# Patient Record
Sex: FEMALE | Race: WHITE | NOT HISPANIC OR LATINO | Employment: PART TIME | URBAN - NONMETROPOLITAN AREA
[De-identification: names, ages, dates, MRNs, and addresses within clinical notes are randomized per-mention and may not be internally consistent; named-entity substitution may affect disease eponyms.]

---

## 2017-01-03 ENCOUNTER — OFFICE VISIT (OUTPATIENT)
Dept: URGENT CARE | Facility: PHYSICIAN GROUP | Age: 67
End: 2017-01-03
Payer: MEDICARE

## 2017-01-03 VITALS
HEART RATE: 76 BPM | BODY MASS INDEX: 20.83 KG/M2 | WEIGHT: 122 LBS | RESPIRATION RATE: 16 BRPM | OXYGEN SATURATION: 100 % | DIASTOLIC BLOOD PRESSURE: 78 MMHG | SYSTOLIC BLOOD PRESSURE: 124 MMHG | TEMPERATURE: 98.2 F | HEIGHT: 64 IN

## 2017-01-03 DIAGNOSIS — J01.00 ACUTE NON-RECURRENT MAXILLARY SINUSITIS: ICD-10-CM

## 2017-01-03 PROCEDURE — 99214 OFFICE O/P EST MOD 30 MIN: CPT | Performed by: PHYSICIAN ASSISTANT

## 2017-01-03 RX ORDER — DOXYCYCLINE HYCLATE 100 MG
100 TABLET ORAL 2 TIMES DAILY
Qty: 20 TAB | Refills: 0 | Status: SHIPPED | OUTPATIENT
Start: 2017-01-03 | End: 2017-02-16

## 2017-01-03 NOTE — MR AVS SNAPSHOT
"        Tanisha Haile   1/3/2017 1:10 PM   Office Visit   MRN: [id number]    Department:  [facility]   Dept Phone:  [phone]    Description:  Female : 1950   Provider:  Trini Oakley PA-C           Reason for Visit     Nasal Congestion X 3 days, treated for laryngitis a week ago with antibiotics      Allergies as of 1/3/2017     Allergen Noted Reactions    Tylenol 2014   Anaphylaxis    Asa [Aspirin] 2012       Nsaids 2013   Rash, Itching    Pcn [Penicillins] 2012   Itching    Sulfa Drugs 2012         You were diagnosed with     Acute non-recurrent maxillary sinusitis   [[id number]]         Vital Signs     Blood Pressure Pulse Temperature Respirations Height Weight    124/78 mmHg 76 36.8 °C (98.2 °F) 16 1.626 m (5' 4.02\") 55.339 kg (122 lb)    Body Mass Index Oxygen Saturation Breastfeeding? Smoking Status          20.93 kg/m2 100% No Former Smoker        Basic Information     Date Of Birth Sex Race Ethnicity Preferred Language    1950 Female White Non- English      Your appointments     2017 11:40 AM   Established Patient with ELLEN Madsen   [facility] [address] [address] [phone]           You will be receiving a confirmation call a few days before your appointment from our automated call confirmation system.              Problem List              ICD-10-CM Priority Class Noted - Resolved    Dyslipidemia E78.5   10/24/2012 - Present    History of carpal tunnel release of both wrists Z98.890   2013 - Present    Vitamin D deficiency E55.9   2013 - Present    Neck pain M54.2   4/15/2013 - Present    Seasonal allergies J30.2   2013 - Present    Cerumen impaction H61.20   2013 - Present    Shingles B02.9   7/10/2013 - Present    Insomnia G47.00   2014 - Present    Headache R51   10/3/2014 - Present    Need for vaccination for measles Z23   2/3/2015 - " Present    Elevated TSH R94.6   7/9/2015 - Present    Stress at home F43.9   7/9/2015 - Present    Mid back pain on left side M54.9   8/14/2015 - Present    Osteoporosis M81.0   8/14/2015 - Present    Interstitial cystitis N30.10   7/7/2016 - Present    Need for Tdap vaccination Z23   10/4/2016 - Present      Health Maintenance        Date Due Completion Dates    IMM DTaP/Tdap/Td Vaccine (1 - Tdap) 5/13/1969 ---    IMM PNEUMOCOCCAL 65+ (ADULT) LOW/MEDIUM RISK SERIES (2 of 2 - PCV13) 12/22/2016 12/22/2015    IMM INFLUENZA (1) 10/2/2017 (Originally 9/1/2016) ---    MAMMOGRAM 1/5/2017 1/5/2016, 1/2/2015, 12/31/2013, 12/24/2012    PAP SMEAR 8/4/2017 8/4/2014, 8/4/2014 (Done)    Override on 8/4/2014: Done    BONE DENSITY 7/15/2020 7/15/2015, 12/24/2012    COLONOSCOPY 1/9/2024 1/9/2014            Current Immunizations     Pneumococcal polysaccharide vaccine (PPSV-23) 12/22/2015  2:20 PM      Below and/or attached are the medications your provider expects you to take. Review all of your home medications and newly ordered medications with your provider and/or pharmacist. Follow medication instructions as directed by your provider and/or pharmacist. Please keep your medication list with you and share with your provider. Update the information when medications are discontinued, doses are changed, or new medications (including over-the-counter products) are added; and carry medication information at all times in the event of emergency situations     Allergies:  TYLENOL - Anaphylaxis     ASA - (reactions not documented)     NSAIDS - Rash,Itching     PCN - Itching     SULFA DRUGS - (reactions not documented)               Medications  Valid as of: January 03, 2017 -  1:36 PM    Generic Name Brand Name Tablet Size Instructions for use    Calcium Carbonate-Vitamin D (Tab) Calcium Carbonate-Vitamin D 600-400 MG-UNIT Take  by mouth 2 Times a Day.          Cholecalciferol (Tab) vitamin D 2000 UNIT Take 1 Tab by mouth every day.           Doxycycline Hyclate (Tab) VIBRAMYCIN 100 MG Take 1 Tab by mouth 2 times a day.        Simvastatin (Tab) ZOCOR 20 MG TAKE 1 TABLET BY MOUTH EVERY EVENING        Tetanus-Diphth-Acell Pertussis (Suspension) ADACEL 5-2-15.5 LF-MCG/0.5 0.5 mL by Intramuscular route Once PRN for up to 1 dose.        ValACYclovir HCl (Tab) VALTREX 500 MG Take 1 Tab by mouth every day.        Vitamin E (Cap) VITAMIN E 400 UNIT Take 800 Units by mouth every day.        .                 Medicines prescribed today were sent to:     [facility] [address] [place] [address] - [address] [facility] [address]    [address] [facility] [address]    Phone: [phone] Fax: [phone]    Open 24 Hours?: Yes      Medication refill instructions:       If your prescription bottle indicates you have medication refills left, it is not necessary to call your provider’s office. Please contact your pharmacy and they will refill your medication.    If your prescription bottle indicates you do not have any refills left, you may request refills at any time through one of the following ways: The online LIN TV system (except Urgent Care), by calling your provider’s office, or by asking your pharmacy to contact your provider’s office with a refill request. Medication refills are processed only during regular business hours and may not be available until the next business day. Your provider may request additional information or to have a follow-up visit with you prior to refilling your medication.   *Please Note: Medication refills are assigned a new Rx number when refilled electronically. Your pharmacy may indicate that no refills were authorized even though a new prescription for the same medication is available at the pharmacy. Please request the medicine by name with the pharmacy before contacting your provider for a refill.           LIN TV Access Code: [email or web address]  Expires: 1/18/2017  1:28 PM    LIN TV  A secure, online tool to  manage your health information     [employer]’s Edmodo® is a secure, online tool that connects you to your personalized health information from the privacy of your home -- day or night - making it very easy for you to manage your healthcare. Once the activation process is completed, you can even access your medical information using the Edmodo margie, which is available for free in the Apple Margie store or Google Play store.     Edmodo provides the following levels of access (as shown below):   My Chart Features   [facility] Primary [facility] Doctor [facility]  Specialists [facility]  Urgent  Care Non-[facility]  Primary Care  Doctor   Email your healthcare team securely and privately 24/7 X X X    Manage appointments: schedule your next appointment; view details of past/upcoming appointments X      Request prescription refills. X      View recent personal medical records, including lab and immunizations X X X X   View health record, including health history, allergies, medications X X X X   Read reports about your outpatient visits, procedures, consult and ER notes X X X X   See your discharge summary, which is a recap of your hospital and/or ER visit that includes your diagnosis, lab results, and care plan. X X       How to register for Edmodo:  1. Go to  [email or web address].  2. Click on the Sign Up Now box, which takes you to the New Member Sign Up page. You will need to provide the following information:  a. Enter your Edmodo Access Code exactly as it appears at the top of this page. (You will not need to use this code after you’ve completed the sign-up process. If you do not sign up before the expiration date, you must request a new code.)   b. Enter your date of birth.   c. Enter your home email address.   d. Click Submit, and follow the next screen’s instructions.  3. Create a Edmodo ID. This will be your Edmodo login ID and cannot be changed, so think of one that is secure and easy to remember.  4. Create a Miiixt  password. You can change your password at any time.  5. Enter your Password Reset Question and Answer. This can be used at a later time if you forget your password.   6. Enter your e-mail address. This allows you to receive e-mail notifications when new information is available in [employer].  7. Click Sign Up. You can now view your health information.    For assistance activating your [employer] account, call [phone]

## 2017-01-03 NOTE — PROGRESS NOTES
Chief Complaint   Patient presents with   • Nasal Congestion     X 3 days, treated for laryngitis a week ago with antibiotics       HISTORY OF PRESENT ILLNESS: Patient is a 66 y.o. female who presents today for evaluation of persistent nasal congestion. Patient has had nasal congestion for a total of about 3 weeks now. It became acutely worse about 4 days ago. She was seen 12/22/16 and put on erythromycin. She states it did not help at all. She denies fever but continues to have pressure and pain in both cheeks. She has difficulty sleeping because of pressure. She recently lost her  to cancer. Over-the-counter medication is not helping.    Patient Active Problem List    Diagnosis Date Noted   • Need for Tdap vaccination 10/04/2016   • Interstitial cystitis 07/07/2016   • Mid back pain on left side 08/14/2015   • Osteoporosis 08/14/2015   • Elevated TSH 07/09/2015   • Stress at home 07/09/2015   • Need for vaccination for measles 02/03/2015   • Headache 10/03/2014   • Insomnia 02/27/2014   • Shingles 07/10/2013   • Cerumen impaction 06/05/2013   • Seasonal allergies 05/22/2013   • Neck pain 04/15/2013   • Vitamin D deficiency 02/28/2013   • History of carpal tunnel release of both wrists 01/16/2013   • Dyslipidemia 10/24/2012       Allergies:Tylenol; Asa; Nsaids; Pcn; and Sulfa drugs    Current Outpatient Prescriptions Ordered in [facility]   Medication Sig Dispense Refill   • doxycycline (VIBRAMYCIN) 100 MG Tab Take 1 Tab by mouth 2 times a day. 20 Tab 0   • simvastatin (ZOCOR) 20 MG Tab TAKE 1 TABLET BY MOUTH EVERY EVENING 90 Tab 3   • Calcium Carbonate-Vitamin D (CALTRATE 600+D) 600-400 MG-UNIT TABS Take  by mouth 2 Times a Day.       • Cholecalciferol (VITAMIN D) 2000 UNIT TABS Take 1 Tab by mouth every day. 30 Each 3   • vitamin e (VITAMIN E) 400 UNIT Cap Take 800 Units by mouth every day.     • tetanus-dipth-acell pertussis (ADACEL) 5-2-15.5 LF-MCG/0.5 Suspension 0.5 mL by Intramuscular route Once PRN for up  to 1 dose. 0.5 mL 0   • valacyclovir (VALTREX) 500 MG Tab Take 1 Tab by mouth every day. 90 Tab 3     No current Epic-ordered facility-administered medications on file.       Past Medical History   Diagnosis Date   • Hyperlipidemia    • Dyslipidemia 10/24/2012   • Cervical cancer screening 2013     Due to repeat around .   • Seasonal allergies 2013   • Insomnia 2014     Not sleeping, can't shut brain off Making her really tired during the day X 1 month plus but worse for a month   • Dysuria 10/2/2014     Seen  for uti, given macrobid Feels the same as  Feels more like vaginal pain     • Shingles        Social History   Substance Use Topics   • Smoking status: Former Smoker -- 1.00 packs/day for 50 years     Types: Cigarettes     Quit date: 2008   • Smokeless tobacco: Never Used   • Alcohol Use: 0.0 oz/week     0 Standard drinks or equivalent per week      Comment: 2 glasses of wine a day sometimes       Family Status   Relation Status Death Age   • Mother  83     heart failure   • Father  69     quad bpass, coma   • Brother Alive      handicap at a facility   • Brother Alive    • Brother Alive      Family History   Problem Relation Age of Onset   • Hypertension Mother    • Hyperlipidemia Mother    • Thyroid Mother    • Lung Disease Mother    • Heart Disease Father    • Hypertension Father    • Hyperlipidemia Father        ROS:   Review of Systems   Constitutional: Negative for fever, chills, weight loss and malaise/fatigue.   HENT: Negative for ear pain, nosebleeds, sore throat and neck pain.    Eyes: Negative for blurred vision.   Respiratory: Negative for cough, sputum production, shortness of breath and wheezing.    Cardiovascular: Negative for chest pain, palpitations, orthopnea and leg swelling.   Gastrointestinal: Negative for heartburn, nausea, vomiting and abdominal pain.   Genitourinary: Negative for dysuria, urgency and frequency.       Exam:  Blood pressure  "124/78, pulse 76, temperature 36.8 °C (98.2 °F), resp. rate 16, height 1.626 m (5' 4.02\"), weight 55.339 kg (122 lb), SpO2 100 %, not currently breastfeeding.  General: Normal appearing. No distress.  HEENT: Conjunctiva clear, lids without ptosis, ears normal shape and contour, canals are clear bilaterally, tympanic membranes are benign, nasal mucosa boggy bilaterally, oropharynx is without erythema, edema or exudates.  Pulmonary: Clear to ausculation and percussion.  Normal effort. No rales, ronchi, or wheezing.   Cardiovascular: Regular rate and rhythm without murmur.   Neurologic: Grossly nonfocal.  Lymph: No cervical lymphadenopathy noted.  Skin: No obvious lesions.  Psych: Normal mood. Alert and oriented x3. Judgment and insight is normal.    Assessment/Plan:  Take all medication as directed. Follow up for worsening or persistent symptoms.    1. Acute non-recurrent maxillary sinusitis  doxycycline (VIBRAMYCIN) 100 MG Tab         "

## 2017-01-05 ENCOUNTER — OFFICE VISIT (OUTPATIENT)
Dept: URGENT CARE | Facility: PHYSICIAN GROUP | Age: 67
End: 2017-01-05
Payer: MEDICARE

## 2017-01-05 VITALS
HEART RATE: 94 BPM | BODY MASS INDEX: 20.83 KG/M2 | HEIGHT: 64 IN | DIASTOLIC BLOOD PRESSURE: 84 MMHG | RESPIRATION RATE: 14 BRPM | SYSTOLIC BLOOD PRESSURE: 122 MMHG | WEIGHT: 122 LBS | TEMPERATURE: 98.6 F | OXYGEN SATURATION: 98 %

## 2017-01-05 DIAGNOSIS — F41.9 ANXIETY: ICD-10-CM

## 2017-01-05 LAB
APPEARANCE UR: NORMAL
BILIRUB UR STRIP-MCNC: NORMAL MG/DL
COLOR UR AUTO: YELLOW
GLUCOSE UR STRIP.AUTO-MCNC: NORMAL MG/DL
KETONES UR STRIP.AUTO-MCNC: NORMAL MG/DL
LEUKOCYTE ESTERASE UR QL STRIP.AUTO: NORMAL
NITRITE UR QL STRIP.AUTO: NORMAL
PH UR STRIP.AUTO: 6 [PH] (ref 5–8)
PROT UR QL STRIP: NORMAL MG/DL
RBC UR QL AUTO: NORMAL
SP GR UR STRIP.AUTO: 1.01
UROBILINOGEN UR STRIP-MCNC: NORMAL MG/DL

## 2017-01-05 PROCEDURE — 99214 OFFICE O/P EST MOD 30 MIN: CPT | Performed by: PHYSICIAN ASSISTANT

## 2017-01-05 PROCEDURE — 81002 URINALYSIS NONAUTO W/O SCOPE: CPT | Performed by: PHYSICIAN ASSISTANT

## 2017-01-05 RX ORDER — HYDROXYZINE HYDROCHLORIDE 25 MG/1
25 TABLET, FILM COATED ORAL 3 TIMES DAILY PRN
Qty: 30 TAB | Refills: 0 | Status: SHIPPED | OUTPATIENT
Start: 2017-01-05 | End: 2017-09-25

## 2017-01-05 RX ORDER — LORAZEPAM 0.5 MG/1
0.5 TABLET ORAL NIGHTLY PRN
Qty: 30 TAB | Refills: 0 | Status: SHIPPED | OUTPATIENT
Start: 2017-01-05 | End: 2017-02-21 | Stop reason: SDUPTHER

## 2017-01-05 NOTE — MR AVS SNAPSHOT
"        Tanisha Haile   2017 3:55 PM   Office Visit   MRN: [id number]    Department:  [facility]   Dept Phone:  [phone]    Description:  Female : 1950   Provider:  Nikita Stiles PA-C           Reason for Visit     Anxiety           Allergies as of 2017     Allergen Noted Reactions    Tylenol 2014   Anaphylaxis    Asa [Aspirin] 2012       Nsaids 2013   Rash, Itching    Pcn [Penicillins] 2012   Itching    Sulfa Drugs 2012         You were diagnosed with     Anxiety   [988857]         Vital Signs     Blood Pressure Pulse Temperature Respirations Height Weight    122/84 mmHg 94 37 °C (98.6 °F) 14 1.626 m (5' 4.02\") 55.339 kg (122 lb)    Body Mass Index Oxygen Saturation Smoking Status             20.93 kg/m2 98% Former Smoker         Basic Information     Date Of Birth Sex Race Ethnicity Preferred Language    1950 Female White Non- English      Your appointments     2017  3:00 PM   Established Patient with ELLEN Madsen   [facility] [address] [address] [phone]           You will be receiving a confirmation call a few days before your appointment from our automated call confirmation system.              Problem List              ICD-10-CM Priority Class Noted - Resolved    Dyslipidemia E78.5   10/24/2012 - Present    History of carpal tunnel release of both wrists Z98.890   2013 - Present    Vitamin D deficiency E55.9   2013 - Present    Neck pain M54.2   4/15/2013 - Present    Seasonal allergies J30.2   2013 - Present    Cerumen impaction H61.20   2013 - Present    Shingles B02.9   7/10/2013 - Present    Insomnia G47.00   2014 - Present    Headache R51   10/3/2014 - Present    Need for vaccination for measles Z23   2/3/2015 - Present    Elevated TSH R94.6   2015 - Present    Stress at home F43.9   2015 - Present    Mid back pain on left " side M54.9   8/14/2015 - Present    Osteoporosis M81.0   8/14/2015 - Present    Interstitial cystitis N30.10   7/7/2016 - Present    Need for Tdap vaccination Z23   10/4/2016 - Present      Health Maintenance        Date Due Completion Dates    IMM DTaP/Tdap/Td Vaccine (1 - Tdap) 5/13/1969 ---    IMM PNEUMOCOCCAL 65+ (ADULT) LOW/MEDIUM RISK SERIES (2 of 2 - PCV13) 12/22/2016 12/22/2015    MAMMOGRAM 1/5/2017 1/5/2016, 1/2/2015, 12/31/2013, 12/24/2012    IMM INFLUENZA (1) 10/2/2017 (Originally 9/1/2016) ---    PAP SMEAR 8/4/2017 8/4/2014, 8/4/2014 (Done)    Override on 8/4/2014: Done    BONE DENSITY 7/15/2020 7/15/2015, 12/24/2012    COLONOSCOPY 1/9/2024 1/9/2014            Current Immunizations     Pneumococcal polysaccharide vaccine (PPSV-23) 12/22/2015  2:20 PM      Below and/or attached are the medications your provider expects you to take. Review all of your home medications and newly ordered medications with your provider and/or pharmacist. Follow medication instructions as directed by your provider and/or pharmacist. Please keep your medication list with you and share with your provider. Update the information when medications are discontinued, doses are changed, or new medications (including over-the-counter products) are added; and carry medication information at all times in the event of emergency situations     Allergies:  TYLENOL - Anaphylaxis     ASA - (reactions not documented)     NSAIDS - Rash,Itching     PCN - Itching     SULFA DRUGS - (reactions not documented)               Medications  Valid as of: January 05, 2017 -  4:46 PM    Generic Name Brand Name Tablet Size Instructions for use    Calcium Carbonate-Vitamin D (Tab) Calcium Carbonate-Vitamin D 600-400 MG-UNIT Take  by mouth 2 Times a Day.          Cholecalciferol (Tab) vitamin D 2000 UNIT Take 1 Tab by mouth every day.        Doxycycline Hyclate (Tab) VIBRAMYCIN 100 MG Take 1 Tab by mouth 2 times a day.        HydrOXYzine HCl (Tab) ATARAX 25  MG Take 1 Tab by mouth 3 times a day as needed for Anxiety.        LORazepam (Tab) ATIVAN 0.5 MG Take 1 Tab by mouth at bedtime as needed for Anxiety.        Simvastatin (Tab) ZOCOR 20 MG TAKE 1 TABLET BY MOUTH EVERY EVENING        Tetanus-Diphth-Acell Pertussis (Suspension) ADACEL 5-2-15.5 LF-MCG/0.5 0.5 mL by Intramuscular route Once PRN for up to 1 dose.        ValACYclovir HCl (Tab) VALTREX 500 MG Take 1 Tab by mouth every day.        Vitamin E (Cap) VITAMIN E 400 UNIT Take 800 Units by mouth every day.        .                 Medicines prescribed today were sent to:     [facility] [address] [place] [address] - [address] [facility] [address]    Phone: [phone] Fax: [phone]    Open 24 Hours?: Yes      Medication refill instructions:       If your prescription bottle indicates you have medication refills left, it is not necessary to call your provider’s office. Please contact your pharmacy and they will refill your medication.    If your prescription bottle indicates you do not have any refills left, you may request refills at any time through one of the following ways: The online Con[employer] system (except Urgent Care), by calling your provider’s office, or by asking your pharmacy to contact your provider’s office with a refill request. Medication refills are processed only during regular business hours and may not be available until the next business day. Your provider may request additional information or to have a follow-up visit with you prior to refilling your medication.   *Please Note: Medication refills are assigned a new Rx number when refilled electronically. Your pharmacy may indicate that no refills were authorized even though a new prescription for the same medication is available at the pharmacy. Please request the medicine by name with the pharmacy before contacting your provider for a refill.           Conversion Sound Access Code:  [email or web address]  Expires: 1/18/2017  1:28 PM    [employer]  A secure, online tool to manage your health information     [employer] [employer]’s [employer]® is a secure, online tool that connects you to your personalized health information from the privacy of your home -- day or night - making it very easy for you to manage your healthcare. Once the activation process is completed, you can even access your medical information using the [employer] margie, which is available for free in the Apple Margie store or Google Play store.     [employer] provides the following levels of access (as shown below):   My Chart Features   [facility] Primary Care Doctor [facility]  Specialists [facility]  Urgent  Care Non-[facility]  Primary Care  Doctor   Email your healthcare team securely and privately 24/7 X X X    Manage appointments: schedule your next appointment; view details of past/upcoming appointments X      Request prescription refills. X      View recent personal medical records, including lab and immunizations X X X X   View health record, including health history, allergies, medications X X X X   Read reports about your outpatient visits, procedures, consult and ER notes X X X X   See your discharge summary, which is a recap of your hospital and/or ER visit that includes your diagnosis, lab results, and care plan. X X       How to register for [employer]:  1. Go to  [email or web address].  2. Click on the Sign Up Now box, which takes you to the New Member Sign Up page. You will need to provide the following information:  a. Enter your [employer] Access Code exactly as it appears at the top of this page. (You will not need to use this code after you’ve completed the sign-up process. If you do not sign up before the expiration date, you must request a new code.)   b. Enter your date of birth.   c. Enter your home email address.   d. Click Submit, and follow the next screen’s instructions.  3. Create a [employer] ID. This will be your [employer] login ID and cannot be  changed, so think of one that is secure and easy to remember.  4. Create a [employer] password. You can change your password at any time.  5. Enter your Password Reset Question and Answer. This can be used at a later time if you forget your password.   6. Enter your e-mail address. This allows you to receive e-mail notifications when new information is available in [employer].  7. Click Sign Up. You can now view your health information.    For assistance activating your [employer] account, call [phone]

## 2017-01-06 NOTE — PROGRESS NOTES
Chief Complaint   Patient presents with   • Anxiety       HISTORY OF PRESENT ILLNESS: Patient is a 66 y.o. female who presents today because she is having anxiety in the evenings. Her   6 days ago suddenly from an undiagnosed brain cancer . This patient has been having difficulty sleeping, fever in the evenings because she has alone, internal monologue and a lot of worries about finances and decision making around her 's death. She denies any suicidal or homicidal ideation    Patient Active Problem List    Diagnosis Date Noted   • Need for Tdap vaccination 10/04/2016   • Interstitial cystitis 2016   • Mid back pain on left side 2015   • Osteoporosis 2015   • Elevated TSH 2015   • Stress at home 2015   • Need for vaccination for measles 2015   • Headache 10/03/2014   • Insomnia 2014   • Shingles 07/10/2013   • Cerumen impaction 2013   • Seasonal allergies 2013   • Neck pain 04/15/2013   • Vitamin D deficiency 2013   • History of carpal tunnel release of both wrists 2013   • Dyslipidemia 10/24/2012       Allergies:Tylenol; Asa; Nsaids; Pcn; and Sulfa drugs    Current Outpatient Prescriptions Ordered in [facility]   Medication Sig Dispense Refill   • hydrOXYzine (ATARAX) 25 MG Tab Take 1 Tab by mouth 3 times a day as needed for Anxiety. 30 Tab 0   • lorazepam (ATIVAN) 0.5 MG Tab Take 1 Tab by mouth at bedtime as needed for Anxiety. 30 Tab 0   • doxycycline (VIBRAMYCIN) 100 MG Tab Take 1 Tab by mouth 2 times a day. 20 Tab 0   • vitamin e (VITAMIN E) 400 UNIT Cap Take 800 Units by mouth every day.     • tetanus-dipth-acell pertussis (ADACEL) 5-2-15.5 LF-MCG/0.5 Suspension 0.5 mL by Intramuscular route Once PRN for up to 1 dose. 0.5 mL 0   • simvastatin (ZOCOR) 20 MG Tab TAKE 1 TABLET BY MOUTH EVERY EVENING 90 Tab 3   • valacyclovir (VALTREX) 500 MG Tab Take 1 Tab by mouth every day. 90 Tab 3   • Calcium Carbonate-Vitamin D (CALTRATE 600+D)  600-400 MG-UNIT TABS Take  by mouth 2 Times a Day.       • Cholecalciferol (VITAMIN D) 2000 UNIT TABS Take 1 Tab by mouth every day. 30 Each 3     No current Epic-ordered facility-administered medications on file.       Past Medical History   Diagnosis Date   • Hyperlipidemia    • Dyslipidemia 10/24/2012   • Cervical cancer screening 2013     Due to repeat around .   • Seasonal allergies 2013   • Insomnia 2014     Not sleeping, can't shut brain off Making her really tired during the day X 1 month plus but worse for a month   • Dysuria 10/2/2014     Seen  for uti, given macrobid Feels the same as  Feels more like vaginal pain     • Shingles        Social History   Substance Use Topics   • Smoking status: Former Smoker -- 1.00 packs/day for 50 years     Types: Cigarettes     Quit date: 2008   • Smokeless tobacco: Never Used   • Alcohol Use: 0.0 oz/week     0 Standard drinks or equivalent per week      Comment: 2 glasses of wine a day sometimes       Family Status   Relation Status Death Age   • Mother  83     heart failure   • Father  69     quad bpass, coma   • Brother Alive      handicap at a facility   • Brother Alive    • Brother Alive      Family History   Problem Relation Age of Onset   • Hypertension Mother    • Hyperlipidemia Mother    • Thyroid Mother    • Lung Disease Mother    • Heart Disease Father    • Hypertension Father    • Hyperlipidemia Father        ROS:  Review of Systems   Constitutional: Negative for fever, chills, weight loss and malaise/fatigue.   HENT: Negative for ear pain, nosebleeds, congestion, sore throat and neck pain.    Eyes: Negative for blurred vision.   Respiratory: Negative for cough, sputum production, shortness of breath and wheezing.    Cardiovascular: Negative for chest pain, palpitations, orthopnea and leg swelling.   Gastrointestinal: Negative for heartburn, nausea, vomiting and abdominal pain.       Exam:  Blood pressure  "122/84, pulse 94, temperature 37 °C (98.6 °F), resp. rate 14, height 1.626 m (5' 4.02\"), weight 55.339 kg (122 lb), SpO2 98 %.  General:  Well nourished, well developed female in NAD, tearful when talking about her   Head:Normocephalic, atraumatic  Eyes: PERRLA, EOM within normal limits, no conjunctival injection, no scleral icterus, visual fields and acuity grossly intact.  Extremities: no clubbing, cyanosis, or edema.    Please note that this dictation was created using voice recognition software. I have made every reasonable attempt to correct obvious errors, but I expect that there are errors of grammar and possibly content that I did not discover before finalizing the note.    Assessment/Plan:  1. Anxiety  POCT Urinalysis    hydrOXYzine (ATARAX) 25 MG Tab    lorazepam (ATIVAN) 0.5 MG Tab       Followup with primary care in the next 7-10 days if not significantly improving, return to the urgent care or go to the emergency room sooner for any worsening of symptoms.         "

## 2017-01-09 ENCOUNTER — OFFICE VISIT (OUTPATIENT)
Dept: URGENT CARE | Facility: PHYSICIAN GROUP | Age: 67
End: 2017-01-09
Payer: MEDICARE

## 2017-01-09 VITALS
HEART RATE: 73 BPM | RESPIRATION RATE: 16 BRPM | WEIGHT: 122 LBS | BODY MASS INDEX: 20.83 KG/M2 | OXYGEN SATURATION: 98 % | HEIGHT: 64 IN | DIASTOLIC BLOOD PRESSURE: 68 MMHG | TEMPERATURE: 98.8 F | SYSTOLIC BLOOD PRESSURE: 104 MMHG

## 2017-01-09 DIAGNOSIS — J98.8 WHEEZING-ASSOCIATED RESPIRATORY INFECTION (WARI): ICD-10-CM

## 2017-01-09 PROCEDURE — 99214 OFFICE O/P EST MOD 30 MIN: CPT | Performed by: PHYSICIAN ASSISTANT

## 2017-01-09 RX ORDER — ALBUTEROL SULFATE 90 UG/1
2 AEROSOL, METERED RESPIRATORY (INHALATION) EVERY 4 HOURS PRN
Qty: 1 INHALER | Refills: 0 | Status: SHIPPED | OUTPATIENT
Start: 2017-01-09 | End: 2017-09-19

## 2017-01-09 NOTE — MR AVS SNAPSHOT
"        Tanisha Haile   2017 3:50 PM   Office Visit   MRN: [id number]    Department:  [facility]   Dept Phone:  [phone]    Description:  Female : 1950   Provider:  Nikita Stiles PA-C           Reason for Visit     Cough Pt states she was seen 16 with little improvement, Pt states when she was last seen R foot is now numb      Allergies as of 2017     Allergen Noted Reactions    Tylenol 2014   Anaphylaxis    Asa [Aspirin] 2012       Nsaids 2013   Rash, Itching    Pcn [Penicillins] 2012   Itching    Sulfa Drugs 2012         You were diagnosed with     Wheezing-associated respiratory infection (WARI)   [[id number]]         Vital Signs     Blood Pressure Pulse Temperature Respirations Height Weight    104/68 mmHg 73 37.1 °C (98.8 °F) 16 1.626 m (5' 4.02\") 55.339 kg (122 lb)    Body Mass Index Oxygen Saturation Breastfeeding? Smoking Status          20.93 kg/m2 98% No Former Smoker        Basic Information     Date Of Birth Sex Race Ethnicity Preferred Language    1950 Female White Non- English      Your appointments     2017  3:00 PM   Established Patient with ELLEN Madsen   [facility] [address] [address] [phone]           You will be receiving a confirmation call a few days before your appointment from our automated call confirmation system.              Problem List              ICD-10-CM Priority Class Noted - Resolved    Dyslipidemia E78.5   10/24/2012 - Present    History of carpal tunnel release of both wrists Z98.890   2013 - Present    Vitamin D deficiency E55.9   2013 - Present    Neck pain M54.2   4/15/2013 - Present    Seasonal allergies J30.2   2013 - Present    Cerumen impaction H61.20   2013 - Present    Shingles B02.9   7/10/2013 - Present    Insomnia G47.00   2014 - Present    Headache R51   10/3/2014 - Present    Need for " vaccination for measles Z23   2/3/2015 - Present    Elevated TSH R94.6   7/9/2015 - Present    Stress at home F43.9   7/9/2015 - Present    Mid back pain on left side M54.9   8/14/2015 - Present    Osteoporosis M81.0   8/14/2015 - Present    Interstitial cystitis N30.10   7/7/2016 - Present    Need for Tdap vaccination Z23   10/4/2016 - Present      Health Maintenance        Date Due Completion Dates    IMM DTaP/Tdap/Td Vaccine (1 - Tdap) 5/13/1969 ---    IMM PNEUMOCOCCAL 65+ (ADULT) LOW/MEDIUM RISK SERIES (2 of 2 - PCV13) 12/22/2016 12/22/2015    MAMMOGRAM 1/5/2017 1/5/2016, 1/2/2015, 12/31/2013, 12/24/2012    IMM INFLUENZA (1) 10/2/2017 (Originally 9/1/2016) ---    PAP SMEAR 8/4/2017 8/4/2014, 8/4/2014 (Done)    Override on 8/4/2014: Done    BONE DENSITY 7/15/2020 7/15/2015, 12/24/2012    COLONOSCOPY 1/9/2024 1/9/2014            Current Immunizations     Pneumococcal polysaccharide vaccine (PPSV-23) 12/22/2015  2:20 PM      Below and/or attached are the medications your provider expects you to take. Review all of your home medications and newly ordered medications with your provider and/or pharmacist. Follow medication instructions as directed by your provider and/or pharmacist. Please keep your medication list with you and share with your provider. Update the information when medications are discontinued, doses are changed, or new medications (including over-the-counter products) are added; and carry medication information at all times in the event of emergency situations     Allergies:  TYLENOL - Anaphylaxis     ASA - (reactions not documented)     NSAIDS - Rash,Itching     PCN - Itching     SULFA DRUGS - (reactions not documented)               Medications  Valid as of: January 09, 2017 -  5:05 PM    Generic Name Brand Name Tablet Size Instructions for use    Albuterol Sulfate (Aero Soln) albuterol 108 (90 BASE) MCG/ACT Inhale 2 Puffs by mouth every four hours as needed.        Calcium Carbonate-Vitamin D (Tab)  Calcium Carbonate-Vitamin D 600-400 MG-UNIT Take  by mouth 2 Times a Day.          Cholecalciferol (Tab) vitamin D 2000 UNIT Take 1 Tab by mouth every day.        Doxycycline Hyclate (Tab) VIBRAMYCIN 100 MG Take 1 Tab by mouth 2 times a day.        HydrOXYzine HCl (Tab) ATARAX 25 MG Take 1 Tab by mouth 3 times a day as needed for Anxiety.        LORazepam (Tab) ATIVAN 0.5 MG Take 1 Tab by mouth at bedtime as needed for Anxiety.        Simvastatin (Tab) ZOCOR 20 MG TAKE 1 TABLET BY MOUTH EVERY EVENING        Tetanus-Diphth-Acell Pertussis (Suspension) ADACEL 5-2-15.5 LF-MCG/0.5 0.5 mL by Intramuscular route Once PRN for up to 1 dose.        ValACYclovir HCl (Tab) VALTREX 500 MG Take 1 Tab by mouth every day.        Vitamin E (Cap) VITAMIN E 400 UNIT Take 800 Units by mouth every day.        .                 Medicines prescribed today were sent to:     [facility] [id number] [facility] [place] - [address] AT [place] [address]    [address] [facility] [address]    Phone: [phone] Fax: [phone]    Open 24 Hours?: No      Medication refill instructions:       If your prescription bottle indicates you have medication refills left, it is not necessary to call your provider’s office. Please contact your pharmacy and they will refill your medication.    If your prescription bottle indicates you do not have any refills left, you may request refills at any time through one of the following ways: The online Cup[employer] system (except Urgent Care), by calling your provider’s office, or by asking your pharmacy to contact your provider’s office with a refill request. Medication refills are processed only during regular business hours and may not be available until the next business day. Your provider may request additional information or to have a follow-up visit with you prior to refilling your medication.   *Please Note: Medication refills are assigned a new Rx number when refilled electronically. Your pharmacy may  indicate that no refills were authorized even though a new prescription for the same medication is available at the pharmacy. Please request the medicine by name with the pharmacy before contacting your provider for a refill.           [employer] Access Code: [email or web address]  Expires: 1/18/2017  1:28 PM    [employer]  A secure, online tool to manage your health information     [employer]’s [employer]® is a secure, online tool that connects you to your personalized health information from the privacy of your home -- day or night - making it very easy for you to manage your healthcare. Once the activation process is completed, you can even access your medical information using the [employer] margie, which is available for free in the Apple Margie store or Google Play store.     [employer] provides the following levels of access (as shown below):   My Chart Features   [facility] Doctor [facility]  Specialists [facility]  Urgent  Care [facility]  Primary Care  Doctor   Email your healthcare team securely and privately 24/7 X X X    Manage appointments: schedule your next appointment; view details of past/upcoming appointments X      Request prescription refills. X      View recent personal medical records, including lab and immunizations X X X X   View health record, including health history, allergies, medications X X X X   Read reports about your outpatient visits, procedures, consult and ER notes X X X X   See your discharge summary, which is a recap of your hospital and/or ER visit that includes your diagnosis, lab results, and care plan. X X       How to register for [employer]:  1. Go to  [email or web address].  2. Click on the Sign Up Now box, which takes you to the New Member Sign Up page. You will need to provide the following information:  a. Enter your [employer] Access Code exactly as it appears at the top of this page. (You will not need to use this code after you’ve completed the sign-up process. If you do not sign up  before the expiration date, you must request a new code.)   b. Enter your date of birth.   c. Enter your home email address.   d. Click Submit, and follow the next screen’s instructions.  3. Create a [employer] ID. This will be your [employer] login ID and cannot be changed, so think of one that is secure and easy to remember.  4. Create a [employer] password. You can change your password at any time.  5. Enter your Password Reset Question and Answer. This can be used at a later time if you forget your password.   6. Enter your e-mail address. This allows you to receive e-mail notifications when new information is available in [employer].  7. Click Sign Up. You can now view your health information.    For assistance activating your [employer] account, call [phone]

## 2017-01-10 NOTE — PROGRESS NOTES
Chief Complaint   Patient presents with   • Cough     Pt states she was seen 12/19/16 with little improvement, Pt states when she was last seen R foot is now numb       HISTORY OF PRESENT ILLNESS: Patient is a 66 y.o. female who presents today because she has no ongoing cough. His been going on for almost a month. She initially had a laryngitis, was treated for that and most of her symptoms went away, but she continues to have a cough, some intermittent phlegm production. She denies any fevers, chills, nausea, vomiting or diarrhea. She is under a lot of stress, as he recently lost her .    Patient Active Problem List    Diagnosis Date Noted   • Need for Tdap vaccination 10/04/2016   • Interstitial cystitis 07/07/2016   • Mid back pain on left side 08/14/2015   • Osteoporosis 08/14/2015   • Elevated TSH 07/09/2015   • Stress at home 07/09/2015   • Need for vaccination for measles 02/03/2015   • Headache 10/03/2014   • Insomnia 02/27/2014   • Shingles 07/10/2013   • Cerumen impaction 06/05/2013   • Seasonal allergies 05/22/2013   • Neck pain 04/15/2013   • Vitamin D deficiency 02/28/2013   • History of carpal tunnel release of both wrists 01/16/2013   • Dyslipidemia 10/24/2012       Allergies:Tylenol; Asa; Nsaids; Pcn; and Sulfa drugs    Current Outpatient Prescriptions Ordered in [facility]   Medication Sig Dispense Refill   • albuterol 108 (90 BASE) MCG/ACT Aero Soln inhalation aerosol Inhale 2 Puffs by mouth every four hours as needed. 1 Inhaler 0   • hydrOXYzine (ATARAX) 25 MG Tab Take 1 Tab by mouth 3 times a day as needed for Anxiety. 30 Tab 0   • lorazepam (ATIVAN) 0.5 MG Tab Take 1 Tab by mouth at bedtime as needed for Anxiety. 30 Tab 0   • simvastatin (ZOCOR) 20 MG Tab TAKE 1 TABLET BY MOUTH EVERY EVENING 90 Tab 3   • valacyclovir (VALTREX) 500 MG Tab Take 1 Tab by mouth every day. 90 Tab 3   • Calcium Carbonate-Vitamin D (CALTRATE 600+D) 600-400 MG-UNIT TABS Take  by mouth 2 Times a Day.       •  Cholecalciferol (VITAMIN D) 2000 UNIT TABS Take 1 Tab by mouth every day. 30 Each 3   • doxycycline (VIBRAMYCIN) 100 MG Tab Take 1 Tab by mouth 2 times a day. 20 Tab 0   • vitamin e (VITAMIN E) 400 UNIT Cap Take 800 Units by mouth every day.     • tetanus-dipth-acell pertussis (ADACEL) 5-2-15.5 LF-MCG/0.5 Suspension 0.5 mL by Intramuscular route Once PRN for up to 1 dose. 0.5 mL 0     No current Epic-ordered facility-administered medications on file.       Past Medical History   Diagnosis Date   • Hyperlipidemia    • Dyslipidemia 10/24/2012   • Cervical cancer screening 2013     Due to repeat around .   • Seasonal allergies 2013   • Insomnia 2014     Not sleeping, can't shut brain off Making her really tired during the day X 1 month plus but worse for a month   • Dysuria 10/2/2014     Seen  for uti, given macrobid Feels the same as  Feels more like vaginal pain     • Shingles        Social History   Substance Use Topics   • Smoking status: Former Smoker -- 1.00 packs/day for 50 years     Types: Cigarettes     Quit date: 2008   • Smokeless tobacco: Never Used   • Alcohol Use: 0.0 oz/week     0 Standard drinks or equivalent per week      Comment: 2 glasses of wine a day sometimes       Family Status   Relation Status Death Age   • Mother  83     heart failure   • Father  69     quad bpass, coma   • Brother Alive      handicap at a facility   • Brother Alive    • Brother Alive      Family History   Problem Relation Age of Onset   • Hypertension Mother    • Hyperlipidemia Mother    • Thyroid Mother    • Lung Disease Mother    • Heart Disease Father    • Hypertension Father    • Hyperlipidemia Father        ROS:  Review of Systems   Constitutional: Negative for fever, chills, weight loss and malaise/fatigue.   HENT: Negative for ear pain, nosebleeds, congestion, sore throat and neck pain.    Eyes: Negative for blurred vision.   Respiratory: Positive for cough, occasional  "sputum production, shortness of breath and no wheezing.    Cardiovascular: Negative for chest pain, palpitations, orthopnea and leg swelling.   Gastrointestinal: Negative for heartburn, nausea, vomiting and abdominal pain.   Genitourinary: Negative for dysuria, urgency and frequency.     Exam:  Blood pressure 104/68, pulse 73, temperature 37.1 °C (98.8 °F), resp. rate 16, height 1.626 m (5' 4.02\"), weight 55.339 kg (122 lb), SpO2 98 %, not currently breastfeeding.  General:  Well nourished, well developed female in NAD  Head:Normocephalic, atraumatic  Eyes: PERRLA, EOM within normal limits, no conjunctival injection, no scleral icterus, visual fields and acuity grossly intact.  Ears: Normal shape and symmetry, no tenderness, no discharge. External canals are without any significant edema or erythema. Tympanic membranes are without any inflammation, no effusion. Gross auditory acuity is intact  Nose: Symmetrical without tenderness, no discharge.  Mouth: reasonable hygiene, no erythema exudates or tonsillar enlargement.  Neck: no masses, range of motion within normal limits, no tracheal deviation. No obvious thyroid enlargement.  Pulmonary: chest is symmetrical with respiration, diminished with a few scattered wheezes bilaterally, no rales or rhonchi  Cardiovascular: regular rate and rhythm without murmurs, rubs, or gallops.  Extremities: no clubbing, cyanosis, or edema.    Please note that this dictation was created using voice recognition software. I have made every reasonable attempt to correct obvious errors, but I expect that there are errors of grammar and possibly content that I did not discover before finalizing the note.    Assessment/Plan:  1. Wheezing-associated respiratory infection (WARI)  albuterol 108 (90 BASE) MCG/ACT Aero Soln inhalation aerosol    reassured the patient. No evidence of acute bacterial infection.    Followup with primary care in the next 7-10 days if not significantly improving, return " to the urgent care or go to the emergency room sooner for any worsening of symptoms.

## 2017-02-16 ENCOUNTER — OFFICE VISIT (OUTPATIENT)
Dept: URGENT CARE | Facility: PHYSICIAN GROUP | Age: 67
End: 2017-02-16
Payer: MEDICARE

## 2017-02-16 VITALS
DIASTOLIC BLOOD PRESSURE: 78 MMHG | TEMPERATURE: 97.5 F | HEART RATE: 76 BPM | OXYGEN SATURATION: 100 % | RESPIRATION RATE: 16 BRPM | SYSTOLIC BLOOD PRESSURE: 122 MMHG | BODY MASS INDEX: 21.1 KG/M2 | WEIGHT: 123 LBS

## 2017-02-16 DIAGNOSIS — J04.0 LARYNGITIS: ICD-10-CM

## 2017-02-16 DIAGNOSIS — J32.9 RHINOSINUSITIS: Primary | ICD-10-CM

## 2017-02-16 DIAGNOSIS — J98.8 RTI (RESPIRATORY TRACT INFECTION): ICD-10-CM

## 2017-02-16 PROCEDURE — G8484 FLU IMMUNIZE NO ADMIN: HCPCS | Performed by: FAMILY MEDICINE

## 2017-02-16 PROCEDURE — G8432 DEP SCR NOT DOC, RNG: HCPCS | Performed by: FAMILY MEDICINE

## 2017-02-16 PROCEDURE — 1101F PT FALLS ASSESS-DOCD LE1/YR: CPT | Performed by: FAMILY MEDICINE

## 2017-02-16 PROCEDURE — G8419 CALC BMI OUT NRM PARAM NOF/U: HCPCS | Performed by: FAMILY MEDICINE

## 2017-02-16 PROCEDURE — 1036F TOBACCO NON-USER: CPT | Performed by: FAMILY MEDICINE

## 2017-02-16 PROCEDURE — 4040F PNEUMOC VAC/ADMIN/RCVD: CPT | Performed by: FAMILY MEDICINE

## 2017-02-16 PROCEDURE — 99214 OFFICE O/P EST MOD 30 MIN: CPT | Performed by: FAMILY MEDICINE

## 2017-02-16 PROCEDURE — 3014F SCREEN MAMMO DOC REV: CPT | Performed by: FAMILY MEDICINE

## 2017-02-16 PROCEDURE — 3017F COLORECTAL CA SCREEN DOC REV: CPT | Performed by: FAMILY MEDICINE

## 2017-02-16 RX ORDER — DOXYCYCLINE HYCLATE 100 MG
100 TABLET ORAL EVERY 12 HOURS
Qty: 14 TAB | Refills: 0 | Status: SHIPPED | OUTPATIENT
Start: 2017-02-16 | End: 2017-02-23

## 2017-02-16 RX ORDER — CODEINE PHOSPHATE AND GUAIFENESIN 10; 100 MG/5ML; MG/5ML
10 SOLUTION ORAL EVERY 6 HOURS PRN
Qty: 240 ML | Refills: 0 | Status: SHIPPED | OUTPATIENT
Start: 2017-02-16 | End: 2017-09-19

## 2017-02-16 ASSESSMENT — ENCOUNTER SYMPTOMS
FEVER: 0
FOCAL WEAKNESS: 0
HEMOPTYSIS: 0
DIZZINESS: 0
ORTHOPNEA: 0
CHILLS: 0
SORE THROAT: 1
COUGH: 1
SHORTNESS OF BREATH: 0

## 2017-02-16 NOTE — PROGRESS NOTES
Subjective:      Tanisha Haile is a 66 y.o. female who presents with Nasal Congestion    Chief Complaint   Patient presents with   • Nasal Congestion        - sinus pain pressure and DC x 2-3 days. No NVFC. Says she is prone to sinus infections. Slight cough for past few days as well    - hoarse x 2 months. Discussed this could be due to PND, advised to srat using her Flonase. If not improving in next few weeks when she has her f/u w/ PCP she may need ENT eval.               HPI    Review of Systems   Constitutional: Negative for fever and chills.   HENT: Positive for congestion and sore throat.    Respiratory: Positive for cough. Negative for hemoptysis and shortness of breath.    Cardiovascular: Negative for chest pain and orthopnea.   Neurological: Negative for dizziness and focal weakness.          Objective:     /78 mmHg  Pulse 76  Temp(Src) 36.4 °C (97.5 °F)  Resp 16  Wt 55.792 kg (123 lb)  SpO2 100%     Physical Exam   Constitutional: She appears well-developed. No distress.   HENT:   Head: Normocephalic and atraumatic.   Mouth/Throat: Oropharynx is clear and moist.   Cardiovascular: Regular rhythm.    No murmur heard.  Pulmonary/Chest: Effort normal. She has no wheezes.   Neurological: She is alert.   Skin: Skin is warm and dry.   Psychiatric: She has a normal mood and affect. Judgment normal.   Nursing note and vitals reviewed.  boggy nasal mucosa w/ yellow DC              Assessment/Plan:         1. Rhinosinusitis  doxycycline (VIBRAMYCIN) 100 MG Tab   2. RTI (respiratory tract infection)  guaifenesin-codeine (ROBITUSSIN AC) Solution oral solution   3. Laryngitis               Dx & d/c instructions discussed w/ patient and/or family members. Follow up w/ Prvt Dr or here in 3-4 days if not getting better, sooner if needed,  ER if worse and UC/PCP unavailable.        Possible side effects (i.e. Rash, GI upset/constipation, sedation, elevation of BP or sugars) of any medications given discussed.

## 2017-02-16 NOTE — MR AVS SNAPSHOT
Tanisha Pérezjune   2017 9:40 AM   Office Visit   MRN: [id number]    Department:  [facility]   Dept Phone:  [phone]    Description:  Female : 1950   Provider:  Dimas Lam M.D.           Reason for Visit     Nasal Congestion           Allergies as of 2017     Allergen Noted Reactions    Tylenol 2014   Anaphylaxis    Asa [Aspirin] 2012       Nsaids 2013   Rash, Itching    Pcn [Penicillins] 2012   Itching    Sulfa Drugs 2012         You were diagnosed with     Rhinosinusitis   [833760]  -  Primary     RTI (respiratory tract infection)   [135191]       Laryngitis   [715585]         Vital Signs     Blood Pressure Pulse Temperature Respirations Weight Oxygen Saturation    122/78 mmHg 76 36.4 °C (97.5 °F) 16 55.792 kg (123 lb) 100%    Smoking Status                   Former Smoker           Basic Information     Date Of Birth Sex Race Ethnicity Preferred Language    1950 Female White Non- English      Your appointments     2017  3:00 PM   Established Patient with ELLEN Madsen   [facility])    [address] [place] [id number] [phone]           You will be receiving a confirmation call a few days before your appointment from our automated call confirmation system.              Problem List              ICD-10-CM Priority Class Noted - Resolved    Dyslipidemia E78.5   10/24/2012 - Present    History of carpal tunnel release of both wrists Z98.890   2013 - Present    Vitamin D deficiency E55.9   2013 - Present    Neck pain M54.2   4/15/2013 - Present    Seasonal allergies J30.2   2013 - Present    Cerumen impaction H61.20   2013 - Present    Shingles B02.9   7/10/2013 - Present    Insomnia G47.00   2014 - Present    Headache R51   10/3/2014 - Present    Need for vaccination for measles Z23   2/3/2015 - Present    Elevated TSH R94.6   2015 - Present    Stress at home F43.9   7/9/2015 - Present    Mid back pain on left side M54.9   8/14/2015 - Present    Osteoporosis M81.0   8/14/2015 - Present    Interstitial cystitis N30.10   7/7/2016 - Present    Need for Tdap vaccination Z23   10/4/2016 - Present      Health Maintenance        Date Due Completion Dates    IMM DTaP/Tdap/Td Vaccine (1 - Tdap) 5/13/1969 ---    IMM PNEUMOCOCCAL 65+ (ADULT) LOW/MEDIUM RISK SERIES (2 of 2 - PCV13) 12/22/2016 12/22/2015    MAMMOGRAM 1/5/2017 1/5/2016, 1/2/2015, 12/31/2013, 12/24/2012    IMM INFLUENZA (1) 10/2/2017 (Originally 9/1/2016) ---    PAP SMEAR 8/4/2017 8/4/2014, 8/4/2014 (Done)    Override on 8/4/2014: Done    BONE DENSITY 7/15/2020 7/15/2015, 12/24/2012    COLONOSCOPY 1/9/2024 1/9/2014            Current Immunizations     Pneumococcal polysaccharide vaccine (PPSV-23) 12/22/2015  2:20 PM      Below and/or attached are the medications your provider expects you to take. Review all of your home medications and newly ordered medications with your provider and/or pharmacist. Follow medication instructions as directed by your provider and/or pharmacist. Please keep your medication list with you and share with your provider. Update the information when medications are discontinued, doses are changed, or new medications (including over-the-counter products) are added; and carry medication information at all times in the event of emergency situations     Allergies:  TYLENOL - Anaphylaxis     ASA - (reactions not documented)     NSAIDS - Rash,Itching     PCN - Itching     SULFA DRUGS - (reactions not documented)               Medications  Valid as of: February 16, 2017 - 10:11 AM    Generic Name Brand Name Tablet Size Instructions for use    Albuterol Sulfate (Aero Soln) albuterol 108 (90 BASE) MCG/ACT Inhale 2 Puffs by mouth every four hours as needed.        Calcium Carbonate-Vitamin D (Tab) Calcium Carbonate-Vitamin D 600-400 MG-UNIT Take  by mouth 2 Times a Day.           Cholecalciferol (Tab) vitamin D 2000 UNIT Take 1 Tab by mouth every day.        Doxycycline Hyclate (Tab) VIBRAMYCIN 100 MG Take 1 Tab by mouth every 12 hours for 7 days.        Guaifenesin-Codeine (Solution) ROBITUSSIN -10 mg/5mL Take 10 mL by mouth every 6 hours as needed for Cough.        HydrOXYzine HCl (Tab) ATARAX 25 MG Take 1 Tab by mouth 3 times a day as needed for Anxiety.        LORazepam (Tab) ATIVAN 0.5 MG Take 1 Tab by mouth at bedtime as needed for Anxiety.        Simvastatin (Tab) ZOCOR 20 MG TAKE 1 TABLET BY MOUTH EVERY EVENING        Tetanus-Diphth-Acell Pertussis (Suspension) ADACEL 5-2-15.5 LF-MCG/0.5 0.5 mL by Intramuscular route Once PRN for up to 1 dose.        ValACYclovir HCl (Tab) VALTREX 500 MG Take 1 Tab by mouth every day.        Vitamin E (Cap) VITAMIN E 400 UNIT Take 800 Units by mouth every day.        .                 Medicines prescribed today were sent to:     [facility] [address] [place] [address] - [address] [facility] [address] [place] [address] & [place]    [address] [facility] [address] [facility] [address]    Phone: [phone] Fax: [phone]    Open 24 Hours?: No      Medication refill instructions:       If your prescription bottle indicates you have medication refills left, it is not necessary to call your provider’s office. Please contact your pharmacy and they will refill your medication.    If your prescription bottle indicates you do not have any refills left, you may request refills at any time through one of the following ways: The online [employer] system (except Urgent Care), by calling your provider’s office, or by asking your pharmacy to contact your provider’s office with a refill request. Medication refills are processed only during regular business hours and may not be available until the next business day. Your provider may request additional information or to have a follow-up visit with you prior to refilling your medication.   *Please Note: Medication refills are  assigned a new Rx number when refilled electronically. Your pharmacy may indicate that no refills were authorized even though a new prescription for the same medication is available at the pharmacy. Please request the medicine by name with the pharmacy before contacting your provider for a refill.           new test company Access Code: [email or web address]  Expires: 3/11/2017  1:57 PM    new test company  A secure, online tool to manage your health information     [employer]’s new test company® is a secure, online tool that connects you to your personalized health information from the privacy of your home -- day or night - making it very easy for you to manage your healthcare. Once the activation process is completed, you can even access your medical information using the new test company margie, which is available for free in the Apple Margie store or Google Play store.     new test company provides the following levels of access (as shown below):   My Chart Features   [facility] Primary Care Doctor [facility]  Specialists [facility]  Urgent  Care Non-[facility]  Primary Care  Doctor   Email your healthcare team securely and privately 24/7 X X X    Manage appointments: schedule your next appointment; view details of past/upcoming appointments X      Request prescription refills. X      View recent personal medical records, including lab and immunizations X X X X   View health record, including health history, allergies, medications X X X X   Read reports about your outpatient visits, procedures, consult and ER notes X X X X   See your discharge summary, which is a recap of your hospital and/or ER visit that includes your diagnosis, lab results, and care plan. X X       How to register for new test company:  1. Go to  [email or web address].  2. Click on the Sign Up Now box, which takes you to the New Member Sign Up page. You will need to provide the following information:  a. Enter your new test company Access Code exactly as it appears at the top of this page. (You will not need to use this  code after you’ve completed the sign-up process. If you do not sign up before the expiration date, you must request a new code.)   b. Enter your date of birth.   c. Enter your home email address.   d. Click Submit, and follow the next screen’s instructions.  3. Create a OT Enterprisest ID. This will be your OT Enter[employer] login ID and cannot be changed, so think of one that is secure and easy to remember.  4. Create a [employer] password. You can change your password at any time.  5. Enter your Password Reset Question and Answer. This can be used at a later time if you forget your password.   6. Enter your e-mail address. This allows you to receive e-mail notifications when new information is available in [employer].  7. Click Sign Up. You can now view your health information.    For assistance activating your [employer] account, call [phone]

## 2017-02-16 NOTE — Clinical Note
February 16, 2017         Patient: Tanisha Haile   YOB: 1950   Date of Visit: 2/16/2017           To Whom it May Concern:    Tanisha Haile was seen in my clinic on 2/16/2017. She may return to school on 2/17/17..    If you have any questions or concerns, please don't hesitate to call.        Sincerely,     Dimas Lam M.D.  Electronically Signed

## 2017-02-21 ENCOUNTER — OFFICE VISIT (OUTPATIENT)
Dept: MEDICAL GROUP | Facility: PHYSICIAN GROUP | Age: 67
End: 2017-02-21
Payer: MEDICARE

## 2017-02-21 VITALS
HEIGHT: 64 IN | WEIGHT: 123 LBS | TEMPERATURE: 97.7 F | DIASTOLIC BLOOD PRESSURE: 72 MMHG | RESPIRATION RATE: 12 BRPM | HEART RATE: 76 BPM | SYSTOLIC BLOOD PRESSURE: 110 MMHG | BODY MASS INDEX: 21 KG/M2 | OXYGEN SATURATION: 100 %

## 2017-02-21 DIAGNOSIS — F43.21 GRIEF REACTION: ICD-10-CM

## 2017-02-21 DIAGNOSIS — J01.10 ACUTE NON-RECURRENT FRONTAL SINUSITIS: ICD-10-CM

## 2017-02-21 DIAGNOSIS — F51.04 PSYCHOPHYSIOLOGICAL INSOMNIA: ICD-10-CM

## 2017-02-21 DIAGNOSIS — F41.9 ANXIETY: ICD-10-CM

## 2017-02-21 DIAGNOSIS — R20.0 NUMBNESS OF FOOT: ICD-10-CM

## 2017-02-21 DIAGNOSIS — M81.0 OSTEOPOROSIS: ICD-10-CM

## 2017-02-21 DIAGNOSIS — E78.5 DYSLIPIDEMIA: ICD-10-CM

## 2017-02-21 DIAGNOSIS — R20.0 NUMBNESS OF LOWER EXTREMITY: ICD-10-CM

## 2017-02-21 DIAGNOSIS — B02.9 HERPES ZOSTER WITHOUT COMPLICATION: ICD-10-CM

## 2017-02-21 DIAGNOSIS — E55.9 VITAMIN D DEFICIENCY: ICD-10-CM

## 2017-02-21 PROBLEM — F43.20 GRIEF REACTION: Status: ACTIVE | Noted: 2017-02-21

## 2017-02-21 PROCEDURE — 3017F COLORECTAL CA SCREEN DOC REV: CPT | Performed by: NURSE PRACTITIONER

## 2017-02-21 PROCEDURE — 4040F PNEUMOC VAC/ADMIN/RCVD: CPT | Performed by: NURSE PRACTITIONER

## 2017-02-21 PROCEDURE — 3014F SCREEN MAMMO DOC REV: CPT | Performed by: NURSE PRACTITIONER

## 2017-02-21 PROCEDURE — 1101F PT FALLS ASSESS-DOCD LE1/YR: CPT | Performed by: NURSE PRACTITIONER

## 2017-02-21 PROCEDURE — G8419 CALC BMI OUT NRM PARAM NOF/U: HCPCS | Performed by: NURSE PRACTITIONER

## 2017-02-21 PROCEDURE — G8484 FLU IMMUNIZE NO ADMIN: HCPCS | Performed by: NURSE PRACTITIONER

## 2017-02-21 PROCEDURE — G8432 DEP SCR NOT DOC, RNG: HCPCS | Performed by: NURSE PRACTITIONER

## 2017-02-21 PROCEDURE — 99214 OFFICE O/P EST MOD 30 MIN: CPT | Performed by: NURSE PRACTITIONER

## 2017-02-21 PROCEDURE — 1036F TOBACCO NON-USER: CPT | Performed by: NURSE PRACTITIONER

## 2017-02-21 RX ORDER — LORAZEPAM 0.5 MG/1
0.5 TABLET ORAL NIGHTLY PRN
Qty: 30 TAB | Refills: 0 | Status: SHIPPED
Start: 2017-02-21 | End: 2017-09-19

## 2017-02-21 ASSESSMENT — PATIENT HEALTH QUESTIONNAIRE - PHQ9
SUM OF ALL RESPONSES TO PHQ QUESTIONS 1-9: 13
5. POOR APPETITE OR OVEREATING: 1 - SEVERAL DAYS
CLINICAL INTERPRETATION OF PHQ2 SCORE: 6

## 2017-02-21 NOTE — ASSESSMENT & PLAN NOTE
Patient's   unexpectedly in Dec 2016. Patient cannot sleep and is very anxious. She was initially given Ativan 0.5 mg and it helped considerably, but she ran out and is asking for refill.

## 2017-02-21 NOTE — ASSESSMENT & PLAN NOTE
Patient was treated for sinusitis last week. She still has medications left over from the visit. Patient does have nasal spray that she has not used. Patient has not used anti-histamines or decongestants. She is having voice changes and a nagging cough.

## 2017-02-22 PROBLEM — R20.0 NUMBNESS OF LOWER EXTREMITY: Status: ACTIVE | Noted: 2017-02-22

## 2017-02-22 NOTE — ASSESSMENT & PLAN NOTE
Patient has a history of hyperlipidemia.  Cholesterol,Tot 170 100 - 199 mg/dL Final      Triglycerides 42 0 - 149 mg/dL Final     HDL 92 >=40 mg/dL Final     LDL 70 <100 mg/dL Final

## 2017-02-22 NOTE — ASSESSMENT & PLAN NOTE
Patient has a history of osteoporosis in the past she's not due for repeat bone density next year. Patient cannot take bisphosphonates. She however continued calcium and vitamin D.

## 2017-02-22 NOTE — ASSESSMENT & PLAN NOTE
Patient describes a chronic numbness to her right foot and lower leg.  This is only on the top of her foot. No weakness noted. No pain. No redness or swelling. No injury. No diabetes.  Patient does have a history of back pain.

## 2017-02-22 NOTE — ASSESSMENT & PLAN NOTE
Patient has been on various forms of treatment for insomnia. Currently her situation is quite different. She is now  for less than 3 months and still is having trouble sleeping. She is having normal grief reaction and depression that is difficult for her to settle into a good night sleep and wakes up very nervous. Initially someone gave her some lorazepam 0.5 mg which seemed to help she ran out and then thought to try to go without it. It is really still having some sleep issues and is here to discuss. It certainly would not be long term therapy.

## 2017-02-22 NOTE — PROGRESS NOTES
Chief Complaint   Patient presents with   • Sinus Problem     issues with sinus / fv UTI       HISTORY OF PRESENT ILLNESS: Patient is a @ age 66 here  today to discuss    Interval history:     Hospitalizations NO    Injuries NO    Illness YES acute sinusitis treated at URGENT CARE    Grief reaction  Patient's   unexpectedly in Dec 2016. Patient cannot sleep and is very anxious. She was initially given Ativan 0.5 mg and it helped considerably, but she ran out and is asking for refill.     Acute non-recurrent frontal sinusitis  Patient was treated for sinusitis last week. She still has medications left over from the visit. Patient does have nasal spray that she has not used. Patient has not used anti-histamines or decongestants. She is having voice changes and a nagging cough.     Insomnia  Patient has been on various forms of treatment for insomnia. Currently her situation is quite different. She is now  for less than 3 months and still is having trouble sleeping. She is having normal grief reaction and depression that is difficult for her to settle into a good night sleep and wakes up very nervous. Initially someone gave her some lorazepam 0.5 mg which seemed to help she ran out and then thought to try to go without it. It is really still having some sleep issues and is here to discuss. It certainly would not be long term therapy.    Osteoporosis  Patient has a history of osteoporosis in the past she's not due for repeat bone density next year. Patient cannot take bisphosphonates. She however continued calcium and vitamin D.    Vitamin D deficiency  Patient to continue taking vitamin D 2000 units daily.    Dyslipidemia  Patient has a history of hyperlipidemia.  Cholesterol,Tot 170 100 - 199 mg/dL Final      Triglycerides 42 0 - 149 mg/dL Final     HDL 92 >=40 mg/dL Final     LDL 70 <100 mg/dL Final             Shingles  Currently there is no outbreak or blistering.    Numbness of lower  extremity  Patient describes a chronic numbness to her right foot and lower leg.  This is only on the top of her foot. No weakness noted. No pain. No redness or swelling. No injury. No diabetes.  Patient does have a history of back pain.                 Allergies:Tylenol; Asa; Nsaids; Pcn; and Sulfa drugs    Current Outpatient Prescriptions Ordered in [facility]   Medication Sig Dispense Refill   • lorazepam (ATIVAN) 0.5 MG Tab Take 1 Tab by mouth at bedtime as needed for Anxiety. 30 Tab 0   • doxycycline (VIBRAMYCIN) 100 MG Tab Take 1 Tab by mouth every 12 hours for 7 days. 14 Tab 0   • guaifenesin-codeine (ROBITUSSIN AC) Solution oral solution Take 10 mL by mouth every 6 hours as needed for Cough. 240 mL 0   • albuterol 108 (90 BASE) MCG/ACT Aero Soln inhalation aerosol Inhale 2 Puffs by mouth every four hours as needed. 1 Inhaler 0   • hydrOXYzine (ATARAX) 25 MG Tab Take 1 Tab by mouth 3 times a day as needed for Anxiety. 30 Tab 0   • vitamin e (VITAMIN E) 400 UNIT Cap Take 800 Units by mouth every day.     • tetanus-dipth-acell pertussis (ADACEL) 5-2-15.5 LF-MCG/0.5 Suspension 0.5 mL by Intramuscular route Once PRN for up to 1 dose. 0.5 mL 0   • simvastatin (ZOCOR) 20 MG Tab TAKE 1 TABLET BY MOUTH EVERY EVENING 90 Tab 3   • valacyclovir (VALTREX) 500 MG Tab Take 1 Tab by mouth every day. 90 Tab 3   • Calcium Carbonate-Vitamin D (CALTRATE 600+D) 600-400 MG-UNIT TABS Take  by mouth 2 Times a Day.       • Cholecalciferol (VITAMIN D) 2000 UNIT TABS Take 1 Tab by mouth every day. 30 Each 3     No current Epic-ordered facility-administered medications on file.       Past Medical History   Diagnosis Date   • Hyperlipidemia    • Dyslipidemia 10/24/2012   • Cervical cancer screening 5/22/2013     Due to repeat around 2015.   • Seasonal allergies 5/22/2013   • Insomnia 2/27/2014     Not sleeping, can't shut brain off Making her really tired during the day X 1 month plus but worse for a month   • Dysuria 10/2/2014     Seen 9/27  "for uti, given macrobid Feels the same as  Feels more like vaginal pain     • Shingles        Social History   Substance Use Topics   • Smoking status: Former Smoker -- 1.00 packs/day for 50 years     Types: Cigarettes     Quit date: 2008   • Smokeless tobacco: Never Used   • Alcohol Use: 0.0 oz/week     0 Standard drinks or equivalent per week      Comment: 2 glasses of wine a day sometimes       Family Status   Relation Status Death Age   • Mother  83     heart failure   • Father  69     quad bpass, coma   • Brother Alive      handicap at a facility   • Brother Alive    • Brother Alive      Family History   Problem Relation Age of Onset   • Hypertension Mother    • Hyperlipidemia Mother    • Thyroid Mother    • Lung Disease Mother    • Heart Disease Father    • Hypertension Father    • Hyperlipidemia Father        ROS: As documented in my HPI      Exam:  Blood pressure 110/72, pulse 76, temperature 36.5 °C (97.7 °F), resp. rate 12, height 1.626 m (5' 4\"), weight 55.792 kg (123 lb), SpO2 100 %.  General:  Well nourished, well developed female in NAD  Head: Nontender scalp. No lesions  Neck: Supple. Symmetric   Nasal: Boggy and red Oropharynx: Postnasal drip is present some cobblestoning in the back of her throat   Ears: Slight wax is present but no impaction TMs are seen no redness noted.  Pulmonary:  Normal effort. No rales, ronchi, or wheezing.  Cardiovascular: Regular rate and rhythm without murmur.   Abdomen: Soft nontender, normal bowel sounds  Extremities: no clubbing, cyanosis, or edema.  Psych: Alert and oriented x3.  Emotional: Stable mood but very sad affect appropriate for grief  Neurological: No focal deficits    Please note that this dictation was created using voice recognition software. I have made every reasonable attempt to correct obvious errors, but I expect that there are errors of grammar and possibly content that I did not discover before finalizing the " note.    Assessment/Plan:  1. Grief reaction  Stable but need to follow up.   2. Anxiety  lorazepam (ATIVAN) 0.5 MG Tab   3. Acute non-recurrent frontal sinusitis  resolving   4. Numbness of foot  REFERRAL TO PODIATRY    REFERRAL TO NEURODIAGNOSTICS (EEG,EP,EMG/NCS/DBS) Modality Requested: EMG-Comment Extremities   5. Psychophysiological insomnia  Ativan 0.5 mg temporary    6. Osteoporosis  Vitamin D and calcium   7. Vitamin D deficiency   vitamin D    8. Dyslipidemia   continue statin    9. Herpes zoster without complication   stable no outbreaks      Patient was called the following day to have her come in next week to reexamine her right lower leg and foot secondary to some numbness that she is having. She is agreeable and we will see her next week before going on with EMG and other types of diagnostic testing.

## 2017-02-28 ENCOUNTER — OFFICE VISIT (OUTPATIENT)
Dept: MEDICAL GROUP | Facility: PHYSICIAN GROUP | Age: 67
End: 2017-02-28
Payer: MEDICARE

## 2017-02-28 VITALS
OXYGEN SATURATION: 95 % | RESPIRATION RATE: 12 BRPM | HEART RATE: 76 BPM | WEIGHT: 124 LBS | TEMPERATURE: 98.2 F | DIASTOLIC BLOOD PRESSURE: 72 MMHG | HEIGHT: 64 IN | BODY MASS INDEX: 21.17 KG/M2 | SYSTOLIC BLOOD PRESSURE: 114 MMHG

## 2017-02-28 DIAGNOSIS — F43.21 GRIEF REACTION: ICD-10-CM

## 2017-02-28 DIAGNOSIS — R20.0 NUMBNESS OF LOWER EXTREMITY: ICD-10-CM

## 2017-02-28 DIAGNOSIS — F51.04 PSYCHOPHYSIOLOGICAL INSOMNIA: ICD-10-CM

## 2017-02-28 PROCEDURE — 1101F PT FALLS ASSESS-DOCD LE1/YR: CPT | Performed by: NURSE PRACTITIONER

## 2017-02-28 PROCEDURE — G8419 CALC BMI OUT NRM PARAM NOF/U: HCPCS | Performed by: NURSE PRACTITIONER

## 2017-02-28 PROCEDURE — 99213 OFFICE O/P EST LOW 20 MIN: CPT | Performed by: NURSE PRACTITIONER

## 2017-02-28 PROCEDURE — 1036F TOBACCO NON-USER: CPT | Performed by: NURSE PRACTITIONER

## 2017-02-28 PROCEDURE — 3014F SCREEN MAMMO DOC REV: CPT | Performed by: NURSE PRACTITIONER

## 2017-02-28 PROCEDURE — 3017F COLORECTAL CA SCREEN DOC REV: CPT | Performed by: NURSE PRACTITIONER

## 2017-02-28 PROCEDURE — G8432 DEP SCR NOT DOC, RNG: HCPCS | Performed by: NURSE PRACTITIONER

## 2017-02-28 PROCEDURE — G8484 FLU IMMUNIZE NO ADMIN: HCPCS | Performed by: NURSE PRACTITIONER

## 2017-02-28 PROCEDURE — 4040F PNEUMOC VAC/ADMIN/RCVD: CPT | Performed by: NURSE PRACTITIONER

## 2017-02-28 NOTE — ASSESSMENT & PLAN NOTE
Patient tried the ativan to help shut down her mind. It made her feel too groggy. Patient will try to use Melantonin and possibly benadryl.

## 2017-02-28 NOTE — ASSESSMENT & PLAN NOTE
Patient attempted the lorazepam for just helping her shutter breakdown and deal with the grief and get some sleep. It was not helpful at all. Patient is very interested in seeing a therapist either in the behavioral health mental health realm to help her develop some tools to deal with this new grief. She still quite tearful. Will refer her to behavioral health.

## 2017-02-28 NOTE — ASSESSMENT & PLAN NOTE
Patient is here for follow up on her numbness on the top of the foot. Patient wears tennis shoes, that tie up with shoe strings.   Patient has appointment with Podiatry. They have called patient and I have asked her to call back for appointment.

## 2017-02-28 NOTE — MR AVS SNAPSHOT
"        Tanisha Haile   2017 3:20 PM   Office Visit   MRN: [id number]    Department:  [facility]   Dept Phone:  [phone]    Description:  Female : 1950   Provider:  ELLEN Madsen           Reason for Visit     Results foot / rt foot      Allergies as of 2017     Allergen Noted Reactions    Tylenol 2014   Anaphylaxis    Asa [Aspirin] 2012       Nsaids 2013   Rash, Itching    Pcn [Penicillins] 2012   Itching    Sulfa Drugs 2012         You were diagnosed with     Numbness of lower extremity   [6277842]       Psychophysiological insomnia   [576974]       Grief reaction   [458559]         Vital Signs     Blood Pressure Pulse Temperature Respirations Height Weight    114/72 mmHg 76 36.8 °C (98.2 °F) 12 1.626 m (5' 4\") 56.246 kg (124 lb)    Body Mass Index Oxygen Saturation Smoking Status             21.27 kg/m2 95% Former Smoker         Basic Information     Date Of Birth Sex Race Ethnicity Preferred Language    1950 Female White Non- English      Your appointments     Mar 21, 2017  4:20 PM   Urgent/Same Day with ELLEN Madsen   [facility] [place] [address] [facility] [address] [phone]           You will be receiving a confirmation call a few days before your appointment from our automated call confirmation system.              Problem List              ICD-10-CM Priority Class Noted - Resolved    Dyslipidemia E78.5 Low  10/24/2012 - Present    Vitamin D deficiency E55.9 Medium  2013 - Present    Neck pain M54.2 Medium  4/15/2013 - Present    Seasonal allergies J30.2 Medium  2013 - Present    Shingles B02.9 Low  7/10/2013 - Present    Insomnia G47.00 High  2014 - Present    Headache R51 Low  10/3/2014 - Present    Elevated TSH R94.6 Low  2015 - Present    Mid back pain on left side M54.9 High  2015 - Present    Osteoporosis M81.0 High  2015 - Present   " Interstitial cystitis N30.10 Low  7/7/2016 - Present    Grief reaction F43.20 High  2/21/2017 - Present    Acute non-recurrent frontal sinusitis J01.10 Low  2/21/2017 - Present    Numbness of lower extremity R20.0   2/22/2017 - Present      Health Maintenance        Date Due Completion Dates    IMM DTaP/Tdap/Td Vaccine (1 - Tdap) 5/13/1969 ---    IMM PNEUMOCOCCAL 65+ (ADULT) LOW/MEDIUM RISK SERIES (2 of 2 - PCV13) 12/22/2016 12/22/2015    MAMMOGRAM 1/5/2017 1/5/2016, 1/2/2015, 12/31/2013, 12/24/2012    IMM INFLUENZA (1) 10/2/2017 (Originally 9/1/2016) ---    PAP SMEAR 8/4/2017 8/4/2014, 8/4/2014 (Done)    Override on 8/4/2014: Done    BONE DENSITY 7/15/2020 7/15/2015, 12/24/2012    COLONOSCOPY 1/9/2024 1/9/2014            Current Immunizations     Pneumococcal polysaccharide vaccine (PPSV-23) 12/22/2015  2:20 PM      Below and/or attached are the medications your provider expects you to take. Review all of your home medications and newly ordered medications with your provider and/or pharmacist. Follow medication instructions as directed by your provider and/or pharmacist. Please keep your medication list with you and share with your provider. Update the information when medications are discontinued, doses are changed, or new medications (including over-the-counter products) are added; and carry medication information at all times in the event of emergency situations     Allergies:  TYLENOL - Anaphylaxis     ASA - (reactions not documented)     NSAIDS - Rash,Itching     PCN - Itching     SULFA DRUGS - (reactions not documented)               Medications  Valid as of: February 28, 2017 -  4:00 PM    Generic Name Brand Name Tablet Size Instructions for use    Albuterol Sulfate (Aero Soln) albuterol 108 (90 BASE) MCG/ACT Inhale 2 Puffs by mouth every four hours as needed.        Calcium Carbonate-Vitamin D (Tab) Calcium Carbonate-Vitamin D 600-400 MG-UNIT Take  by mouth 2 Times a Day.          Cholecalciferol (Tab)  vitamin D 2000 UNIT Take 1 Tab by mouth every day.        Guaifenesin-Codeine (Solution) ROBITUSSIN -10 mg/5mL Take 10 mL by mouth every 6 hours as needed for Cough.        HydrOXYzine HCl (Tab) ATARAX 25 MG Take 1 Tab by mouth 3 times a day as needed for Anxiety.        LORazepam (Tab) ATIVAN 0.5 MG Take 1 Tab by mouth at bedtime as needed for Anxiety.        Simvastatin (Tab) ZOCOR 20 MG TAKE 1 TABLET BY MOUTH EVERY EVENING        Tetanus-Diphth-Acell Pertussis (Suspension) ADACEL 5-2-15.5 LF-MCG/0.5 0.5 mL by Intramuscular route Once PRN for up to 1 dose.        ValACYclovir HCl (Tab) VALTREX 500 MG Take 1 Tab by mouth every day.        Vitamin E (Cap) VITAMIN E 400 UNIT Take 800 Units by mouth every day.        .                 Medicines prescribed today were sent to:     [facility] [address] [place] [address] [address]  AT [facility] [address] [facility] [address]    Phone: [phone] Fax: [phone]    Open 24 Hours?: No      Medication refill instructions:       If your prescription bottle indicates you have medication refills left, it is not necessary to call your provider’s office. Please contact your pharmacy and they will refill your medication.    If your prescription bottle indicates you do not have any refills left, you may request refills at any time through one of the following ways: The online [employer] system (except Urgent Care), by calling your provider’s office, or by asking your pharmacy to contact your provider’s office with a refill request. Medication refills are processed only during regular business hours and may not be available until the next business day. Your provider may request additional information or to have a follow-up visit with you prior to refilling your medication.   *Please Note: Medication refills are assigned a new Rx number when refilled electronically. Your pharmacy may indicate that no refills were authorized even  though a new prescription for the same medication is available at the pharmacy. Please request the medicine by name with the pharmacy before contacting your provider for a refill.        Referral     A referral request has been sent to our patient care coordination department. Please allow 3-5 business days for us to process this request and contact you either by phone or mail. If you do not hear from us by the 5th business day, please call us at [phone].           HUNT Mobile Ads Access Code: [email or web address]  Expires: 3/11/2017  1:57 PM    HUNT Mobile Ads  A secure, online tool to manage your health information     [employer]’s HUNT Mobile Ads® is a secure, online tool that connects you to your personalized health information from the privacy of your home -- day or night - making it very easy for you to manage your healthcare. Once the activation process is completed, you can even access your medical information using the HUNT Mobile Ads margie, which is available for free in the Apple Margie store or Google Play store.     HUNT Mobile Ads provides the following levels of access (as shown below):   My Chart Features   [facility]  Specialists [facility]  Urgent  Care Non-[facility]  Primary Care  Doctor   Email your healthcare team securely and privately 24/7 X X X    Manage appointments: schedule your next appointment; view details of past/upcoming appointments X      Request prescription refills. X      View recent personal medical records, including lab and immunizations X X X X   View health record, including health history, allergies, medications X X X X   Read reports about your outpatient visits, procedures, consult and ER notes X X X X   See your discharge summary, which is a recap of your hospital and/or ER visit that includes your diagnosis, lab results, and care plan. X X       How to register for HUNT Mobile Ads:  1. Go to  [email or web address].  2. Click on the Sign Up Now box, which takes you to the New Member Sign Up  page. You will need to provide the following information:  a. Enter your ScreenHits Access Code exactly as it appears at the top of this page. (You will not need to use this code after you’ve completed the sign-up process. If you do not sign up before the expiration date, you must request a new code.)   b. Enter your date of birth.   c. Enter your home email address.   d. Click Submit, and follow the next screen’s instructions.  3. Create a ScreenHits ID. This will be your ScreenHits login ID and cannot be changed, so think of one that is secure and easy to remember.  4. Create a ScreenHits password. You can change your password at any time.  5. Enter your Password Reset Question and Answer. This can be used at a later time if you forget your password.   6. Enter your e-mail address. This allows you to receive e-mail notifications when new information is available in ScreenHits.  7. Click Sign Up. You can now view your health information.    For assistance activating your ScreenHits account, call [phone]

## 2017-02-28 NOTE — PROGRESS NOTES
Chief Complaint   Patient presents with   • Results     foot / rt foot       HISTORY OF PRESENT ILLNESS: Patient is a @ age 66 here  today to follow up by my request.         Numbness of lower extremity  Patient is here for follow up on her numbness on the top of the foot. Patient wears tennis shoes, that tie up with shoe strings.   Patient has appointment with Podiatry. They have called patient and I have asked her to call back for appointment.     Insomnia  Patient tried the ativan to help shut down her mind. It made her feel too groggy. Patient will try to use Melantonin and possibly benadryl.     Grief reaction  Patient attempted the lorazepam for just helping her shutter breakdown and deal with the grief and get some sleep. It was not helpful at all. Patient is very interested in seeing a therapist either in the behavioral health mental health realm to help her develop some tools to deal with this new grief. She still quite tearful. Will refer her to behavioral health.        Allergies:Tylenol; Asa; Nsaids; Pcn; and Sulfa drugs    Current Outpatient Prescriptions Ordered in [facility]   Medication Sig Dispense Refill   • lorazepam (ATIVAN) 0.5 MG Tab Take 1 Tab by mouth at bedtime as needed for Anxiety. 30 Tab 0   • guaifenesin-codeine (ROBITUSSIN AC) Solution oral solution Take 10 mL by mouth every 6 hours as needed for Cough. 240 mL 0   • albuterol 108 (90 BASE) MCG/ACT Aero Soln inhalation aerosol Inhale 2 Puffs by mouth every four hours as needed. 1 Inhaler 0   • simvastatin (ZOCOR) 20 MG Tab TAKE 1 TABLET BY MOUTH EVERY EVENING 90 Tab 3   • Calcium Carbonate-Vitamin D (CALTRATE 600+D) 600-400 MG-UNIT TABS Take  by mouth 2 Times a Day.       • Cholecalciferol (VITAMIN D) 2000 UNIT TABS Take 1 Tab by mouth every day. 30 Each 3   • hydrOXYzine (ATARAX) 25 MG Tab Take 1 Tab by mouth 3 times a day as needed for Anxiety. 30 Tab 0   • vitamin e (VITAMIN E) 400 UNIT Cap Take 800 Units by mouth every day.     •  "tetanus-dipth-acell pertussis (ADACEL) 5-2-15.5 LF-MCG/0.5 Suspension 0.5 mL by Intramuscular route Once PRN for up to 1 dose. 0.5 mL 0   • valacyclovir (VALTREX) 500 MG Tab Take 1 Tab by mouth every day. 90 Tab 3     No current Epic-ordered facility-administered medications on file.       Past Medical History   Diagnosis Date   • Hyperlipidemia    • Dyslipidemia 10/24/2012   • Cervical cancer screening 2013     Due to repeat around .   • Seasonal allergies 2013   • Insomnia 2014     Not sleeping, can't shut brain off Making her really tired during the day X 1 month plus but worse for a month   • Dysuria 10/2/2014     Seen  for uti, given macrobid Feels the same as  Feels more like vaginal pain     • Shingles        Social History   Substance Use Topics   • Smoking status: Former Smoker -- 1.00 packs/day for 50 years     Types: Cigarettes     Quit date: 2008   • Smokeless tobacco: Never Used   • Alcohol Use: 0.0 oz/week     0 Standard drinks or equivalent per week      Comment: 2 glasses of wine a day sometimes       Family Status   Relation Status Death Age   • Mother  83     heart failure   • Father  69     quad bpass, coma   • Brother Alive      handicap at a facility   • Brother Alive    • Brother Alive      Family History   Problem Relation Age of Onset   • Hypertension Mother    • Hyperlipidemia Mother    • Thyroid Mother    • Lung Disease Mother    • Heart Disease Father    • Hypertension Father    • Hyperlipidemia Father        ROS: As documented in my HPI      Exam:  Blood pressure 114/72, pulse 76, temperature 36.8 °C (98.2 °F), resp. rate 12, height 1.626 m (5' 4\"), weight 56.246 kg (124 lb), SpO2 95 %.  General:  Well nourished, well developed female in NAD  Head: Nontender scalp. No lesions  Neck: Supple. Symmetric Thyroid palpated. No bruits  Pulmonary:  Normal effort. No rales, ronchi, or wheezing.  Cardiovascular: Regular rate and rhythm without " murmur.   Abdomen: Soft nontender, normal bowel sounds  Extremities: no clubbing, cyanosis, or edema.  RIGHT FOOT: pulses present and strong. Foot is warm. Gross sensation is present.  Strength and tone 5/5  DTR - patella and achilles 3+  Psych: Alert and oriented x3.  Emotional: Mood tearful Affect stable, well dressed  Neurological: No focal deficits    Please note that this dictation was created using voice recognition software. I have made every reasonable attempt to correct obvious errors, but I expect that there are errors of grammar and possibly content that I did not discover before finalizing the note.    Assessment/Plan:  1. Numbness of lower extremity   Keep referral to podiatry   2. Psychophysiological insomnia  Melantonin and benadryl   3. Grief reaction  REFERRAL TO BEHAVIORAL HEALTH

## 2017-03-21 ENCOUNTER — OFFICE VISIT (OUTPATIENT)
Dept: MEDICAL GROUP | Facility: PHYSICIAN GROUP | Age: 67
End: 2017-03-21
Payer: MEDICARE

## 2017-03-21 VITALS
RESPIRATION RATE: 12 BRPM | HEART RATE: 80 BPM | OXYGEN SATURATION: 95 % | WEIGHT: 122 LBS | DIASTOLIC BLOOD PRESSURE: 72 MMHG | SYSTOLIC BLOOD PRESSURE: 114 MMHG | BODY MASS INDEX: 20.83 KG/M2 | TEMPERATURE: 98.5 F | HEIGHT: 64 IN

## 2017-03-21 DIAGNOSIS — M54.9 MID BACK PAIN ON LEFT SIDE: ICD-10-CM

## 2017-03-21 DIAGNOSIS — E55.9 VITAMIN D DEFICIENCY: ICD-10-CM

## 2017-03-21 DIAGNOSIS — F43.21 GRIEF REACTION: ICD-10-CM

## 2017-03-21 DIAGNOSIS — F51.04 PSYCHOPHYSIOLOGICAL INSOMNIA: ICD-10-CM

## 2017-03-21 DIAGNOSIS — R20.0 NUMBNESS OF LOWER EXTREMITY: ICD-10-CM

## 2017-03-21 DIAGNOSIS — I83.90 VARICOSE VEIN OF LEG: ICD-10-CM

## 2017-03-21 PROCEDURE — G8484 FLU IMMUNIZE NO ADMIN: HCPCS | Performed by: NURSE PRACTITIONER

## 2017-03-21 PROCEDURE — G8432 DEP SCR NOT DOC, RNG: HCPCS | Performed by: NURSE PRACTITIONER

## 2017-03-21 PROCEDURE — 4040F PNEUMOC VAC/ADMIN/RCVD: CPT | Performed by: NURSE PRACTITIONER

## 2017-03-21 PROCEDURE — 1036F TOBACCO NON-USER: CPT | Performed by: NURSE PRACTITIONER

## 2017-03-21 PROCEDURE — G8420 CALC BMI NORM PARAMETERS: HCPCS | Performed by: NURSE PRACTITIONER

## 2017-03-21 PROCEDURE — 3014F SCREEN MAMMO DOC REV: CPT | Performed by: NURSE PRACTITIONER

## 2017-03-21 PROCEDURE — 3017F COLORECTAL CA SCREEN DOC REV: CPT | Performed by: NURSE PRACTITIONER

## 2017-03-21 PROCEDURE — 99214 OFFICE O/P EST MOD 30 MIN: CPT | Performed by: NURSE PRACTITIONER

## 2017-03-21 PROCEDURE — 1101F PT FALLS ASSESS-DOCD LE1/YR: CPT | Performed by: NURSE PRACTITIONER

## 2017-03-21 NOTE — ASSESSMENT & PLAN NOTE
Patient is improving. She goes to bed around 11 pm. On a good night, she will sleep until 5 am. Recently had a set back with feeling around her late . Coping better.

## 2017-03-21 NOTE — ASSESSMENT & PLAN NOTE
Patient has varicose veins on her right knee. They go over her knee cap and cause some discomfort.  Patient has varicose veins on both legs. Recently larger one came over the right knee.

## 2017-03-21 NOTE — MR AVS SNAPSHOT
"        Tanisha Haile   3/21/2017 4:20 PM   Office Visit   MRN: [id number]    Department:  [facility]   Dept Phone:  [phone]    Description:  Female : 1950   Provider:  ELLEN Madsen           Reason for Visit     Numbness feet  / rt knee issue      Allergies as of 3/21/2017     Allergen Noted Reactions    Tylenol 2014   Anaphylaxis    Asa [Aspirin] 2012       Nsaids 2013   Rash, Itching    Pcn [Penicillins] 2012   Itching    Sulfa Drugs 2012         You were diagnosed with     Varicose vein of leg   [189192]       Psychophysiological insomnia   [342216]         Vital Signs     Blood Pressure Pulse Temperature Respirations Height Weight    114/72 mmHg 80 36.9 °C (98.5 °F) 12 1.626 m (5' 4.02\") 55.339 kg (122 lb)    Body Mass Index Oxygen Saturation Smoking Status             20.93 kg/m2 95% Former Smoker         Basic Information     Date Of Birth Sex Race Ethnicity Preferred Language    1950 Female White Non- English      Your appointments     2017  9:00 AM   Telemedicine Clinic New Patient with Aislinn Donovan P.H.D., TELEMEDICINE [place], TELEMED [facility]    [address] [facility] [facility] [address]  [address]  [facility] [id number] [phone]            2017 10:20 AM   Established Patient with ELLEN Madsen   [address] [address] [phone]           You will be receiving a confirmation call a few days before your appointment from our automated call confirmation system.              Problem List              ICD-10-CM Priority Class Noted - Resolved    Dyslipidemia E78.5 Low  10/24/2012 - Present    Vitamin D deficiency E55.9 Medium  2013 - Present    Neck pain M54.2 Medium  4/15/2013 - Present    Seasonal allergies J30.2 Medium  2013 - Present    Shingles B02.9 Low  7/10/2013 - Present    Insomnia G47.00 High  " 2/27/2014 - Present    Headache R51 Low  10/3/2014 - Present    Elevated TSH R94.6 Low  7/9/2015 - Present    Mid back pain on left side M54.9 High  8/14/2015 - Present    Osteoporosis M81.0 High  8/14/2015 - Present    Interstitial cystitis N30.10 Low  7/7/2016 - Present    Grief reaction F43.20 High  2/21/2017 - Present    Acute non-recurrent frontal sinusitis J01.10 Low  2/21/2017 - Present    Numbness of lower extremity R20.0   2/22/2017 - Present    Varicose vein of leg I83.90   3/21/2017 - Present      Health Maintenance        Date Due Completion Dates    IMM DTaP/Tdap/Td Vaccine (1 - Tdap) 5/13/1969 ---    IMM PNEUMOCOCCAL 65+ (ADULT) LOW/MEDIUM RISK SERIES (2 of 2 - PCV13) 12/22/2016 12/22/2015    MAMMOGRAM 1/5/2017 1/5/2016, 1/2/2015, 12/31/2013, 12/24/2012    IMM INFLUENZA (1) 10/2/2017 (Originally 9/1/2016) ---    PAP SMEAR 8/4/2017 8/4/2014, 8/4/2014 (Done)    Override on 8/4/2014: Done    BONE DENSITY 7/15/2020 7/15/2015, 12/24/2012    COLONOSCOPY 1/9/2024 1/9/2014            Current Immunizations     Pneumococcal polysaccharide vaccine (PPSV-23) 12/22/2015  2:20 PM      Below and/or attached are the medications your provider expects you to take. Review all of your home medications and newly ordered medications with your provider and/or pharmacist. Follow medication instructions as directed by your provider and/or pharmacist. Please keep your medication list with you and share with your provider. Update the information when medications are discontinued, doses are changed, or new medications (including over-the-counter products) are added; and carry medication information at all times in the event of emergency situations     Allergies:  TYLENOL - Anaphylaxis     ASA - (reactions not documented)     NSAIDS - Rash,Itching     PCN - Itching     SULFA DRUGS - (reactions not documented)               Medications  Valid as of: March 21, 2017 -  4:37 PM    Generic Name Brand Name Tablet Size Instructions for  use    Albuterol Sulfate (Aero Soln) albuterol 108 (90 BASE) MCG/ACT Inhale 2 Puffs by mouth every four hours as needed.        Calcium Carbonate-Vitamin D (Tab) Calcium Carbonate-Vitamin D 600-400 MG-UNIT Take  by mouth 2 Times a Day.          Cholecalciferol (Tab) vitamin D 2000 UNIT Take 1 Tab by mouth every day.        Guaifenesin-Codeine (Solution) ROBITUSSIN -10 mg/5mL Take 10 mL by mouth every 6 hours as needed for Cough.        HydrOXYzine HCl (Tab) ATARAX 25 MG Take 1 Tab by mouth 3 times a day as needed for Anxiety.        LORazepam (Tab) ATIVAN 0.5 MG Take 1 Tab by mouth at bedtime as needed for Anxiety.        Simvastatin (Tab) ZOCOR 20 MG TAKE 1 TABLET BY MOUTH EVERY EVENING        Tetanus-Diphth-Acell Pertussis (Suspension) ADACEL 5-2-15.5 LF-MCG/0.5 0.5 mL by Intramuscular route Once PRN for up to 1 dose.        ValACYclovir HCl (Tab) VALTREX 500 MG Take 1 Tab by mouth every day.        Vitamin E (Cap) VITAMIN E 400 UNIT Take 800 Units by mouth every day.        .                 Medicines prescribed today were sent to:     [facility] [address] [place] [address] [address] [place] [address] AT [address] [address] [address] [facility] [address]    Phone: [phone] Fax: [phone]    Open 24 Hours?: No      Medication refill instructions:       If your prescription bottle indicates you have medication refills left, it is not necessary to call your provider’s office. Please contact your pharmacy and they will refill your medication.    If your prescription bottle indicates you do not have any refills left, you may request refills at any time through one of the following ways: The online [employer] system (except Urgent Care), by calling your provider’s office, or by asking your pharmacy to contact your provider’s office with a refill request. Medication refills are processed only during regular business hours and may not be available until the next business day. Your provider  may request additional information or to have a follow-up visit with you prior to refilling your medication.   *Please Note: Medication refills are assigned a new Rx number when refilled electronically. Your pharmacy may indicate that no refills were authorized even though a new prescription for the same medication is available at the pharmacy. Please request the medicine by name with the pharmacy before contacting your provider for a refill.           SocialThreader Access Code: [email or web address]  Expires: 4/9/2017  2:26 PM    SocialThreader  A secure, online tool to manage your health information     [employer]’s SocialThreader® is a secure, online tool that connects you to your personalized health information from the privacy of your home -- day or night - making it very easy for you to manage your healthcare. Once the activation process is completed, you can even access your medical information using the SocialThreader margie, which is available for free in the Apple Margie store or Google Play store.     SocialThreader provides the following levels of access (as shown below):   My Chart Features   [facility] Doctor [facility]  Specialists [facility]  Urgent  Care Non-[facility]  Primary Care  Doctor   Email your healthcare team securely and privately 24/7 X X X    Manage appointments: schedule your next appointment; view details of past/upcoming appointments X      Request prescription refills. X      View recent personal medical records, including lab and immunizations X X X X   View health record, including health history, allergies, medications X X X X   Read reports about your outpatient visits, procedures, consult and ER notes X X X X   See your discharge summary, which is a recap of your hospital and/or ER visit that includes your diagnosis, lab results, and care plan. X X       How to register for SocialThreader:  1. Go to  [email or web address].  2. Click on the Sign Up Now box, which takes you to the New Member Sign Up page. You will need to  provide the following information:  a. Enter your JoopLoop Access Code exactly as it appears at the top of this page. (You will not need to use this code after you’ve completed the sign-up process. If you do not sign up before the expiration date, you must request a new code.)   b. Enter your date of birth.   c. Enter your home email address.   d. Click Submit, and follow the next screen’s instructions.  3. Create a JoopLoop ID. This will be your JoopLoop login ID and cannot be changed, so think of one that is secure and easy to remember.  4. Create a JoopLoop password. You can change your password at any time.  5. Enter your Password Reset Question and Answer. This can be used at a later time if you forget your password.   6. Enter your e-mail address. This allows you to receive e-mail notifications when new information is available in JoopLoop.  7. Click Sign Up. You can now view your health information.    For assistance activating your JoopLoop account, call [phone]

## 2017-03-22 PROBLEM — J01.10 ACUTE NON-RECURRENT FRONTAL SINUSITIS: Status: RESOLVED | Noted: 2017-02-21 | Resolved: 2017-03-22

## 2017-03-22 NOTE — PROGRESS NOTES
Chief Complaint   Patient presents with   • Numbness     feet  / rt knee issue       HISTORY OF PRESENT ILLNESS: Patient is a @ age 66 here  today to discuss and follow up.    Interval history:     Hospitalizations NONE    Injuries NONE    Illness : resolving sinus, bronchitis now gone.        Insomnia  Patient is improving. She goes to bed around 11 pm. On a good night, she will sleep until 5 am. Recently had a set back with feeling around her late . Coping better.     Varicose vein of leg  Patient has varicose veins on her right knee. They go over her knee cap and cause some discomfort.  Patient has varicose veins on both legs. Recently larger one came over the right knee.     Mid back pain on left side  Patient continues to have periodic and intermittent pain to her mid back. She uses stretching, and relaxation.    Grief reaction  Patient is a . She is coping a little better. Some setbacks recently. The gentleman wanted to purchase her 's clothing and this brought up a lot of memories and she found herself tearful and sad. But she was able to get through it on her own.    Vitamin D deficiency  Patient continues to take vitamin D. Recheck once a year.    Numbness of lower extremity  Patient continues to have on and off numbness to the top of her foot. This is despite the change of shoes. She follows up with podiatry in a couple weeks.        Allergies:Tylenol; Asa; Nsaids; Pcn; and Sulfa drugs    Current Outpatient Prescriptions [facility]   Medication Sig Dispense Refill   • albuterol 108 (90 BASE) MCG/ACT Aero Soln inhalation aerosol Inhale 2 Puffs by mouth every four hours as needed. 1 Inhaler 0   • simvastatin (ZOCOR) 20 MG Tab TAKE 1 TABLET BY MOUTH EVERY EVENING 90 Tab 3   • Cholecalciferol (VITAMIN D) 2000 UNIT TABS Take 1 Tab by mouth every day. 30 Each 3   • lorazepam (ATIVAN) 0.5 MG Tab Take 1 Tab by mouth at bedtime as needed for Anxiety. 30 Tab 0   • guaifenesin-codeine  (ROBITUSSIN AC) Solution oral solution Take 10 mL by mouth every 6 hours as needed for Cough. 240 mL 0   • hydrOXYzine (ATARAX) 25 MG Tab Take 1 Tab by mouth 3 times a day as needed for Anxiety. 30 Tab 0   • vitamin e (VITAMIN E) 400 UNIT Cap Take 800 Units by mouth every day.     • tetanus-dipth-acell pertussis (ADACEL) 5-2-15.5 LF-MCG/0.5 Suspension 0.5 mL by Intramuscular route Once PRN for up to 1 dose. 0.5 mL 0   • valacyclovir (VALTREX) 500 MG Tab Take 1 Tab by mouth every day. 90 Tab 3   • Calcium Carbonate-Vitamin D (CALTRATE 600+D) 600-400 MG-UNIT TABS Take  by mouth 2 Times a Day.         No current Epic-ordered facility-administered medications on file.       Past Medical History   Diagnosis Date   • Hyperlipidemia    • Dyslipidemia 10/24/2012   • Cervical cancer screening 2013     Due to repeat around .   • Seasonal allergies 2013   • Insomnia 2014     Not sleeping, can't shut brain off Making her really tired during the day X 1 month plus but worse for a month   • Dysuria 10/2/2014     Seen  for uti, given macrobid Feels the same as  Feels more like vaginal pain     • Shingles        Social History   Substance Use Topics   • Smoking status: Former Smoker -- 1.00 packs/day for 50 years     Types: Cigarettes     Quit date: 2008   • Smokeless tobacco: Never Used   • Alcohol Use: 0.0 oz/week     0 Standard drinks or equivalent per week      Comment: 2 glasses of wine a day sometimes       Family Status   Relation Status Death Age   • Mother  83     heart failure   • Father  69     quad bpass, coma   • Brother Alive      handicap at a facility   • Brother Alive    • Brother Alive      Family History   Problem Relation Age of Onset   • Hypertension Mother    • Hyperlipidemia Mother    • Thyroid Mother    • Lung Disease Mother    • Heart Disease Father    • Hypertension Father    • Hyperlipidemia Father        ROS: As documented in my HPI      Exam:  Blood  "pressure 114/72, pulse 80, temperature 36.9 °C (98.5 °F), resp. rate 12, height 1.626 m (5' 4.02\"), weight 55.339 kg (122 lb), SpO2 95 %.  General:  Well nourished, well developed female in NAD  Head: Nontender scalp. No lesions  Neck: Supple. Symmetric Thyroid palpated. No bruits  Pulmonary:  Normal effort. No rales, ronchi, or wheezing.  Cardiovascular: Regular rate and rhythm without murmur.   Abdomen: Soft nontender, normal bowel sounds  Extremities: no clubbing, cyanosis, or edema.  Bilateral varicose veins lower extremities.  Anterior right patella - tortuous vein superficial  Psych: Alert and oriented x3.  Emotional: Mood slightly tearful Affect appropriate  Neurological: No focal deficits    Please note that this dictation was created using voice recognition software. I have made every reasonable attempt to correct obvious errors, but I expect that there are errors of grammar and possibly content that I did not discover before finalizing the note.    Assessment/Plan:  1. Varicose vein of leg   stable - precautions discussed   2. Psychophysiological insomnia    Stable - self management   3. Mid back pain on left side    Current status of condition is chronic and controlled on therapy.     4. Grief reaction  Stable has been reading support grief literature   5. Vitamin D deficiency  Current status of condition is chronic and controlled on therapy.     6. Numbness of lower extremity  Podiatry              "

## 2017-03-22 NOTE — ASSESSMENT & PLAN NOTE
Patient is a . She is coping a little better. Some setbacks recently. The gentleman wanted to purchase her 's clothing and this brought up a lot of memories and she found herself tearful and sad. But she was able to get through it on her own.

## 2017-03-22 NOTE — ASSESSMENT & PLAN NOTE
Patient continues to have periodic and intermittent pain to her mid back. She uses stretching, and relaxation.

## 2017-03-22 NOTE — ASSESSMENT & PLAN NOTE
Patient continues to have on and off numbness to the top of her foot. This is despite the change of shoes. She follows up with podiatry in a couple weeks.

## 2017-04-05 ENCOUNTER — TELEPHONE (OUTPATIENT)
Dept: MEDICAL GROUP | Facility: PHYSICIAN GROUP | Age: 67
End: 2017-04-05

## 2017-04-05 ENCOUNTER — TELEMEDICINE2 (OUTPATIENT)
Dept: BEHAVIORAL HEALTH | Facility: PHYSICIAN GROUP | Age: 67
End: 2017-04-05
Payer: MEDICARE

## 2017-04-05 DIAGNOSIS — F43.21 GRIEF: ICD-10-CM

## 2017-04-05 DIAGNOSIS — F43.23 ADJUSTMENT DISORDER WITH MIXED ANXIETY AND DEPRESSED MOOD: ICD-10-CM

## 2017-04-05 DIAGNOSIS — R41.89 COGNITIVE DECLINE: ICD-10-CM

## 2017-04-05 DIAGNOSIS — B02.8 HERPES ZOSTER WITH COMPLICATION: ICD-10-CM

## 2017-04-05 PROCEDURE — 90791 PSYCH DIAGNOSTIC EVALUATION: CPT | Mod: GT | Performed by: PSYCHOLOGIST

## 2017-04-05 RX ORDER — VALACYCLOVIR HYDROCHLORIDE 500 MG/1
500 TABLET, FILM COATED ORAL DAILY
Qty: 90 TAB | Refills: 3 | Status: CANCELLED | OUTPATIENT
Start: 2017-04-05

## 2017-04-05 RX ORDER — VALACYCLOVIR HYDROCHLORIDE 500 MG/1
500 TABLET, FILM COATED ORAL DAILY
Qty: 90 TAB | Refills: 3 | Status: SHIPPED | OUTPATIENT
Start: 2017-04-05 | End: 2017-09-25

## 2017-04-05 RX ORDER — ACYCLOVIR 50 MG/G
OINTMENT TOPICAL
COMMUNITY
End: 2017-04-05 | Stop reason: SDUPTHER

## 2017-04-05 RX ORDER — ACYCLOVIR 50 MG/G
1 OINTMENT TOPICAL
Qty: 1 TUBE | Refills: 3 | Status: SHIPPED | OUTPATIENT
Start: 2017-04-05 | End: 2017-09-19

## 2017-04-06 ENCOUNTER — TELEPHONE (OUTPATIENT)
Dept: MEDICAL GROUP | Facility: PHYSICIAN GROUP | Age: 67
End: 2017-04-06

## 2017-04-06 NOTE — TELEPHONE ENCOUNTER
MEDICATION PRIOR AUTHORIZATION NEEDED:    1. Name of Medication: Acyclovir    2. Requested By (Name of Pharmacy): [facility]     3. Is insurance on file current? Yes    4. What is the name & phone number of the 3rd party payor? [employer] [phone]

## 2017-04-12 PROBLEM — F43.23 ADJUSTMENT DISORDER WITH MIXED ANXIETY AND DEPRESSED MOOD: Status: ACTIVE | Noted: 2017-04-12

## 2017-04-12 PROBLEM — F43.21 GRIEF: Status: ACTIVE | Noted: 2017-04-12

## 2017-04-12 PROBLEM — R41.89 COGNITIVE DECLINE: Status: ACTIVE | Noted: 2017-04-12

## 2017-04-12 PROBLEM — R41.89 COGNITIVE DECLINE: Status: RESOLVED | Noted: 2017-04-12 | Resolved: 2017-04-12
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
## 2017-05-03 ENCOUNTER — APPOINTMENT (OUTPATIENT)
Dept: BEHAVIORAL HEALTH | Facility: PHYSICIAN GROUP | Age: 67
End: 2017-05-03
Payer: MEDICARE

## 2017-05-13 ENCOUNTER — PATIENT OUTREACH (OUTPATIENT)
Dept: HEALTH INFORMATION MANAGEMENT | Facility: OTHER | Age: 67
End: 2017-05-13

## 2017-05-13 NOTE — PROGRESS NOTES
Outcome: Left Message    WebIZ Checked & Epic Updated:  yes    HealthConnect Verified: no    Attempt # 1

## 2017-08-14 ENCOUNTER — TELEPHONE (OUTPATIENT)
Dept: MEDICAL GROUP | Facility: PHYSICIAN GROUP | Age: 67
End: 2017-08-14

## 2017-08-14 DIAGNOSIS — E78.5 DYSLIPIDEMIA: ICD-10-CM

## 2017-08-14 DIAGNOSIS — E55.9 VITAMIN D DEFICIENCY: ICD-10-CM

## 2017-08-14 DIAGNOSIS — I83.90 VARICOSE VEIN OF LEG: ICD-10-CM

## 2017-08-14 DIAGNOSIS — R20.0 NUMBNESS OF LOWER EXTREMITY: ICD-10-CM

## 2017-08-14 NOTE — TELEPHONE ENCOUNTER
1. Caller Name: Sharon                                         Call Back Number: [phone] (home)       Patient approves a detailed voicemail message: no    2. SPECIFIC Action To Be Taken: Labs    3. Diagnosis/Clinical Reason for Request: Routine    4. Specialty & Provider Name/Lab/Imaging Location: [facility]    5. Is appointment scheduled for requested order/referral: yes -     Patient informed they will receive a return phone call from the office ONLY if there are any questions before processing their request. Advised to call back if they haven't received a call from the referral department in 5 days.

## 2017-08-18 ENCOUNTER — OFFICE VISIT (OUTPATIENT)
Dept: MEDICAL GROUP | Facility: PHYSICIAN GROUP | Age: 67
End: 2017-08-18
Payer: MEDICARE

## 2017-08-18 VITALS
BODY MASS INDEX: 20.66 KG/M2 | HEIGHT: 64 IN | TEMPERATURE: 97.3 F | HEART RATE: 85 BPM | OXYGEN SATURATION: 96 % | RESPIRATION RATE: 12 BRPM | SYSTOLIC BLOOD PRESSURE: 130 MMHG | DIASTOLIC BLOOD PRESSURE: 76 MMHG | WEIGHT: 121 LBS

## 2017-08-18 DIAGNOSIS — F43.21 GRIEF REACTION: ICD-10-CM

## 2017-08-18 DIAGNOSIS — F51.04 PSYCHOPHYSIOLOGICAL INSOMNIA: ICD-10-CM

## 2017-08-18 DIAGNOSIS — M81.6 LOCALIZED OSTEOPOROSIS, UNSPECIFIED PATHOLOGICAL FRACTURE PRESENCE: ICD-10-CM

## 2017-08-18 DIAGNOSIS — Z23 NEED FOR PNEUMOCOCCAL VACCINATION: ICD-10-CM

## 2017-08-18 DIAGNOSIS — E78.5 DYSLIPIDEMIA: ICD-10-CM

## 2017-08-18 DIAGNOSIS — E55.9 VITAMIN D DEFICIENCY: ICD-10-CM

## 2017-08-18 PROCEDURE — 90670 PCV13 VACCINE IM: CPT | Performed by: NURSE PRACTITIONER

## 2017-08-18 PROCEDURE — G0009 ADMIN PNEUMOCOCCAL VACCINE: HCPCS | Performed by: NURSE PRACTITIONER

## 2017-08-18 PROCEDURE — 99214 OFFICE O/P EST MOD 30 MIN: CPT | Mod: 25 | Performed by: NURSE PRACTITIONER

## 2017-08-18 RX ORDER — SIMVASTATIN 20 MG
TABLET ORAL
Qty: 90 TAB | Refills: 3 | Status: SHIPPED | OUTPATIENT
Start: 2017-08-18 | End: 2017-11-13

## 2017-08-18 NOTE — MR AVS SNAPSHOT
"        Tanisha Haile   2017 2:40 PM   Office Visit   MRN: [id number]    Department:  [facility]   Dept Phone:  [phone]    Description:  Female : 1950   Provider:  ELLEN Madsen           Reason for Visit     Annual Exam           Allergies as of 2017     Allergen Noted Reactions    Tylenol 2014   Anaphylaxis    Asa [Aspirin] 2012       Nsaids 2013   Rash, Itching    Pcn [Penicillins] 2012   Itching    Sulfa Drugs 2012         You were diagnosed with     Grief reaction   [566656]       Psychophysiological insomnia   [093393]       Vitamin D deficiency   [0796790]       Dyslipidemia   [096541]       Localized osteoporosis, unspecified pathological fracture presence   [3295674]       Need for pneumococcal vaccination   [890570]         Vital Signs     Blood Pressure Pulse Temperature Respirations Height Weight    130/76 mmHg 85 36.3 °C (97.3 °F) 12 1.626 m (5' 4.02\") 54.885 kg (121 lb)    Body Mass Index Oxygen Saturation Smoking Status             20.76 kg/m2 96% Former Smoker         Basic Information     Date Of Birth Sex Race Ethnicity Preferred Language    1950 Female White Non- English      Your appointments     Aug 22, 2017 10:30 AM   MA [id number] with ANGELITA LAGUERRE MG 1   [facility] IMAGING [facility] MAMMOGRAPHY ([place])    [address]  [place] [address]   [phone]           No deodorant, powder, perfume or lotion under the arm or breast area.              Problem List              ICD-10-CM Priority Class Noted - Resolved    Dyslipidemia E78.5 Low  10/24/2012 - Present    Vitamin D deficiency E55.9 Medium  2013 - Present    Neck pain M54.2 Medium  4/15/2013 - Present    Seasonal allergies J30.2 Medium  2013 - Present    Shingles B02.9 Low  7/10/2013 - Present    Insomnia G47.00 High  2014 - Present    Headache R51 Low  10/3/2014 - Present    Mid back pain on left side M54.9 High  " 8/14/2015 - Present    Osteoporosis M81.0 High  8/14/2015 - Present    Interstitial cystitis N30.10 Low  7/7/2016 - Present    Grief reaction F43.20 High  2/21/2017 - Present    Numbness of lower extremity R20.0   2/22/2017 - Present    Varicose vein of leg I83.90   3/21/2017 - Present    Adjustment disorder with mixed anxiety and depressed mood F43.23   4/12/2017 - Present    Grief F43.20   4/12/2017 - Present      Health Maintenance        Date Due Completion Dates    IMM DTaP/Tdap/Td Vaccine (1 - Tdap) 5/13/1969 ---    IMM PNEUMOCOCCAL 65+ (ADULT) LOW/MEDIUM RISK SERIES (2 of 2 - PCV13) 12/22/2016 12/22/2015    MAMMOGRAM 1/5/2017 1/5/2016, 1/2/2015, 12/31/2013, 12/24/2012    IMM INFLUENZA (1) 10/2/2017 (Originally 9/1/2017) ---    BONE DENSITY 7/15/2020 7/15/2015, 12/24/2012    COLONOSCOPY 1/9/2024 1/9/2014            Current Immunizations     13-VALENT PCV PREVNAR  Incomplete    Pneumococcal polysaccharide vaccine (PPSV-23) 12/22/2015  2:20 PM      Below and/or attached are the medications your provider expects you to take. Review all of your home medications and newly ordered medications with your provider and/or pharmacist. Follow medication instructions as directed by your provider and/or pharmacist. Please keep your medication list with you and share with your provider. Update the information when medications are discontinued, doses are changed, or new medications (including over-the-counter products) are added; and carry medication information at all times in the event of emergency situations     Allergies:  TYLENOL - Anaphylaxis     ASA - (reactions not documented)     NSAIDS - Rash,Itching     PCN - Itching     SULFA DRUGS - (reactions not documented)               Medications  Valid as of: August 18, 2017 -  2:55 PM    Generic Name Brand Name Tablet Size Instructions for use    Acyclovir (Ointment) ZOVIRAX 5 % Apply 1 Tube to affected area(s) every 3 hours.        Albuterol Sulfate (Aero Soln) albuterol  108 (90 Base) MCG/ACT Inhale 2 Puffs by mouth every four hours as needed.        Calcium Carbonate-Vitamin D (Tab) Calcium Carbonate-Vitamin D 600-400 MG-UNIT Take  by mouth 2 Times a Day.          Cholecalciferol (Tab) vitamin D 2000 UNIT Take 1 Tab by mouth every day.        Guaifenesin-Codeine (Solution) ROBITUSSIN -10 mg/5mL Take 10 mL by mouth every 6 hours as needed for Cough.        HydrOXYzine HCl (Tab) ATARAX 25 MG Take 1 Tab by mouth 3 times a day as needed for Anxiety.        LORazepam (Tab) ATIVAN 0.5 MG Take 1 Tab by mouth at bedtime as needed for Anxiety.        Simvastatin (Tab) ZOCOR 20 MG TAKE 1 TABLET BY MOUTH EVERY EVENING        Tetanus-Diphth-Acell Pertussis (Suspension) ADACEL 5-2-15.5 LF-MCG/0.5 0.5 mL by Intramuscular route Once PRN for up to 1 dose.        ValACYclovir HCl (Tab) VALTREX 500 MG Take 1 Tab by mouth every day.        Vitamin E (Cap) VITAMIN E 400 UNIT Take 800 Units by mouth every day.        .                 Medicines prescribed today were sent to:     [facility] [id number] [place] [address] [address] [facility] [address] [facility] [address]    [address] [place] [address] [address] [facility] [address]    Phone: [phone] Fax: [phone]    Open 24 Hours?: No    [facility] [address] [place], [place] - [address] [address] [address] [facility] [address]    [address] [facility] [address]    Phone: [phone] Fax: [phone]    Open 24 Hours?: No      Medication refill instructions:       If your prescription bottle indicates you have medication refills left, it is not necessary to call your provider’s office. Please contact your pharmacy and they will refill your medication.    If your prescription bottle indicates you do not have any refills left, you may request refills at any time through one of the following ways: The online [employer] system (except Urgent Care), by calling your provider’s office, or by asking your pharmacy to contact your provider’s office with a refill  request. Medication refills are processed only during regular business hours and may not be available until the next business day. Your provider may request additional information or to have a follow-up visit with you prior to refilling your medication.   *Please Note: Medication refills are assigned a new Rx number when refilled electronically. Your pharmacy may indicate that no refills were authorized even though a new prescription for the same medication is available at the pharmacy. Please request the medicine by name with the pharmacy before contacting your provider for a refill.        Referral     A referral request has been sent to our patient care coordination department. Please allow 3-5 business days for us to process this request and contact you either by phone or mail. If you do not hear from us by the 5th business day, please call us at [phone].           [employer] Access Code: Activation code not generated  Current [employer] Status: Active

## 2017-08-18 NOTE — ASSESSMENT & PLAN NOTE
Patient attended a telemedicine Psych but did not find the experience helpful . Went to group at [facility], and it started out okay, but now not working. Patient would like to attend  in [place] in person.

## 2017-08-18 NOTE — ASSESSMENT & PLAN NOTE
Patient states her sleep is improved. She state getting better. Patient goes to bed at 11 and wakes at 6 am.

## 2017-08-21 NOTE — PROGRESS NOTES
Chief Complaint   Patient presents with   • Annual Exam       HISTORY OF PRESENT ILLNESS: Patient is a @ age 67 here  today to discuss:    Interval history:     Hospitalizations - NO    Injuries NO    Illness NO         Grief reaction  Patient attended a telemedicine Psych but did not find the experience helpful . Went to group at [employer], and it started out okay, but now not working. Patient would like to attend  in [place] in person.     Insomnia  Patient states her sleep is improved. She state getting better. Patient goes to bed at 11 and wakes at 6 am.     Vitamin D deficiency  Patient continues to supplement with vitamin D. Will obtain new labs.     Dyslipidemia  Patient continues to take simvastatin and no myalgias. Patient needs to obtain new refill.     Osteoporosis  Patient continues to take her supplement.     Review of Systems   Constitutional: Negative for fever, chills, weight loss and malaise/fatigue.   HENT: Negative for ear pain, nosebleeds, congestion, sore throat and neck pain.    Eyes: Negative for blurred vision.   Respiratory: Negative for cough, sputum production, shortness of breath and wheezing.    Cardiovascular: Negative for chest pain, palpitations, orthopnea and leg swelling.   Gastrointestinal: Negative for heartburn, nausea, vomiting and abdominal pain.   Genitourinary: Negative for dysuria, urgency and frequency.   Musculoskeletal: Negative for myalgias, back pain and joint pain.   Skin: Negative for rash and itching.   Neurological: Negative for dizziness, tingling, tremors, sensory change, focal weakness and headaches.   Endo/Heme/Allergies: Does not bruise/bleed easily.   Psychiatric/Behavioral: DEALING with grief.       Allergies:Tylenol; Asa; Nsaids; Pcn; and Sulfa drugs    Current Outpatient Prescriptions Ordered in [facility]   Medication Sig Dispense Refill   • simvastatin (ZOCOR) 20 MG Tab TAKE 1 TABLET BY MOUTH EVERY EVENING 90 Tab 3   • Calcium Carbonate-Vitamin D (CALTRATE  600+D) 600-400 MG-UNIT TABS Take  by mouth 2 Times a Day.       • Cholecalciferol (VITAMIN D) 2000 UNIT TABS Take 1 Tab by mouth every day. 30 Each 3   • acyclovir (ZOVIRAX) 5 % Ointment Apply 1 Tube to affected area(s) every 3 hours. 1 Tube 3   • valacyclovir (VALTREX) 500 MG Tab Take 1 Tab by mouth every day. 90 Tab 3   • lorazepam (ATIVAN) 0.5 MG Tab Take 1 Tab by mouth at bedtime as needed for Anxiety. 30 Tab 0   • guaifenesin-codeine (ROBITUSSIN AC) Solution oral solution Take 10 mL by mouth every 6 hours as needed for Cough. 240 mL 0   • albuterol 108 (90 BASE) MCG/ACT Aero Soln inhalation aerosol Inhale 2 Puffs by mouth every four hours as needed. 1 Inhaler 0   • hydrOXYzine (ATARAX) 25 MG Tab Take 1 Tab by mouth 3 times a day as needed for Anxiety. 30 Tab 0   • vitamin e (VITAMIN E) 400 UNIT Cap Take 800 Units by mouth every day.     • tetanus-dipth-acell pertussis (ADACEL) 5-2-15.5 LF-MCG/0.5 Suspension 0.5 mL by Intramuscular route Once PRN for up to 1 dose. 0.5 mL 0     No current Epic-ordered facility-administered medications on file.       Past Medical History   Diagnosis Date   • Hyperlipidemia    • Dyslipidemia 10/24/2012   • Cervical cancer screening 2013     Due to repeat around .   • Seasonal allergies 2013   • Insomnia 2014     Not sleeping, can't shut brain off Making her really tired during the day X 1 month plus but worse for a month   • Dysuria 10/2/2014     Seen  for uti, given macrobid Feels the same as  Feels more like vaginal pain     • Shingles        Social History   Substance Use Topics   • Smoking status: Former Smoker -- 1.00 packs/day for 50 years     Types: Cigarettes     Quit date: 2008   • Smokeless tobacco: Never Used   • Alcohol Use: 0.0 oz/week     0 Standard drinks or equivalent per week      Comment: 2 glasses of wine a day sometimes       Family Status   Relation Status Death Age   • Mother  83     heart failure   • Father  69  "    quad bpass, coma   • Brother Alive      handicap at a facility   • Brother Alive    • Brother Alive      Family History   Problem Relation Age of Onset   • Hypertension Mother    • Hyperlipidemia Mother    • Thyroid Mother    • Lung Disease Mother    • Heart Disease Father    • Hypertension Father    • Hyperlipidemia Father        ROS: As documented in my HPI      Exam:  Blood pressure 130/76, pulse 85, temperature 36.3 °C (97.3 °F), resp. rate 12, height 1.626 m (5' 4.02\"), weight 54.885 kg (121 lb), SpO2 96 %.  General:  Well nourished, well developed female in NAD - mildly tearful  Head: Nontender scalp. No lesions  Neck: Supple. Symmetric Thyroid palpated. No bruits  Pulmonary:  Normal effort. No rales, ronchi, or wheezing.  Cardiovascular: Regular rate and rhythm without murmur.   Abdomen: Soft nontender, normal bowel sounds  Extremities: no clubbing, cyanosis, or edema.  Psych: Alert and oriented x3.  Neurological: No focal deficits    Please note that this dictation was created using voice recognition software. I have made every reasonable attempt to correct obvious errors, but I expect that there are errors of grammar and possibly content that I did not discover before finalizing the note.    Assessment/Plan:  1. Grief reaction  REFERRAL TO BEHAVIORAL HEALTH   2. Psychophysiological insomnia  Support and     3. Vitamin D deficiency  Current status of condition is chronic and controlled on therapy.     4. Dyslipidemia  Current status of condition is chronic and controlled on therapy.     5. Localized osteoporosis, unspecified pathological fracture presence  Current status of condition is chronic and controlled on therapy.     6. Need for pneumococcal vaccination  Prevnar 13 PCV-13            "

## 2017-08-22 ENCOUNTER — HOSPITAL ENCOUNTER (OUTPATIENT)
Dept: RADIOLOGY | Facility: MEDICAL CENTER | Age: 67
End: 2017-08-22
Attending: NURSE PRACTITIONER
Payer: MEDICARE

## 2017-08-22 DIAGNOSIS — Z13.9 SCREENING: ICD-10-CM

## 2017-08-22 PROCEDURE — 77063 BREAST TOMOSYNTHESIS BI: CPT

## 2017-08-23 ENCOUNTER — HOSPITAL ENCOUNTER (OUTPATIENT)
Dept: LAB | Facility: MEDICAL CENTER | Age: 67
End: 2017-08-23
Attending: NURSE PRACTITIONER
Payer: MEDICARE

## 2017-08-23 DIAGNOSIS — E78.5 DYSLIPIDEMIA: ICD-10-CM

## 2017-08-23 DIAGNOSIS — I83.90 VARICOSE VEIN OF LEG: ICD-10-CM

## 2017-08-23 DIAGNOSIS — E55.9 VITAMIN D DEFICIENCY: ICD-10-CM

## 2017-08-23 DIAGNOSIS — R20.0 NUMBNESS OF LOWER EXTREMITY: ICD-10-CM

## 2017-08-23 LAB
25(OH)D3 SERPL-MCNC: 53 NG/ML (ref 30–100)
ALBUMIN SERPL BCP-MCNC: 4.1 G/DL (ref 3.2–4.9)
ALBUMIN/GLOB SERPL: 1.7 G/DL
ALP SERPL-CCNC: 54 U/L (ref 30–99)
ALT SERPL-CCNC: 12 U/L (ref 2–50)
ANION GAP SERPL CALC-SCNC: 5 MMOL/L (ref 0–11.9)
AST SERPL-CCNC: 18 U/L (ref 12–45)
BILIRUB SERPL-MCNC: 0.5 MG/DL (ref 0.1–1.5)
BUN SERPL-MCNC: 13 MG/DL (ref 8–22)
CALCIUM SERPL-MCNC: 9.5 MG/DL (ref 8.5–10.5)
CHLORIDE SERPL-SCNC: 105 MMOL/L (ref 96–112)
CHOLEST SERPL-MCNC: 162 MG/DL (ref 100–199)
CO2 SERPL-SCNC: 30 MMOL/L (ref 20–33)
CREAT SERPL-MCNC: 0.64 MG/DL (ref 0.5–1.4)
FASTING STATUS PATIENT QL REPORTED: NORMAL
GFR SERPL CREATININE-BSD FRML MDRD: >60 ML/MIN/1.73 M 2
GLOBULIN SER CALC-MCNC: 2.4 G/DL (ref 1.9–3.5)
GLUCOSE SERPL-MCNC: 88 MG/DL (ref 65–99)
HDLC SERPL-MCNC: 91 MG/DL
LDLC SERPL CALC-MCNC: 62 MG/DL
POTASSIUM SERPL-SCNC: 4.2 MMOL/L (ref 3.6–5.5)
PROT SERPL-MCNC: 6.5 G/DL (ref 6–8.2)
SODIUM SERPL-SCNC: 140 MMOL/L (ref 135–145)
T4 FREE SERPL-MCNC: 0.67 NG/DL (ref 0.53–1.43)
TRIGL SERPL-MCNC: 44 MG/DL (ref 0–149)
TSH SERPL DL<=0.005 MIU/L-ACNC: 1.66 UIU/ML (ref 0.3–3.7)

## 2017-08-23 PROCEDURE — 84439 ASSAY OF FREE THYROXINE: CPT

## 2017-08-23 PROCEDURE — 80061 LIPID PANEL: CPT

## 2017-08-23 PROCEDURE — 36415 COLL VENOUS BLD VENIPUNCTURE: CPT

## 2017-08-23 PROCEDURE — 84443 ASSAY THYROID STIM HORMONE: CPT

## 2017-08-23 PROCEDURE — 80053 COMPREHEN METABOLIC PANEL: CPT

## 2017-08-23 PROCEDURE — 82306 VITAMIN D 25 HYDROXY: CPT

## 2017-09-01 ENCOUNTER — OFFICE VISIT (OUTPATIENT)
Dept: URGENT CARE | Facility: PHYSICIAN GROUP | Age: 67
End: 2017-09-01
Payer: MEDICARE

## 2017-09-01 VITALS
WEIGHT: 115 LBS | HEART RATE: 86 BPM | BODY MASS INDEX: 19.63 KG/M2 | SYSTOLIC BLOOD PRESSURE: 118 MMHG | OXYGEN SATURATION: 96 % | HEIGHT: 64 IN | DIASTOLIC BLOOD PRESSURE: 70 MMHG | TEMPERATURE: 97.1 F | RESPIRATION RATE: 14 BRPM

## 2017-09-01 DIAGNOSIS — M79.674 TOE PAIN, RIGHT: ICD-10-CM

## 2017-09-01 PROCEDURE — 99214 OFFICE O/P EST MOD 30 MIN: CPT | Performed by: PHYSICIAN ASSISTANT

## 2017-09-01 RX ORDER — TRAMADOL HYDROCHLORIDE 50 MG/1
50 TABLET ORAL EVERY 6 HOURS PRN
Qty: 20 TAB | Refills: 0 | Status: SHIPPED | OUTPATIENT
Start: 2017-09-01 | End: 2017-09-25

## 2017-09-01 NOTE — PROGRESS NOTES
Chief Complaint   Patient presents with   • Toe Injury     Broken toe       HISTORY OF PRESENT ILLNESS: Patient is a 67 y.o. female who presents today for reevaluation of right toe pain. Patient injured her fifth toe on her right foot approximately one month ago. Patient actively kicked something, injuring her toe but last night hit her toe again causing worsening pain. She is unable to take anti-inflammatories and acetaminophen. She has been using ice which helps temporarily. She has worsening pain with ambulation. She denies distal paresthesias.    Patient Active Problem List    Diagnosis Date Noted   • Grief reaction 02/21/2017     Priority: High   • Mid back pain on left side 08/14/2015     Priority: High   • Osteoporosis 08/14/2015     Priority: High   • Insomnia 02/27/2014     Priority: High   • Seasonal allergies 05/22/2013     Priority: Medium   • Neck pain 04/15/2013     Priority: Medium   • Vitamin D deficiency 02/28/2013     Priority: Medium   • Interstitial cystitis 07/07/2016     Priority: Low   • Headache 10/03/2014     Priority: Low   • Shingles 07/10/2013     Priority: Low   • Dyslipidemia 10/24/2012     Priority: Low   • Adjustment disorder with mixed anxiety and depressed mood 04/12/2017   • Grief 04/12/2017   • Varicose vein of leg 03/21/2017   • Numbness of lower extremity 02/22/2017       Allergies:Tylenol; Asa [aspirin]; Nsaids; Pcn [penicillins]; and Sulfa drugs    Current Outpatient Prescriptions Ordered in [facility]   Medication Sig Dispense Refill   • tramadol (ULTRAM) 50 MG Tab Take 1 Tab by mouth every 6 hours as needed for Mild Pain. 20 Tab 0   • simvastatin (ZOCOR) 20 MG Tab TAKE 1 TABLET BY MOUTH EVERY EVENING 90 Tab 3   • Calcium Carbonate-Vitamin D (CALTRATE 600+D) 600-400 MG-UNIT TABS Take  by mouth 2 Times a Day.       • acyclovir (ZOVIRAX) 5 % Ointment Apply 1 Tube to affected area(s) every 3 hours. 1 Tube 3   • valacyclovir (VALTREX) 500 MG Tab Take 1 Tab by mouth every day. 90 Tab 3    • lorazepam (ATIVAN) 0.5 MG Tab Take 1 Tab by mouth at bedtime as needed for Anxiety. 30 Tab 0   • guaifenesin-codeine (ROBITUSSIN AC) Solution oral solution Take 10 mL by mouth every 6 hours as needed for Cough. 240 mL 0   • albuterol 108 (90 BASE) MCG/ACT Aero Soln inhalation aerosol Inhale 2 Puffs by mouth every four hours as needed. 1 Inhaler 0   • hydrOXYzine (ATARAX) 25 MG Tab Take 1 Tab by mouth 3 times a day as needed for Anxiety. 30 Tab 0   • vitamin e (VITAMIN E) 400 UNIT Cap Take 800 Units by mouth every day.     • tetanus-dipth-acell pertussis (ADACEL) 5-2-15.5 LF-MCG/0.5 Suspension 0.5 mL by Intramuscular route Once PRN for up to 1 dose. 0.5 mL 0   • Cholecalciferol (VITAMIN D) 2000 UNIT TABS Take 1 Tab by mouth every day. 30 Each 3     No current Epic-ordered facility-administered medications on file.        Past Medical History:   Diagnosis Date   • Dysuria 10/2/2014    Seen  for uti, given macrobid Feels the same as  Feels more like vaginal pain     • Insomnia 2014    Not sleeping, can't shut brain off Making her really tired during the day X 1 month plus but worse for a month   • Cervical cancer screening 2013    Due to repeat around .   • Seasonal allergies 2013   • Dyslipidemia 10/24/2012   • Hyperlipidemia    • Shingles        Social History   Substance Use Topics   • Smoking status: Former Smoker     Packs/day: 1.00     Years: 50.00     Types: Cigarettes     Quit date: 2008   • Smokeless tobacco: Never Used   • Alcohol use 0.0 oz/week      Comment: 2 glasses of wine a day sometimes       Family Status   Relation Status   • Mother  at age 83    heart failure   • Father  at age 69    quad bpass, coma   • Brother Alive    handicap at a facility   • Brother Alive   • Brother Alive     Family History   Problem Relation Age of Onset   • Hypertension Mother    • Hyperlipidemia Mother    • Thyroid Mother    • Lung Disease Mother    • Heart Disease Father  "   • Hypertension Father    • Hyperlipidemia Father        ROS:    Review of Systems   Constitutional: Negative for fever, chills, weight loss and malaise/fatigue.   HENT: Negative for ear pain, nosebleeds, congestion, sore throat and neck pain.    Eyes: Negative for blurred vision.   Respiratory: Negative for cough, sputum production, shortness of breath and wheezing.    Cardiovascular: Negative for chest pain, palpitations, orthopnea and leg swelling.   Gastrointestinal: Negative for heartburn, nausea, vomiting and abdominal pain.   Genitourinary: Negative for dysuria, urgency and frequency.       Exam:  Blood pressure 118/70, pulse 86, temperature 36.2 °C (97.1 °F), resp. rate 14, height 1.626 m (5' 4\"), weight 52.2 kg (115 lb), SpO2 96 %.  General: Well developed, well nourished. No distress.  HEENT: Head is grossly normal.  Pulmonary: No respiratory distress noted.  Cardiovascular: Right pedal pulse is strong.  Neurologic: No sensory deficit noted.  Extremities: Soft tissue swelling noted on the fifth toe of the right foot with associated tenderness to palpation.  Skin: Warm, dry, good turgor. No rashes in visible areas.   Psych: Normal mood. Alert and oriented x3. Judgment and insight is normal.     X-ray is unavailable at this time.    Assessment/Plan:  Postop shoe provided. Patient reports improved comfort. He has taken tramadol the past. He is medication as instructed. Follow-up worsening or persistent symptoms.  1. Toe pain, right  tramadol (ULTRAM) 50 MG Tab       "

## 2017-09-16 ENCOUNTER — OFFICE VISIT (OUTPATIENT)
Dept: URGENT CARE | Facility: PHYSICIAN GROUP | Age: 67
End: 2017-09-16
Payer: MEDICARE

## 2017-09-16 VITALS
RESPIRATION RATE: 12 BRPM | SYSTOLIC BLOOD PRESSURE: 108 MMHG | BODY MASS INDEX: 20.06 KG/M2 | DIASTOLIC BLOOD PRESSURE: 64 MMHG | HEART RATE: 68 BPM | TEMPERATURE: 98.2 F | HEIGHT: 65 IN | WEIGHT: 120.4 LBS | OXYGEN SATURATION: 100 %

## 2017-09-16 DIAGNOSIS — B34.9 VIRAL ILLNESS: ICD-10-CM

## 2017-09-16 PROCEDURE — 99213 OFFICE O/P EST LOW 20 MIN: CPT | Performed by: FAMILY MEDICINE

## 2017-09-16 ASSESSMENT — PATIENT HEALTH QUESTIONNAIRE - PHQ9: CLINICAL INTERPRETATION OF PHQ2 SCORE: 0

## 2017-09-19 ENCOUNTER — OFFICE VISIT (OUTPATIENT)
Dept: URGENT CARE | Facility: PHYSICIAN GROUP | Age: 67
End: 2017-09-19
Payer: MEDICARE

## 2017-09-19 VITALS
SYSTOLIC BLOOD PRESSURE: 130 MMHG | WEIGHT: 120 LBS | HEIGHT: 65 IN | TEMPERATURE: 97.3 F | HEART RATE: 66 BPM | RESPIRATION RATE: 12 BRPM | DIASTOLIC BLOOD PRESSURE: 78 MMHG | OXYGEN SATURATION: 98 % | BODY MASS INDEX: 19.99 KG/M2

## 2017-09-19 DIAGNOSIS — H61.23 BILATERAL IMPACTED CERUMEN: ICD-10-CM

## 2017-09-19 DIAGNOSIS — R05.9 COUGH: ICD-10-CM

## 2017-09-19 PROCEDURE — 99213 OFFICE O/P EST LOW 20 MIN: CPT | Mod: 25 | Performed by: PHYSICIAN ASSISTANT

## 2017-09-19 PROCEDURE — 69210 REMOVE IMPACTED EAR WAX UNI: CPT | Performed by: PHYSICIAN ASSISTANT

## 2017-09-19 ASSESSMENT — ENCOUNTER SYMPTOMS
SORE THROAT: 0
CHILLS: 0
SHORTNESS OF BREATH: 0
WHEEZING: 0
COUGH: 1
FEVER: 0

## 2017-09-19 NOTE — PATIENT INSTRUCTIONS
Cough, Adult   A cough is a reflex that helps clear your throat and airways. It can help heal the body or may be a reaction to an irritated airway. A cough may only last 2 or 3 weeks (acute) or may last more than 8 weeks (chronic).   CAUSES  Acute cough:  · Viral or bacterial infections.  Chronic cough:  · Infections.  · Allergies.  · Asthma.  · Post-nasal drip.  · Smoking.  · Heartburn or acid reflux.  · Some medicines.  · Chronic lung problems (COPD).  · Cancer.  SYMPTOMS   · Cough.  · Fever.  · Chest pain.  · Increased breathing rate.  · High-pitched whistling sound when breathing (wheezing).  · Colored mucus that you cough up (sputum).  TREATMENT   · A bacterial cough may be treated with antibiotic medicine.  · A viral cough must run its course and will not respond to antibiotics.  · Your caregiver may recommend other treatments if you have a chronic cough.  HOME CARE INSTRUCTIONS   · Only take over-the-counter or prescription medicines for pain, discomfort, or fever as directed by your caregiver. Use cough suppressants only as directed by your caregiver.  · Use a cold steam vaporizer or humidifier in your bedroom or home to help loosen secretions.  · Sleep in a semi-upright position if your cough is worse at night.  · Rest as needed.  · Stop smoking if you smoke.  SEEK IMMEDIATE MEDICAL CARE IF:   · You have pus in your sputum.  · Your cough starts to worsen.  · You cannot control your cough with suppressants and are losing sleep.  · You begin coughing up blood.  · You have difficulty breathing.  · You develop pain which is getting worse or is uncontrolled with medicine.  · You have a fever.  MAKE SURE YOU:   · Understand these instructions.  · Will watch your condition.  · Will get help right away if you are not doing well or get worse.     This information is not intended to replace advice given to you by your health care provider. Make sure you discuss any questions you have with your health care  provider.     Document Released: 06/15/2012 Document Revised: 03/11/2013 Document Reviewed: 02/24/2016  [employer] Interactive Patient Education ©2016 [employer]      Cerumen Impaction  The structures of the external ear canal secrete a waxy substance known as cerumen. Excess cerumen can build up in the ear canal, causing a condition known as cerumen impaction. Cerumen impaction can cause ear pain and disrupt the function of the ear.  The rate of cerumen production differs for each individual. In certain individuals, the configuration of the ear canal may decrease his or her ability to naturally remove cerumen.  CAUSES  Cerumen impaction is caused by excessive cerumen production or buildup.  RISK FACTORS  · Frequent use of swabs to clean ears.  · Having narrow ear canals.  · Having eczema.  · Being dehydrated.  SIGNS AND SYMPTOMS  · Diminished hearing.  · Ear drainage.  · Ear pain.  · Ear itch.  TREATMENT  Treatment may involve:  · Over-the-counter or prescription ear drops to soften the cerumen.  · Removal of cerumen by a health care provider. This may be done with:  ¨ Irrigation with warm water. This is the most common method of removal.  ¨ Ear curettes and other instruments.  ¨ Surgery. This may be done in severe cases.  HOME CARE INSTRUCTIONS  · Take medicines only as directed by your health care provider.  · Do not insert objects into the ear with the intent of cleaning the ear.  PREVENTION  · Do not insert objects into the ear, even with the intent of cleaning the ear. Removing cerumen as a part of normal hygiene is not necessary, and the use of swabs in the ear canal is not recommended.  · Drink enough water to keep your urine clear or pale yellow.  · Control your eczema if you have it.  SEEK MEDICAL CARE IF:  · You develop ear pain.  · You develop bleeding from the ear.  · The cerumen does not clear after you use ear drops as directed.     This information is not intended to replace advice given to you by  your health care provider. Make sure you discuss any questions you have with your health care provider.     Document Released: 01/25/2006 Document Revised: 01/08/2016 Document Reviewed: 03/17/2011  [employer] Interactive Patient Education ©2016 [employer]

## 2017-09-19 NOTE — PROGRESS NOTES
Subjective:      Tanisha Haile is a 67 y.o. female who presents with Cough (x1day congestion)            Patient presents today with cough for the last couple of days. Also reports some nasal congestion. She was seen a few days ago and told that she had a viral upper respiratory illness. No fevers, shortness of breath, wheezing, or other complaints. She has not been using any OTC medications.      Cough   This is a new problem. The current episode started in the past 7 days. The problem has been waxing and waning. Associated symptoms include nasal congestion. Pertinent negatives include no chills, ear pain, fever, sore throat, shortness of breath or wheezing. Nothing aggravates the symptoms. She has tried nothing for the symptoms. The treatment provided no relief.       Review of Systems   Constitutional: Negative for chills and fever.   HENT: Positive for congestion. Negative for ear discharge, ear pain and sore throat.    Respiratory: Positive for cough. Negative for shortness of breath and wheezing.      Allergies:Tylenol; Asa [aspirin]; Nsaids; Pcn [penicillins]; and Sulfa drugs    Current Outpatient Prescriptions Ordered in [facility]   Medication Sig Dispense Refill   • tramadol (ULTRAM) 50 MG Tab Take 1 Tab by mouth every 6 hours as needed for Mild Pain. 20 Tab 0   • simvastatin (ZOCOR) 20 MG Tab TAKE 1 TABLET BY MOUTH EVERY EVENING 90 Tab 3   • valacyclovir (VALTREX) 500 MG Tab Take 1 Tab by mouth every day. 90 Tab 3   • hydrOXYzine (ATARAX) 25 MG Tab Take 1 Tab by mouth 3 times a day as needed for Anxiety. 30 Tab 0   • vitamin e (VITAMIN E) 400 UNIT Cap Take 800 Units by mouth every day.     • Calcium Carbonate-Vitamin D (CALTRATE 600+D) 600-400 MG-UNIT TABS Take  by mouth 2 Times a Day.         No current Epic-ordered facility-administered medications on file.        Past Medical History:   Diagnosis Date   • Dysuria 10/2/2014    Seen 9/27 for uti, given macrobid Feels the same as 9/27 Feels more like vaginal  "pain     • Insomnia 2014    Not sleeping, can't shut brain off Making her really tired during the day X 1 month plus but worse for a month   • Cervical cancer screening 2013    Due to repeat around .   • Seasonal allergies 2013   • Dyslipidemia 10/24/2012   • Hyperlipidemia    • Shingles        Social History   Substance Use Topics   • Smoking status: Former Smoker     Packs/day: 1.00     Years: 50.00     Types: Cigarettes     Quit date: 2008   • Smokeless tobacco: Never Used   • Alcohol use 0.0 oz/week      Comment: 2 glasses of wine a day sometimes       Family Status   Relation Status   • Mother  at age 83    heart failure   • Father  at age 69    quad bpass, coma   • Brother Alive    handicap at a facility   • Brother Alive   • Brother Alive     Family History   Problem Relation Age of Onset   • Hypertension Mother    • Hyperlipidemia Mother    • Thyroid Mother    • Lung Disease Mother    • Heart Disease Father    • Hypertension Father    • Hyperlipidemia Father           Objective:     /78   Pulse 66   Temp 36.3 °C (97.3 °F)   Resp 12   Ht 1.651 m (5' 5\")   Wt 54.4 kg (120 lb)   SpO2 98%   Breastfeeding? No   BMI 19.97 kg/m²      Physical Exam   Constitutional: She is oriented to person, place, and time. She appears well-developed and well-nourished. No distress.   HENT:   Head: Normocephalic and atraumatic.   Right Ear: External ear normal.   Left Ear: External ear normal.   Mouth/Throat: Oropharynx is clear and moist.   Canals completely obstructed by cerumen. After removal, canals and tympanic membrane is unremarkable   Eyes: Right eye exhibits no discharge. Left eye exhibits no discharge.   Neck: Normal range of motion. Neck supple.   Cardiovascular: Normal rate and regular rhythm.    Pulmonary/Chest: Effort normal and breath sounds normal. She has no wheezes. She has no rales.   Occasional nonproductive cough   Neurological: She is alert and oriented to " person, place, and time.   Skin: Skin is warm and dry. She is not diaphoretic.   Psychiatric: She has a normal mood and affect. Her behavior is normal. Judgment and thought content normal.   Nursing note and vitals reviewed.              Assessment/Plan:     1. Cough      Likely secondary to viral illness. Lungs clear. Given written instructions. Recommended Mucinex. Follow up with PCP as needed.   2. Bilateral impacted cerumen      Canals cleared in clinic. Given written instructions. Follow-up as needed     Procedure: Cerumen Removal  Risks and benefits of procedure discussed  Cerumen removed with curette, alligator forceps, and lavage after softening agent instilled  Patient tolerated well  Post procedure exam with clear canal and normal TM      [employer] Interactive Patient Education given: Cough, cerumen impaction    Please note that this dictation was created using voice recognition software. I have made every reasonable attempt to correct obvious errors, but I expect that there are errors of grammar and possibly content that I did not discover before finalizing the note.

## 2017-09-25 ENCOUNTER — OFFICE VISIT (OUTPATIENT)
Dept: URGENT CARE | Facility: PHYSICIAN GROUP | Age: 67
End: 2017-09-25
Payer: MEDICARE

## 2017-09-25 VITALS
HEIGHT: 64 IN | SYSTOLIC BLOOD PRESSURE: 126 MMHG | OXYGEN SATURATION: 100 % | HEART RATE: 69 BPM | DIASTOLIC BLOOD PRESSURE: 84 MMHG | TEMPERATURE: 97.9 F | WEIGHT: 117 LBS | BODY MASS INDEX: 19.97 KG/M2

## 2017-09-25 DIAGNOSIS — J01.41 ACUTE RECURRENT PANSINUSITIS: ICD-10-CM

## 2017-09-25 PROCEDURE — 99214 OFFICE O/P EST MOD 30 MIN: CPT | Performed by: PHYSICIAN ASSISTANT

## 2017-09-25 RX ORDER — DOXYCYCLINE HYCLATE 100 MG
100 TABLET ORAL 2 TIMES DAILY
Qty: 20 TAB | Refills: 0 | Status: SHIPPED | OUTPATIENT
Start: 2017-09-25 | End: 2017-12-21

## 2017-09-25 RX ORDER — FLUTICASONE PROPIONATE 50 MCG
2 SPRAY, SUSPENSION (ML) NASAL DAILY
Qty: 1 BOTTLE | Refills: 1 | Status: SHIPPED | OUTPATIENT
Start: 2017-09-25 | End: 2018-09-12 | Stop reason: SDUPTHER

## 2017-09-25 ASSESSMENT — ENCOUNTER SYMPTOMS
HEADACHES: 1
WHEEZING: 0
CHILLS: 0
SORE THROAT: 0
SHORTNESS OF BREATH: 0
SINUS PRESSURE: 1
FEVER: 0

## 2017-09-25 ASSESSMENT — PAIN SCALES - GENERAL: PAINLEVEL: 3=SLIGHT PAIN

## 2017-09-25 NOTE — PROGRESS NOTES
Subjective:      Tanisha Haile is a 67 y.o. female who presents with URI (x 7days)            Patient presents today with over one week history of sinus pressure and presumed sinus infection. She reports that she has increasing pain in her face and head. Denies fever or chills. No other complaints.      Sinusitis   This is a new problem. The current episode started 1 to 4 weeks ago. The problem has been gradually worsening since onset. There has been no fever. The pain is moderate. Associated symptoms include congestion, ear pain, headaches and sinus pressure. Pertinent negatives include no chills, shortness of breath or sore throat. Past treatments include nothing. The treatment provided no relief.       Review of Systems   Constitutional: Negative for chills and fever.   HENT: Positive for congestion, ear pain and sinus pressure. Negative for sore throat.    Respiratory: Negative for shortness of breath and wheezing.    Neurological: Positive for headaches.     Allergies:Tylenol; Asa [aspirin]; Nsaids; Pcn [penicillins]; and Sulfa drugs    Current Outpatient Prescriptions Ordered in [facility]   Medication Sig Dispense Refill   • doxycycline (VIBRAMYCIN) 100 MG Tab Take 1 Tab by mouth 2 times a day. 20 Tab 0   • fluticasone (FLONASE) 50 MCG/ACT nasal spray Spray 2 Sprays in nose every day. 1 Bottle 1   • simvastatin (ZOCOR) 20 MG Tab TAKE 1 TABLET BY MOUTH EVERY EVENING 90 Tab 3   • Calcium Carbonate-Vitamin D (CALTRATE 600+D) 600-400 MG-UNIT TABS Take  by mouth 2 Times a Day.         No current Epic-ordered facility-administered medications on file.        Past Medical History:   Diagnosis Date   • Dysuria 10/2/2014    Seen 9/27 for uti, given macrobid Feels the same as 9/27 Feels more like vaginal pain     • Insomnia 2/27/2014    Not sleeping, can't shut brain off Making her really tired during the day X 1 month plus but worse for a month   • Cervical cancer screening 5/22/2013    Due to repeat around 2015.   •  "Seasonal allergies 2013   • Dyslipidemia 10/24/2012   • Hyperlipidemia    • Shingles        Social History   Substance Use Topics   • Smoking status: Former Smoker     Packs/day: 1.00     Years: 50.00     Types: Cigarettes     Quit date: 2008   • Smokeless tobacco: Never Used   • Alcohol use 0.0 oz/week      Comment: 2 glasses of wine a day sometimes       Family Status   Relation Status   • Mother  at age 83    heart failure   • Father  at age 69    quad bpass, coma   • Brother Alive    handicap at a facility   • Brother Alive   • Brother Alive     Family History   Problem Relation Age of Onset   • Hypertension Mother    • Hyperlipidemia Mother    • Thyroid Mother    • Lung Disease Mother    • Heart Disease Father    • Hypertension Father    • Hyperlipidemia Father           Objective:     /84   Pulse 69   Temp 36.6 °C (97.9 °F)   Ht 1.626 m (5' 4\")   Wt 53.1 kg (117 lb)   SpO2 100%   BMI 20.08 kg/m²      Physical Exam   Constitutional: She is oriented to person, place, and time. She appears well-developed and well-nourished. No distress.   HENT:   Head: Normocephalic and atraumatic.   Right Ear: External ear normal.   Left Ear: External ear normal.   Mouth/Throat: Oropharynx is clear and moist.   Mild left-sided frontal and moderate bilateral maxillary sinus tenderness   Eyes: Right eye exhibits no discharge. Left eye exhibits no discharge.   Neck: Normal range of motion. Neck supple.   Cardiovascular: Normal rate.    Pulmonary/Chest: Effort normal.   Neurological: She is alert and oriented to person, place, and time.   Skin: Skin is warm and dry. She is not diaphoretic.   Psychiatric: She has a normal mood and affect. Her behavior is normal. Judgment and thought content normal.   Nursing note and vitals reviewed.              Assessment/Plan:     1. Acute recurrent pansinusitis  doxycycline (VIBRAMYCIN) 100 MG Tab    fluticasone (FLONASE) 50 MCG/ACT nasal spray    Ongoing for " over a week. Frontal/maxillary sinus tenderness. Start Flonase, irrigation, contingent doxycycline. Follow-up with PCP       [employer] Interactive Patient Education given:Sinusitis    Use Tylenol and/or ibuprofen as needed for pain or fever.  Use a Kam Med, Neti Pot, or other nasal irrigation device daily.  Use Flonase daily.  May try a short course of a decongestant such as Sudafed.  May try Afrin nasal spray for 3-5 days and then discontinue use.  May try an over-the-counter antihistamine such as Zyrtec, Claritin, or Allegra.  Use a cool mist humidifier with distilled water.  Elevate the head of your bed a few inches.  Drink plenty of fluids and get adequate rest.  Follow up with primary care provider in a few days if not improving.  Return for new or worsening symptoms.      Please note that this dictation was created using voice recognition software. I have made every reasonable attempt to correct obvious errors, but I expect that there are errors of grammar and possibly content that I did not discover before finalizing the note.

## 2017-09-25 NOTE — PATIENT INSTRUCTIONS
Use Tylenol and/or ibuprofen as needed for pain or fever.  Use a Kam Med, Neti Pot, or other nasal irrigation device daily.  Use Flonase daily.  May try a short course of a decongestant such as Sudafed.  May try Afrin nasal spray for 3-5 days and then discontinue use.  May try an over-the-counter antihistamine such as Zyrtec, Claritin, or Allegra.  Use a cool mist humidifier with distilled water.  Elevate the head of your bed a few inches.  Drink plenty of fluids and get adequate rest.  Follow up with primary care provider in a few days if not improving.  Return for new or worsening symptoms.      Sinusitis, Adult  Sinusitis is redness, soreness, and inflammation of the paranasal sinuses. Paranasal sinuses are air pockets within the bones of your face. They are located beneath your eyes, in the middle of your forehead, and above your eyes. In healthy paranasal sinuses, mucus is able to drain out, and air is able to circulate through them by way of your nose. However, when your paranasal sinuses are inflamed, mucus and air can become trapped. This can allow bacteria and other germs to grow and cause infection.  Sinusitis can develop quickly and last only a short time (acute) or continue over a long period (chronic). Sinusitis that lasts for more than 12 weeks is considered chronic.  CAUSES  Causes of sinusitis include:  · Allergies.  · Structural abnormalities, such as displacement of the cartilage that separates your nostrils (deviated septum), which can decrease the air flow through your nose and sinuses and affect sinus drainage.  · Functional abnormalities, such as when the small hairs (cilia) that line your sinuses and help remove mucus do not work properly or are not present.  SIGNS AND SYMPTOMS  Symptoms of acute and chronic sinusitis are the same. The primary symptoms are pain and pressure around the affected sinuses. Other symptoms include:  · Upper toothache.  · Earache.  · Headache.  · Bad  breath.  · Decreased sense of smell and taste.  · A cough, which worsens when you are lying flat.  · Fatigue.  · Fever.  · Thick drainage from your nose, which often is green and may contain pus (purulent).  · Swelling and warmth over the affected sinuses.  DIAGNOSIS  Your health care provider will perform a physical exam. During your exam, your health care provider may perform any of the following to help determine if you have acute sinusitis or chronic sinusitis:  · Look in your nose for signs of abnormal growths in your nostrils (nasal polyps).  · Tap over the affected sinus to check for signs of infection.  · View the inside of your sinuses using an imaging device that has a light attached (endoscope).  If your health care provider suspects that you have chronic sinusitis, one or more of the following tests may be recommended:  · Allergy tests.  · Nasal culture. A sample of mucus is taken from your nose, sent to a lab, and screened for bacteria.  · Nasal cytology. A sample of mucus is taken from your nose and examined by your health care provider to determine if your sinusitis is related to an allergy.  TREATMENT  Most cases of acute sinusitis are related to a viral infection and will resolve on their own within 10 days. Sometimes, medicines are prescribed to help relieve symptoms of both acute and chronic sinusitis. These may include pain medicines, decongestants, nasal steroid sprays, or saline sprays.  However, for sinusitis related to a bacterial infection, your health care provider will prescribe antibiotic medicines. These are medicines that will help kill the bacteria causing the infection.  Rarely, sinusitis is caused by a fungal infection. In these cases, your health care provider will prescribe antifungal medicine.  For some cases of chronic sinusitis, surgery is needed. Generally, these are cases in which sinusitis recurs more than 3 times per year, despite other treatments.  HOME CARE  INSTRUCTIONS  · Drink plenty of water. Water helps thin the mucus so your sinuses can drain more easily.  · Use a humidifier.  · Inhale steam 3-4 times a day (for example, sit in the bathroom with the shower running).  · Apply a warm, moist washcloth to your face 3-4 times a day, or as directed by your health care provider.  · Use saline nasal sprays to help moisten and clean your sinuses.  · Take medicines only as directed by your health care provider.  · If you were prescribed either an antibiotic or antifungal medicine, finish it all even if you start to feel better.  SEEK IMMEDIATE MEDICAL CARE IF:  · You have increasing pain or severe headaches.  · You have nausea, vomiting, or drowsiness.  · You have swelling around your face.  · You have vision problems.  · You have a stiff neck.  · You have difficulty breathing.     This information is not intended to replace advice given to you by your health care provider. Make sure you discuss any questions you have with your health care provider.     Document Released: 12/18/2006 Document Revised: 01/08/2016 Document Reviewed: 01/01/2013  [employer] Interactive Patient Education ©2016 [employer]

## 2017-10-02 ASSESSMENT — ENCOUNTER SYMPTOMS
SHORTNESS OF BREATH: 0
NECK PAIN: 1
CHILLS: 1
HEADACHES: 1
SORE THROAT: 0

## 2017-10-02 NOTE — PROGRESS NOTES
"Subjective:   Tanisha Miner a 67 y.o. female who presents for Sinusitis (neck pain, headache, stuffy nose, clear mucus)    Sinusitis   This is a new problem. The current episode started in the past 7 days. The problem has been gradually worsening since onset. There has been no fever. The pain is moderate. Associated symptoms include chills, congestion, headaches and neck pain. Pertinent negatives include no shortness of breath or sore throat.     Review of Systems   Constitutional: Positive for chills.   HENT: Positive for congestion. Negative for sore throat.    Respiratory: Negative for shortness of breath.    Musculoskeletal: Positive for neck pain.   Neurological: Positive for headaches.      Objective:   /64   Pulse 68   Temp 36.8 °C (98.2 °F)   Resp 12   Ht 1.651 m (5' 5\")   Wt 54.6 kg (120 lb 6.4 oz)   SpO2 100%   BMI 20.04 kg/m²   Physical Exam   Constitutional: She is oriented to person, place, and time. She appears well-developed and well-nourished. No distress.   HENT:   Head: Normocephalic and atraumatic.   Nose: Mucosal edema and rhinorrhea present. No sinus tenderness. Right sinus exhibits no maxillary sinus tenderness and no frontal sinus tenderness. Left sinus exhibits no maxillary sinus tenderness and no frontal sinus tenderness.   Eyes: Conjunctivae and EOM are normal. Pupils are equal, round, and reactive to light.   Cardiovascular: Normal rate, regular rhythm, normal heart sounds and intact distal pulses.    No murmur heard.  Pulmonary/Chest: Effort normal and breath sounds normal. No respiratory distress.   Abdominal: Soft. Bowel sounds are normal. She exhibits no distension. There is no tenderness.   Musculoskeletal: Normal range of motion.   Neurological: She is alert and oriented to person, place, and time. She has normal reflexes. No sensory deficit.   Skin: Skin is warm and dry.   Psychiatric: She has a normal mood and affect. Her behavior is normal.     Assessment/Plan: "     1. Viral illness  OTC fever reducer like ibuprofen or tylenol PRN fever per 's directions Differential diagnosis, natural history, supportive care, and indications for immediate follow-up discussed.

## 2017-11-13 RX ORDER — SIMVASTATIN 20 MG
TABLET ORAL
Qty: 90 TAB | Refills: 1 | Status: SHIPPED | OUTPATIENT
Start: 2017-11-13 | End: 2018-05-04 | Stop reason: SDUPTHER

## 2017-11-13 NOTE — TELEPHONE ENCOUNTER
Pt has had OV within the 12 month protocol and lipid panel is current. 6 month supply sent to pharmacy.   Lab Results   Component Value Date/Time    CHOLSTRLTOT 162 08/23/2017 07:37 AM    LDL 62 08/23/2017 07:37 AM    HDL 91 08/23/2017 07:37 AM    TRIGLYCERIDE 44 08/23/2017 07:37 AM       Lab Results   Component Value Date/Time    SODIUM 140 08/23/2017 07:37 AM    POTASSIUM 4.2 08/23/2017 07:37 AM    CHLORIDE 105 08/23/2017 07:37 AM    CO2 30 08/23/2017 07:37 AM    GLUCOSE 88 08/23/2017 07:37 AM    BUN 13 08/23/2017 07:37 AM    CREATININE 0.64 08/23/2017 07:37 AM    CREATININE 0.8 04/08/2007 01:19 PM     Lab Results   Component Value Date/Time    ALKPHOSPHAT 54 08/23/2017 07:37 AM    ASTSGOT 18 08/23/2017 07:37 AM    ALTSGPT 12 08/23/2017 07:37 AM    TBILIRUBIN 0.5 08/23/2017 07:37 AM

## 2017-12-21 ENCOUNTER — OFFICE VISIT (OUTPATIENT)
Dept: URGENT CARE | Facility: PHYSICIAN GROUP | Age: 67
End: 2017-12-21
Payer: MEDICARE

## 2017-12-21 VITALS
WEIGHT: 120 LBS | DIASTOLIC BLOOD PRESSURE: 86 MMHG | SYSTOLIC BLOOD PRESSURE: 138 MMHG | TEMPERATURE: 98.4 F | HEART RATE: 71 BPM | HEIGHT: 64 IN | BODY MASS INDEX: 20.49 KG/M2 | RESPIRATION RATE: 16 BRPM | OXYGEN SATURATION: 100 %

## 2017-12-21 DIAGNOSIS — R03.0 ELEVATED BLOOD PRESSURE READING: ICD-10-CM

## 2017-12-21 PROCEDURE — 99213 OFFICE O/P EST LOW 20 MIN: CPT | Performed by: PHYSICIAN ASSISTANT

## 2017-12-21 RX ORDER — OMEPRAZOLE 20 MG/1
20 CAPSULE, DELAYED RELEASE ORAL DAILY
COMMUNITY
End: 2018-09-12

## 2017-12-21 ASSESSMENT — ENCOUNTER SYMPTOMS
COUGH: 0
PALPITATIONS: 0
SHORTNESS OF BREATH: 0

## 2017-12-21 NOTE — PROGRESS NOTES
Subjective:      Tanisha Haile is a 67 y.o. female who presents with Hypertension (Pt states she took BP this morning and it was high)            Patient reports that she took her blood pressure this morning at home using a home monitor and it was elevated 160s over 90s. She reported feeling okay but became concerned with the elevated reading. In the clinic her blood pressure is 138/86. She did not bring her monitor with her to the visit. She denies any chest pain, shortness of breath, lower extremity edema, palpitations, or other complaints. No personal history of hypertension. Family history of hypertension        Review of Systems   Respiratory: Negative for cough and shortness of breath.    Cardiovascular: Negative for chest pain, palpitations and leg swelling.     Allergies:Tylenol; Asa [aspirin]; Nsaids; Pcn [penicillins]; and Sulfa drugs    Current Outpatient Prescriptions Ordered in [facility]   Medication Sig Dispense Refill   • omeprazole (PRILOSEC) 20 MG delayed-release capsule Take 20 mg by mouth every day.     • simvastatin (ZOCOR) 20 MG Tab TAKE 1 TABLET BY MOUTH EVERY EVENING 90 Tab 1   • Calcium Carbonate-Vitamin D (CALTRATE 600+D) 600-400 MG-UNIT TABS Take  by mouth 2 Times a Day.       • fluticasone (FLONASE) 50 MCG/ACT nasal spray Spray 2 Sprays in nose every day. 1 Bottle 1     No current Epic-ordered facility-administered medications on file.        Past Medical History:   Diagnosis Date   • Cervical cancer screening 5/22/2013    Due to repeat around 2015.   • Dyslipidemia 10/24/2012   • Dysuria 10/2/2014    Seen 9/27 for uti, given macrobid Feels the same as 9/27 Feels more like vaginal pain     • Hyperlipidemia    • Insomnia 2/27/2014    Not sleeping, can't shut brain off Making her really tired during the day X 1 month plus but worse for a month   • Seasonal allergies 5/22/2013   • Shingles        Social History   Substance Use Topics   • Smoking status: Former Smoker     Packs/day: 1.00      "Years: 50.00     Types: Cigarettes     Quit date: 2008   • Smokeless tobacco: Never Used   • Alcohol use 0.0 oz/week      Comment: 2 glasses of wine a day sometimes       Family Status   Relation Status   • Mother  at age 83    heart failure   • Father  at age 69    quad bpass, coma   • Brother Alive    handicap at a facility   • Brother Alive   • Brother Alive     Family History   Problem Relation Age of Onset   • Hypertension Mother    • Hyperlipidemia Mother    • Thyroid Mother    • Lung Disease Mother    • Heart Disease Father    • Hypertension Father    • Hyperlipidemia Father           Objective:     /86   Pulse 71   Temp 36.9 °C (98.4 °F)   Resp 16   Ht 1.626 m (5' 4.02\")   Wt 54.4 kg (120 lb)   SpO2 100%   Breastfeeding? No   BMI 20.59 kg/m²      Physical Exam   Constitutional: She is oriented to person, place, and time. She appears well-developed and well-nourished. No distress.   HENT:   Head: Normocephalic and atraumatic.   Neck: Normal range of motion. Neck supple.   Cardiovascular: Normal rate and regular rhythm.    Pulmonary/Chest: Effort normal and breath sounds normal. She has no wheezes. She has no rales.   Musculoskeletal: She exhibits no edema.   Neurological: She is alert and oriented to person, place, and time.   Skin: Skin is warm and dry. She is not diaphoretic.   Psychiatric: She has a normal mood and affect. Her behavior is normal. Judgment and thought content normal.   Nursing note and vitals reviewed.              Assessment/Plan:     1. Elevated blood pressure reading      Elevated reading at home, blood pressure unremarkable in the clinic. Recommended checking her monitor. Return if worsening       [employer] Interactive Patient Education given: How to take your blood pressure    Please note that this dictation was created using voice recognition software. I have made every reasonable attempt to correct obvious errors, but I expect that there are errors " of grammar and possibly content that I did not discover before finalizing the note.

## 2017-12-21 NOTE — PATIENT INSTRUCTIONS
"How to Take Your Blood Pressure  HOW DO I GET A BLOOD PRESSURE MACHINE?  · You can buy an electronic home blood pressure machine at your local pharmacy. Insurance will sometimes cover the cost if you have a prescription.  · Ask your doctor what type of machine is best for you. There are different machines for your arm and your wrist.  · If you decide to buy a machine to check your blood pressure on your arm, first check the size of your arm so you can buy the right size cuff. To check the size of your arm:    ¨ Use a measuring tape that shows both inches and centimeters.    ¨ Wrap the measuring tape around the upper-middle part of your arm. You may need someone to help you measure.    ¨ Write down your arm measurement in both inches and centimeters.    · To measure your blood pressure correctly, it is important to have the right size cuff.    ¨ If your arm is up to 13 inches (up to 34 centimeters), get an adult cuff size.  ¨ If your arm is 13 to 17 inches (35 to 44 centimeters), get a large adult cuff size.    ¨  If your arm is 17 to 20 inches (45 to 52 centimeters), get an adult thigh cuff.    WHAT DO THE NUMBERS MEAN?   · There are two numbers that make up your blood pressure. For example: 120/80.  ¨ The first number (120 in our example) is called the \"systolic pressure.\" It is a measure of the pressure in your blood vessels when your heart is pumping blood.  ¨ The second number (80 in our example) is called the \"diastolic pressure.\" It is a measure of the pressure in your blood vessels when your heart is resting between beats.  · Your doctor will tell you what your blood pressure should be.  WHAT SHOULD I DO BEFORE I CHECK MY BLOOD PRESSURE?   · Try to rest or relax for at least 30 minutes before you check your blood pressure.  · Do not smoke.  · Do not have any drinks with caffeine, such as:  ¨ Soda.  ¨ Coffee.  ¨ Tea.  · Check your blood pressure in a quiet room.  · Sit down and stretch out your arm on a table. " Keep your arm at about the level of your heart. Let your arm relax.  · Make sure that your legs are not crossed.  HOW DO I CHECK MY BLOOD PRESSURE?  · Follow the directions that came with your machine.  · Make sure you remove any tight-fitting clothing from your arm or wrist. Wrap the cuff around your upper arm or wrist. You should be able to fit a finger between the cuff and your arm. If you cannot fit a finger between the cuff and your arm, it is too tight and should be removed and rewrapped.  · Some units require you to manually pump up the arm cuff.  · Automatic units inflate the cuff when you press a button.  · Cuff deflation is automatic in both models.  · After the cuff is inflated, the unit measures your blood pressure and pulse. The readings are shown on a monitor. Hold still and breathe normally while the cuff is inflated.  · Getting a reading takes less than a minute.  · Some models store readings in a memory. Some provide a printout of readings. If your machine does not store your readings, keep a written record.  · Take readings with you to your next visit with your doctor.     This information is not intended to replace advice given to you by your health care provider. Make sure you discuss any questions you have with your health care provider.     Document Released: 11/30/2009 Document Revised: 01/08/2016 Document Reviewed: 02/12/2015  [employer] Interactive Patient Education ©2016 [employer]

## 2017-12-27 ENCOUNTER — OFFICE VISIT (OUTPATIENT)
Dept: MEDICAL GROUP | Facility: PHYSICIAN GROUP | Age: 67
End: 2017-12-27
Payer: MEDICARE

## 2017-12-27 ENCOUNTER — HOSPITAL ENCOUNTER (OUTPATIENT)
Facility: MEDICAL CENTER | Age: 67
End: 2017-12-27
Attending: NURSE PRACTITIONER
Payer: MEDICARE

## 2017-12-27 VITALS
BODY MASS INDEX: 20.66 KG/M2 | DIASTOLIC BLOOD PRESSURE: 80 MMHG | HEIGHT: 64 IN | SYSTOLIC BLOOD PRESSURE: 124 MMHG | HEART RATE: 84 BPM | WEIGHT: 121 LBS | OXYGEN SATURATION: 100 % | RESPIRATION RATE: 16 BRPM | TEMPERATURE: 97.6 F

## 2017-12-27 DIAGNOSIS — Z71.1 CONCERN ABOUT STD IN FEMALE WITHOUT DIAGNOSIS: ICD-10-CM

## 2017-12-27 DIAGNOSIS — E78.5 DYSLIPIDEMIA: ICD-10-CM

## 2017-12-27 DIAGNOSIS — K21.9 GASTROESOPHAGEAL REFLUX DISEASE WITHOUT ESOPHAGITIS: ICD-10-CM

## 2017-12-27 DIAGNOSIS — F51.04 PSYCHOPHYSIOLOGICAL INSOMNIA: ICD-10-CM

## 2017-12-27 DIAGNOSIS — E55.9 VITAMIN D DEFICIENCY: ICD-10-CM

## 2017-12-27 PROCEDURE — 99214 OFFICE O/P EST MOD 30 MIN: CPT | Performed by: NURSE PRACTITIONER

## 2017-12-27 PROCEDURE — 87491 CHLMYD TRACH DNA AMP PROBE: CPT | Mod: GA

## 2017-12-27 PROCEDURE — 87591 N.GONORRHOEAE DNA AMP PROB: CPT | Mod: GA

## 2017-12-27 RX ORDER — PANTOPRAZOLE SODIUM 40 MG/1
TABLET, DELAYED RELEASE ORAL
COMMUNITY
Start: 2017-12-13 | End: 2018-09-12

## 2017-12-27 ASSESSMENT — PATIENT HEALTH QUESTIONNAIRE - PHQ9: CLINICAL INTERPRETATION OF PHQ2 SCORE: 0

## 2017-12-27 NOTE — PROGRESS NOTES
Chief Complaint   Patient presents with   • Hypertension     fv UC       HISTORY OF PRESENT ILLNESS: Patient is a @ age 67  here  today to discuss:     Interval history: blood pressure concerns     Hospitalizations  No     Injuries  No     Illness no         Insomnia  Patient has long-term insomnia. This is the method she uses to get to sleep. Usually fairly good     Vitamin D deficiency  Patient has a history of vitamin D insufficiency. Patient continues to take over-the-counter vitamin D.    Dyslipidemia  Patient has a history of hyperlipidemia. She takes simvastatin 20 mg one daily. She states there is no myalgias.  Results for MARLEE WOODS (MRN [id number]) as of 12/27/2017 12:41   Ref. Range 8/23/2017 07:36 8/23/2017 07:37   Cholesterol,Tot Latest Ref Range: 100 - 199 mg/dL  162   Triglycerides Latest Ref Range: 0 - 149 mg/dL  44   HDL Latest Ref Range: >=40 mg/dL  91   LDL Latest Ref Range: <100 mg/dL  62       Gastroesophageal reflux disease without esophagitis  Patient has history of GERD and is on Protonix 40 mg one daily. She had been on omeprazole 20 mg in the past. Took the protonix for a while and currently off and feels better.     Concern about STD in female without diagnosis  Patient is now in new relationship. Having sex and interested in having STI testing for baseline.         Allergies:Tylenol; Asa [aspirin]; Nsaids; Pcn [penicillins]; and Sulfa drugs    Current Outpatient Prescriptions Ordered in [facility]   Medication Sig Dispense Refill   • pantoprazole (PROTONIX) 40 MG Tablet Delayed Response      • simvastatin (ZOCOR) 20 MG Tab TAKE 1 TABLET BY MOUTH EVERY EVENING 90 Tab 1   • fluticasone (FLONASE) 50 MCG/ACT nasal spray Spray 2 Sprays in nose every day. 1 Bottle 1   • Calcium Carbonate-Vitamin D (CALTRATE 600+D) 600-400 MG-UNIT TABS Take  by mouth 2 Times a Day.       • omeprazole (PRILOSEC) 20 MG delayed-release capsule Take 20 mg by mouth every day.       No current Epic-ordered  "facility-administered medications on file.        Past Medical History:   Diagnosis Date   • Cervical cancer screening 2013    Due to repeat around .   • Dyslipidemia 10/24/2012   • Dysuria 10/2/2014    Seen  for uti, given macrobid Feels the same as  Feels more like vaginal pain     • Hyperlipidemia    • Insomnia 2014    Not sleeping, can't shut brain off Making her really tired during the day X 1 month plus but worse for a month   • Seasonal allergies 2013   • Shingles        Social History   Substance Use Topics   • Smoking status: Former Smoker     Packs/day: 1.00     Years: 50.00     Types: Cigarettes     Quit date: 2008   • Smokeless tobacco: Never Used   • Alcohol use 0.0 oz/week      Comment: 2 glasses of wine a day sometimes       Family Status   Relation Status   • Mother  at age 83    heart failure   • Father  at age 69    quad bpass, coma   • Brother Alive    handicap at a facility   • Brother Alive   • Brother Alive     Family History   Problem Relation Age of Onset   • Hypertension Mother    • Hyperlipidemia Mother    • Thyroid Mother    • Lung Disease Mother    • Heart Disease Father    • Hypertension Father    • Hyperlipidemia Father        ROS: As documented in my HPI      Exam:  Blood pressure 124/80, pulse 84, temperature 36.4 °C (97.6 °F), resp. rate 16, height 1.626 m (5' 4\"), weight 54.9 kg (121 lb), SpO2 100 %.  General:  Well nourished, well developed female in NAD  Head: Nontender scalp. No lesions  Neck: Supple. Symmetric Thyroid palpated. No bruits  Pulmonary:  Normal effort. No rales, ronchi, or wheezing.  Cardiovascular: Regular rate and rhythm without murmur.   Abdomen: Soft nontender, normal bowel sounds  Extremities: no clubbing, cyanosis, or edema.  Psych: Alert and oriented x3.    Neurological: No focal deficits    Please note that this dictation was created using voice recognition software. I have made every reasonable attempt to " correct obvious errors, but I expect that there are errors of grammar and possibly content that I did not discover before finalizing the note.    Assessment/Plan:  1. Psychophysiological insomnia - controlled COMP METABOLIC PANEL   2. Vitamin D deficiency - controlled    3. Dyslipidemia - Current status of condition is chronic and controlled on therapy.   LIPID PANEL    VITAMIN D,25 HYDROXY   4. Gastroesophageal reflux disease without esophagitis - NO MEDICATIONS>  COMP METABOLIC PANEL    OCCULT BLOOD FECES IMMUNOASSAY   5. Concern about STD in female without diagnosis  CHLAMYDIA/GC PCR URINE OR SWAB    HIV ANTIBODIES    HEP C VIRUS ANTIBODY    RPR QUAL W/REFLEX      No flu vaccine today

## 2017-12-27 NOTE — ASSESSMENT & PLAN NOTE
Patient is now in new relationship. Having sex and interested in having STI testing for baseline.

## 2017-12-27 NOTE — ASSESSMENT & PLAN NOTE
Patient has a history of vitamin D insufficiency. Patient continues to take over-the-counter vitamin D.

## 2017-12-27 NOTE — ASSESSMENT & PLAN NOTE
Patient has history of GERD and is on Protonix 40 mg one daily. She had been on omeprazole 20 mg in the past.

## 2017-12-27 NOTE — ASSESSMENT & PLAN NOTE
Patient has a history of hyperlipidemia. She takes simvastatin 20 mg one daily. She states there is no myalgias.  Results for MARLEE WOODS (MRN [id number]) as of 12/27/2017 12:41   Ref. Range 8/23/2017 07:36 8/23/2017 07:37   Cholesterol,Tot Latest Ref Range: 100 - 199 mg/dL  162   Triglycerides Latest Ref Range: 0 - 149 mg/dL  44   HDL Latest Ref Range: >=40 mg/dL  91   LDL Latest Ref Range: <100 mg/dL  62

## 2017-12-28 LAB
C TRACH DNA SPEC QL NAA+PROBE: NEGATIVE
N GONORRHOEA DNA SPEC QL NAA+PROBE: NEGATIVE
SPECIMEN SOURCE: NORMAL

## 2018-01-02 ENCOUNTER — HOSPITAL ENCOUNTER (OUTPATIENT)
Dept: LAB | Facility: MEDICAL CENTER | Age: 68
End: 2018-01-02
Attending: NURSE PRACTITIONER
Payer: MEDICARE

## 2018-01-02 DIAGNOSIS — F51.04 PSYCHOPHYSIOLOGICAL INSOMNIA: ICD-10-CM

## 2018-01-02 DIAGNOSIS — K21.9 GASTROESOPHAGEAL REFLUX DISEASE WITHOUT ESOPHAGITIS: ICD-10-CM

## 2018-01-02 DIAGNOSIS — E78.5 DYSLIPIDEMIA: ICD-10-CM

## 2018-01-02 DIAGNOSIS — Z71.1 CONCERN ABOUT STD IN FEMALE WITHOUT DIAGNOSIS: ICD-10-CM

## 2018-01-02 LAB
25(OH)D3 SERPL-MCNC: 51 NG/ML (ref 30–100)
ALBUMIN SERPL BCP-MCNC: 3.8 G/DL (ref 3.2–4.9)
ALBUMIN/GLOB SERPL: 1.7 G/DL
ALP SERPL-CCNC: 47 U/L (ref 30–99)
ALT SERPL-CCNC: 17 U/L (ref 2–50)
ANION GAP SERPL CALC-SCNC: 4 MMOL/L (ref 0–11.9)
AST SERPL-CCNC: 23 U/L (ref 12–45)
BILIRUB SERPL-MCNC: 0.5 MG/DL (ref 0.1–1.5)
BUN SERPL-MCNC: 12 MG/DL (ref 8–22)
CALCIUM SERPL-MCNC: 8.8 MG/DL (ref 8.5–10.5)
CHLORIDE SERPL-SCNC: 107 MMOL/L (ref 96–112)
CHOLEST SERPL-MCNC: 169 MG/DL (ref 100–199)
CO2 SERPL-SCNC: 29 MMOL/L (ref 20–33)
CREAT SERPL-MCNC: 0.62 MG/DL (ref 0.5–1.4)
GFR SERPL CREATININE-BSD FRML MDRD: >60 ML/MIN/1.73 M 2
GLOBULIN SER CALC-MCNC: 2.2 G/DL (ref 1.9–3.5)
GLUCOSE SERPL-MCNC: 87 MG/DL (ref 65–99)
HCV AB SER QL: NEGATIVE
HDLC SERPL-MCNC: 90 MG/DL
HIV 1+2 AB+HIV1 P24 AG SERPL QL IA: NON REACTIVE
LDLC SERPL CALC-MCNC: 69 MG/DL
POTASSIUM SERPL-SCNC: 3.9 MMOL/L (ref 3.6–5.5)
PROT SERPL-MCNC: 6 G/DL (ref 6–8.2)
SODIUM SERPL-SCNC: 140 MMOL/L (ref 135–145)
TREPONEMA PALLIDUM IGG+IGM AB [PRESENCE] IN SERUM OR PLASMA BY IMMUNOASSAY: NON REACTIVE
TRIGL SERPL-MCNC: 52 MG/DL (ref 0–149)

## 2018-01-02 PROCEDURE — 36415 COLL VENOUS BLD VENIPUNCTURE: CPT

## 2018-01-02 PROCEDURE — 82306 VITAMIN D 25 HYDROXY: CPT | Mod: GA

## 2018-01-02 PROCEDURE — 80053 COMPREHEN METABOLIC PANEL: CPT

## 2018-01-02 PROCEDURE — 86803 HEPATITIS C AB TEST: CPT

## 2018-01-02 PROCEDURE — 80061 LIPID PANEL: CPT

## 2018-01-02 PROCEDURE — G0475 HIV COMBINATION ASSAY: HCPCS | Mod: GA

## 2018-01-02 PROCEDURE — 86780 TREPONEMA PALLIDUM: CPT | Mod: GA

## 2018-01-16 ENCOUNTER — HOSPITAL ENCOUNTER (OUTPATIENT)
Facility: MEDICAL CENTER | Age: 68
End: 2018-01-16
Attending: PHYSICIAN ASSISTANT
Payer: MEDICARE

## 2018-01-16 ENCOUNTER — OFFICE VISIT (OUTPATIENT)
Dept: URGENT CARE | Facility: PHYSICIAN GROUP | Age: 68
End: 2018-01-16
Payer: MEDICARE

## 2018-01-16 VITALS
RESPIRATION RATE: 14 BRPM | OXYGEN SATURATION: 98 % | TEMPERATURE: 97.8 F | WEIGHT: 117 LBS | HEART RATE: 67 BPM | BODY MASS INDEX: 19.97 KG/M2 | HEIGHT: 64 IN | DIASTOLIC BLOOD PRESSURE: 88 MMHG | SYSTOLIC BLOOD PRESSURE: 138 MMHG

## 2018-01-16 DIAGNOSIS — N30.00 ACUTE CYSTITIS WITHOUT HEMATURIA: ICD-10-CM

## 2018-01-16 LAB
APPEARANCE UR: NORMAL
BILIRUB UR STRIP-MCNC: NORMAL MG/DL
COLOR UR AUTO: NORMAL
GLUCOSE UR STRIP.AUTO-MCNC: NORMAL MG/DL
KETONES UR STRIP.AUTO-MCNC: 80 MG/DL
LEUKOCYTE ESTERASE UR QL STRIP.AUTO: NORMAL
NITRITE UR QL STRIP.AUTO: NORMAL
PH UR STRIP.AUTO: 6 [PH] (ref 5–8)
PROT UR QL STRIP: NORMAL MG/DL
RBC UR QL AUTO: NORMAL
SP GR UR STRIP.AUTO: 1.01
UROBILINOGEN UR STRIP-MCNC: NORMAL MG/DL

## 2018-01-16 PROCEDURE — 87086 URINE CULTURE/COLONY COUNT: CPT

## 2018-01-16 PROCEDURE — 99214 OFFICE O/P EST MOD 30 MIN: CPT | Performed by: PHYSICIAN ASSISTANT

## 2018-01-16 PROCEDURE — 87186 SC STD MICRODIL/AGAR DIL: CPT

## 2018-01-16 PROCEDURE — 87077 CULTURE AEROBIC IDENTIFY: CPT

## 2018-01-16 PROCEDURE — 81002 URINALYSIS NONAUTO W/O SCOPE: CPT | Performed by: PHYSICIAN ASSISTANT

## 2018-01-16 RX ORDER — PHENAZOPYRIDINE HYDROCHLORIDE 200 MG/1
200 TABLET, FILM COATED ORAL 3 TIMES DAILY
Qty: 6 TAB | Refills: 0 | Status: SHIPPED | OUTPATIENT
Start: 2018-01-16 | End: 2018-01-18

## 2018-01-16 RX ORDER — NITROFURANTOIN 25; 75 MG/1; MG/1
100 CAPSULE ORAL EVERY 12 HOURS
Qty: 10 CAP | Refills: 0 | Status: SHIPPED | OUTPATIENT
Start: 2018-01-16 | End: 2018-01-21

## 2018-01-16 ASSESSMENT — ENCOUNTER SYMPTOMS
SWEATS: 0
BACK PAIN: 0
NAUSEA: 0
CHILLS: 0
FEVER: 0
FLANK PAIN: 0
VOMITING: 0

## 2018-01-16 NOTE — PROGRESS NOTES
Subjective:      Tanisha Haile is a 67 y.o. female who presents with Dysuria (x4days )            Dysuria, urgency, frequency for the last 4 days. No fevers, chills, abdominal pain, back pain, or other complaints. No history of pyelonephritis. History of UTIs.      Dysuria    This is a new problem. The current episode started in the past 7 days. The problem occurs every urination. The problem has been waxing and waning. The quality of the pain is described as burning. The pain is moderate. There has been no fever. There is no history of pyelonephritis. Associated symptoms include frequency and urgency. Pertinent negatives include no chills, discharge, flank pain, hematuria, hesitancy, nausea, possible pregnancy, sweats or vomiting. She has tried nothing for the symptoms. The treatment provided no relief. Her past medical history is significant for recurrent UTIs.       Review of Systems   Constitutional: Negative for chills and fever.   Gastrointestinal: Negative for nausea and vomiting.   Genitourinary: Positive for dysuria, frequency and urgency. Negative for flank pain, hematuria and hesitancy.   Musculoskeletal: Negative for back pain.     Allergies:Tylenol; Asa [aspirin]; Nsaids; Pcn [penicillins]; and Sulfa drugs    Current Outpatient Prescriptions Ordered in [facility]   Medication Sig Dispense Refill   • nitrofurantoin monohydr macro (MACROBID) 100 MG Cap Take 1 Cap by mouth every 12 hours for 5 days. 10 Cap 0   • phenazopyridine (PYRIDIUM) 200 MG Tab Take 1 Tab by mouth 3 times a day for 2 days. 6 Tab 0   • pantoprazole (PROTONIX) 40 MG Tablet Delayed Response      • omeprazole (PRILOSEC) 20 MG delayed-release capsule Take 20 mg by mouth every day.     • simvastatin (ZOCOR) 20 MG Tab TAKE 1 TABLET BY MOUTH EVERY EVENING 90 Tab 1   • fluticasone (FLONASE) 50 MCG/ACT nasal spray Spray 2 Sprays in nose every day. 1 Bottle 1   • Calcium Carbonate-Vitamin D (CALTRATE 600+D) 600-400 MG-UNIT TABS Take  by mouth 2 Times  "a Day.         No current Epic-ordered facility-administered medications on file.        Past Medical History:   Diagnosis Date   • Cervical cancer screening 2013    Due to repeat around .   • Dyslipidemia 10/24/2012   • Dysuria 10/2/2014    Seen  for uti, given macrobid Feels the same as  Feels more like vaginal pain     • Hyperlipidemia    • Insomnia 2014    Not sleeping, can't shut brain off Making her really tired during the day X 1 month plus but worse for a month   • Seasonal allergies 2013   • Shingles        Social History   Substance Use Topics   • Smoking status: Former Smoker     Packs/day: 1.00     Years: 50.00     Types: Cigarettes     Quit date: 2008   • Smokeless tobacco: Never Used   • Alcohol use 0.0 oz/week      Comment: 2 glasses of wine a day sometimes       Family Status   Relation Status   • Mother  at age 83    heart failure   • Father  at age 69    quad bpass, coma   • Brother Alive    handicap at a facility   • Brother Alive   • Brother Alive     Family History   Problem Relation Age of Onset   • Hypertension Mother    • Hyperlipidemia Mother    • Thyroid Mother    • Lung Disease Mother    • Heart Disease Father    • Hypertension Father    • Hyperlipidemia Father             Objective:     /88   Pulse 67   Temp 36.6 °C (97.8 °F)   Resp 14   Ht 1.626 m (5' 4.02\")   Wt 53.1 kg (117 lb)   SpO2 98%   Breastfeeding? No   BMI 20.07 kg/m²      Physical Exam   Constitutional: She is oriented to person, place, and time. She appears well-developed and well-nourished. No distress.   HENT:   Head: Normocephalic and atraumatic.   Eyes: Right eye exhibits no discharge. Left eye exhibits no discharge.   Neck: Normal range of motion. Neck supple.   Cardiovascular: Normal rate and regular rhythm.    Pulmonary/Chest: Effort normal and breath sounds normal.   Abdominal: Soft. Bowel sounds are normal. She exhibits no distension. There is tenderness " (mild). There is no guarding.   Neurological: She is alert and oriented to person, place, and time.   Skin: Skin is warm and dry. She is not diaphoretic.   Psychiatric: She has a normal mood and affect. Her behavior is normal. Judgment and thought content normal.   Nursing note and vitals reviewed.    Labs: Urinalysis positive for leukocytes, nitrates, blood, protein, ketones          Assessment/Plan:     1. Acute cystitis without hematuria  POCT Urinalysis    Urine Culture    nitrofurantoin monohydr macro (MACROBID) 100 MG Cap    phenazopyridine (PYRIDIUM) 200 MG Tab    Ongoing for 4 days. Urine positive for leukocytes, nitrates, blood. Start Macrobid. Culture urine. Follow PCP       [employer] Patient Education given: UTI    Please note that this dictation was created using voice recognition software. I have made every reasonable attempt to correct obvious errors, but I expect that there are errors of grammar and possibly content that I did not discover before finalizing the note.

## 2018-01-16 NOTE — PATIENT INSTRUCTIONS
Urinary Tract Infection  A urinary tract infection (UTI) can occur any place along the urinary tract. The tract includes the kidneys, ureters, bladder, and urethra. A type of germ called bacteria often causes a UTI. UTIs are often helped with antibiotic medicine.   HOME CARE   · If given, take antibiotics as told by your doctor. Finish them even if you start to feel better.  · Drink enough fluids to keep your pee (urine) clear or pale yellow.  · Avoid tea, drinks with caffeine, and bubbly (carbonated) drinks.  · Pee often. Avoid holding your pee in for a long time.  · Pee before and after having sex (intercourse).  · Wipe from front to back after you poop (bowel movement) if you are a woman. Use each tissue only once.  GET HELP RIGHT AWAY IF:   · You have back pain.  · You have lower belly (abdominal) pain.  · You have chills.  · You feel sick to your stomach (nauseous).  · You throw up (vomit).  · Your burning or discomfort with peeing does not go away.  · You have a fever.  · Your symptoms are not better in 3 days.  MAKE SURE YOU:   · Understand these instructions.  · Will watch your condition.  · Will get help right away if you are not doing well or get worse.     This information is not intended to replace advice given to you by your health care provider. Make sure you discuss any questions you have with your health care provider.     Document Released: 06/05/2009 Document Revised: 01/08/2016 Document Reviewed: 07/18/2013  [employer] Interactive Patient Education ©2016 [employer]

## 2018-01-18 LAB
BACTERIA UR CULT: ABNORMAL
BACTERIA UR CULT: ABNORMAL
SIGNIFICANT IND 70042: ABNORMAL
SITE SITE: ABNORMAL
SOURCE SOURCE: ABNORMAL

## 2018-02-13 ENCOUNTER — OFFICE VISIT (OUTPATIENT)
Dept: URGENT CARE | Facility: PHYSICIAN GROUP | Age: 68
End: 2018-02-13
Payer: MEDICARE

## 2018-02-13 ENCOUNTER — HOSPITAL ENCOUNTER (OUTPATIENT)
Facility: MEDICAL CENTER | Age: 68
End: 2018-02-13
Attending: FAMILY MEDICINE
Payer: MEDICARE

## 2018-02-13 VITALS
BODY MASS INDEX: 20.49 KG/M2 | SYSTOLIC BLOOD PRESSURE: 132 MMHG | HEART RATE: 76 BPM | DIASTOLIC BLOOD PRESSURE: 84 MMHG | RESPIRATION RATE: 14 BRPM | HEIGHT: 64 IN | WEIGHT: 120 LBS | TEMPERATURE: 97.4 F | OXYGEN SATURATION: 100 %

## 2018-02-13 DIAGNOSIS — N39.0 ACUTE URINARY TRACT INFECTION: ICD-10-CM

## 2018-02-13 LAB
APPEARANCE UR: NORMAL
BILIRUB UR STRIP-MCNC: NORMAL MG/DL
COLOR UR AUTO: NORMAL
GLUCOSE UR STRIP.AUTO-MCNC: NORMAL MG/DL
KETONES UR STRIP.AUTO-MCNC: NORMAL MG/DL
LEUKOCYTE ESTERASE UR QL STRIP.AUTO: NORMAL
NITRITE UR QL STRIP.AUTO: NORMAL
PH UR STRIP.AUTO: 7 [PH] (ref 5–8)
PROT UR QL STRIP: NORMAL MG/DL
RBC UR QL AUTO: NORMAL
SP GR UR STRIP.AUTO: 1.01
UROBILINOGEN UR STRIP-MCNC: NORMAL MG/DL

## 2018-02-13 PROCEDURE — 87086 URINE CULTURE/COLONY COUNT: CPT

## 2018-02-13 PROCEDURE — 87077 CULTURE AEROBIC IDENTIFY: CPT

## 2018-02-13 PROCEDURE — 99214 OFFICE O/P EST MOD 30 MIN: CPT | Performed by: FAMILY MEDICINE

## 2018-02-13 PROCEDURE — 87186 SC STD MICRODIL/AGAR DIL: CPT

## 2018-02-13 PROCEDURE — 81002 URINALYSIS NONAUTO W/O SCOPE: CPT | Performed by: FAMILY MEDICINE

## 2018-02-13 RX ORDER — NITROFURANTOIN 25; 75 MG/1; MG/1
100 CAPSULE ORAL EVERY 12 HOURS
Qty: 10 CAP | Refills: 0 | Status: SHIPPED | OUTPATIENT
Start: 2018-02-13 | End: 2018-02-18
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
## 2018-02-24 ENCOUNTER — OFFICE VISIT (OUTPATIENT)
Dept: URGENT CARE | Facility: PHYSICIAN GROUP | Age: 68
End: 2018-02-24
Payer: MEDICARE

## 2018-02-24 ENCOUNTER — HOSPITAL ENCOUNTER (OUTPATIENT)
Facility: MEDICAL CENTER | Age: 68
End: 2018-02-24
Attending: PHYSICIAN ASSISTANT
Payer: MEDICARE

## 2018-02-24 VITALS
DIASTOLIC BLOOD PRESSURE: 78 MMHG | OXYGEN SATURATION: 97 % | RESPIRATION RATE: 16 BRPM | SYSTOLIC BLOOD PRESSURE: 124 MMHG | HEIGHT: 64 IN | BODY MASS INDEX: 20.66 KG/M2 | HEART RATE: 74 BPM | WEIGHT: 121 LBS | TEMPERATURE: 98.3 F

## 2018-02-24 DIAGNOSIS — N30.00 ACUTE CYSTITIS WITHOUT HEMATURIA: ICD-10-CM

## 2018-02-24 DIAGNOSIS — R30.9 PAINFUL URINATION: ICD-10-CM

## 2018-02-24 LAB
APPEARANCE UR: CLEAR
BILIRUB UR STRIP-MCNC: NEGATIVE MG/DL
COLOR UR AUTO: YELLOW
GLUCOSE UR STRIP.AUTO-MCNC: NEGATIVE MG/DL
KETONES UR STRIP.AUTO-MCNC: NEGATIVE MG/DL
LEUKOCYTE ESTERASE UR QL STRIP.AUTO: NEGATIVE
NITRITE UR QL STRIP.AUTO: NEGATIVE
PH UR STRIP.AUTO: 6.5 [PH] (ref 5–8)
PROT UR QL STRIP: NEGATIVE MG/DL
RBC UR QL AUTO: ABNORMAL
SP GR UR STRIP.AUTO: 1
UROBILINOGEN UR STRIP-MCNC: NEGATIVE MG/DL

## 2018-02-24 PROCEDURE — 81002 URINALYSIS NONAUTO W/O SCOPE: CPT | Performed by: PHYSICIAN ASSISTANT

## 2018-02-24 PROCEDURE — 99214 OFFICE O/P EST MOD 30 MIN: CPT | Performed by: PHYSICIAN ASSISTANT

## 2018-02-24 PROCEDURE — 87086 URINE CULTURE/COLONY COUNT: CPT

## 2018-02-24 RX ORDER — CIPROFLOXACIN 500 MG/1
500 TABLET, FILM COATED ORAL 2 TIMES DAILY
Qty: 10 TAB | Refills: 0 | Status: SHIPPED | OUTPATIENT
Start: 2018-02-24 | End: 2018-03-01

## 2018-02-24 RX ORDER — PHENAZOPYRIDINE HYDROCHLORIDE 100 MG/1
100 TABLET, FILM COATED ORAL 3 TIMES DAILY PRN
Qty: 6 TAB | Refills: 0 | Status: SHIPPED | OUTPATIENT
Start: 2018-02-24 | End: 2018-09-28

## 2018-02-24 ASSESSMENT — ENCOUNTER SYMPTOMS
CHILLS: 0
ABDOMINAL PAIN: 0
RESPIRATORY NEGATIVE: 1
CARDIOVASCULAR NEGATIVE: 1
VOMITING: 0
FEVER: 0
NAUSEA: 0
FLANK PAIN: 0
DIARRHEA: 0
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
## 2018-02-26 DIAGNOSIS — N30.00 ACUTE CYSTITIS WITHOUT HEMATURIA: ICD-10-CM

## 2018-02-28 LAB
BACTERIA UR CULT: NORMAL
SIGNIFICANT IND 70042: NORMAL
SITE SITE: NORMAL
SOURCE SOURCE: NORMAL

## 2018-04-07 ENCOUNTER — OFFICE VISIT (OUTPATIENT)
Dept: URGENT CARE | Facility: PHYSICIAN GROUP | Age: 68
End: 2018-04-07
Payer: MEDICARE

## 2018-04-07 VITALS
HEART RATE: 68 BPM | OXYGEN SATURATION: 100 % | SYSTOLIC BLOOD PRESSURE: 108 MMHG | BODY MASS INDEX: 21.85 KG/M2 | HEIGHT: 64 IN | RESPIRATION RATE: 16 BRPM | DIASTOLIC BLOOD PRESSURE: 76 MMHG | TEMPERATURE: 97.9 F | WEIGHT: 128 LBS

## 2018-04-07 DIAGNOSIS — J32.9 RHINOSINUSITIS: ICD-10-CM

## 2018-04-07 PROCEDURE — 99214 OFFICE O/P EST MOD 30 MIN: CPT | Performed by: PHYSICIAN ASSISTANT

## 2018-04-07 RX ORDER — DOXYCYCLINE HYCLATE 100 MG
100 TABLET ORAL 2 TIMES DAILY
Qty: 20 TAB | Refills: 0 | Status: SHIPPED | OUTPATIENT
Start: 2018-04-07 | End: 2018-09-12

## 2018-04-07 NOTE — PROGRESS NOTES
Chief Complaint   Patient presents with   • Sinus Problem       HISTORY OF PRESENT ILLNESS: Patient is a 67 y.o. female who presents today for the following:    Nasal congestion x 5 days  + white nasal drainage, ST, head pressure, cough from PND  Denies fever, SOB, ear pain  OTC meds tried: none     Patient Active Problem List    Diagnosis Date Noted   • Grief reaction 02/21/2017     Priority: High   • Mid back pain on left side 08/14/2015     Priority: High   • Osteoporosis 08/14/2015     Priority: High   • Insomnia 02/27/2014     Priority: High   • Seasonal allergies 05/22/2013     Priority: Medium   • Neck pain 04/15/2013     Priority: Medium   • Vitamin D deficiency 02/28/2013     Priority: Medium   • Interstitial cystitis 07/07/2016     Priority: Low   • Headache 10/03/2014     Priority: Low   • Shingles 07/10/2013     Priority: Low   • Dyslipidemia 10/24/2012     Priority: Low   • Gastroesophageal reflux disease without esophagitis 12/27/2017   • Concern about STD in female without diagnosis 12/27/2017   • Adjustment disorder with mixed anxiety and depressed mood 04/12/2017   • Grief 04/12/2017   • Varicose vein of leg 03/21/2017   • Numbness of lower extremity 02/22/2017       Allergies:Tylenol; Asa [aspirin]; Nsaids; Pcn [penicillins]; and Sulfa drugs    Current Outpatient Prescriptions Ordered in [facility]   Medication Sig Dispense Refill   • doxycycline (VIBRAMYCIN) 100 MG Tab Take 1 Tab by mouth 2 times a day. Fill if symptoms worsen at any point OR if they fail to improve over the next 7-10 days 20 Tab 0   • phenazopyridine (PYRIDIUM) 100 MG Tab Take 1 Tab by mouth 3 times a day as needed. 6 Tab 0   • pantoprazole (PROTONIX) 40 MG Tablet Delayed Response      • omeprazole (PRILOSEC) 20 MG delayed-release capsule Take 20 mg by mouth every day.     • simvastatin (ZOCOR) 20 MG Tab TAKE 1 TABLET BY MOUTH EVERY EVENING 90 Tab 1   • fluticasone (FLONASE) 50 MCG/ACT nasal spray Spray 2 Sprays in nose every day. 1  Bottle 1   • Calcium Carbonate-Vitamin D (CALTRATE 600+D) 600-400 MG-UNIT TABS Take  by mouth 2 Times a Day.         No current Epic-ordered facility-administered medications on file.        Past Medical History:   Diagnosis Date   • Cervical cancer screening 2013    Due to repeat around .   • Dyslipidemia 10/24/2012   • Dysuria 10/2/2014    Seen  for uti, given macrobid Feels the same as  Feels more like vaginal pain     • Hyperlipidemia    • Insomnia 2014    Not sleeping, can't shut brain off Making her really tired during the day X 1 month plus but worse for a month   • Seasonal allergies 2013   • Shingles        Social History   Substance Use Topics   • Smoking status: Former Smoker     Packs/day: 1.00     Years: 50.00     Types: Cigarettes     Quit date: 2008   • Smokeless tobacco: Never Used   • Alcohol use 0.0 oz/week      Comment: 2 glasses of wine a day sometimes       Family Status   Relation Status   • Mother  at age 83    heart failure   • Father  at age 69    quad bpass, coma   • Brother Alive    handicap at a facility   • Brother Alive   • Brother Alive     Family History   Problem Relation Age of Onset   • Hypertension Mother    • Hyperlipidemia Mother    • Thyroid Mother    • Lung Disease Mother    • Heart Disease Father    • Hypertension Father    • Hyperlipidemia Father        ROS:    Review of Systems   Constitutional: Negative for fever, chills, weight loss and malaise/fatigue.   HENT: Negative for ear pain, nosebleeds  and neck pain.    Eyes: Negative for blurred vision.   Respiratory: Negative for sputum production, shortness of breath and wheezing.    Cardiovascular: Negative for chest pain, palpitations, orthopnea and leg swelling.   Gastrointestinal: Negative for heartburn, nausea, vomiting and abdominal pain.   Genitourinary: Negative for dysuria, urgency and frequency.       Exam:  Blood pressure 108/76, pulse 68, temperature 36.6 °C (97.9  "°F), resp. rate 16, height 1.626 m (5' 4\"), weight 58.1 kg (128 lb), SpO2 100 %.  General: Well developed, well nourished. No distress.  HEENT: Conjunctiva clear, lids without ptosis, PERRL/EOMI. Ears normal shape and contour, canals are clear bilaterally, tympanic membranes are benign. Nasal mucosa is markedly edematous bilaterally. Oropharynx is without erythema, edema or exudates. Moist mucous membranes.  Pulmonary: Clear to ausculation and percussion.  Normal effort. No rales, ronchi, or wheezing.   Cardiovascular: Regular rate and rhythm without murmur. No edema.   Neurologic: Grossly nonfocal.  Lymph: No cervical lymphadenopathy noted.  Skin: Warm, dry, good turgor. No rashes in visible areas.   Psych: Normal mood. Alert and oriented x3. Judgment and insight is normal.    Assessment/Plan:  Discussed likely seasonal allergies. Discussed appropriate over-the-counter symptomatic medication, and when to return to clinic. Contingent antibiotic prescription given to patient to fill upon meeting criteria of guidelines discussed.  Follow-up for worsening or persistent symptoms.  1. Rhinosinusitis  doxycycline (VIBRAMYCIN) 100 MG Tab       "

## 2018-05-04 ENCOUNTER — OFFICE VISIT (OUTPATIENT)
Dept: MEDICAL GROUP | Facility: PHYSICIAN GROUP | Age: 68
End: 2018-05-04
Payer: MEDICARE

## 2018-05-04 VITALS
HEART RATE: 78 BPM | WEIGHT: 117.2 LBS | RESPIRATION RATE: 12 BRPM | SYSTOLIC BLOOD PRESSURE: 128 MMHG | TEMPERATURE: 98 F | DIASTOLIC BLOOD PRESSURE: 78 MMHG | HEIGHT: 64 IN | BODY MASS INDEX: 20.01 KG/M2 | OXYGEN SATURATION: 99 %

## 2018-05-04 DIAGNOSIS — R51.9 INTRACTABLE EPISODIC HEADACHE, UNSPECIFIED HEADACHE TYPE: ICD-10-CM

## 2018-05-04 DIAGNOSIS — E55.9 VITAMIN D DEFICIENCY: ICD-10-CM

## 2018-05-04 DIAGNOSIS — Z23 NEED FOR VACCINATION: ICD-10-CM

## 2018-05-04 DIAGNOSIS — Z13.29 SCREENING FOR THYROID DISORDER: ICD-10-CM

## 2018-05-04 DIAGNOSIS — Z23 ENCOUNTER FOR ADMINISTRATION OF VACCINE: ICD-10-CM

## 2018-05-04 DIAGNOSIS — M62.838 MUSCLE SPASM: ICD-10-CM

## 2018-05-04 DIAGNOSIS — E78.5 DYSLIPIDEMIA: ICD-10-CM

## 2018-05-04 PROCEDURE — 90734 MENACWYD/MENACWYCRM VACC IM: CPT | Performed by: NURSE PRACTITIONER

## 2018-05-04 PROCEDURE — 99213 OFFICE O/P EST LOW 20 MIN: CPT | Mod: 25 | Performed by: NURSE PRACTITIONER

## 2018-05-04 PROCEDURE — 90471 IMMUNIZATION ADMIN: CPT | Performed by: NURSE PRACTITIONER

## 2018-05-04 PROCEDURE — 90472 IMMUNIZATION ADMIN EACH ADD: CPT | Performed by: NURSE PRACTITIONER

## 2018-05-04 PROCEDURE — 90707 MMR VACCINE SC: CPT | Performed by: NURSE PRACTITIONER

## 2018-05-04 RX ORDER — CYCLOBENZAPRINE HCL 5 MG
5 TABLET ORAL 2 TIMES DAILY PRN
Qty: 45 TAB | Refills: 1 | Status: SHIPPED | OUTPATIENT
Start: 2018-05-04 | End: 2018-09-12

## 2018-05-04 RX ORDER — SIMVASTATIN 20 MG
TABLET ORAL
Qty: 90 TAB | Refills: 3 | Status: SHIPPED | OUTPATIENT
Start: 2018-05-04 | End: 2019-05-02 | Stop reason: SDUPTHER

## 2018-05-04 NOTE — ASSESSMENT & PLAN NOTE
Patient reports increased episodes of headaches secondary to neck pain. She reports that headaches will last all day. She reports that headaches start at her neck and come up around the top of her head. She denies nausea, vomiting, photophobia. She cannot take Tylenol, APAP, NSAIDs because of allergies. She reports that she has taken cyclobenzaprine in the past, which has helped. Patient will take cyclobenzaprine at bedtime to see if this helps with neck pain and headache. We did review that this could make her drowsy and she is not to take it before driving.

## 2018-05-04 NOTE — ASSESSMENT & PLAN NOTE
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 She is required to have some vaccinations prior to starting school. He vaccinations that are required that she has no record of getting are 2 MMRs and one Menactra vaccine. She reports that she has had diseases of measles, mumps, rubella when she was a child and young adult. I offered to order antibody tests, but she would rather just get the immunizations. Patient is getting MMR and Menactra vaccine today. She will return in one month for second MMR. A copy of her vaccination record was given to her.
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
## 2018-06-11 ENCOUNTER — NON-PROVIDER VISIT (OUTPATIENT)
Dept: MEDICAL GROUP | Facility: PHYSICIAN GROUP | Age: 68
End: 2018-06-11
Payer: MEDICARE

## 2018-06-11 DIAGNOSIS — Z23 NEED FOR MMR VACCINE: ICD-10-CM

## 2018-06-11 PROCEDURE — 90471 IMMUNIZATION ADMIN: CPT | Performed by: FAMILY MEDICINE

## 2018-06-11 PROCEDURE — 90707 MMR VACCINE SC: CPT | Performed by: FAMILY MEDICINE

## 2018-06-11 NOTE — PROGRESS NOTES
"Tanisha Haile is a 68 y.o. female here for a non-provider visit for:   MMR 2 of 2    Reason for immunization: continue or complete series started at the office  Immunization records indicate need for vaccine: Yes, confirmed with Epic  Minimum interval has been met for this vaccine: Yes  ABN completed: Not Indicated    Order and dose verified by: CA / DO  VIS Dated  2/12/18 was given to patient: Yes  All IAC Questionnaire questions were answered \"No.\"    Patient tolerated injection and no adverse effects were observed or reported: Yes    Pt scheduled for next dose in series: Not Indicated    "

## 2018-09-06 ENCOUNTER — HOSPITAL ENCOUNTER (OUTPATIENT)
Dept: LAB | Facility: MEDICAL CENTER | Age: 68
End: 2018-09-06
Attending: NURSE PRACTITIONER
Payer: MEDICARE

## 2018-09-06 DIAGNOSIS — E78.5 DYSLIPIDEMIA: ICD-10-CM

## 2018-09-06 DIAGNOSIS — E55.9 VITAMIN D DEFICIENCY: ICD-10-CM

## 2018-09-06 DIAGNOSIS — Z13.29 SCREENING FOR THYROID DISORDER: ICD-10-CM

## 2018-09-06 LAB
25(OH)D3 SERPL-MCNC: 60 NG/ML (ref 30–100)
ALBUMIN SERPL BCP-MCNC: 4.1 G/DL (ref 3.2–4.9)
ALBUMIN/GLOB SERPL: 1.9 G/DL
ALP SERPL-CCNC: 56 U/L (ref 30–99)
ALT SERPL-CCNC: 14 U/L (ref 2–50)
ANION GAP SERPL CALC-SCNC: 8 MMOL/L (ref 0–11.9)
AST SERPL-CCNC: 18 U/L (ref 12–45)
BILIRUB SERPL-MCNC: 0.6 MG/DL (ref 0.1–1.5)
BUN SERPL-MCNC: 15 MG/DL (ref 8–22)
CALCIUM SERPL-MCNC: 9.8 MG/DL (ref 8.5–10.5)
CHLORIDE SERPL-SCNC: 102 MMOL/L (ref 96–112)
CHOLEST SERPL-MCNC: 167 MG/DL (ref 100–199)
CO2 SERPL-SCNC: 29 MMOL/L (ref 20–33)
CREAT SERPL-MCNC: 0.58 MG/DL (ref 0.5–1.4)
FASTING STATUS PATIENT QL REPORTED: NORMAL
GLOBULIN SER CALC-MCNC: 2.2 G/DL (ref 1.9–3.5)
GLUCOSE SERPL-MCNC: 82 MG/DL (ref 65–99)
HDLC SERPL-MCNC: 82 MG/DL
LDLC SERPL CALC-MCNC: 73 MG/DL
POTASSIUM SERPL-SCNC: 4.2 MMOL/L (ref 3.6–5.5)
PROT SERPL-MCNC: 6.3 G/DL (ref 6–8.2)
SODIUM SERPL-SCNC: 139 MMOL/L (ref 135–145)
T4 FREE SERPL-MCNC: 0.77 NG/DL (ref 0.53–1.43)
TRIGL SERPL-MCNC: 58 MG/DL (ref 0–149)
TSH SERPL DL<=0.005 MIU/L-ACNC: 2.68 UIU/ML (ref 0.38–5.33)

## 2018-09-06 PROCEDURE — 82306 VITAMIN D 25 HYDROXY: CPT

## 2018-09-06 PROCEDURE — 36415 COLL VENOUS BLD VENIPUNCTURE: CPT

## 2018-09-06 PROCEDURE — 80053 COMPREHEN METABOLIC PANEL: CPT

## 2018-09-06 PROCEDURE — 84443 ASSAY THYROID STIM HORMONE: CPT

## 2018-09-06 PROCEDURE — 84439 ASSAY OF FREE THYROXINE: CPT

## 2018-09-06 PROCEDURE — 80061 LIPID PANEL: CPT

## 2018-09-12 ENCOUNTER — OFFICE VISIT (OUTPATIENT)
Dept: MEDICAL GROUP | Facility: PHYSICIAN GROUP | Age: 68
End: 2018-09-12
Payer: MEDICARE

## 2018-09-12 VITALS
OXYGEN SATURATION: 99 % | HEIGHT: 64 IN | TEMPERATURE: 97.7 F | RESPIRATION RATE: 12 BRPM | WEIGHT: 111.6 LBS | HEART RATE: 71 BPM | BODY MASS INDEX: 19.05 KG/M2 | SYSTOLIC BLOOD PRESSURE: 140 MMHG | DIASTOLIC BLOOD PRESSURE: 90 MMHG

## 2018-09-12 DIAGNOSIS — Z13.6 SCREENING FOR CARDIOVASCULAR CONDITION: ICD-10-CM

## 2018-09-12 DIAGNOSIS — Z13.29 SCREENING FOR THYROID DISORDER: ICD-10-CM

## 2018-09-12 DIAGNOSIS — J30.2 SEASONAL ALLERGIC RHINITIS, UNSPECIFIED TRIGGER: ICD-10-CM

## 2018-09-12 DIAGNOSIS — E78.5 DYSLIPIDEMIA: ICD-10-CM

## 2018-09-12 DIAGNOSIS — E55.9 VITAMIN D DEFICIENCY: ICD-10-CM

## 2018-09-12 DIAGNOSIS — K21.9 GASTROESOPHAGEAL REFLUX DISEASE WITHOUT ESOPHAGITIS: ICD-10-CM

## 2018-09-12 DIAGNOSIS — N30.10 INTERSTITIAL CYSTITIS: ICD-10-CM

## 2018-09-12 DIAGNOSIS — M54.9 MID BACK PAIN ON LEFT SIDE: ICD-10-CM

## 2018-09-12 DIAGNOSIS — R03.0 ELEVATED BLOOD PRESSURE READING: ICD-10-CM

## 2018-09-12 DIAGNOSIS — F17.210 CIGARETTE NICOTINE DEPENDENCE WITHOUT COMPLICATION: ICD-10-CM

## 2018-09-12 PROCEDURE — 99214 OFFICE O/P EST MOD 30 MIN: CPT | Performed by: NURSE PRACTITIONER

## 2018-09-12 RX ORDER — FLUTICASONE PROPIONATE 50 MCG
2 SPRAY, SUSPENSION (ML) NASAL DAILY
Qty: 1 BOTTLE | Refills: 2 | Status: SHIPPED | OUTPATIENT
Start: 2018-09-12 | End: 2019-09-05

## 2018-09-12 NOTE — PATIENT INSTRUCTIONS
"DASH Eating Plan  DASH stands for \"Dietary Approaches to Stop Hypertension.\" The DASH eating plan is a healthy eating plan that has been shown to reduce high blood pressure (hypertension). Additional health benefits may include reducing the risk of type 2 diabetes mellitus, heart disease, and stroke. The DASH eating plan may also help with weight loss.  What do I need to know about the DASH eating plan?  For the DASH eating plan, you will follow these general guidelines:  · Choose foods with less than 150 milligrams of sodium per serving (as listed on the food label).  · Use salt-free seasonings or herbs instead of table salt or sea salt.  · Check with your health care provider or pharmacist before using salt substitutes.  · Eat lower-sodium products. These are often labeled as \"low-sodium\" or \"no salt added.\"  · Eat fresh foods. Avoid eating a lot of canned foods.  · Eat more vegetables, fruits, and low-fat dairy products.  · Choose whole grains. Look for the word \"whole\" as the first word in the ingredient list.  · Choose fish and skinless chicken or turkey more often than red meat. Limit fish, poultry, and meat to 6 oz (170 g) each day.  · Limit sweets, desserts, sugars, and sugary drinks.  · Choose heart-healthy fats.  · Eat more home-cooked food and less restaurant, buffet, and fast food.  · Limit fried foods.  · Do not herr foods. Cook foods using methods such as baking, boiling, grilling, and broiling instead.  · When eating at a restaurant, ask that your food be prepared with less salt, or no salt if possible.  What foods can I eat?  Seek help from a dietitian for individual calorie needs.  Grains   Whole grain or whole wheat bread. Brown rice. Whole grain or whole wheat pasta. Quinoa, bulgur, and whole grain cereals. Low-sodium cereals. Corn or whole wheat flour tortillas. Whole grain cornbread. Whole grain crackers. Low-sodium crackers.  Vegetables   Fresh or frozen vegetables (raw, steamed, roasted, or " grilled). Low-sodium or reduced-sodium tomato and vegetable juices. Low-sodium or reduced-sodium tomato sauce and paste. Low-sodium or reduced-sodium canned vegetables.  Fruits   All fresh, canned (in natural juice), or frozen fruits.  Meat and Other Protein Products   Ground beef (85% or leaner), grass-fed beef, or beef trimmed of fat. Skinless chicken or turkey. Ground chicken or turkey. Pork trimmed of fat. All fish and seafood. Eggs. Dried beans, peas, or lentils. Unsalted nuts and seeds. Unsalted canned beans.  Dairy   Low-fat dairy products, such as skim or 1% milk, 2% or reduced-fat cheeses, low-fat ricotta or cottage cheese, or plain low-fat yogurt. Low-sodium or reduced-sodium cheeses.  Fats and Oils   Tub margarines without trans fats. Light or reduced-fat mayonnaise and salad dressings (reduced sodium). Avocado. Safflower, olive, or canola oils. Natural peanut or almond butter.  Other   Unsalted popcorn and pretzels.  The items listed above may not be a complete list of recommended foods or beverages. Contact your dietitian for more options.   What foods are not recommended?  Grains   White bread. White pasta. White rice. Refined cornbread. Bagels and croissants. Crackers that contain trans fat.  Vegetables   Creamed or fried vegetables. Vegetables in a cheese sauce. Regular canned vegetables. Regular canned tomato sauce and paste. Regular tomato and vegetable juices.  Fruits   Canned fruit in light or heavy syrup. Fruit juice.  Meat and Other Protein Products   Fatty cuts of meat. Ribs, chicken wings, carroll, sausage, bologna, salami, chitterlings, fatback, hot dogs, bratwurst, and packaged luncheon meats. Salted nuts and seeds. Canned beans with salt.  Dairy   Whole or 2% milk, cream, half-and-half, and cream cheese. Whole-fat or sweetened yogurt. Full-fat cheeses or blue cheese. Nondairy creamers and whipped toppings. Processed cheese, cheese spreads, or cheese curds.  Condiments   Onion and garlic  salt, seasoned salt, table salt, and sea salt. Canned and packaged gravies. Worcestershire sauce. Tartar sauce. Barbecue sauce. Teriyaki sauce. Soy sauce, including reduced sodium. Steak sauce. Fish sauce. Oyster sauce. Cocktail sauce. Horseradish. Ketchup and mustard. Meat flavorings and tenderizers. Bouillon cubes. Hot sauce. Tabasco sauce. Marinades. Taco seasonings. Relishes.  Fats and Oils   Butter, stick margarine, lard, shortening, ghee, and carroll fat. Coconut, palm kernel, or palm oils. Regular salad dressings.  Other   Pickles and olives. Salted popcorn and pretzels.  The items listed above may not be a complete list of foods and beverages to avoid. Contact your dietitian for more information.   Where can I find more information?  National Heart, Lung, and [employer] [place]: www.nhlbi.nih.gov/health/health-topics/topics/dash/  This information is not intended to replace advice given to you by your health care provider. Make sure you discuss any questions you have with your health care provider.  Document Released: 12/06/2012 Document Revised: 05/25/2017 Document Reviewed: 10/22/2014  [employer] Interactive Patient Education © 2017 [employer]    Take blood pressure at home at least 3 times a week.    If blood pressure is greater than 140/90 frequently, then make appointment to see me.

## 2018-09-12 NOTE — ASSESSMENT & PLAN NOTE
This is a chronic health problem that is well controlled with current  lifestyle measures.  Avoids trigger foods.  Not taking PPI any longer.  Denies blood in stool, dark black stool, nausea, vomiting.
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
## 2018-09-12 NOTE — ASSESSMENT & PLAN NOTE
Patient does not have a history of hypertension.  Her blood pressure is elevated today at 140/90. The patient denies chest pain, shortness of breath, headache, vision changes, epistaxis, or dyspnea on exertion. She does not take her blood pressure at home.  She will start taking her blood pressure at home a few times a week and will schedule an appointment if blood pressure is consistently greater than 140/90.  We did discuss modifiable risk factors such as smoking and routine aerobic exercise and DASH diet.  A handout for DASH diet was given to patient.

## 2018-09-13 PROBLEM — Z71.1 CONCERN ABOUT STD IN FEMALE WITHOUT DIAGNOSIS: Status: RESOLVED | Noted: 2017-12-27 | Resolved: 2018-09-13

## 2018-09-13 PROBLEM — F43.21 GRIEF: Status: RESOLVED | Noted: 2017-04-12 | Resolved: 2018-09-13

## 2018-09-13 NOTE — ASSESSMENT & PLAN NOTE
Patient reports chronic intermittent lower back pain.  Since she uses stretching and relaxation to help manage.

## 2018-09-13 NOTE — ASSESSMENT & PLAN NOTE
This is a chronic health problem that is well controlled with current medications and lifestyle measures.  Patient takes simvastatin 20 mg daily.  Lipid profile shows good control.  Component      Latest Ref Rng & Units 1/2/2018 9/6/2018           7:46 AM  8:14 AM   Cholesterol,Tot      100 - 199 mg/dL 169 167   Triglycerides      0 - 149 mg/dL 52 58   HDL      >=40 mg/dL 90 82   LDL      <100 mg/dL 69 73

## 2018-09-13 NOTE — ASSESSMENT & PLAN NOTE
Patient has a history of vitamin D insufficiency.  She takes vitamin D supplementation.  Recent serum vitamin D level is 60.

## 2018-09-17 ENCOUNTER — TELEPHONE (OUTPATIENT)
Dept: HEMATOLOGY ONCOLOGY | Facility: MEDICAL CENTER | Age: 68
End: 2018-09-17

## 2018-09-17 NOTE — TELEPHONE ENCOUNTER
Need additional information. 1st attempt to contact the patient,- left voicemail for patient requesting a return call to verify eligibility for LCSP.

## 2018-09-24 ENCOUNTER — APPOINTMENT (OUTPATIENT)
Dept: RADIOLOGY | Facility: MEDICAL CENTER | Age: 68
End: 2018-09-24
Attending: NURSE PRACTITIONER
Payer: MEDICARE

## 2018-09-28 ENCOUNTER — OFFICE VISIT (OUTPATIENT)
Dept: MEDICAL GROUP | Facility: PHYSICIAN GROUP | Age: 68
End: 2018-09-28
Payer: MEDICARE

## 2018-09-28 VITALS
DIASTOLIC BLOOD PRESSURE: 64 MMHG | HEART RATE: 68 BPM | RESPIRATION RATE: 12 BRPM | TEMPERATURE: 98.2 F | WEIGHT: 113.8 LBS | SYSTOLIC BLOOD PRESSURE: 130 MMHG | BODY MASS INDEX: 19.43 KG/M2 | HEIGHT: 64 IN | OXYGEN SATURATION: 97 %

## 2018-09-28 DIAGNOSIS — R51.9 INTRACTABLE EPISODIC HEADACHE, UNSPECIFIED HEADACHE TYPE: ICD-10-CM

## 2018-09-28 DIAGNOSIS — E78.5 DYSLIPIDEMIA: ICD-10-CM

## 2018-09-28 DIAGNOSIS — N30.10 INTERSTITIAL CYSTITIS: ICD-10-CM

## 2018-09-28 DIAGNOSIS — J30.2 SEASONAL ALLERGIC RHINITIS, UNSPECIFIED TRIGGER: ICD-10-CM

## 2018-09-28 DIAGNOSIS — F43.21 GRIEF REACTION: ICD-10-CM

## 2018-09-28 DIAGNOSIS — M81.6 LOCALIZED OSTEOPOROSIS, UNSPECIFIED PATHOLOGICAL FRACTURE PRESENCE: ICD-10-CM

## 2018-09-28 DIAGNOSIS — F43.23 ADJUSTMENT DISORDER WITH MIXED ANXIETY AND DEPRESSED MOOD: ICD-10-CM

## 2018-09-28 DIAGNOSIS — I83.90 VARICOSE VEIN OF LEG: ICD-10-CM

## 2018-09-28 DIAGNOSIS — F51.04 PSYCHOPHYSIOLOGICAL INSOMNIA: ICD-10-CM

## 2018-09-28 DIAGNOSIS — K21.9 GASTROESOPHAGEAL REFLUX DISEASE WITHOUT ESOPHAGITIS: ICD-10-CM

## 2018-09-28 DIAGNOSIS — E55.9 VITAMIN D DEFICIENCY: ICD-10-CM

## 2018-09-28 DIAGNOSIS — Z00.00 MEDICARE ANNUAL WELLNESS VISIT, SUBSEQUENT: ICD-10-CM

## 2018-09-28 DIAGNOSIS — R03.0 ELEVATED BLOOD PRESSURE READING: ICD-10-CM

## 2018-09-28 DIAGNOSIS — M54.9 MID BACK PAIN ON LEFT SIDE: ICD-10-CM

## 2018-09-28 DIAGNOSIS — F17.210 CIGARETTE NICOTINE DEPENDENCE WITHOUT COMPLICATION: ICD-10-CM

## 2018-09-28 PROBLEM — R20.0 NUMBNESS OF LOWER EXTREMITY: Status: RESOLVED | Noted: 2017-02-22 | Resolved: 2018-09-28

## 2018-09-28 PROCEDURE — G0439 PPPS, SUBSEQ VISIT: HCPCS | Performed by: NURSE PRACTITIONER

## 2018-09-28 PROCEDURE — 99999 PR NO CHARGE: CPT | Performed by: NURSE PRACTITIONER

## 2018-09-28 RX ORDER — ASCORBIC ACID 500 MG
500 TABLET ORAL DAILY
COMMUNITY

## 2018-09-28 ASSESSMENT — ACTIVITIES OF DAILY LIVING (ADL): BATHING_REQUIRES_ASSISTANCE: 0

## 2018-09-28 ASSESSMENT — ENCOUNTER SYMPTOMS: GENERAL WELL-BEING: EXCELLENT

## 2018-09-28 ASSESSMENT — PATIENT HEALTH QUESTIONNAIRE - PHQ9: CLINICAL INTERPRETATION OF PHQ2 SCORE: 0

## 2018-09-28 ASSESSMENT — PAIN SCALES - GENERAL: PAINLEVEL: NO PAIN
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
## 2018-09-28 NOTE — TELEPHONE ENCOUNTER
Need additional information.2nd attempt to contact the patient,- left voicemail for patient requesting a return call to verify eligibility for LCSP.

## 2018-09-28 NOTE — ASSESSMENT & PLAN NOTE
Patient reports headaches are now infrequent.  Used to occur with neck pain.  Last headache was over 3 months ago.

## 2018-09-28 NOTE — ASSESSMENT & PLAN NOTE
Patient reports that she is no longer grieving.  Her  passed away 2 years ago.  She is feeling much better.  Socializes with friends and is going to school full-time.

## 2018-09-28 NOTE — ASSESSMENT & PLAN NOTE
Patient reports she is sleeping well through the night, does not have difficulty falling asleep.  She reports insomnia is not a concern anymore.

## 2018-09-28 NOTE — ASSESSMENT & PLAN NOTE
Chronic problem.  Takes cranberry tablets and ascorbic acid.  Managed by urology, whom she sees 1 to 2 times a year.

## 2018-09-28 NOTE — ASSESSMENT & PLAN NOTE
Chronic health problem.  Took Fosamax briefly 3 years ago, caused GI issues.  She discontinued medication and takes Calcium and Vitamin D supplement.  Bone density scan due in 2 years.

## 2018-09-28 NOTE — ASSESSMENT & PLAN NOTE
Patient reports this is a chronic intermittent problem she uses stretching and relaxation to help manage.

## 2018-09-28 NOTE — ASSESSMENT & PLAN NOTE
Chronic health problem.  Patient reports stable and does not take medication except for Flonase nasal spray periodically.  Manages with avoiding triggers.

## 2018-09-28 NOTE — ASSESSMENT & PLAN NOTE
Chronic.  Well controlled with simvastatin 20 mg daily and lifestyle measures.  Patient walks a mile a day.

## 2018-09-28 NOTE — ASSESSMENT & PLAN NOTE
Chronic.  Smokes a pack a day. Not interested in quitting. Patient was referred to Lung Cancer Screening Program.  She received voicemail to call them back for screening questions.  She will call them back today.

## 2018-09-28 NOTE — PATIENT INSTRUCTIONS
"DASH Eating Plan  DASH stands for \"Dietary Approaches to Stop Hypertension.\" The DASH eating plan is a healthy eating plan that has been shown to reduce high blood pressure (hypertension). Additional health benefits may include reducing the risk of type 2 diabetes mellitus, heart disease, and stroke. The DASH eating plan may also help with weight loss.  What do I need to know about the DASH eating plan?  For the DASH eating plan, you will follow these general guidelines:  · Choose foods with less than 150 milligrams of sodium per serving (as listed on the food label).  · Use salt-free seasonings or herbs instead of table salt or sea salt.  · Check with your health care provider or pharmacist before using salt substitutes.  · Eat lower-sodium products. These are often labeled as \"low-sodium\" or \"no salt added.\"  · Eat fresh foods. Avoid eating a lot of canned foods.  · Eat more vegetables, fruits, and low-fat dairy products.  · Choose whole grains. Look for the word \"whole\" as the first word in the ingredient list.  · Choose fish and skinless chicken or turkey more often than red meat. Limit fish, poultry, and meat to 6 oz (170 g) each day.  · Limit sweets, desserts, sugars, and sugary drinks.  · Choose heart-healthy fats.  · Eat more home-cooked food and less restaurant, buffet, and fast food.  · Limit fried foods.  · Do not herr foods. Cook foods using methods such as baking, boiling, grilling, and broiling instead.  · When eating at a restaurant, ask that your food be prepared with less salt, or no salt if possible.  What foods can I eat?  Seek help from a dietitian for individual calorie needs.  Grains   Whole grain or whole wheat bread. Brown rice. Whole grain or whole wheat pasta. Quinoa, bulgur, and whole grain cereals. Low-sodium cereals. Corn or whole wheat flour tortillas. Whole grain cornbread. Whole grain crackers. Low-sodium crackers.  Vegetables   Fresh or frozen vegetables (raw, steamed, roasted, or " grilled). Low-sodium or reduced-sodium tomato and vegetable juices. Low-sodium or reduced-sodium tomato sauce and paste. Low-sodium or reduced-sodium canned vegetables.  Fruits   All fresh, canned (in natural juice), or frozen fruits.  Meat and Other Protein Products   Ground beef (85% or leaner), grass-fed beef, or beef trimmed of fat. Skinless chicken or turkey. Ground chicken or turkey. Pork trimmed of fat. All fish and seafood. Eggs. Dried beans, peas, or lentils. Unsalted nuts and seeds. Unsalted canned beans.  Dairy   Low-fat dairy products, such as skim or 1% milk, 2% or reduced-fat cheeses, low-fat ricotta or cottage cheese, or plain low-fat yogurt. Low-sodium or reduced-sodium cheeses.  Fats and Oils   Tub margarines without trans fats. Light or reduced-fat mayonnaise and salad dressings (reduced sodium). Avocado. Safflower, olive, or canola oils. Natural peanut or almond butter.  Other   Unsalted popcorn and pretzels.  The items listed above may not be a complete list of recommended foods or beverages. Contact your dietitian for more options.   What foods are not recommended?  Grains   White bread. White pasta. White rice. Refined cornbread. Bagels and croissants. Crackers that contain trans fat.  Vegetables   Creamed or fried vegetables. Vegetables in a cheese sauce. Regular canned vegetables. Regular canned tomato sauce and paste. Regular tomato and vegetable juices.  Fruits   Canned fruit in light or heavy syrup. Fruit juice.  Meat and Other Protein Products   Fatty cuts of meat. Ribs, chicken wings, carroll, sausage, bologna, salami, chitterlings, fatback, hot dogs, bratwurst, and packaged luncheon meats. Salted nuts and seeds. Canned beans with salt.  Dairy   Whole or 2% milk, cream, half-and-half, and cream cheese. Whole-fat or sweetened yogurt. Full-fat cheeses or blue cheese. Nondairy creamers and whipped toppings. Processed cheese, cheese spreads, or cheese curds.  Condiments   Onion and garlic  salt, seasoned salt, table salt, and sea salt. Canned and packaged gravies. Worcestershire sauce. Tartar sauce. Barbecue sauce. Teriyaki sauce. Soy sauce, including reduced sodium. Steak sauce. Fish sauce. Oyster sauce. Cocktail sauce. Horseradish. Ketchup and mustard. Meat flavorings and tenderizers. Bouillon cubes. Hot sauce. Tabasco sauce. Marinades. Taco seasonings. Relishes.  Fats and Oils   Butter, stick margarine, lard, shortening, ghee, and carroll fat. Coconut, palm kernel, or palm oils. Regular salad dressings.  Other   Pickles and olives. Salted popcorn and pretzels.  The items listed above may not be a complete list of foods and beverages to avoid. Contact your dietitian for more information.   Where can I find more information?  National [employer] [place]: www.nhlbi.nih.gov/health/health-topics/topics/dash/  This information is not intended to replace advice given to you by your health care provider. Make sure you discuss any questions you have with your health care provider.  Document Released: 12/06/2012 Document Revised: 05/25/2017 Document Reviewed: 10/22/2014  Elsevier Interactive Patient Education © 2017 [employer] Inc.        Trina Lambert at [facility]  [phone]

## 2018-10-04 ENCOUNTER — APPOINTMENT (OUTPATIENT)
Dept: RADIOLOGY | Facility: MEDICAL CENTER | Age: 68
End: 2018-10-04
Attending: NURSE PRACTITIONER
Payer: MEDICARE

## 2019-02-20 ENCOUNTER — OFFICE VISIT (OUTPATIENT)
Dept: URGENT CARE | Facility: PHYSICIAN GROUP | Age: 69
End: 2019-02-20
Payer: MEDICARE

## 2019-02-20 VITALS
TEMPERATURE: 98.4 F | SYSTOLIC BLOOD PRESSURE: 122 MMHG | DIASTOLIC BLOOD PRESSURE: 70 MMHG | WEIGHT: 113 LBS | HEART RATE: 69 BPM | HEIGHT: 64 IN | RESPIRATION RATE: 14 BRPM | OXYGEN SATURATION: 99 % | BODY MASS INDEX: 19.29 KG/M2

## 2019-02-20 DIAGNOSIS — M25.552 LEFT HIP PAIN: ICD-10-CM

## 2019-02-20 DIAGNOSIS — M79.18 MYOFASCIAL PAIN: ICD-10-CM

## 2019-02-20 PROCEDURE — 99214 OFFICE O/P EST MOD 30 MIN: CPT | Performed by: PHYSICIAN ASSISTANT

## 2019-02-20 RX ORDER — CYCLOBENZAPRINE HCL 10 MG
10 TABLET ORAL 3 TIMES DAILY PRN
Qty: 30 TAB | Refills: 0 | Status: SHIPPED | OUTPATIENT
Start: 2019-02-20 | End: 2019-09-05

## 2019-02-20 ASSESSMENT — PAIN SCALES - GENERAL: PAINLEVEL: 8=MODERATE-SEVERE PAIN

## 2019-02-20 NOTE — LETTER
February 20, 2019         Patient: Tanisha Haile   YOB: 1950   Date of Visit: 2/20/2019           To Whom it May Concern:    Tanisha Haile was seen in my clinic on 2/20/2019. She may return to work on 02/23/2019, please excuse any recent absences.    If you have any questions or concerns, please don't hesitate to call.        Sincerely,           Nikita Stiles P.A.-C.  Electronically Signed

## 2019-02-20 NOTE — PROGRESS NOTES
Chief Complaint   Patient presents with   • Hip Pain     x 3 days// L hip// radiates L leg       HISTORY OF PRESENT ILLNESS: Patient is a 68 y.o. female who presents today because she has a 3-4-week history of left hip pain.  Sometimes she has pain in her left foot.  She reports that the pain alternates between her left foot and her left hip but does not radiate from her left hip all the way down her leg to her left foot.  She denies any distal paresthesias.  States that she is allergic to all anti-inflammatories, they make her break out in hives.  I discussed Voltaren gel with her and she reports that she thinks she has tried that with out having any side effects.  She has not been taking any medications for her current symptoms and is not sure of what might have triggered her current symptoms    Patient Active Problem List    Diagnosis Date Noted   • Grief reaction 02/21/2017     Priority: High   • Mid back pain on left side 08/14/2015     Priority: High   • Osteoporosis 08/14/2015     Priority: High   • Insomnia 02/27/2014     Priority: High   • Seasonal allergies 05/22/2013     Priority: Medium   • Vitamin D deficiency 02/28/2013     Priority: Medium   • Interstitial cystitis 07/07/2016     Priority: Low   • Headache 10/03/2014     Priority: Low   • Dyslipidemia 10/24/2012     Priority: Low   • Elevated blood pressure reading 09/12/2018   • Cigarette nicotine dependence without complication 09/12/2018   • Encounter for administration of vaccine 05/04/2018   • Gastroesophageal reflux disease without esophagitis 12/27/2017   • Adjustment disorder with mixed anxiety and depressed mood 04/12/2017   • Varicose vein of leg 03/21/2017       Allergies:Tylenol; Asa [aspirin]; Nsaids; Pcn [penicillins]; and Sulfa drugs    Current Outpatient Prescriptions Ordered in [facility]   Medication Sig Dispense Refill   • Diclofenac Sodium (VOLTAREN) 1 % Gel Apply 2 g to skin as directed 4 times a day. 100 g 0   • cyclobenzaprine  (FLEXERIL) 10 MG Tab Take 1 Tab by mouth 3 times a day as needed. 30 Tab 0   • CRANBERRY PO Take  by mouth.     • ascorbic acid (ASCORBIC ACID) 500 MG Tab Take 500 mg by mouth every day.     • simvastatin (ZOCOR) 20 MG Tab TAKE 1 TABLET BY MOUTH EVERY EVENING 90 Tab 3   • Calcium Carbonate-Vitamin D (CALTRATE 600+D) 600-400 MG-UNIT TABS Take  by mouth 2 Times a Day.       • fluticasone (FLONASE) 50 MCG/ACT nasal spray Spray 2 Sprays in nose every day. 1 Bottle 2     No current Epic-ordered facility-administered medications on file.        Past Medical History:   Diagnosis Date   • Cervical cancer screening 2013    Due to repeat around .   • Dyslipidemia 10/24/2012   • Dysuria 10/2/2014    Seen  for uti, given macrobid Feels the same as  Feels more like vaginal pain     • Hyperlipidemia    • Insomnia 2014    Not sleeping, can't shut brain off Making her really tired during the day X 1 month plus but worse for a month   • Seasonal allergies 2013   • Shingles        Social History   Substance Use Topics   • Smoking status: Current Every Day Smoker     Packs/day: 1.00     Years: 31.00     Types: Cigarettes   • Smokeless tobacco: Never Used   • Alcohol use 4.2 oz/week     7 Glasses of wine per week      Comment: 1 glass of wine daily       Family Status   Relation Status   • Mo  at age 83        heart failure   • Fa  at age 69        quad bpass, coma   • Bro Alive        handicap at a facility   • Bro Alive   • Bro Alive     Family History   Problem Relation Age of Onset   • Hypertension Mother    • Hyperlipidemia Mother    • Thyroid Mother    • Lung Disease Mother    • Heart Disease Father    • Hypertension Father    • Hyperlipidemia Father        ROS:  Review of Systems   Constitutional: Negative for fever, chills, weight loss and malaise/fatigue.   HENT: Negative for ear pain, nosebleeds, congestion, sore throat and neck pain.    Eyes: Negative for blurred vision.  "  Respiratory: Negative for cough, sputum production, shortness of breath and wheezing.    Cardiovascular: Negative for chest pain, palpitations, orthopnea and leg swelling.   Gastrointestinal: Negative for heartburn, nausea, vomiting and abdominal pain.   Genitourinary: Negative for dysuria, urgency and frequency.     Exam:  Blood pressure 122/70, pulse 69, temperature 36.9 °C (98.4 °F), temperature source Temporal, resp. rate 14, height 1.626 m (5' 4\"), weight 51.3 kg (113 lb), SpO2 99 %, not currently breastfeeding.  General:  Well nourished, well developed female in NAD  Head:Normocephalic, atraumatic  Eyes: PERRLA, EOM within normal limits, no conjunctival injection, no scleral icterus, visual fields and acuity grossly intact.  Extremities: no clubbing, cyanosis, or edema.  Musculoskeletal: Left hip is without any visual deformity, erythema, edema or ecchymosis.  She has tenderness over her left iliac crest anterolaterally, as well as tenderness to the paraspinous musculature and tissues of her lumbar spine on the left, no vertebral point tenderness.    Please note that this dictation was created using voice recognition software. I have made every reasonable attempt to correct obvious errors, but I expect that there are errors of grammar and possibly content that I did not discover before finalizing the note.    Assessment/Plan:  1. Myofascial pain  cyclobenzaprine (FLEXERIL) 10 MG Tab   2. Left hip pain  Diclofenac Sodium (VOLTAREN) 1 % Gel       Followup with primary care in the next 7-10 days if not significantly improving, return to the urgent care or go to the emergency room sooner for any worsening of symptoms.       "

## 2019-02-20 NOTE — PATIENT INSTRUCTIONS
"Smoking Cessation, Tips for Success  If you are ready to quit smoking, congratulations! You have chosen to help yourself be healthier. Cigarettes bring nicotine, tar, carbon monoxide, and other irritants into your body. Your lungs, heart, and blood vessels will be able to work better without these poisons. There are many different ways to quit smoking. Nicotine gum, nicotine patches, a nicotine inhaler, or nicotine nasal spray can help with physical craving. Hypnosis, support groups, and medicines help break the habit of smoking.  WHAT THINGS CAN I DO TO MAKE QUITTING EASIER?   Here are some tips to help you quit for good:  · Pick a date when you will quit smoking completely. Tell all of your friends and family about your plan to quit on that date.  · Do not try to slowly cut down on the number of cigarettes you are smoking. Pick a quit date and quit smoking completely starting on that day.  · Throw away all cigarettes.    · Clean and remove all ashtrays from your home, work, and car.  · On a card, write down your reasons for quitting. Carry the card with you and read it when you get the urge to smoke.  · Cleanse your body of nicotine. Drink enough water and fluids to keep your urine clear or pale yellow. Do this after quitting to flush the nicotine from your body.  · Learn to predict your moods. Do not let a bad situation be your excuse to have a cigarette. Some situations in your life might tempt you into wanting a cigarette.  · Never have \"just one\" cigarette. It leads to wanting another and another. Remind yourself of your decision to quit.  · Change habits associated with smoking. If you smoked while driving or when feeling stressed, try other activities to replace smoking. Stand up when drinking your coffee. Brush your teeth after eating. Sit in a different chair when you read the paper. Avoid alcohol while trying to quit, and try to drink fewer caffeinated beverages. Alcohol and caffeine may urge you to " "smoke.  · Avoid foods and drinks that can trigger a desire to smoke, such as sugary or spicy foods and alcohol.  · Ask people who smoke not to smoke around you.  · Have something planned to do right after eating or having a cup of coffee. For example, plan to take a walk or exercise.  · Try a relaxation exercise to calm you down and decrease your stress. Remember, you may be tense and nervous for the first 2 weeks after you quit, but this will pass.  · Find new activities to keep your hands busy. Play with a pen, coin, or rubber band. Doodle or draw things on paper.  · Brush your teeth right after eating. This will help cut down on the craving for the taste of tobacco after meals. You can also try mouthwash.    · Use oral substitutes in place of cigarettes. Try using lemon drops, carrots, cinnamon sticks, or chewing gum. Keep them handy so they are available when you have the urge to smoke.  · When you have the urge to smoke, try deep breathing.  · Designate your home as a nonsmoking area.  · If you are a heavy smoker, ask your health care provider about a prescription for nicotine chewing gum. It can ease your withdrawal from nicotine.  · Reward yourself. Set aside the cigarette money you save and buy yourself something nice.  · Look for support from others. Join a support group or smoking cessation program. Ask someone at home or at work to help you with your plan to quit smoking.  · Always ask yourself, \"Do I need this cigarette or is this just a reflex?\" Tell yourself, \"Today, I choose not to smoke,\" or \"I do not want to smoke.\" You are reminding yourself of your decision to quit.  · Do not replace cigarette smoking with electronic cigarettes (commonly called e-cigarettes). The safety of e-cigarettes is unknown, and some may contain harmful chemicals.  · If you relapse, do not give up! Plan ahead and think about what you will do the next time you get the urge to smoke.  HOW WILL I FEEL WHEN I QUIT SMOKING?  You " may have symptoms of withdrawal because your body is used to nicotine (the addictive substance in cigarettes). You may crave cigarettes, be irritable, feel very hungry, cough often, get headaches, or have difficulty concentrating. The withdrawal symptoms are only temporary. They are strongest when you first quit but will go away within 10-14 days. When withdrawal symptoms occur, stay in control. Think about your reasons for quitting. Remind yourself that these are signs that your body is healing and getting used to being without cigarettes. Remember that withdrawal symptoms are easier to treat than the major diseases that smoking can cause.   Even after the withdrawal is over, expect periodic urges to smoke. However, these cravings are generally short lived and will go away whether you smoke or not. Do not smoke!  WHAT RESOURCES ARE AVAILABLE TO HELP ME QUIT SMOKING?  Your health care provider can direct you to community resources or hospitals for support, which may include:  · Group support.  · Education.  · Hypnosis.  · Therapy.     This information is not intended to replace advice given to you by your health care provider. Make sure you discuss any questions you have with your health care provider.     Document Released: 09/15/2005 Document Revised: 01/08/2016 Document Reviewed: 06/05/2014  [employer] Interactive Patient Education ©2016 [employer]

## 2019-03-14 ENCOUNTER — OFFICE VISIT (OUTPATIENT)
Dept: MEDICAL GROUP | Facility: PHYSICIAN GROUP | Age: 69
End: 2019-03-14
Payer: MEDICARE

## 2019-03-14 VITALS
TEMPERATURE: 97.2 F | DIASTOLIC BLOOD PRESSURE: 72 MMHG | BODY MASS INDEX: 19.7 KG/M2 | WEIGHT: 115.4 LBS | HEART RATE: 64 BPM | OXYGEN SATURATION: 97 % | RESPIRATION RATE: 12 BRPM | HEIGHT: 64 IN | SYSTOLIC BLOOD PRESSURE: 124 MMHG

## 2019-03-14 DIAGNOSIS — E04.9 ENLARGED THYROID: ICD-10-CM

## 2019-03-14 DIAGNOSIS — I83.93 VARICOSE VEINS OF BOTH LOWER EXTREMITIES, UNSPECIFIED WHETHER COMPLICATED: ICD-10-CM

## 2019-03-14 DIAGNOSIS — L29.0 ANAL ITCHING: ICD-10-CM

## 2019-03-14 DIAGNOSIS — M25.572 ACUTE LEFT ANKLE PAIN: ICD-10-CM

## 2019-03-14 DIAGNOSIS — K64.9 HEMORRHOIDS, UNSPECIFIED HEMORRHOID TYPE: ICD-10-CM

## 2019-03-14 DIAGNOSIS — I83.813 VARICOSE VEINS OF BILATERAL LOWER EXTREMITIES WITH PAIN: ICD-10-CM

## 2019-03-14 PROCEDURE — 99214 OFFICE O/P EST MOD 30 MIN: CPT | Performed by: NURSE PRACTITIONER

## 2019-03-14 ASSESSMENT — PATIENT HEALTH QUESTIONNAIRE - PHQ9: CLINICAL INTERPRETATION OF PHQ2 SCORE: 0

## 2019-03-14 ASSESSMENT — PAIN SCALES - GENERAL: PAINLEVEL: NO PAIN
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
## 2019-03-14 NOTE — ASSESSMENT & PLAN NOTE
Patient reports anal itching, onset 2 months ago.  She has tried Neosporin.  Pain is worse when she sits.  She has a history of hemorrhoids.

## 2019-03-15 PROBLEM — M25.572 ACUTE LEFT ANKLE PAIN: Status: ACTIVE | Noted: 2019-03-15

## 2019-03-15 PROBLEM — K64.9 HEMORRHOIDS: Status: ACTIVE | Noted: 2019-03-15

## 2019-03-16 NOTE — ASSESSMENT & PLAN NOTE
Patient reports anal itching and tenderness, intermittent.  She reports history of hemorrhoids.  Denies constipation.

## 2019-03-16 NOTE — ASSESSMENT & PLAN NOTE
Patient reports new onset left lateral ankle pain.  Onset 2 days ago.  Denies injury.  Quality of pain is dull.  Denies erythema or swelling.  Patient has not tried anything.  Discussed options including ice, elevation, ankle x-ray if pain does not improve.

## 2019-03-16 NOTE — ASSESSMENT & PLAN NOTE
Patient reports varicose veins in both legs are getting worse, left worse than right.  She reports very tender.  She is open to referral to vascular surgery for consultation and recommendations.

## 2019-04-04 ENCOUNTER — TELEPHONE (OUTPATIENT)
Dept: URGENT CARE | Facility: PHYSICIAN GROUP | Age: 69
End: 2019-04-04

## 2019-05-03 RX ORDER — SIMVASTATIN 20 MG
TABLET ORAL
Qty: 90 TAB | Refills: 1 | Status: SHIPPED | OUTPATIENT
Start: 2019-05-03 | End: 2019-09-05 | Stop reason: SDUPTHER

## 2019-05-03 NOTE — TELEPHONE ENCOUNTER
Pt has had OV within the 12 month protocol and lipid panel is current. 6 month supply sent to pharmacy.   Lab Results   Component Value Date/Time    CHOLSTRLTOT 167 09/06/2018 08:14 AM    LDL 73 09/06/2018 08:14 AM    HDL 82 09/06/2018 08:14 AM    TRIGLYCERIDE 58 09/06/2018 08:14 AM       Lab Results   Component Value Date/Time    SODIUM 139 09/06/2018 08:14 AM    POTASSIUM 4.2 09/06/2018 08:14 AM    CHLORIDE 102 09/06/2018 08:14 AM    CO2 29 09/06/2018 08:14 AM    GLUCOSE 82 09/06/2018 08:14 AM    BUN 15 09/06/2018 08:14 AM    CREATININE 0.58 09/06/2018 08:14 AM    CREATININE 0.8 04/08/2007 01:19 PM     Lab Results   Component Value Date/Time    ALKPHOSPHAT 56 09/06/2018 08:14 AM    ASTSGOT 18 09/06/2018 08:14 AM    ALTSGPT 14 09/06/2018 08:14 AM    TBILIRUBIN 0.6 09/06/2018 08:14 AM

## 2019-05-03 NOTE — TELEPHONE ENCOUNTER
Was the patient seen in the last year in this department? Yes    Does patient have an active prescription for medications requested? No     Received Request Via: Pharmacy      Pt met protocol?: Yes, OV 3/19   Lab Results   Component Value Date/Time    CHOLSTRLTOT 167 09/06/2018 08:14 AM    LDL 73 09/06/2018 08:14 AM    HDL 82 09/06/2018 08:14 AM    TRIGLYCERIDE 58 09/06/2018 08:14 AM       Lab Results   Component Value Date/Time    SODIUM 139 09/06/2018 08:14 AM    POTASSIUM 4.2 09/06/2018 08:14 AM    CHLORIDE 102 09/06/2018 08:14 AM    CO2 29 09/06/2018 08:14 AM    GLUCOSE 82 09/06/2018 08:14 AM    BUN 15 09/06/2018 08:14 AM    CREATININE 0.58 09/06/2018 08:14 AM    CREATININE 0.8 04/08/2007 01:19 PM     Lab Results   Component Value Date/Time    ALKPHOSPHAT 56 09/06/2018 08:14 AM    ASTSGOT 18 09/06/2018 08:14 AM    ALTSGPT 14 09/06/2018 08:14 AM    TBILIRUBIN 0.6 09/06/2018 08:14 AM

## 2019-05-31 ENCOUNTER — APPOINTMENT (OUTPATIENT)
Dept: RADIOLOGY | Facility: IMAGING CENTER | Age: 69
End: 2019-05-31
Attending: NURSE PRACTITIONER
Payer: MEDICARE

## 2019-05-31 ENCOUNTER — OFFICE VISIT (OUTPATIENT)
Dept: URGENT CARE | Facility: PHYSICIAN GROUP | Age: 69
End: 2019-05-31
Payer: MEDICARE

## 2019-05-31 VITALS
DIASTOLIC BLOOD PRESSURE: 82 MMHG | RESPIRATION RATE: 16 BRPM | SYSTOLIC BLOOD PRESSURE: 150 MMHG | TEMPERATURE: 97.3 F | OXYGEN SATURATION: 95 % | HEART RATE: 87 BPM | WEIGHT: 116 LBS | BODY MASS INDEX: 19.91 KG/M2

## 2019-05-31 DIAGNOSIS — L08.9 HAND ABRASION, INFECTED, RIGHT, INITIAL ENCOUNTER: ICD-10-CM

## 2019-05-31 DIAGNOSIS — S60.511A HAND ABRASION, INFECTED, RIGHT, INITIAL ENCOUNTER: ICD-10-CM

## 2019-05-31 DIAGNOSIS — S20.212A RIB CONTUSION, LEFT, INITIAL ENCOUNTER: ICD-10-CM

## 2019-05-31 DIAGNOSIS — R07.81 RIB PAIN ON LEFT SIDE: ICD-10-CM

## 2019-05-31 PROCEDURE — 71100 X-RAY EXAM RIBS UNI 2 VIEWS: CPT | Mod: TC,FY,LT | Performed by: NURSE PRACTITIONER

## 2019-05-31 PROCEDURE — 99213 OFFICE O/P EST LOW 20 MIN: CPT | Performed by: NURSE PRACTITIONER

## 2019-05-31 RX ORDER — CEPHALEXIN 500 MG/1
500 CAPSULE ORAL 4 TIMES DAILY
Qty: 20 CAP | Refills: 0 | Status: SHIPPED | OUTPATIENT
Start: 2019-05-31 | End: 2019-06-05

## 2019-05-31 ASSESSMENT — ENCOUNTER SYMPTOMS
WHEEZING: 0
COUGH: 0
SPUTUM PRODUCTION: 0
SHORTNESS OF BREATH: 0
EYE REDNESS: 0
FEVER: 0
FALLS: 1
NUMBNESS: 0
BACK PAIN: 0
VOMITING: 0
EYE PAIN: 0
VISUAL CHANGE: 0
HEMOPTYSIS: 0
LOSS OF CONSCIOUSNESS: 0
NAUSEA: 0
NECK PAIN: 0
EYE DISCHARGE: 0

## 2019-05-31 NOTE — PROGRESS NOTES
Subjective:      Tanisha Haile is a 69 y.o. female who presents with Fall (tripped and fell yesterday (R) hand abrasion)            Received and abrasion to right palm. Has right rib pain post fall. Denies hip pain.       Fall   The accident occurred 12 to 24 hours ago. The fall occurred while walking (Had a trip and fall. ). Impact surface: Pavement. The volume of blood lost was minimal. The pain is present in the right hand. The pain is at a severity of 3/10. The pain is mild. Pertinent negatives include no fever, loss of consciousness, nausea, numbness, visual change or vomiting. Treatments tried: neosporin. The treatment provided mild relief.       Review of Systems   Constitutional: Negative for fever.   HENT: Negative for ear discharge and tinnitus.    Eyes: Negative for pain, discharge and redness.   Respiratory: Negative for cough, hemoptysis, sputum production, shortness of breath and wheezing.    Gastrointestinal: Negative for nausea and vomiting.   Musculoskeletal: Positive for falls. Negative for back pain and neck pain.        Right rib pain to touch and increased pain with sneezing this morning.    Skin:        Abrasion to right palm with clear bloody discharge.    Neurological: Negative for loss of consciousness and numbness.   All other systems reviewed and are negative.         Objective:     /82   Pulse 87   Temp 36.3 °C (97.3 °F)   Resp 16   Wt 52.6 kg (116 lb)   LMP  (LMP Unknown)   SpO2 95%   BMI 19.91 kg/m²      Physical Exam   Constitutional: She is oriented to person, place, and time. She appears well-developed and well-nourished. No distress.   HENT:   Head: Normocephalic and atraumatic.   Right Ear: External ear normal.   Left Ear: External ear normal.   Mouth/Throat: Oropharynx is clear and moist.   Eyes: Pupils are equal, round, and reactive to light. Conjunctivae and EOM are normal.   Neck: Normal range of motion. Neck supple.   Cardiovascular: Normal rate.    Pulses:   Radial pulses are 2+ on the right side.   Pulmonary/Chest: Effort normal and breath sounds normal. No accessory muscle usage. No respiratory distress. She has no decreased breath sounds. She has no wheezes. She has no rhonchi. She has no rales. Chest wall is not dull to percussion. She exhibits tenderness and bony tenderness. She exhibits no laceration, no crepitus, no edema, no swelling and no retraction.       Abdominal: Soft. Normal appearance.   Musculoskeletal: She exhibits tenderness. She exhibits no deformity.        Right wrist: Normal.        Right hand: She exhibits tenderness. She exhibits normal range of motion, no bony tenderness, normal two-point discrimination, normal capillary refill and no deformity. Normal sensation noted. Normal strength noted.        Hands:  Neurological: She is alert and oriented to person, place, and time. She has normal strength. No sensory deficit. GCS eye subscore is 4. GCS verbal subscore is 5. GCS motor subscore is 6.   Skin: Skin is warm and dry. Abrasion noted.   Psychiatric: She has a normal mood and affect. Her speech is normal and behavior is normal. Judgment and thought content normal.   Nursing note and vitals reviewed.              Assessment/Plan:     1. Rib contusion, left, initial encounter  - [id number] (W/O CXR) LEFT; Future  Narrative       5/31/2019 12:37 PM    HISTORY/REASON FOR EXAM:  Left-sided rib pain after ground-level fall one day ago.      TECHNIQUE/EXAM DESCRIPTION AND NUMBER OF VIEWS:  For views of the left ribs.    COMPARISON: NONE    FINDINGS:  No displaced fractures or acute bony changes are noted.  There is no evidence of a hemo or pneumothorax.   Impression       Negative left rib series.     Ice or heat for comfort.     2. Hand abrasion, infected, right, initial encounter  - tetanus-dipth-acell pertussis (ADACEL) 5-2-15.5 LF-MCG/0.5 Suspension; 0.5 mL by Intramuscular route Once PRN for up to 1 dose.  Dispense: 0.5 mL; Refill:  0    The wound is cleansed, debrided of foreign material as much as possible, and dressed. The patient is alerted to watch for any signs of infection (redness, pus, pain, increased swelling or fever) and call if such occurs. Home wound care instructions are provided. Tetanus vaccination status reviewed: tetanus status unknown to the patient.    Apply OTC Bacitracin as directed to wound, and monitor for signs of infection.  Keep wound clean and dry.     Follow up for increased redness, pain, swelling, fevers, purulent drainage, shortness of breath, or any other concerns.

## 2019-05-31 NOTE — PATIENT INSTRUCTIONS
Apply OTC Bacitracin as directed to wound, and monitor for signs of infection.  Keep wound clean and dry.     Follow up for increased redness, pain, swelling, fevers, purulent drainage, shortness of breath, or any other concerns.     Abrasion  An abrasion is a cut or scrape on the outer surface of your skin. An abrasion does not extend through all of the layers of your skin. It is important to care for your abrasion properly to prevent infection.  What are the causes?  Most abrasions are caused by falling on or gliding across the ground or another surface. When your skin rubs on something, the outer and inner layer of skin rubs off.  What are the signs or symptoms?  A cut or scrape is the main symptom of this condition. The scrape may be bleeding, or it may appear red or pink. If there was an associated fall, there may be an underlying bruise.  How is this diagnosed?  An abrasion is diagnosed with a physical exam.  How is this treated?  Treatment for this condition depends on how large and deep the abrasion is. Usually, your abrasion will be cleaned with water and mild soap. This removes any dirt or debris that may be stuck. An antibiotic ointment may be applied to the abrasion to help prevent infection. A bandage (dressing) may be placed on the abrasion to keep it clean.  You may also need a tetanus shot.  Follow these instructions at home:  Medicines  · Take or apply medicines only as directed by your health care provider.  · If you were prescribed an antibiotic ointment, finish all of it even if you start to feel better.  Wound care  · Clean the wound with mild soap and water 2-3 times per day or as directed by your health care provider. Pat your wound dry with a clean towel. Do not rub it.  · There are many different ways to close and cover a wound. Follow instructions from your health care provider about:  ¨ Wound care.  ¨ Dressing changes and removal.  · Check your wound every day for signs of infection. Watch  for:  ¨ Redness, swelling, or pain.  ¨ Fluid, blood, or pus.  General instructions  · Keep the dressing dry as directed by your health care provider. Do not take baths, swim, use a hot tub, or do anything that would put your wound underwater until your health care provider approves.  · If there is swelling, raise (elevate) the injured area above the level of your heart while you are sitting or lying down.  · Keep all follow-up visits as directed by your health care provider. This is important.  Contact a health care provider if:  · You received a tetanus shot and you have swelling, severe pain, redness, or bleeding at the injection site.  · Your pain is not controlled with medicine.  · You have increased redness, swelling, or pain at the site of your wound.  Get help right away if:  · You have a red streak going away from your wound.  · You have a fever.  · You have fluid, blood, or pus coming from your wound.  · You notice a bad smell coming from your wound or your dressing.  This information is not intended to replace advice given to you by your health care provider. Make sure you discuss any questions you have with your health care provider.  Document Released: 09/27/2006 Document Revised: 08/18/2017 Document Reviewed: 12/16/2015  [employer] Patient Education © 2017 [employer]    Rib Contusion  Introduction  A rib contusion is a deep bruise on your rib area. Contusions are the result of a blunt trauma that causes bleeding and injury to the tissues under the skin. A rib contusion may involve bruising of the ribs and of the skin and muscles in the area. The skin overlying the contusion may turn blue, purple, or yellow. Minor injuries will give you a painless contusion, but more severe contusions may stay painful and swollen for a few weeks.  What are the causes?  A contusion is usually caused by a blow, trauma, or direct force to an area of the body. This often occurs while playing contact  sports.  What are the signs or symptoms?  · Swelling and redness of the injured area.  · Discoloration of the injured area.  · Tenderness and soreness of the injured area.  · Pain with or without movement.  How is this diagnosed?  The diagnosis can be made by taking a medical history and performing a physical exam. An X-ray, CT scan, or MRI may be needed to determine if there were any associated injuries, such as broken bones (fractures) or internal injuries.  How is this treated?  Often, the best treatment for a rib contusion is rest. Icing or applying cold compresses to the injured area may help reduce swelling and inflammation. Deep breathing exercises may be recommended to reduce the risk of partial lung collapse and pneumonia. Over-the-counter or prescription medicines may also be recommended for pain control.  Follow these instructions at home:  · Apply ice to the injured area:  ¨ Put ice in a plastic bag.  ¨ Place a towel between your skin and the bag.  ¨ Leave the ice on for 20 minutes, 2-3 times per day.  · Take medicines only as directed by your health care provider.  · Rest the injured area. Avoid strenuous activity and any activities or movements that cause pain. Be careful during activities and avoid bumping the injured area.  · Perform deep-breathing exercises as directed by your health care provider.  · Do not lift anything that is heavier than 5 lb (2.3 kg) until your health care provider approves.  · Do not use any tobacco products, including cigarettes, chewing tobacco, or electronic cigarettes. If you need help quitting, ask your health care provider.  Contact a health care provider if:  · You have increased bruising or swelling.  · You have pain that is not controlled with treatment.  · You have a fever.  Get help right away if:  · You have difficulty breathing or shortness of breath.  · You develop a continual cough, or you cough up thick or bloody sputum.  · You feel sick to your stomach  (nauseous), you throw up (vomit), or you have abdominal pain.  This information is not intended to replace advice given to you by your health care provider. Make sure you discuss any questions you have with your health care provider.  Document Released: 09/12/2002 Document Revised: 05/25/2017 Document Reviewed: 09/29/2015  © 2017 [employer]

## 2019-06-05 ENCOUNTER — OFFICE VISIT (OUTPATIENT)
Dept: URGENT CARE | Facility: PHYSICIAN GROUP | Age: 69
End: 2019-06-05
Payer: MEDICARE

## 2019-06-05 VITALS
RESPIRATION RATE: 16 BRPM | DIASTOLIC BLOOD PRESSURE: 78 MMHG | TEMPERATURE: 97.6 F | HEART RATE: 78 BPM | SYSTOLIC BLOOD PRESSURE: 128 MMHG | OXYGEN SATURATION: 98 %

## 2019-06-05 DIAGNOSIS — L08.9 INFECTED ABRASION: ICD-10-CM

## 2019-06-05 DIAGNOSIS — T14.8XXA INFECTED ABRASION: ICD-10-CM

## 2019-06-05 PROCEDURE — 99214 OFFICE O/P EST MOD 30 MIN: CPT | Performed by: PHYSICIAN ASSISTANT

## 2019-06-05 RX ORDER — CEPHALEXIN 500 MG/1
500 CAPSULE ORAL 4 TIMES DAILY
Qty: 12 CAP | Refills: 0 | Status: SHIPPED | OUTPATIENT
Start: 2019-06-05 | End: 2019-06-08

## 2019-06-05 ASSESSMENT — ENCOUNTER SYMPTOMS
TINGLING: 0
SENSORY CHANGE: 0
CHILLS: 0
FEVER: 0
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
## 2019-06-24 ENCOUNTER — OFFICE VISIT (OUTPATIENT)
Dept: URGENT CARE | Facility: PHYSICIAN GROUP | Age: 69
End: 2019-06-24
Payer: MEDICARE

## 2019-06-24 VITALS
HEART RATE: 64 BPM | HEIGHT: 64 IN | TEMPERATURE: 98.4 F | SYSTOLIC BLOOD PRESSURE: 118 MMHG | BODY MASS INDEX: 19.46 KG/M2 | DIASTOLIC BLOOD PRESSURE: 80 MMHG | RESPIRATION RATE: 14 BRPM | OXYGEN SATURATION: 100 % | WEIGHT: 114 LBS
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
## 2019-06-24 PROCEDURE — 99214 OFFICE O/P EST MOD 30 MIN: CPT | Performed by: PHYSICIAN ASSISTANT

## 2019-06-24 RX ORDER — TRIAMCINOLONE ACETONIDE 5 MG/G
CREAM TOPICAL
Qty: 60 G | Refills: 0 | Status: SHIPPED | OUTPATIENT
Start: 2019-06-24 | End: 2020-05-13

## 2019-06-24 NOTE — PROGRESS NOTES
Chief Complaint   Patient presents with   • Insect Bite     Left forearm/ Pt states poss spider bite/ happened yesterday        HISTORY OF PRESENT ILLNESS: Patient is a 69 y.o. female who presents today for the following:    Patient comes in for evaluation of a bump on her left forearm that she noticed yesterday morning pulling weeds, wearing long gloves, but noticed a red bump shortly after.  She denies any pain and itching.  She has not had any drainage.  She only has the one bump.    Patient Active Problem List    Diagnosis Date Noted   • Grief reaction 02/21/2017     Priority: High   • Mid back pain on left side 08/14/2015     Priority: High   • Osteoporosis 08/14/2015     Priority: High   • Insomnia 02/27/2014     Priority: High   • Seasonal allergies 05/22/2013     Priority: Medium   • Vitamin D deficiency 02/28/2013     Priority: Medium   • Interstitial cystitis 07/07/2016     Priority: Low   • Headache 10/03/2014     Priority: Low   • Dyslipidemia 10/24/2012     Priority: Low   • Acute left ankle pain 03/15/2019   • Hemorrhoids 03/15/2019   • Anal itching 03/14/2019   • Elevated blood pressure reading 09/12/2018   • Cigarette nicotine dependence without complication 09/12/2018   • Encounter for administration of vaccine 05/04/2018   • Gastroesophageal reflux disease without esophagitis 12/27/2017   • Adjustment disorder with mixed anxiety and depressed mood 04/12/2017   • Varicose vein of leg 03/21/2017       Allergies:Tylenol; Asa [aspirin]; Nsaids; Pcn [penicillins]; and Sulfa drugs    Current Outpatient Prescriptions Ordered in [facility]   Medication Sig Dispense Refill   • triamcinolone acetonide (KENALOG) 0.5 % Cream Apply to affected area up to 4 times daily as needed for rash. Max use is 14 days. 60 g 0   • tetanus-dipth-acell pertussis (ADACEL) 5-2-15.5 LF-MCG/0.5 Suspension 0.5 mL by Intramuscular route Once PRN for up to 1 dose. 0.5 mL 0   • simvastatin (ZOCOR) 20 MG Tab TAKE 1 TABLET BY MOUTH EVERY  EVENING 90 Tab 1   • hydrocortisone 2.5 % Cream topical cream Apply 1 Application to affected area(s) 2 times a day. (Patient not taking: Reported on 2019) 1 Tube 0   • Diclofenac Sodium (VOLTAREN) 1 % Gel Apply 2 g to skin as directed 4 times a day. (Patient not taking: Reported on 2019) 100 g 0   • cyclobenzaprine (FLEXERIL) 10 MG Tab Take 1 Tab by mouth 3 times a day as needed. (Patient not taking: Reported on 2019) 30 Tab 0   • CRANBERRY PO Take  by mouth.     • ascorbic acid (ASCORBIC ACID) 500 MG Tab Take 500 mg by mouth every day.     • fluticasone (FLONASE) 50 MCG/ACT nasal spray Spray 2 Sprays in nose every day. (Patient not taking: Reported on 2019) 1 Bottle 2   • Calcium Carbonate-Vitamin D (CALTRATE 600+D) 600-400 MG-UNIT TABS Take  by mouth 2 Times a Day.         No current Epic-ordered facility-administered medications on file.        Past Medical History:   Diagnosis Date   • Cervical cancer screening 2013    Due to repeat around .   • Dyslipidemia 10/24/2012   • Dysuria 10/2/2014    Seen  for uti, given macrobid Feels the same as  Feels more like vaginal pain     • Hyperlipidemia    • Insomnia 2014    Not sleeping, can't shut brain off Making her really tired during the day X 1 month plus but worse for a month   • Seasonal allergies 2013   • Shingles        Social History   Substance Use Topics   • Smoking status: Current Every Day Smoker     Packs/day: 1.00     Years: 31.00     Types: Cigarettes   • Smokeless tobacco: Never Used   • Alcohol use 4.2 oz/week     7 Glasses of wine per week      Comment: 1 glass of wine daily       Family Status   Relation Status   • Mo  at age 83        heart failure   • Fa  at age 69        quad bpass, coma   • Bro Alive        handicap at a facility   • Bro Alive   • Bro Alive     Family History   Problem Relation Age of Onset   • Hypertension Mother    • Hyperlipidemia Mother    • Thyroid Mother    •  "Lung Disease Mother    • Heart Disease Father    • Hypertension Father    • Hyperlipidemia Father        Review of Systems:    Constitutional ROS: No unexpected change in weight, No weakness, No fatigue  Eye ROS: No recent significant change in vision, No eye pain, redness, discharge  Ear ROS: No drainage, No tinnitus or vertigo, No recent change in hearing  Mouth/Throat ROS: No teeth or gum problems, No bleeding gums, No tongue complaints  Neck ROS: No swollen glands, No significant pain in neck  Pulmonary ROS: No chronic cough, sputum, or hemoptysis, No dyspnea on exertion, No wheezing  Cardiovascular ROS: No diaphoresis, No edema, No palpitations  Gastrointestinal ROS: No change in bowel habits, No significant change in appetite, No nausea, vomiting, diarrhea, or constipation  Musculoskeletal/Extremities ROS: No peripheral edema, No pain, redness or swelling on the joints  Hematologic/Lymphatic ROS: No chills, No night sweats, No weight loss  Skin/Integumentary ROS: Positive for a possible insect bite.      Exam:  /80   Pulse 64   Temp 36.9 °C (98.4 °F) (Temporal)   Resp 14   Ht 1.626 m (5' 4\")   Wt 51.7 kg (114 lb)   SpO2 100%   General: Well developed, well nourished. No distress.  Pulmonary: Unlabored respiratory effort. .   Neurologic: Grossly nonfocal. No facial asymmetry noted.  Skin: Warm, dry, good turgor.  1 cm area of raised erythematous skin on the flexor surface of the left forearm that is nontender to palpation.  There is no induration, fluctuance, or other skin changes noted.  Psych: Normal mood. Alert and oriented x3. Judgment and insight is normal.    Assessment/Plan:  Skin lesion appears consistent with an insect bite.  No signs of infection noted.  Use all medication as directed.  Follow up for worsening or persistent symptoms.  1. Insect bite, initial encounter  triamcinolone acetonide (KENALOG) 0.5 % Cream       "

## 2019-09-05 ENCOUNTER — OFFICE VISIT (OUTPATIENT)
Dept: MEDICAL GROUP | Facility: PHYSICIAN GROUP | Age: 69
End: 2019-09-05
Payer: MEDICARE

## 2019-09-05 VITALS
RESPIRATION RATE: 14 BRPM | HEIGHT: 64 IN | DIASTOLIC BLOOD PRESSURE: 80 MMHG | TEMPERATURE: 98 F | SYSTOLIC BLOOD PRESSURE: 118 MMHG | BODY MASS INDEX: 19.46 KG/M2 | HEART RATE: 82 BPM | WEIGHT: 114 LBS | OXYGEN SATURATION: 98 %

## 2019-09-05 DIAGNOSIS — E78.5 DYSLIPIDEMIA: ICD-10-CM

## 2019-09-05 DIAGNOSIS — Z13.0 ENCOUNTER FOR SCREENING FOR HEMATOLOGIC DISORDER: ICD-10-CM

## 2019-09-05 DIAGNOSIS — F17.200 NICOTINE DEPENDENCE WITH CURRENT USE: ICD-10-CM

## 2019-09-05 DIAGNOSIS — E55.9 VITAMIN D DEFICIENCY: ICD-10-CM

## 2019-09-05 PROBLEM — Z23 ENCOUNTER FOR ADMINISTRATION OF VACCINE: Status: RESOLVED | Noted: 2018-05-04 | Resolved: 2019-09-05

## 2019-09-05 PROCEDURE — 99214 OFFICE O/P EST MOD 30 MIN: CPT | Performed by: NURSE PRACTITIONER

## 2019-09-05 RX ORDER — SIMVASTATIN 20 MG
TABLET ORAL
Qty: 90 TAB | Refills: 3 | Status: SHIPPED | OUTPATIENT
Start: 2019-09-05 | End: 2020-09-21 | Stop reason: SDUPTHER

## 2019-09-05 NOTE — ASSESSMENT & PLAN NOTE
Patient continues to smoke on a daily basis, she understands the risks associated with this.  She does decline assistance and referral to the lung cancer screening program.

## 2019-09-05 NOTE — PATIENT INSTRUCTIONS
Labs anytime    Will get outside records including mammogram    Tentatively will see you in a year
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
## 2019-09-05 NOTE — ASSESSMENT & PLAN NOTE
The 10-year ASCVD risk score ([place] TILLEY Jr., et al., 2013) is: 10.3%  She is appropriately on statin, she takes simvastatin 20 mg daily, needs refills, this was called in for her.  She is due for labs, these have been ordered.  She is to otherwise follow with me annually and as needed.  She was reminded of continue with good lifestyle modifications.

## 2019-09-10 ENCOUNTER — HOSPITAL ENCOUNTER (OUTPATIENT)
Dept: LAB | Facility: MEDICAL CENTER | Age: 69
End: 2019-09-10
Attending: NURSE PRACTITIONER
Payer: MEDICARE

## 2019-09-10 DIAGNOSIS — E55.9 VITAMIN D DEFICIENCY: ICD-10-CM

## 2019-09-10 DIAGNOSIS — Z13.0 ENCOUNTER FOR SCREENING FOR HEMATOLOGIC DISORDER: ICD-10-CM

## 2019-09-10 DIAGNOSIS — E78.5 DYSLIPIDEMIA: ICD-10-CM

## 2019-09-10 LAB
25(OH)D3 SERPL-MCNC: 54 NG/ML (ref 30–100)
ALBUMIN SERPL BCP-MCNC: 4.3 G/DL (ref 3.2–4.9)
ALBUMIN/GLOB SERPL: 1.8 G/DL
ALP SERPL-CCNC: 57 U/L (ref 30–99)
ALT SERPL-CCNC: 15 U/L (ref 2–50)
ANION GAP SERPL CALC-SCNC: 9 MMOL/L (ref 0–11.9)
AST SERPL-CCNC: 20 U/L (ref 12–45)
BASOPHILS # BLD AUTO: 1.1 % (ref 0–1.8)
BASOPHILS # BLD: 0.07 K/UL (ref 0–0.12)
BILIRUB SERPL-MCNC: 0.5 MG/DL (ref 0.1–1.5)
BUN SERPL-MCNC: 14 MG/DL (ref 8–22)
CALCIUM SERPL-MCNC: 9.5 MG/DL (ref 8.5–10.5)
CHLORIDE SERPL-SCNC: 104 MMOL/L (ref 96–112)
CHOLEST SERPL-MCNC: 180 MG/DL (ref 100–199)
CO2 SERPL-SCNC: 29 MMOL/L (ref 20–33)
CREAT SERPL-MCNC: 0.7 MG/DL (ref 0.5–1.4)
EOSINOPHIL # BLD AUTO: 0.33 K/UL (ref 0–0.51)
EOSINOPHIL NFR BLD: 5 % (ref 0–6.9)
ERYTHROCYTE [DISTWIDTH] IN BLOOD BY AUTOMATED COUNT: 46.2 FL (ref 35.9–50)
FASTING STATUS PATIENT QL REPORTED: NORMAL
GLOBULIN SER CALC-MCNC: 2.4 G/DL (ref 1.9–3.5)
GLUCOSE SERPL-MCNC: 82 MG/DL (ref 65–99)
HCT VFR BLD AUTO: 46 % (ref 37–47)
HDLC SERPL-MCNC: 98 MG/DL
HGB BLD-MCNC: 14.7 G/DL (ref 12–16)
IMM GRANULOCYTES # BLD AUTO: 0.02 K/UL (ref 0–0.11)
IMM GRANULOCYTES NFR BLD AUTO: 0.3 % (ref 0–0.9)
LDLC SERPL CALC-MCNC: 71 MG/DL
LYMPHOCYTES # BLD AUTO: 1.65 K/UL (ref 1–4.8)
LYMPHOCYTES NFR BLD: 25.2 % (ref 22–41)
MCH RBC QN AUTO: 30.4 PG (ref 27–33)
MCHC RBC AUTO-ENTMCNC: 32 G/DL (ref 33.6–35)
MCV RBC AUTO: 95.2 FL (ref 81.4–97.8)
MONOCYTES # BLD AUTO: 0.52 K/UL (ref 0–0.85)
MONOCYTES NFR BLD AUTO: 8 % (ref 0–13.4)
NEUTROPHILS # BLD AUTO: 3.95 K/UL (ref 2–7.15)
NEUTROPHILS NFR BLD: 60.4 % (ref 44–72)
NRBC # BLD AUTO: 0 K/UL
NRBC BLD-RTO: 0 /100 WBC
PLATELET # BLD AUTO: 256 K/UL (ref 164–446)
PMV BLD AUTO: 9.5 FL (ref 9–12.9)
POTASSIUM SERPL-SCNC: 3.9 MMOL/L (ref 3.6–5.5)
PROT SERPL-MCNC: 6.7 G/DL (ref 6–8.2)
RBC # BLD AUTO: 4.83 M/UL (ref 4.2–5.4)
SODIUM SERPL-SCNC: 142 MMOL/L (ref 135–145)
TRIGL SERPL-MCNC: 56 MG/DL (ref 0–149)
WBC # BLD AUTO: 6.5 K/UL (ref 4.8–10.8)

## 2019-09-10 PROCEDURE — 85025 COMPLETE CBC W/AUTO DIFF WBC: CPT

## 2019-09-10 PROCEDURE — 82306 VITAMIN D 25 HYDROXY: CPT

## 2019-09-10 PROCEDURE — 80053 COMPREHEN METABOLIC PANEL: CPT

## 2019-09-10 PROCEDURE — 80061 LIPID PANEL: CPT

## 2019-09-10 PROCEDURE — 36415 COLL VENOUS BLD VENIPUNCTURE: CPT

## 2019-10-05 ENCOUNTER — OFFICE VISIT (OUTPATIENT)
Dept: URGENT CARE | Facility: PHYSICIAN GROUP | Age: 69
End: 2019-10-05
Payer: MEDICARE

## 2019-10-05 VITALS
DIASTOLIC BLOOD PRESSURE: 82 MMHG | OXYGEN SATURATION: 99 % | WEIGHT: 114 LBS | TEMPERATURE: 97.3 F | SYSTOLIC BLOOD PRESSURE: 132 MMHG | RESPIRATION RATE: 14 BRPM | BODY MASS INDEX: 19.57 KG/M2 | HEART RATE: 68 BPM

## 2019-10-05 DIAGNOSIS — B02.9 HERPES ZOSTER WITHOUT COMPLICATION: ICD-10-CM

## 2019-10-05 PROCEDURE — 99214 OFFICE O/P EST MOD 30 MIN: CPT | Performed by: PHYSICIAN ASSISTANT

## 2019-10-05 RX ORDER — VALACYCLOVIR HYDROCHLORIDE 1 G/1
1000 TABLET, FILM COATED ORAL 3 TIMES DAILY
Qty: 21 TAB | Refills: 0 | Status: SHIPPED | OUTPATIENT
Start: 2019-10-05 | End: 2019-10-12

## 2019-10-09 ASSESSMENT — ENCOUNTER SYMPTOMS
EYE DISCHARGE: 0
CHILLS: 0
FEVER: 0
COUGH: 0
VOMITING: 0
DIARRHEA: 0
SHORTNESS OF BREATH: 0
MYALGIAS: 0
SORE THROAT: 0
EYE REDNESS: 0
FATIGUE: 1
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
## 2019-12-30 ENCOUNTER — TELEPHONE (OUTPATIENT)
Dept: URGENT CARE | Facility: PHYSICIAN GROUP | Age: 69
End: 2019-12-30

## 2019-12-30 ENCOUNTER — OFFICE VISIT (OUTPATIENT)
Dept: URGENT CARE | Facility: PHYSICIAN GROUP | Age: 69
End: 2019-12-30
Payer: MEDICARE

## 2019-12-30 ENCOUNTER — APPOINTMENT (OUTPATIENT)
Dept: RADIOLOGY | Facility: IMAGING CENTER | Age: 69
End: 2019-12-30
Attending: NURSE PRACTITIONER
Payer: MEDICARE

## 2019-12-30 VITALS
TEMPERATURE: 97.3 F | SYSTOLIC BLOOD PRESSURE: 126 MMHG | OXYGEN SATURATION: 98 % | BODY MASS INDEX: 19.22 KG/M2 | RESPIRATION RATE: 14 BRPM | WEIGHT: 112 LBS | DIASTOLIC BLOOD PRESSURE: 78 MMHG | HEART RATE: 72 BPM

## 2019-12-30 DIAGNOSIS — M25.552 LEFT HIP PAIN: ICD-10-CM

## 2019-12-30 PROCEDURE — 99213 OFFICE O/P EST LOW 20 MIN: CPT | Performed by: NURSE PRACTITIONER

## 2019-12-30 PROCEDURE — 72170 X-RAY EXAM OF PELVIS: CPT | Mod: TC,FY | Performed by: NURSE PRACTITIONER

## 2019-12-30 RX ORDER — LIDOCAINE 50 MG/G
1 PATCH TOPICAL EVERY 24 HOURS
Qty: 10 PATCH | Refills: 0 | Status: SHIPPED | OUTPATIENT
Start: 2019-12-30 | End: 2020-01-09

## 2019-12-30 ASSESSMENT — PAIN SCALES - GENERAL: PAINLEVEL: 8=MODERATE-SEVERE PAIN

## 2019-12-30 NOTE — TELEPHONE ENCOUNTER
Patient was seen by you in urgent care today. She tried to  her rx of lidocaine (LIDODERM) 5 % Patch at [facility], pharamacy is saying they cannot dispense without prior auth. Patient can be reached at [phone]

## 2019-12-30 NOTE — TELEPHONE ENCOUNTER
I spoke to pt and I let her know that we sent a Prior Auth to her insurance for her medication. We will let her know if its denied or approved.

## 2019-12-30 NOTE — PROGRESS NOTES
Subjective:      Tanisha Haile is a 69 y.o. female who presents with Hip Pain (x1wk L side hip/ non injury related )    Past Medical History:   Diagnosis Date   • Cervical cancer screening 5/22/2013    Due to repeat around 2015.   • Dyslipidemia 10/24/2012   • Dysuria 10/2/2014    Seen 9/27 for uti, given macrobid Feels the same as 9/27 Feels more like vaginal pain     • Hyperlipidemia    • Insomnia 2/27/2014    Not sleeping, can't shut brain off Making her really tired during the day X 1 month plus but worse for a month   • Seasonal allergies 5/22/2013   • Shingles      Social History     Socioeconomic History   • Marital status:      Spouse name: Not on file   • Number of children: Not on file   • Years of education: Not on file   • Highest education level: Not on file   Occupational History   • Not on file   Social Needs   • Financial resource strain: Not on file   • Food insecurity:     Worry: Not on file     Inability: Not on file   • Transportation needs:     Medical: Not on file     Non-medical: Not on file   Tobacco Use   • Smoking status: Current Every Day Smoker     Packs/day: 1.00     Years: 31.00     Pack years: 31.00     Types: Cigarettes   • Smokeless tobacco: Never Used   Substance and Sexual Activity   • Alcohol use: Yes     Alcohol/week: 4.2 oz     Types: 7 Glasses of wine per week     Comment: 1 glass of wine daily   • Drug use: No     Comment: denies h/o heroin, cocaine, meth, IVDU   • Sexual activity: Never     Partners: Male   Lifestyle   • Physical activity:     Days per week: Not on file     Minutes per session: Not on file   • Stress: Not on file   Relationships   • Social connections:     Talks on phone: Not on file     Gets together: Not on file     Attends Ten[employer] service: Not on file     Active member of club or organization: Not on file     Attends meetings of clubs or organizations: Not on file     Relationship status: Not on file   • Intimate partner violence:     Fear of  current or ex partner: Not on file     Emotionally abused: Not on file     Physically abused: Not on file     Forced sexual activity: Not on file   Other Topics Concern   • Not on file   Social History Narrative   • Not on file     Family History   Problem Relation Age of Onset   • Hypertension Mother    • Hyperlipidemia Mother    • Thyroid Mother    • Lung Disease Mother    • Heart Disease Father    • Hypertension Father    • Hyperlipidemia Father        Allergies: Tylenol; Asa [aspirin]; Influenza virus vacc; Nsaids; Pcn [penicillins]; and Sulfa drugs    Patient is a 69-year-old female who presents today with complaint of localized pain to the left lateral hip/left lower back area.  Symptoms initially started approximately 10 days ago.  She does not know of any trauma or incident that preceded her pain.  States she has a history of osteoporosis.  No recent falls.        Other   This is a new problem. The current episode started in the past 7 days. The problem occurs constantly. The problem has been unchanged. Nothing aggravates the symptoms. She has tried nothing for the symptoms. The treatment provided no relief.       Review of Systems   Musculoskeletal:        Left hip pain   All other systems reviewed and are negative.         Objective:     /78   Pulse 72   Temp 36.3 °C (97.3 °F) (Temporal)   Resp 14   Wt 50.8 kg (112 lb)   LMP  (LMP Unknown)   SpO2 98%   BMI 19.22 kg/m²      Physical Exam  Vitals signs reviewed.   Constitutional:       Appearance: Normal appearance.   Musculoskeletal:        Legs:       Comments: Localized pain to the left lateral hip area.  No increasing pain with range of motion.  No swelling or deformity, no discoloration.   Neurological:      Mental Status: She is alert and oriented to person, place, and time.   Psychiatric:         Mood and Affect: Mood normal.         Behavior: Behavior normal.         Thought Content: Thought content normal.         Judgment: Judgment  normal.       X-ray, pelvis:        HISTORY/REASON FOR EXAM:  Pain and left buttock area.        TECHNIQUE/EXAM DESCRIPTION AND NUMBER OF VIEWS:  2 view(s) of the pelvis.     COMPARISON:  None.     FINDINGS:  There is no evidence of acute pelvic fracture. No cortical disruption is appreciated. There is no evidence of dislocation.  No femoral head flattening is identified. There is no evidence of femoral head deformity. No bone erosions are identified.  There is moderate joint space narrowing and periarticular sclerosis and marginal spurring in each hip joint.     IMPRESSION:     1.  No acute fracture or dislocation is identified.     2.  Findings are consistent with moderate osteoarthritis.       Assessment/Plan:   Low back strain  Left hip pain    Lidocaine patches  Rest  Ice PRN  Follow up for persistent or worsening of symptoms    There are no diagnoses linked to this encounter.

## 2020-01-06 ENCOUNTER — TELEPHONE (OUTPATIENT)
Dept: URGENT CARE | Facility: PHYSICIAN GROUP | Age: 70
End: 2020-01-06

## 2020-01-21 ENCOUNTER — TELEPHONE (OUTPATIENT)
Dept: URGENT CARE | Facility: PHYSICIAN GROUP | Age: 70
End: 2020-01-21

## 2020-01-21 ENCOUNTER — OFFICE VISIT (OUTPATIENT)
Dept: URGENT CARE | Facility: PHYSICIAN GROUP | Age: 70
End: 2020-01-21
Payer: MEDICARE

## 2020-01-21 ENCOUNTER — HOSPITAL ENCOUNTER (OUTPATIENT)
Facility: MEDICAL CENTER | Age: 70
End: 2020-01-21
Attending: PHYSICIAN ASSISTANT
Payer: MEDICARE

## 2020-01-21 VITALS
SYSTOLIC BLOOD PRESSURE: 138 MMHG | TEMPERATURE: 98.6 F | DIASTOLIC BLOOD PRESSURE: 80 MMHG | OXYGEN SATURATION: 98 % | WEIGHT: 109 LBS | BODY MASS INDEX: 18.71 KG/M2 | RESPIRATION RATE: 14 BRPM | HEART RATE: 74 BPM

## 2020-01-21 DIAGNOSIS — R82.90 ABNORMAL URINALYSIS: ICD-10-CM

## 2020-01-21 DIAGNOSIS — R42 DIZZINESS: ICD-10-CM

## 2020-01-21 LAB
APPEARANCE UR: NORMAL
BILIRUB UR STRIP-MCNC: NORMAL MG/DL
COLOR UR AUTO: NORMAL
GLUCOSE BLD-MCNC: 83 MG/DL (ref 70–100)
GLUCOSE UR STRIP.AUTO-MCNC: NORMAL MG/DL
KETONES UR STRIP.AUTO-MCNC: NORMAL MG/DL
LEUKOCYTE ESTERASE UR QL STRIP.AUTO: NORMAL
NITRITE UR QL STRIP.AUTO: NORMAL
PH UR STRIP.AUTO: 7 [PH] (ref 5–8)
PROT UR QL STRIP: NORMAL MG/DL
RBC UR QL AUTO: NORMAL
SP GR UR STRIP.AUTO: 1.02
UROBILINOGEN UR STRIP-MCNC: 0.2 MG/DL

## 2020-01-21 PROCEDURE — 99214 OFFICE O/P EST MOD 30 MIN: CPT | Performed by: PHYSICIAN ASSISTANT

## 2020-01-21 PROCEDURE — 82962 GLUCOSE BLOOD TEST: CPT | Performed by: PHYSICIAN ASSISTANT

## 2020-01-21 PROCEDURE — 81002 URINALYSIS NONAUTO W/O SCOPE: CPT | Performed by: PHYSICIAN ASSISTANT

## 2020-01-21 PROCEDURE — 87086 URINE CULTURE/COLONY COUNT: CPT

## 2020-01-21 NOTE — PROGRESS NOTES
Chief Complaint   Patient presents with   • Fall     pt fell this morning hit her R side of her body/        HISTORY OF PRESENT ILLNESS: Patient is a 69 y.o. female who presents today for the following:    Patient comes in for evaluation of a ground-level fall this morning at home.  Patient states she was in the shower this morning when she started to feel lightheaded.  She stepped out of the shower and promptly fell.  She states she fell 2 more times immediately following, striking her head on the toilet.  She is not sure if she lost consciousness or not.  She does complain of a mild headache, only in the location of the contusion.  She denies blurry vision, nausea, confusion, and weakness.  She states that she lost her dog yesterday and has not been eating or drinking very much in the last 24 hours.  She is not on blood thinners.  She has not taken any over-the-counter medication today.    Patient Active Problem List    Diagnosis Date Noted   • Grief reaction 02/21/2017     Priority: High   • Mid back pain on left side 08/14/2015     Priority: High   • Osteoporosis 08/14/2015     Priority: High   • Insomnia 02/27/2014     Priority: High   • Seasonal allergies 05/22/2013     Priority: Medium   • Vitamin D deficiency 02/28/2013     Priority: Medium   • Interstitial cystitis 07/07/2016     Priority: Low   • Headache 10/03/2014     Priority: Low   • Dyslipidemia 10/24/2012     Priority: Low   • Acute left ankle pain 03/15/2019   • Hemorrhoids 03/15/2019   • Anal itching 03/14/2019   • Elevated blood pressure reading 09/12/2018   • Nicotine dependence with current use 09/12/2018   • Gastroesophageal reflux disease without esophagitis 12/27/2017   • Adjustment disorder with mixed anxiety and depressed mood 04/12/2017   • Varicose vein of leg 03/21/2017       Allergies:Tylenol; Asa [aspirin]; Influenza virus vacc; Nsaids; Pcn [penicillins]; and Sulfa drugs    Current Outpatient Medications Ordered in Epic   Medication  Sig Dispense Refill   • VITAMIN E PO Take  by mouth.     • simvastatin (ZOCOR) 20 MG Tab TAKE 1 TABLET BY MOUTH EVERY EVENING 90 Tab 3   • triamcinolone acetonide (KENALOG) 0.5 % Cream Apply to affected area up to 4 times daily as needed for rash. Max use is 14 days. (Patient not taking: Reported on 2019) 60 g 0   • CRANBERRY PO Take  by mouth.     • ascorbic acid (ASCORBIC ACID) 500 MG Tab Take 500 mg by mouth every day.     • Calcium Carbonate-Vitamin D (CALTRATE 600+D) 600-400 MG-UNIT TABS Take  by mouth 2 Times a Day.         No current Epic-ordered facility-administered medications on file.        Past Medical History:   Diagnosis Date   • Cervical cancer screening 2013    Due to repeat around .   • Dyslipidemia 10/24/2012   • Dysuria 10/2/2014    Seen  for uti, given macrobid Feels the same as  Feels more like vaginal pain     • Hyperlipidemia    • Insomnia 2014    Not sleeping, can't shut brain off Making her really tired during the day X 1 month plus but worse for a month   • Seasonal allergies 2013   • Shingles        Social History     Tobacco Use   • Smoking status: Current Every Day Smoker     Packs/day: 1.00     Years: 31.00     Pack years: 31.00     Types: Cigarettes   • Smokeless tobacco: Never Used   Substance Use Topics   • Alcohol use: Yes     Alcohol/week: 4.2 oz     Types: 7 Glasses of wine per week     Comment: 1 glass of wine daily   • Drug use: No     Comment: denies h/o heroin, cocaine, meth, IVDU       Family Status   Relation Name Status   • Mo   at age 83        heart failure   • Fa   at age 69        quad bpass, coma   • Bro  Alive        handicap at a facility   • Bro  Alive   • Bro  Alive     Family History   Problem Relation Age of Onset   • Hypertension Mother    • Hyperlipidemia Mother    • Thyroid Mother    • Lung Disease Mother    • Heart Disease Father    • Hypertension Father    • Hyperlipidemia Father        Review of Systems:    Constitutional ROS: No unexpected change in weight, No weakness, No fatigue  Eye ROS: No recent significant change in vision, No eye pain, redness, discharge  Ear ROS: No drainage, No tinnitus or vertigo, No recent change in hearing  Mouth/Throat ROS: No teeth or gum problems, No bleeding gums, No tongue complaints  Neck ROS: No swollen glands, No significant pain in neck  Pulmonary ROS: No chronic cough, sputum, or hemoptysis, No dyspnea on exertion, No wheezing  Cardiovascular ROS: No diaphoresis, No edema, No palpitations  Musculoskeletal/Extremities ROS: No peripheral edema, No pain, redness or swelling on the joints  Hematologic/Lymphatic ROS: No chills, No night sweats, No weight loss  Skin/Integumentary ROS: No edema, No evidence of rash, No itching      Exam:  /80   Pulse 74   Temp 37 °C (98.6 °F) (Temporal)   Resp 14   Wt 49.4 kg (109 lb)   SpO2 98%   General: Well developed, well nourished. No distress.    Eye: PERRL/EOMI; conjunctivae clear, lids normal.  ENMT: Lips without lesions, MMM. Oropharynx is clear. Bilateral TMs are within normal limits. Contusion noted at the hairline, right side of forehead.  Neck: No carotid bruits noted.  Pulmonary: Unlabored respiratory effort. Lungs clear to auscultation, no wheezes, no rhonchi.    Cardiovascular: Regular rate and rhythm without murmur.   Neurologic: Grossly nonfocal. No facial asymmetry noted.   strength is strong medical bilaterally.  No tongue deviation noted.  Negative pronator drift.  Lymph: No cervical lymphadenopathy noted.  Skin: Warm, dry, good turgor. No rashes in visible areas.   Psych: Normal mood. Alert and oriented to person, place and time.    Glucose: 83  UA: 15 ketones, small blood, 30 protein, trace leukocyte esterase, otherwise negative    Assessment/Plan:  Suspect patient will feel better after eating and drinking.  No signs of any neurological compromise or ICH.  Discussed red flags and ER precautions.  Follow-up with  primary care if symptoms do not resolve, ER if they worsen.  1. Dizziness  POCT Urinalysis    POCT glucose

## 2020-01-22 ENCOUNTER — TELEPHONE (OUTPATIENT)
Dept: URGENT CARE | Facility: PHYSICIAN GROUP | Age: 70
End: 2020-01-22

## 2020-01-22 NOTE — TELEPHONE ENCOUNTER
PT faxed paperwork to be filled out for light duty for work. Sarah was not here today and patient needed papers filled out for work.  I reviewed pt chart for this purpose and filled out work form for pt.  I did not examine patient today.  Paperwork only.

## 2020-01-22 NOTE — TELEPHONE ENCOUNTER
Pt states that Employer has a specific for they need filled out saying exactly what restrictions she has, pt states employer faxed something earlier today, but we have not gotten anything, pt said she would call employer and see if they can resend pt stated that she needs this form to be completed so she can return to work.

## 2020-01-23 LAB
BACTERIA UR CULT: NORMAL
SIGNIFICANT IND 70042: NORMAL
SITE SITE: NORMAL
SOURCE SOURCE: NORMAL

## 2020-01-24 ENCOUNTER — TELEPHONE (OUTPATIENT)
Dept: URGENT CARE | Facility: PHYSICIAN GROUP | Age: 70
End: 2020-01-24

## 2020-01-24 NOTE — TELEPHONE ENCOUNTER
----- Message from Trini Oakley P.A.-C. sent at 1/23/2020  4:38 PM PST -----  Please let pt know the urine culture is negative. Follow up for persistent symptoms.

## 2020-01-24 NOTE — TELEPHONE ENCOUNTER
Pt called back and informed her of her results from her recent urine culture, she states that she no longer has any symptoms

## 2020-02-23 ENCOUNTER — OFFICE VISIT (OUTPATIENT)
Dept: URGENT CARE | Facility: PHYSICIAN GROUP | Age: 70
End: 2020-02-23
Payer: MEDICARE

## 2020-02-23 VITALS
HEART RATE: 70 BPM | SYSTOLIC BLOOD PRESSURE: 140 MMHG | WEIGHT: 110 LBS | HEIGHT: 64 IN | OXYGEN SATURATION: 98 % | BODY MASS INDEX: 18.78 KG/M2 | DIASTOLIC BLOOD PRESSURE: 84 MMHG | TEMPERATURE: 97.9 F | RESPIRATION RATE: 12 BRPM

## 2020-02-23 DIAGNOSIS — R09.81 SINUS CONGESTION: ICD-10-CM

## 2020-02-23 DIAGNOSIS — H61.23 BILATERAL IMPACTED CERUMEN: ICD-10-CM

## 2020-02-23 PROCEDURE — 99213 OFFICE O/P EST LOW 20 MIN: CPT | Performed by: PHYSICIAN ASSISTANT

## 2020-02-23 NOTE — PROGRESS NOTES
Chief Complaint   Patient presents with   • Sinusitis     sinus head aches /        HISTORY OF PRESENT ILLNESS: Patient is a 69 y.o. female who presents today for the following:    Runny nose x 1 weeks, drier over the last few days  Now nasal congestion without drainage  + HA, right ear pressure  Denies cough, ST, fever  OTC meds tried: none    Patient Active Problem List    Diagnosis Date Noted   • Grief reaction 02/21/2017     Priority: High   • Mid back pain on left side 08/14/2015     Priority: High   • Osteoporosis 08/14/2015     Priority: High   • Insomnia 02/27/2014     Priority: High   • Seasonal allergies 05/22/2013     Priority: Medium   • Vitamin D deficiency 02/28/2013     Priority: Medium   • Interstitial cystitis 07/07/2016     Priority: Low   • Headache 10/03/2014     Priority: Low   • Dyslipidemia 10/24/2012     Priority: Low   • Acute left ankle pain 03/15/2019   • Hemorrhoids 03/15/2019   • Anal itching 03/14/2019   • Elevated blood pressure reading 09/12/2018   • Nicotine dependence with current use 09/12/2018   • Gastroesophageal reflux disease without esophagitis 12/27/2017   • Adjustment disorder with mixed anxiety and depressed mood 04/12/2017   • Varicose vein of leg 03/21/2017       Allergies:Tylenol; Asa [aspirin]; Influenza virus vacc; Nsaids; Pcn [penicillins]; and Sulfa drugs    Current Outpatient Medications Ordered in Epic   Medication Sig Dispense Refill   • simvastatin (ZOCOR) 20 MG Tab TAKE 1 TABLET BY MOUTH EVERY EVENING 90 Tab 3   • VITAMIN E PO Take  by mouth.     • triamcinolone acetonide (KENALOG) 0.5 % Cream Apply to affected area up to 4 times daily as needed for rash. Max use is 14 days. (Patient not taking: Reported on 12/30/2019) 60 g 0   • CRANBERRY PO Take  by mouth.     • ascorbic acid (ASCORBIC ACID) 500 MG Tab Take 500 mg by mouth every day.     • Calcium Carbonate-Vitamin D (CALTRATE 600+D) 600-400 MG-UNIT TABS Take  by mouth 2 Times a Day.         No current  Epic-ordered facility-administered medications on file.        Past Medical History:   Diagnosis Date   • Cervical cancer screening 2013    Due to repeat around .   • Dyslipidemia 10/24/2012   • Dysuria 10/2/2014    Seen  for uti, given macrobid Feels the same as  Feels more like vaginal pain     • Hyperlipidemia    • Insomnia 2014    Not sleeping, can't shut brain off Making her really tired during the day X 1 month plus but worse for a month   • Seasonal allergies 2013   • Shingles        Social History     Tobacco Use   • Smoking status: Current Every Day Smoker     Packs/day: 1.00     Years: 31.00     Pack years: 31.00     Types: Cigarettes   • Smokeless tobacco: Never Used   Substance Use Topics   • Alcohol use: Yes     Alcohol/week: 4.2 oz     Types: 7 Glasses of wine per week     Comment: 1 glass of wine daily   • Drug use: No     Comment: denies h/o heroin, cocaine, meth, IVDU       Family Status   Relation Name Status   • Mo   at age 83        heart failure   • Fa   at age 69        quad bpass, coma   • Bro  Alive        handicap at a facility   • Bro  Alive   • Bro  Alive     Family History   Problem Relation Age of Onset   • Hypertension Mother    • Hyperlipidemia Mother    • Thyroid Mother    • Lung Disease Mother    • Heart Disease Father    • Hypertension Father    • Hyperlipidemia Father        Review of Systems:    Constitutional ROS: No unexpected change in weight, No weakness, No fatigue  Eye ROS: No recent significant change in vision, No eye pain, redness, discharge  Ear ROS: No drainage, No tinnitus or vertigo, No recent change in hearing  Mouth/Throat ROS: No teeth or gum problems, No bleeding gums, No tongue complaints  Neck ROS: No swollen glands, No significant pain in neck  Pulmonary ROS: No chronic cough, sputum, or hemoptysis, No dyspnea on exertion, No wheezing  Cardiovascular ROS: No diaphoresis, No edema, No  "palpitations  Musculoskeletal/Extremities ROS: No peripheral edema, No pain, redness or swelling on the joints  Hematologic/Lymphatic ROS: No chills, No night sweats, No weight loss  Skin/Integumentary ROS: No edema, No evidence of rash, No itching      Exam:  /84   Pulse 70   Temp 36.6 °C (97.9 °F) (Temporal)   Resp 12   Ht 1.626 m (5' 4\")   Wt 49.9 kg (110 lb)   SpO2 98%   General: Well developed, well nourished. No distress.    Eye: PERRL/EOMI; conjunctivae clear, lids normal.  ENMT: Lips without lesions, MMM. Oropharynx is clear. Cerumen impaction noted bilaterally.  Pulmonary: Unlabored respiratory effort. Lungs clear to auscultation, no wheezes, no rhonchi.    Cardiovascular: Regular rate and rhythm without murmur.   Neurologic: Grossly nonfocal. No facial asymmetry noted.  Lymph: No cervical lymphadenopathy noted.  Skin: Warm, dry, good turgor. No rashes in visible areas.   Psych: Normal mood. Alert and oriented to person, place and time.    Assessment/Plan:  Discussed likely viral etiology of the sinus symptoms.  Also discussed that the cerumen impaction may be causing some of the headache and ear pressure.  Recommend soaking both ears over the next 5 to 7 days and returning for irrigation as needed.  Discussed appropriate over-the-counter symptomatic medication, and when to return to clinic. Follow up for worsening or persistent symptoms.  1. Sinus congestion     2. Bilateral impacted cerumen         "

## 2020-03-28 ENCOUNTER — OFFICE VISIT (OUTPATIENT)
Dept: URGENT CARE | Facility: PHYSICIAN GROUP | Age: 70
End: 2020-03-28
Payer: MEDICARE

## 2020-03-28 ENCOUNTER — HOSPITAL ENCOUNTER (OUTPATIENT)
Facility: MEDICAL CENTER | Age: 70
End: 2020-03-28
Attending: PHYSICIAN ASSISTANT
Payer: MEDICARE

## 2020-03-28 VITALS
WEIGHT: 112 LBS | SYSTOLIC BLOOD PRESSURE: 124 MMHG | DIASTOLIC BLOOD PRESSURE: 76 MMHG | BODY MASS INDEX: 19.22 KG/M2 | HEART RATE: 80 BPM | OXYGEN SATURATION: 99 % | TEMPERATURE: 97.4 F | RESPIRATION RATE: 14 BRPM

## 2020-03-28 DIAGNOSIS — R30.0 DYSURIA: ICD-10-CM

## 2020-03-28 DIAGNOSIS — N30.01 ACUTE CYSTITIS WITH HEMATURIA: ICD-10-CM

## 2020-03-28 LAB
APPEARANCE UR: CLEAR
BILIRUB UR STRIP-MCNC: NORMAL MG/DL
COLOR UR AUTO: YELLOW
GLUCOSE UR STRIP.AUTO-MCNC: NORMAL MG/DL
KETONES UR STRIP.AUTO-MCNC: NORMAL MG/DL
LEUKOCYTE ESTERASE UR QL STRIP.AUTO: NORMAL
NITRITE UR QL STRIP.AUTO: NORMAL
PH UR STRIP.AUTO: 6 [PH] (ref 5–8)
PROT UR QL STRIP: NORMAL MG/DL
RBC UR QL AUTO: NORMAL
SP GR UR STRIP.AUTO: 1.01
UROBILINOGEN UR STRIP-MCNC: 0.2 MG/DL

## 2020-03-28 PROCEDURE — 87186 SC STD MICRODIL/AGAR DIL: CPT

## 2020-03-28 PROCEDURE — 87086 URINE CULTURE/COLONY COUNT: CPT

## 2020-03-28 PROCEDURE — 99214 OFFICE O/P EST MOD 30 MIN: CPT | Performed by: PHYSICIAN ASSISTANT

## 2020-03-28 PROCEDURE — 81002 URINALYSIS NONAUTO W/O SCOPE: CPT | Performed by: PHYSICIAN ASSISTANT

## 2020-03-28 PROCEDURE — 87077 CULTURE AEROBIC IDENTIFY: CPT

## 2020-03-28 RX ORDER — NITROFURANTOIN 25; 75 MG/1; MG/1
100 CAPSULE ORAL EVERY 12 HOURS
Qty: 14 CAP | Refills: 0 | Status: SHIPPED | OUTPATIENT
Start: 2020-03-28 | End: 2020-04-04

## 2020-03-28 ASSESSMENT — ENCOUNTER SYMPTOMS
NAUSEA: 0
SWEATS: 0
CHILLS: 0
FLANK PAIN: 0
VOMITING: 0

## 2020-03-28 ASSESSMENT — FIBROSIS 4 INDEX: FIB4 SCORE: 1.39
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
## 2020-04-17 ENCOUNTER — OFFICE VISIT (OUTPATIENT)
Dept: URGENT CARE | Facility: PHYSICIAN GROUP | Age: 70
End: 2020-04-17
Payer: MEDICARE

## 2020-04-17 VITALS
HEIGHT: 64 IN | HEART RATE: 66 BPM | OXYGEN SATURATION: 100 % | SYSTOLIC BLOOD PRESSURE: 138 MMHG | DIASTOLIC BLOOD PRESSURE: 78 MMHG | TEMPERATURE: 97.4 F | RESPIRATION RATE: 12 BRPM | WEIGHT: 110 LBS | BODY MASS INDEX: 18.78 KG/M2

## 2020-04-17 DIAGNOSIS — H10.13 ALLERGIC CONJUNCTIVITIS OF BOTH EYES: ICD-10-CM

## 2020-04-17 PROCEDURE — 99213 OFFICE O/P EST LOW 20 MIN: CPT | Performed by: NURSE PRACTITIONER

## 2020-04-17 RX ORDER — OLOPATADINE HYDROCHLORIDE 1 MG/ML
1 SOLUTION/ DROPS OPHTHALMIC 2 TIMES DAILY
Qty: 5 ML | Refills: 0 | Status: SHIPPED | OUTPATIENT
Start: 2020-04-17 | End: 2020-09-21

## 2020-04-17 ASSESSMENT — FIBROSIS 4 INDEX: FIB4 SCORE: 1.39

## 2020-04-17 ASSESSMENT — ENCOUNTER SYMPTOMS
EYES NEGATIVE: 1
MUSCULOSKELETAL NEGATIVE: 1
CARDIOVASCULAR NEGATIVE: 1
CONSTITUTIONAL NEGATIVE: 1
GASTROINTESTINAL NEGATIVE: 1

## 2020-04-17 NOTE — PROGRESS NOTES
Subjective:      Tanisha Haile is a 69 y.o. female who presents with Seasonal Allergies    Past Medical History:   Diagnosis Date   • Cervical cancer screening 5/22/2013    Due to repeat around 2015.   • Dyslipidemia 10/24/2012   • Dysuria 10/2/2014    Seen 9/27 for uti, given macrobid Feels the same as 9/27 Feels more like vaginal pain     • Hyperlipidemia    • Insomnia 2/27/2014    Not sleeping, can't shut brain off Making her really tired during the day X 1 month plus but worse for a month   • Seasonal allergies 5/22/2013   • Shingles      Social History     Socioeconomic History   • Marital status:      Spouse name: Not on file   • Number of children: Not on file   • Years of education: Not on file   • Highest education level: Not on file   Occupational History   • Not on file   Social Needs   • Financial resource strain: Not on file   • Food insecurity     Worry: Not on file     Inability: Not on file   • Transportation needs     Medical: Not on file     Non-medical: Not on file   Tobacco Use   • Smoking status: Current Every Day Smoker     Packs/day: 1.00     Years: 31.00     Pack years: 31.00     Types: Cigarettes   • Smokeless tobacco: Never Used   Substance and Sexual Activity   • Alcohol use: Yes     Alcohol/week: 4.2 oz     Types: 7 Glasses of wine per week     Comment: 1 glass of wine daily   • Drug use: No     Comment: denies h/o heroin, cocaine, meth, IVDU   • Sexual activity: Never     Partners: Male   Lifestyle   • Physical activity     Days per week: Not on file     Minutes per session: Not on file   • Stress: Not on file   Relationships   • Social connections     Talks on phone: Not on file     Gets together: Not on file     Attends Rastafarian service: Not on file     Active member of club or organization: Not on file     Attends meetings of clubs or organizations: Not on file     Relationship status: Not on file   • Intimate partner violence     Fear of current or ex partner: Not on file     " Emotionally abused: Not on file     Physically abused: Not on file     Forced sexual activity: Not on file   Other Topics Concern   • Not on file   Social History Narrative   • Not on file     Family History   Problem Relation Age of Onset   • Hypertension Mother    • Hyperlipidemia Mother    • Thyroid Mother    • Lung Disease Mother    • Heart Disease Father    • Hypertension Father    • Hyperlipidemia Father        Allergies: Tylenol; Asa [aspirin]; Influenza virus vacc; Nsaids; Pcn [penicillins]; and Sulfa drugs      Patient is a 69-year-old female who presents today with complaint of watery itchy eyes, and clear nasal drainage and congestion.  States she does have a history of allergies, though has not ever really affected her eyes previously.  She denies any fever, body aches, or chills.  No shortness of breath.        Other   This is a recurrent problem. The problem occurs constantly. The problem has been unchanged. Associated symptoms comments: Sneezing, runny nose, watery eyes. Nothing aggravates the symptoms. She has tried nothing for the symptoms. The treatment provided no relief.       Review of Systems   Constitutional: Negative.    HENT: Negative.    Eyes: Negative.    Cardiovascular: Negative.    Gastrointestinal: Negative.    Genitourinary: Negative.    Musculoskeletal: Negative.    Skin: Negative.    All other systems reviewed and are negative.         Objective:     /78   Pulse 66   Temp 36.3 °C (97.4 °F) (Temporal)   Resp 12   Ht 1.626 m (5' 4\")   Wt 49.9 kg (110 lb)   LMP  (LMP Unknown)   SpO2 100%   BMI 18.88 kg/m²      Physical Exam  Vitals signs reviewed.   Constitutional:       Appearance: Normal appearance.   HENT:      Head: Normocephalic and atraumatic.      Right Ear: Tympanic membrane, ear canal and external ear normal.      Left Ear: Tympanic membrane, ear canal and external ear normal.      Nose: Congestion present.      Mouth/Throat:      Mouth: Mucous membranes are " moist.   Eyes:      Extraocular Movements: Extraocular movements intact.      Pupils: Pupils are equal, round, and reactive to light.      Comments: Conjunctiva slightly red and injected bilaterally.  There is clear watery drainage present with no purulence.   Neck:      Musculoskeletal: Normal range of motion and neck supple.   Cardiovascular:      Rate and Rhythm: Normal rate and regular rhythm.      Heart sounds: Normal heart sounds.   Pulmonary:      Effort: Pulmonary effort is normal.      Breath sounds: Normal breath sounds.   Skin:     General: Skin is warm and dry.      Capillary Refill: Capillary refill takes less than 2 seconds.   Neurological:      Mental Status: She is alert and oriented to person, place, and time.   Psychiatric:         Mood and Affect: Mood normal.         Behavior: Behavior normal.         Thought Content: Thought content normal.         Judgment: Judgment normal.                 Assessment/Plan:   Allergic conjunctivitis  Environmental allergies    Patanol ophthalmic drops  Claritin over-the-counter  Return to office for persistent or worsening of symptoms or for any further questions or concerns    There are no diagnoses linked to this encounter.

## 2020-04-27 ENCOUNTER — OFFICE VISIT (OUTPATIENT)
Dept: URGENT CARE | Facility: PHYSICIAN GROUP | Age: 70
End: 2020-04-27
Payer: MEDICARE

## 2020-04-27 VITALS
BODY MASS INDEX: 18.88 KG/M2 | SYSTOLIC BLOOD PRESSURE: 122 MMHG | RESPIRATION RATE: 14 BRPM | WEIGHT: 110.6 LBS | TEMPERATURE: 99.2 F | HEIGHT: 64 IN | HEART RATE: 78 BPM | OXYGEN SATURATION: 98 % | DIASTOLIC BLOOD PRESSURE: 78 MMHG

## 2020-04-27 DIAGNOSIS — H57.11 ACUTE RIGHT EYE PAIN: ICD-10-CM

## 2020-04-27 PROCEDURE — 99214 OFFICE O/P EST MOD 30 MIN: CPT | Performed by: PHYSICIAN ASSISTANT

## 2020-04-27 ASSESSMENT — VISUAL ACUITY
OS_CC: 20/25
OD_CC: 20/50

## 2020-04-27 ASSESSMENT — PAIN SCALES - GENERAL: PAINLEVEL: 5=MODERATE PAIN

## 2020-04-27 ASSESSMENT — FIBROSIS 4 INDEX: FIB4 SCORE: 1.39

## 2020-04-27 NOTE — PROGRESS NOTES
Chief Complaint   Patient presents with   • Eye Pain     x10 days R eye: 20/50  L eye: 20/25  Both eyes: 20/25        HISTORY OF PRESENT ILLNESS: Patient is a 69 y.o. female who presents today for the following:    Patient comes in for evaluation of right eye pain for the past 10 days.  She denies any injury, exudate.  She does report feeling somewhat of foreign body sensation but points to the medial and superior aspect and states that is been intermittent.  She denies wearing contact lenses.  She reports a grittiness.  She denies injury.  She has not had any facial pain or rashes.    Patient Active Problem List    Diagnosis Date Noted   • Grief reaction 02/21/2017     Priority: High   • Mid back pain on left side 08/14/2015     Priority: High   • Osteoporosis 08/14/2015     Priority: High   • Insomnia 02/27/2014     Priority: High   • Seasonal allergies 05/22/2013     Priority: Medium   • Vitamin D deficiency 02/28/2013     Priority: Medium   • Interstitial cystitis 07/07/2016     Priority: Low   • Headache 10/03/2014     Priority: Low   • Dyslipidemia 10/24/2012     Priority: Low   • Acute left ankle pain 03/15/2019   • Hemorrhoids 03/15/2019   • Anal itching 03/14/2019   • Elevated blood pressure reading 09/12/2018   • Nicotine dependence with current use 09/12/2018   • Gastroesophageal reflux disease without esophagitis 12/27/2017   • Adjustment disorder with mixed anxiety and depressed mood 04/12/2017   • Varicose vein of leg 03/21/2017       Allergies:Tylenol; Asa [aspirin]; Influenza virus vacc; Nsaids; Pcn [penicillins]; and Sulfa drugs    Current Outpatient Medications Ordered in Epic   Medication Sig Dispense Refill   • olopatadine (PATANOL) 0.1 % ophthalmic solution Place 1 Drop in both eyes 2 times a day. 5 mL 0   • simvastatin (ZOCOR) 20 MG Tab TAKE 1 TABLET BY MOUTH EVERY EVENING 90 Tab 3   • CRANBERRY PO Take  by mouth.     • ascorbic acid (ASCORBIC ACID) 500 MG Tab Take 500 mg by mouth every day.      • Calcium Carbonate-Vitamin D (CALTRATE 600+D) 600-400 MG-UNIT TABS Take  by mouth 2 Times a Day.       • VITAMIN E PO Take  by mouth.     • triamcinolone acetonide (KENALOG) 0.5 % Cream Apply to affected area up to 4 times daily as needed for rash. Max use is 14 days. (Patient not taking: Reported on 2020) 60 g 0     No current Epic-ordered facility-administered medications on file.        Past Medical History:   Diagnosis Date   • Cervical cancer screening 2013    Due to repeat around .   • Dyslipidemia 10/24/2012   • Dysuria 10/2/2014    Seen  for uti, given macrobid Feels the same as  Feels more like vaginal pain     • Hyperlipidemia    • Insomnia 2014    Not sleeping, can't shut brain off Making her really tired during the day X 1 month plus but worse for a month   • Seasonal allergies 2013   • Shingles        Social History     Tobacco Use   • Smoking status: Current Every Day Smoker     Packs/day: 1.00     Years: 31.00     Pack years: 31.00     Types: Cigarettes   • Smokeless tobacco: Never Used   Substance Use Topics   • Alcohol use: Yes     Alcohol/week: 4.2 oz     Types: 7 Glasses of wine per week     Comment: 1 glass of wine daily   • Drug use: No     Comment: denies h/o heroin, cocaine, meth, IVDU       Family Status   Relation Name Status   • Mo   at age 83        heart failure   • Fa   at age 69        quad bpass, coma   • Bro  Alive        handicap at a facility   • Bro  Alive   • Bro  Alive     Family History   Problem Relation Age of Onset   • Hypertension Mother    • Hyperlipidemia Mother    • Thyroid Mother    • Lung Disease Mother    • Heart Disease Father    • Hypertension Father    • Hyperlipidemia Father        Review of Systems:   Constitutional ROS: No unexpected change in weight, No weakness, No fatigue  Eye ROS: Positive for right eye pain.  Ear ROS: No drainage, No tinnitus or vertigo, No recent change in hearing  Mouth/Throat ROS: No  "teeth or gum problems, No bleeding gums, No tongue complaints  Neck ROS: No swollen glands, No significant pain in neck  Pulmonary ROS: No chronic cough, sputum, or hemoptysis, No dyspnea on exertion, No wheezing  Cardiovascular ROS: No diaphoresis, No edema, No palpitations  Musculoskeletal/Extremities ROS: No peripheral edema, No pain, redness or swelling on the joints  Hematologic/Lymphatic ROS: No chills, No night sweats, No weight loss  Skin/Integumentary ROS: No edema, No evidence of rash, No itching      Exam:  /78   Pulse 78   Temp 37.3 °C (99.2 °F) (Temporal)   Resp 14   Ht 1.626 m (5' 4\")   Wt 50.2 kg (110 lb 9.6 oz)   SpO2 98%   General: Well developed, well nourished. No distress.    Eye: PERRL/EOMI; conjunctivae clear, lids normal.  No fluorescein uptake noted in the right eye.  No foreign body noted on eyelid eversion.  No significant change in level of pain with proparacaine drops.  Pulmonary: Unlabored respiratory effort.   Neurologic: Grossly nonfocal. No facial asymmetry noted.  Skin: Warm, dry, good turgor. No rashes in visible areas.   Psych: Normal mood. Alert and oriented to person, place and time.    Visual acuity:  OS: 20/25  OD: 20/50  OU: 20/25    Assessment/Plan:  Normal exam.  Recommend following up with optometry.  Provided patient with contact information for local optometrists.  Discussed red flags and ER precautions  1. Acute right eye pain         "

## 2020-05-10 ENCOUNTER — OFFICE VISIT (OUTPATIENT)
Dept: URGENT CARE | Facility: PHYSICIAN GROUP | Age: 70
End: 2020-05-10
Payer: MEDICARE

## 2020-05-10 VITALS
WEIGHT: 111.2 LBS | BODY MASS INDEX: 18.98 KG/M2 | DIASTOLIC BLOOD PRESSURE: 80 MMHG | HEIGHT: 64 IN | TEMPERATURE: 97.8 F | SYSTOLIC BLOOD PRESSURE: 122 MMHG | RESPIRATION RATE: 14 BRPM | HEART RATE: 75 BPM | OXYGEN SATURATION: 95 %

## 2020-05-10 DIAGNOSIS — R39.15 URGENCY OF URINATION: ICD-10-CM

## 2020-05-10 DIAGNOSIS — N89.8 VAGINAL IRRITATION: ICD-10-CM

## 2020-05-10 LAB
APPEARANCE UR: CLEAR
BILIRUB UR STRIP-MCNC: NEGATIVE MG/DL
COLOR UR AUTO: YELLOW
GLUCOSE UR STRIP.AUTO-MCNC: NEGATIVE MG/DL
KETONES UR STRIP.AUTO-MCNC: NEGATIVE MG/DL
LEUKOCYTE ESTERASE UR QL STRIP.AUTO: NEGATIVE
NITRITE UR QL STRIP.AUTO: NEGATIVE
PH UR STRIP.AUTO: 7 [PH] (ref 5–8)
PROT UR QL STRIP: NEGATIVE MG/DL
RBC UR QL AUTO: ABNORMAL
SP GR UR STRIP.AUTO: 1.01
UROBILINOGEN UR STRIP-MCNC: NEGATIVE MG/DL

## 2020-05-10 PROCEDURE — 81002 URINALYSIS NONAUTO W/O SCOPE: CPT | Performed by: PHYSICIAN ASSISTANT

## 2020-05-10 PROCEDURE — 99214 OFFICE O/P EST MOD 30 MIN: CPT | Performed by: PHYSICIAN ASSISTANT

## 2020-05-10 ASSESSMENT — FIBROSIS 4 INDEX: FIB4 SCORE: 1.39

## 2020-05-10 NOTE — PROGRESS NOTES
Chief Complaint   Patient presents with   • Painful Urination     x 5 days       HISTORY OF PRESENT ILLNESS: Patient is a 69 y.o. female who presents today for the following:    Patient comes in for evaluation of painful urination for the past 5 days although states she has had this for the past couple of years.  She does report increased urinary urgency.  She states her urine amount has been normal.  She denies abdominal pain, flank pain, nausea, vomiting, fever.  Her LMP was 20 years ago.  She is not currently on hormones.  She reports vaginal irritation when simply sitting.  She denies vaginal discharge.    Patient Active Problem List    Diagnosis Date Noted   • Grief reaction 02/21/2017     Priority: High   • Mid back pain on left side 08/14/2015     Priority: High   • Osteoporosis 08/14/2015     Priority: High   • Insomnia 02/27/2014     Priority: High   • Seasonal allergies 05/22/2013     Priority: Medium   • Vitamin D deficiency 02/28/2013     Priority: Medium   • Interstitial cystitis 07/07/2016     Priority: Low   • Headache 10/03/2014     Priority: Low   • Dyslipidemia 10/24/2012     Priority: Low   • Acute left ankle pain 03/15/2019   • Hemorrhoids 03/15/2019   • Anal itching 03/14/2019   • Elevated blood pressure reading 09/12/2018   • Nicotine dependence with current use 09/12/2018   • Gastroesophageal reflux disease without esophagitis 12/27/2017   • Adjustment disorder with mixed anxiety and depressed mood 04/12/2017   • Varicose vein of leg 03/21/2017       Allergies:Tylenol; Asa [aspirin]; Influenza virus vacc; Nsaids; Pcn [penicillins]; and Sulfa drugs    Current Outpatient Medications Ordered in Epic   Medication Sig Dispense Refill   • estrogens, conjugated (PREMARIN) 0.625 MG/GM Cream Insert vaginally daily for 2 weeks then 2-3 days/week 1 Tube 0   • VITAMIN E PO Take  by mouth.     • simvastatin (ZOCOR) 20 MG Tab TAKE 1 TABLET BY MOUTH EVERY EVENING 90 Tab 3   • CRANBERRY PO Take  by mouth.      • ascorbic acid (ASCORBIC ACID) 500 MG Tab Take 500 mg by mouth every day.     • Calcium Carbonate-Vitamin D (CALTRATE 600+D) 600-400 MG-UNIT TABS Take  by mouth 2 Times a Day.       • olopatadine (PATANOL) 0.1 % ophthalmic solution Place 1 Drop in both eyes 2 times a day. (Patient not taking: Reported on 5/10/2020) 5 mL 0   • triamcinolone acetonide (KENALOG) 0.5 % Cream Apply to affected area up to 4 times daily as needed for rash. Max use is 14 days. (Patient not taking: Reported on 2020) 60 g 0     No current Epic-ordered facility-administered medications on file.        Past Medical History:   Diagnosis Date   • Cervical cancer screening 2013    Due to repeat around .   • Dyslipidemia 10/24/2012   • Dysuria 10/2/2014    Seen  for uti, given macrobid Feels the same as  Feels more like vaginal pain     • Hyperlipidemia    • Insomnia 2014    Not sleeping, can't shut brain off Making her really tired during the day X 1 month plus but worse for a month   • Seasonal allergies 2013   • Shingles        Social History     Tobacco Use   • Smoking status: Current Every Day Smoker     Packs/day: 1.00     Years: 31.00     Pack years: 31.00     Types: Cigarettes   • Smokeless tobacco: Never Used   Substance Use Topics   • Alcohol use: Yes     Alcohol/week: 4.2 oz     Types: 7 Glasses of wine per week     Comment: 1 glass of wine daily   • Drug use: No     Comment: denies h/o heroin, cocaine, meth, IVDU       Family Status   Relation Name Status   • Mo   at age 83        heart failure   • Fa   at age 69        quad bpass, coma   • Bro  Alive        handicap at a facility   • Bro  Alive   • Bro  Alive     Family History   Problem Relation Age of Onset   • Hypertension Mother    • Hyperlipidemia Mother    • Thyroid Mother    • Lung Disease Mother    • Heart Disease Father    • Hypertension Father    • Hyperlipidemia Father        Review of Systems:    Constitutional ROS: No  "unexpected change in weight, No weakness, No fatigue  Pulmonary ROS: No chronic cough, sputum, or hemoptysis, No dyspnea on exertion, No wheezing  Cardiovascular ROS: No diaphoresis, No edema, No palpitations  Gastrointestinal ROS: No change in bowel habits, No significant change in appetite, No nausea, vomiting, diarrhea, or constipation  Hematologic/Lymphatic ROS: No chills, No night sweats, No weight loss  Skin/Integumentary ROS: No edema, No evidence of rash, No itching      Exam:  /80   Pulse 75   Temp 36.6 °C (97.8 °F) (Temporal)   Resp 14   Ht 1.626 m (5' 4\")   Wt 50.4 kg (111 lb 3.2 oz)   SpO2 95%   General: Well developed, well nourished. No distress.  Pulmonary: Unlabored respiratory effort.   Neurologic: Grossly nonfocal. No facial asymmetry noted.  Skin: Warm, dry, good turgor. No rashes in visible areas.   Psych: Normal mood. Alert and oriented x3. Judgment and insight is normal.    UA: Trace blood, otherwise negative    Assessment/Plan:  Discussed with patient that her symptoms seem to be consistent with atrophic vaginitis.  Discussed use of vaginal hormones and patient would like to try this.  Patient states that she has used oral hormones in the past but it caused a vaginal bleeding.  Discussed that this is a lower dose and does not cause systemic elevation of estrogen the way oral hormones do.  Advise follow-up with primary care for further evaluation and management of symptoms.  1. Vaginal irritation  estrogens, conjugated (PREMARIN) 0.625 MG/GM Cream   2. Urgency of urination  POCT Urinalysis       "

## 2020-05-13 ENCOUNTER — OFFICE VISIT (OUTPATIENT)
Dept: MEDICAL GROUP | Facility: PHYSICIAN GROUP | Age: 70
End: 2020-05-13
Payer: MEDICARE

## 2020-05-13 VITALS
TEMPERATURE: 98 F | HEART RATE: 61 BPM | DIASTOLIC BLOOD PRESSURE: 74 MMHG | RESPIRATION RATE: 12 BRPM | OXYGEN SATURATION: 97 % | WEIGHT: 111 LBS | BODY MASS INDEX: 18.95 KG/M2 | HEIGHT: 64 IN | SYSTOLIC BLOOD PRESSURE: 126 MMHG

## 2020-05-13 DIAGNOSIS — N95.2 VAGINAL ATROPHY: ICD-10-CM

## 2020-05-13 PROCEDURE — 99213 OFFICE O/P EST LOW 20 MIN: CPT | Performed by: PHYSICIAN ASSISTANT

## 2020-05-13 RX ORDER — ESTRADIOL 0.1 MG/G
CREAM VAGINAL
Qty: 1 TUBE | Refills: 3 | Status: SHIPPED | OUTPATIENT
Start: 2020-05-13 | End: 2020-09-21

## 2020-05-13 RX ORDER — ERYTHROMYCIN 5 MG/G
OINTMENT OPHTHALMIC
COMMUNITY
Start: 2020-05-05 | End: 2020-05-13

## 2020-05-13 RX ORDER — TOBRAMYCIN AND DEXAMETHASONE 3; 1 MG/ML; MG/ML
1 SUSPENSION/ DROPS OPHTHALMIC
COMMUNITY
End: 2020-09-21

## 2020-05-13 ASSESSMENT — PATIENT HEALTH QUESTIONNAIRE - PHQ9: CLINICAL INTERPRETATION OF PHQ2 SCORE: 0

## 2020-05-13 ASSESSMENT — FIBROSIS 4 INDEX: FIB4 SCORE: 1.41

## 2020-05-13 NOTE — PROGRESS NOTES
"cc:  Urinary urgency    Subjective:     Tanisha Haile is a 70 y.o. female presenting for urinary urgency      Patient presents to the office for urinary urgency.  Patient was seen in the urgent care on 5-.  At the time she felt that hse may have a UTI.  Urine was essentially negative but was told by provider she may have vaginal atrophy.   She was given an estrogen cream and advised to follow up with PCP and is here today.       Review of systems:  See above.   Denies any symptoms unless previously indicated.        Current Outpatient Medications:   •  tobramycin-dexamethasone (TOBRADEX) 0.3-0.1 % Suspension, Place 1 Drop in both eyes every 4 hours while awake., Disp: , Rfl:   •  VITAMIN D PO, Take 2,000 mcg by mouth., Disp: , Rfl:   •  estradiol (ESTRACE) 0.1 MG/GM vaginal cream, Apply a small amount to affected area 3 times a week., Disp: 1 Tube, Rfl: 3  •  olopatadine (PATANOL) 0.1 % ophthalmic solution, Place 1 Drop in both eyes 2 times a day., Disp: 5 mL, Rfl: 0  •  simvastatin (ZOCOR) 20 MG Tab, TAKE 1 TABLET BY MOUTH EVERY EVENING, Disp: 90 Tab, Rfl: 3  •  CRANBERRY PO, Take  by mouth., Disp: , Rfl:   •  ascorbic acid (ASCORBIC ACID) 500 MG Tab, Take 500 mg by mouth every day., Disp: , Rfl:   •  Calcium Carbonate-Vitamin D (CALTRATE 600+D) 600-400 MG-UNIT TABS, Take  by mouth 2 Times a Day.  , Disp: , Rfl:     Allergies, past medical history, past surgical history, family history, social history reviewed and updated    Objective:     Vitals: /74   Pulse 61   Temp 36.7 °C (98 °F) (Temporal)   Resp 12   Ht 1.626 m (5' 4\")   Wt 50.3 kg (111 lb)   LMP  (LMP Unknown)   SpO2 97%   BMI 19.05 kg/m²   General: Alert, pleasant, NAD  EYES:   PERRL, EOMI, no icterus or pallor.  Conjunctivae and lids normal.   HENT:  Normocephalic.  External ears normal.   Neck supple.    Abdomen: Not obese  Skin: Warm, dry, no rashes.  Musculoskeletal: Gait is normal.  Moves all extremities well.    Neurological: No " tremors, sensation grossly intact,  CN2-12 intact.  Psych:  Affect/mood is normal, judgement is good, memory is intact, grooming is appropriate.    Assessment/Plan:     Tanisha was seen today for vaginal pain.    Diagnoses and all orders for this visit:    Vaginal atrophy  -     estradiol (ESTRACE) 0.1 MG/GM vaginal cream; Apply a small amount to affected area 3 times a week.      Patient unable to afford premarin.  Will try estrace.  Will follow up in 3 months and she will contact us sooner if needed.   Advised patient that medication will take time to take effect.  Patient verbalized understanding to all instructions.       Return in about 3 months (around 8/13/2020), or if symptoms worsen or fail to improve.    Please note that this dictation was created using voice recognition software. I have made every reasonable attempt to correct obvious errors, but expect that there are errors of grammar and possible content that I did not discover before finalizing note.

## 2020-08-12 ENCOUNTER — HOSPITAL ENCOUNTER (OUTPATIENT)
Facility: MEDICAL CENTER | Age: 70
End: 2020-08-12
Attending: PHYSICIAN ASSISTANT
Payer: MEDICARE

## 2020-08-12 ENCOUNTER — OFFICE VISIT (OUTPATIENT)
Dept: URGENT CARE | Facility: PHYSICIAN GROUP | Age: 70
End: 2020-08-12
Payer: MEDICARE

## 2020-08-12 VITALS
RESPIRATION RATE: 14 BRPM | HEART RATE: 62 BPM | BODY MASS INDEX: 18.88 KG/M2 | TEMPERATURE: 97.2 F | OXYGEN SATURATION: 100 % | DIASTOLIC BLOOD PRESSURE: 74 MMHG | WEIGHT: 110 LBS | SYSTOLIC BLOOD PRESSURE: 128 MMHG

## 2020-08-12 DIAGNOSIS — R30.9 PAINFUL URINATION: ICD-10-CM

## 2020-08-12 DIAGNOSIS — N39.0 URINARY TRACT INFECTION WITHOUT HEMATURIA, SITE UNSPECIFIED: ICD-10-CM

## 2020-08-12 LAB
APPEARANCE UR: CLEAR
BILIRUB UR STRIP-MCNC: NORMAL MG/DL
COLOR UR AUTO: YELLOW
GLUCOSE UR STRIP.AUTO-MCNC: NORMAL MG/DL
KETONES UR STRIP.AUTO-MCNC: NORMAL MG/DL
LEUKOCYTE ESTERASE UR QL STRIP.AUTO: NORMAL
NITRITE UR QL STRIP.AUTO: NORMAL
PH UR STRIP.AUTO: 7 [PH] (ref 5–8)
PROT UR QL STRIP: NORMAL MG/DL
RBC UR QL AUTO: NORMAL
SP GR UR STRIP.AUTO: 1.01
UROBILINOGEN UR STRIP-MCNC: 0.2 MG/DL

## 2020-08-12 PROCEDURE — 81002 URINALYSIS NONAUTO W/O SCOPE: CPT | Performed by: PHYSICIAN ASSISTANT

## 2020-08-12 PROCEDURE — 87086 URINE CULTURE/COLONY COUNT: CPT

## 2020-08-12 PROCEDURE — 99214 OFFICE O/P EST MOD 30 MIN: CPT | Performed by: PHYSICIAN ASSISTANT

## 2020-08-12 RX ORDER — NITROFURANTOIN 25; 75 MG/1; MG/1
100 CAPSULE ORAL 2 TIMES DAILY
Qty: 14 CAP | Refills: 0 | Status: SHIPPED | OUTPATIENT
Start: 2020-08-12 | End: 2020-08-19

## 2020-08-12 ASSESSMENT — ENCOUNTER SYMPTOMS
CHILLS: 0
FLANK PAIN: 0
DIARRHEA: 0
BACK PAIN: 0
ABDOMINAL PAIN: 0
MYALGIAS: 0
FEVER: 0
VOMITING: 0

## 2020-08-12 ASSESSMENT — FIBROSIS 4 INDEX: FIB4 SCORE: 1.41
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
## 2020-08-14 LAB
BACTERIA UR CULT: NORMAL
SIGNIFICANT IND 70042: NORMAL
SITE SITE: NORMAL
SOURCE SOURCE: NORMAL

## 2020-09-21 ENCOUNTER — OFFICE VISIT (OUTPATIENT)
Dept: MEDICAL GROUP | Facility: PHYSICIAN GROUP | Age: 70
End: 2020-09-21
Payer: MEDICARE

## 2020-09-21 VITALS
HEIGHT: 64 IN | WEIGHT: 108 LBS | BODY MASS INDEX: 18.44 KG/M2 | DIASTOLIC BLOOD PRESSURE: 76 MMHG | TEMPERATURE: 97.2 F | SYSTOLIC BLOOD PRESSURE: 118 MMHG | HEART RATE: 59 BPM | OXYGEN SATURATION: 100 % | RESPIRATION RATE: 12 BRPM

## 2020-09-21 DIAGNOSIS — Z00.00 ANNUAL PHYSICAL EXAM: ICD-10-CM

## 2020-09-21 DIAGNOSIS — E55.9 VITAMIN D DEFICIENCY: ICD-10-CM

## 2020-09-21 DIAGNOSIS — Z78.0 POSTMENOPAUSAL STATUS (AGE-RELATED) (NATURAL): ICD-10-CM

## 2020-09-21 DIAGNOSIS — N95.1 MENOPAUSAL STATE: ICD-10-CM

## 2020-09-21 DIAGNOSIS — Z12.31 ENCOUNTER FOR SCREENING MAMMOGRAM FOR BREAST CANCER: ICD-10-CM

## 2020-09-21 DIAGNOSIS — Z80.8 FAMILY HISTORY OF THYROID CANCER: ICD-10-CM

## 2020-09-21 DIAGNOSIS — Z13.0 ENCOUNTER FOR SCREENING FOR HEMATOLOGIC DISORDER: ICD-10-CM

## 2020-09-21 DIAGNOSIS — E78.5 DYSLIPIDEMIA: ICD-10-CM

## 2020-09-21 DIAGNOSIS — F51.01 PRIMARY INSOMNIA: ICD-10-CM

## 2020-09-21 PROCEDURE — 99214 OFFICE O/P EST MOD 30 MIN: CPT | Performed by: NURSE PRACTITIONER

## 2020-09-21 RX ORDER — FLUTICASONE PROPIONATE 50 MCG
1 SPRAY, SUSPENSION (ML) NASAL DAILY
Qty: 48 G | Refills: 3 | Status: SHIPPED | OUTPATIENT
Start: 2020-09-21 | End: 2021-11-02 | Stop reason: SDUPTHER

## 2020-09-21 RX ORDER — SIMVASTATIN 20 MG
TABLET ORAL
Qty: 90 TAB | Refills: 3 | Status: SHIPPED | OUTPATIENT
Start: 2020-09-21 | End: 2020-10-22 | Stop reason: SDUPTHER

## 2020-09-21 SDOH — HEALTH STABILITY: MENTAL HEALTH: HOW OFTEN DO YOU HAVE A DRINK CONTAINING ALCOHOL?: 4 OR MORE TIMES A WEEK

## 2020-09-21 ASSESSMENT — FIBROSIS 4 INDEX: FIB4 SCORE: 1.41

## 2020-09-21 NOTE — ASSESSMENT & PLAN NOTE
Very pleasant 70-year-old female patient presents today for annual physical exam  Due for labs, these have been ordered  She carries diagnoses of hyper lipidemia, and history of elevated blood pressure  She is also due for refills on simvastatin  She does not have any pressing concerns or complaints other than struggling with occasional insomnia for which she is going to start taking melatonin over-the-counter as well as family history of thyroid disease and would like to have thyroid labs checked  She is due for mammogram and DEXA scan, both these have been ordered  Unfortunately continues to smoke on a daily basis, she understands the risks associated with this and declines assistance with cessation  She is to otherwise follow with me annually and as needed
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
## 2020-10-01 ENCOUNTER — HOSPITAL ENCOUNTER (OUTPATIENT)
Dept: LAB | Facility: MEDICAL CENTER | Age: 70
End: 2020-10-01
Attending: NURSE PRACTITIONER
Payer: MEDICARE

## 2020-10-01 DIAGNOSIS — Z13.0 ENCOUNTER FOR SCREENING FOR HEMATOLOGIC DISORDER: ICD-10-CM

## 2020-10-01 DIAGNOSIS — Z80.8 FAMILY HISTORY OF THYROID CANCER: ICD-10-CM

## 2020-10-01 DIAGNOSIS — E78.5 DYSLIPIDEMIA: ICD-10-CM

## 2020-10-01 DIAGNOSIS — E55.9 VITAMIN D DEFICIENCY: ICD-10-CM

## 2020-10-01 LAB
25(OH)D3 SERPL-MCNC: 79 NG/ML (ref 30–100)
ALBUMIN SERPL BCP-MCNC: 4.3 G/DL (ref 3.2–4.9)
ALBUMIN/GLOB SERPL: 1.9 G/DL
ALP SERPL-CCNC: 79 U/L (ref 30–99)
ALT SERPL-CCNC: 29 U/L (ref 2–50)
ANION GAP SERPL CALC-SCNC: 12 MMOL/L (ref 7–16)
AST SERPL-CCNC: 28 U/L (ref 12–45)
BASOPHILS # BLD AUTO: 0.8 % (ref 0–1.8)
BASOPHILS # BLD: 0.05 K/UL (ref 0–0.12)
BILIRUB SERPL-MCNC: 0.6 MG/DL (ref 0.1–1.5)
BUN SERPL-MCNC: 12 MG/DL (ref 8–22)
CALCIUM SERPL-MCNC: 9.4 MG/DL (ref 8.5–10.5)
CHLORIDE SERPL-SCNC: 103 MMOL/L (ref 96–112)
CHOLEST SERPL-MCNC: 180 MG/DL (ref 100–199)
CO2 SERPL-SCNC: 27 MMOL/L (ref 20–33)
CREAT SERPL-MCNC: 0.49 MG/DL (ref 0.5–1.4)
EOSINOPHIL # BLD AUTO: 0.51 K/UL (ref 0–0.51)
EOSINOPHIL NFR BLD: 8.3 % (ref 0–6.9)
ERYTHROCYTE [DISTWIDTH] IN BLOOD BY AUTOMATED COUNT: 50.2 FL (ref 35.9–50)
FASTING STATUS PATIENT QL REPORTED: NORMAL
GLOBULIN SER CALC-MCNC: 2.3 G/DL (ref 1.9–3.5)
GLUCOSE SERPL-MCNC: 89 MG/DL (ref 65–99)
HCT VFR BLD AUTO: 45.1 % (ref 37–47)
HDLC SERPL-MCNC: 117 MG/DL
HGB BLD-MCNC: 14.3 G/DL (ref 12–16)
IMM GRANULOCYTES # BLD AUTO: 0.01 K/UL (ref 0–0.11)
IMM GRANULOCYTES NFR BLD AUTO: 0.2 % (ref 0–0.9)
LDLC SERPL CALC-MCNC: 55 MG/DL
LYMPHOCYTES # BLD AUTO: 1.41 K/UL (ref 1–4.8)
LYMPHOCYTES NFR BLD: 23 % (ref 22–41)
MCH RBC QN AUTO: 31 PG (ref 27–33)
MCHC RBC AUTO-ENTMCNC: 31.7 G/DL (ref 33.6–35)
MCV RBC AUTO: 97.8 FL (ref 81.4–97.8)
MONOCYTES # BLD AUTO: 0.52 K/UL (ref 0–0.85)
MONOCYTES NFR BLD AUTO: 8.5 % (ref 0–13.4)
NEUTROPHILS # BLD AUTO: 3.62 K/UL (ref 2–7.15)
NEUTROPHILS NFR BLD: 59.2 % (ref 44–72)
NRBC # BLD AUTO: 0 K/UL
NRBC BLD-RTO: 0 /100 WBC
PLATELET # BLD AUTO: 257 K/UL (ref 164–446)
PMV BLD AUTO: 10 FL (ref 9–12.9)
POTASSIUM SERPL-SCNC: 4 MMOL/L (ref 3.6–5.5)
PROT SERPL-MCNC: 6.6 G/DL (ref 6–8.2)
RBC # BLD AUTO: 4.61 M/UL (ref 4.2–5.4)
SODIUM SERPL-SCNC: 142 MMOL/L (ref 135–145)
TRIGL SERPL-MCNC: 38 MG/DL (ref 0–149)
TSH SERPL DL<=0.005 MIU/L-ACNC: 4.01 UIU/ML (ref 0.38–5.33)
WBC # BLD AUTO: 6.1 K/UL (ref 4.8–10.8)

## 2020-10-01 PROCEDURE — 80061 LIPID PANEL: CPT

## 2020-10-01 PROCEDURE — 36415 COLL VENOUS BLD VENIPUNCTURE: CPT

## 2020-10-01 PROCEDURE — 82306 VITAMIN D 25 HYDROXY: CPT

## 2020-10-01 PROCEDURE — 85025 COMPLETE CBC W/AUTO DIFF WBC: CPT

## 2020-10-01 PROCEDURE — 84443 ASSAY THYROID STIM HORMONE: CPT

## 2020-10-01 PROCEDURE — 80053 COMPREHEN METABOLIC PANEL: CPT

## 2020-10-11 ENCOUNTER — OFFICE VISIT (OUTPATIENT)
Dept: URGENT CARE | Facility: PHYSICIAN GROUP | Age: 70
End: 2020-10-11
Payer: MEDICARE

## 2020-10-11 ENCOUNTER — HOSPITAL ENCOUNTER (OUTPATIENT)
Facility: MEDICAL CENTER | Age: 70
End: 2020-10-11
Attending: PHYSICIAN ASSISTANT
Payer: MEDICARE

## 2020-10-11 VITALS
SYSTOLIC BLOOD PRESSURE: 124 MMHG | WEIGHT: 108 LBS | BODY MASS INDEX: 18.44 KG/M2 | HEART RATE: 76 BPM | OXYGEN SATURATION: 97 % | HEIGHT: 64 IN | RESPIRATION RATE: 16 BRPM | DIASTOLIC BLOOD PRESSURE: 78 MMHG | TEMPERATURE: 97.6 F

## 2020-10-11 DIAGNOSIS — R68.83 CHILLS: ICD-10-CM

## 2020-10-11 DIAGNOSIS — J06.9 URI WITH COUGH AND CONGESTION: ICD-10-CM

## 2020-10-11 PROCEDURE — 99214 OFFICE O/P EST MOD 30 MIN: CPT | Performed by: PHYSICIAN ASSISTANT

## 2020-10-11 PROCEDURE — U0003 INFECTIOUS AGENT DETECTION BY NUCLEIC ACID (DNA OR RNA); SEVERE ACUTE RESPIRATORY SYNDROME CORONAVIRUS 2 (SARS-COV-2) (CORONAVIRUS DISEASE [COVID-19]), AMPLIFIED PROBE TECHNIQUE, MAKING USE OF HIGH THROUGHPUT TECHNOLOGIES AS DESCRIBED BY CMS-2020-01-R: HCPCS

## 2020-10-11 ASSESSMENT — FIBROSIS 4 INDEX: FIB4 SCORE: 1.42

## 2020-10-11 NOTE — LETTER
October 11, 2020         Patient: Tanisha Haile   YOB: 1950   Date of Visit: 10/11/2020           To Whom it May Concern:    Tanisha Haile was seen in my clinic on 10/11/2020.     A concern for COVID-19 has been identified and testing is in progress.  We are asking you to excuse absences while following self-isolation protocol per Center for Disease Control (CDC) guidelines.  Your employee will be able to access test results through our electronic delivery system called Currently.    If test results are negative, and once there has been no fever (temperature greater than 100.4 °F) for at least 24 hours without treatment, and no vomiting or diarrhea for at least 48 hours, then returned to work as approved.    If test results are positive then your employee will be contacted by the [facility] or [facility] for further instructions on duration of self-isolation and return to work protocol.  In general, this will also follow the CDC guidelines with a minimum of 10 days from the onset of symptoms and without fever, vomiting, or diarrhea as above.    In general, repeat testing is not necessary and not offered through our [facility].    This is the only note that will be provided from [facility] for this visit.  Your employee will require an appointment with a primary care provider if FMLA or disability forms are required.      If you have any questions or concerns, please don't hesitate to call.        Sincerely,           Trini Oakley P.A.-C.  Electronically Signed

## 2020-10-11 NOTE — PROGRESS NOTES
Chief Complaint   Patient presents with   • Nasal Congestion     post nasal drip, sore throat x 1 day       HISTORY OF PRESENT ILLNESS: Patient is a 70 y.o. female who presents today for the following:    Nasal congestion x 1 day  ST from PND  + chills but denies fever, body aches  Cough from PND  OTC meds today: none  Unable to blow nose, just feels congested  No known COVID exposure    Patient Active Problem List    Diagnosis Date Noted   • Grief reaction 02/21/2017     Priority: High   • Mid back pain on left side 08/14/2015     Priority: High   • Osteoporosis 08/14/2015     Priority: High   • Insomnia 02/27/2014     Priority: High   • Seasonal allergies 05/22/2013     Priority: Medium   • Vitamin D deficiency 02/28/2013     Priority: Medium   • Interstitial cystitis 07/07/2016     Priority: Low   • Headache 10/03/2014     Priority: Low   • Dyslipidemia 10/24/2012     Priority: Low   • Family history of thyroid cancer 09/21/2020   • Annual physical exam 09/21/2020   • Vaginal atrophy 05/13/2020   • Acute left ankle pain 03/15/2019   • Hemorrhoids 03/15/2019   • Anal itching 03/14/2019   • Elevated blood pressure reading 09/12/2018   • Nicotine dependence with current use 09/12/2018   • Gastroesophageal reflux disease without esophagitis 12/27/2017   • Adjustment disorder with mixed anxiety and depressed mood 04/12/2017   • Varicose vein of leg 03/21/2017       Allergies:Tylenol, Asa [aspirin], Influenza virus vacc, Nsaids, Pcn [penicillins], and Sulfa drugs    Current Outpatient Medications Ordered in Epic   Medication Sig Dispense Refill   • multivitamin (THERAGRAN) Tab Take 1 Tab by mouth every day.     • simvastatin (ZOCOR) 20 MG Tab TAKE 1 TABLET BY MOUTH EVERY EVENING 90 Tab 3   • fluticasone (FLONASE) 50 MCG/ACT nasal spray Spray 1 Spray in nose every day. 48 g 3   • VITAMIN D PO Take 2,000 mcg by mouth.     • CRANBERRY PO Take  by mouth.     • ascorbic acid (ASCORBIC ACID) 500 MG Tab Take 500 mg by mouth  every day.     • Calcium Carbonate-Vitamin D (CALTRATE 600+D) 600-400 MG-UNIT TABS Take  by mouth 2 Times a Day.         No current Epic-ordered facility-administered medications on file.        Past Medical History:   Diagnosis Date   • Cervical cancer screening 2013    Due to repeat around .   • Dyslipidemia 10/24/2012   • Dysuria 10/2/2014    Seen  for uti, given macrobid Feels the same as  Feels more like vaginal pain     • Hyperlipidemia    • Insomnia 2014    Not sleeping, can't shut brain off Making her really tired during the day X 1 month plus but worse for a month   • Seasonal allergies 2013   • Shingles        Social History     Tobacco Use   • Smoking status: Current Every Day Smoker     Packs/day: 0.50     Years: 31.00     Pack years: 15.50     Types: Cigarettes   • Smokeless tobacco: Never Used   Substance Use Topics   • Alcohol use: Yes     Alcohol/week: 4.2 oz     Types: 7 Glasses of wine per week     Frequency: 4 or more times a week     Comment: 1 glass of wine daily   • Drug use: No     Comment: denies h/o heroin, cocaine, meth, IVDU       Family Status   Relation Name Status   • Mo   at age 83        heart failure   • Fa   at age 69        quad bpass, coma   • Bro  Alive        handicap at a facility   • Bro  Alive   • Bro  Alive     Family History   Problem Relation Age of Onset   • Hypertension Mother    • Hyperlipidemia Mother    • Thyroid Mother    • Lung Disease Mother    • Heart Disease Father    • Hypertension Father    • Hyperlipidemia Father        Review of Systems:   Constitutional ROS: No unexpected change in weight, No weakness, No fatigue  Eye ROS: No recent significant change in vision, No eye pain, redness, discharge  Ear ROS: No drainage, No tinnitus or vertigo, No recent change in hearing  Mouth/Throat ROS: No teeth or gum problems, No bleeding gums, No tongue complaints  Neck ROS: No swollen glands, No significant pain in  "neck  Pulmonary ROS: No chronic cough, sputum, or hemoptysis, No dyspnea on exertion, No wheezing  Cardiovascular ROS: No diaphoresis, No edema, No palpitations  Musculoskeletal/Extremities ROS: No peripheral edema, No pain, redness or swelling on the joints  Hematologic/Lymphatic ROS: + chills  Skin/Integumentary ROS: No edema, No evidence of rash, No itching      Exam:  /78   Pulse 76   Temp 36.4 °C (97.6 °F) (Temporal)   Resp 16   Ht 1.626 m (5' 4\")   Wt 49 kg (108 lb)   SpO2 97%   General: Well developed, well nourished. No distress.    Eye: PERRL/EOMI; conjunctivae clear, lids normal.  ENMT: Lips without lesions, MMM. Oropharynx is clear. Bilateral TMs are within normal limits.  Pulmonary: Unlabored respiratory effort. Lungs clear to auscultation, no wheezes, no rhonchi.    Cardiovascular: Regular rate and rhythm without murmur.   Neurologic: Grossly nonfocal. No facial asymmetry noted.  Lymph: No cervical lymphadenopathy noted.  Skin: Warm, dry, good turgor. No rashes in visible areas.   Psych: Normal mood. Alert and oriented to person, place and time.    Assessment/Plan:  Discussed likely viral etiology.  Patient will be tested for COVID contacted with results.  Advised to quarantine until results are available.  Discussed appropriate over-the-counter symptomatic medication, and when to return to clinic. Follow up for worsening or persistent symptoms.  1. URI with cough and congestion     2. Chills  COVID/SARS COV-2 PCR       "

## 2020-10-12 DIAGNOSIS — R68.83 CHILLS: ICD-10-CM

## 2020-10-12 LAB
COVID ORDER STATUS COVID19: NORMAL
SARS-COV-2 RNA RESP QL NAA+PROBE: NOTDETECTED
SPECIMEN SOURCE: NORMAL

## 2020-10-13 NOTE — RESULT ENCOUNTER NOTE
Your COVID test came back negative.  You may return to work when you have been without fever for 24 hours.  Let me know if you have any questions.    Sarah

## 2020-10-19 ENCOUNTER — HOSPITAL ENCOUNTER (OUTPATIENT)
Dept: RADIOLOGY | Facility: MEDICAL CENTER | Age: 70
End: 2020-10-19
Payer: MEDICARE

## 2020-10-20 ENCOUNTER — OFFICE VISIT (OUTPATIENT)
Dept: MEDICAL GROUP | Facility: PHYSICIAN GROUP | Age: 70
End: 2020-10-20
Payer: MEDICARE

## 2020-10-20 ENCOUNTER — APPOINTMENT (OUTPATIENT)
Dept: RADIOLOGY | Facility: MEDICAL CENTER | Age: 70
End: 2020-10-20
Attending: NURSE PRACTITIONER
Payer: MEDICARE

## 2020-10-20 VITALS
WEIGHT: 107 LBS | SYSTOLIC BLOOD PRESSURE: 120 MMHG | DIASTOLIC BLOOD PRESSURE: 78 MMHG | RESPIRATION RATE: 14 BRPM | TEMPERATURE: 98.3 F | OXYGEN SATURATION: 100 % | HEART RATE: 63 BPM | BODY MASS INDEX: 18.27 KG/M2 | HEIGHT: 64 IN

## 2020-10-20 DIAGNOSIS — R51.9 INTRACTABLE EPISODIC HEADACHE, UNSPECIFIED HEADACHE TYPE: ICD-10-CM

## 2020-10-20 DIAGNOSIS — Z12.31 VISIT FOR SCREENING MAMMOGRAM: ICD-10-CM

## 2020-10-20 DIAGNOSIS — E55.9 VITAMIN D DEFICIENCY: ICD-10-CM

## 2020-10-20 DIAGNOSIS — E78.5 DYSLIPIDEMIA: ICD-10-CM

## 2020-10-20 PROCEDURE — 99214 OFFICE O/P EST MOD 30 MIN: CPT | Performed by: NURSE PRACTITIONER

## 2020-10-20 RX ORDER — TIZANIDINE HYDROCHLORIDE 2 MG/1
2 CAPSULE, GELATIN COATED ORAL 3 TIMES DAILY
Qty: 90 CAP | Refills: 1 | Status: SHIPPED | OUTPATIENT
Start: 2020-10-20 | End: 2021-11-02

## 2020-10-20 SDOH — HEALTH STABILITY: MENTAL HEALTH: HOW MANY STANDARD DRINKS CONTAINING ALCOHOL DO YOU HAVE ON A TYPICAL DAY?: 1 OR 2

## 2020-10-20 SDOH — ECONOMIC STABILITY: FOOD INSECURITY: WITHIN THE PAST 12 MONTHS, THE FOOD YOU BOUGHT JUST DIDN'T LAST AND YOU DIDN'T HAVE MONEY TO GET MORE.: NEVER TRUE

## 2020-10-20 SDOH — SOCIAL STABILITY: SOCIAL NETWORK: HOW OFTEN DO YOU GET TOGETHER WITH FRIENDS OR RELATIVES?: NEVER

## 2020-10-20 SDOH — HEALTH STABILITY: MENTAL HEALTH
STRESS IS WHEN SOMEONE FEELS TENSE, NERVOUS, ANXIOUS, OR CAN'T SLEEP AT NIGHT BECAUSE THEIR MIND IS TROUBLED. HOW STRESSED ARE YOU?: TO SOME EXTENT

## 2020-10-20 SDOH — ECONOMIC STABILITY: INCOME INSECURITY: HOW HARD IS IT FOR YOU TO PAY FOR THE VERY BASICS LIKE FOOD, HOUSING, MEDICAL CARE, AND HEATING?: NOT VERY HARD

## 2020-10-20 SDOH — ECONOMIC STABILITY: TRANSPORTATION INSECURITY
IN THE PAST 12 MONTHS, HAS THE LACK OF TRANSPORTATION KEPT YOU FROM MEDICAL APPOINTMENTS OR FROM GETTING MEDICATIONS?: NO

## 2020-10-20 SDOH — ECONOMIC STABILITY: FOOD INSECURITY: WITHIN THE PAST 12 MONTHS, YOU WORRIED THAT YOUR FOOD WOULD RUN OUT BEFORE YOU GOT MONEY TO BUY MORE.: SOMETIMES TRUE

## 2020-10-20 SDOH — HEALTH STABILITY: PHYSICAL HEALTH: ON AVERAGE, HOW MANY MINUTES DO YOU ENGAGE IN EXERCISE AT THIS LEVEL?: 150+ MINUTES

## 2020-10-20 SDOH — HEALTH STABILITY: PHYSICAL HEALTH: ON AVERAGE, HOW MANY DAYS PER WEEK DO YOU ENGAGE IN MODERATE TO STRENUOUS EXERCISE (LIKE A BRISK WALK)?: 7 DAYS

## 2020-10-20 SDOH — ECONOMIC STABILITY: INCOME INSECURITY: IN THE LAST 12 MONTHS, WAS THERE A TIME WHEN YOU WERE NOT ABLE TO PAY THE MORTGAGE OR RENT ON TIME?: NO

## 2020-10-20 SDOH — ECONOMIC STABILITY: HOUSING INSECURITY
IN THE LAST 12 MONTHS, WAS THERE A TIME WHEN YOU DID NOT HAVE A STEADY PLACE TO SLEEP OR SLEPT IN A SHELTER (INCLUDING NOW)?: NO

## 2020-10-20 SDOH — HEALTH STABILITY: MENTAL HEALTH: HOW OFTEN DO YOU HAVE 6 OR MORE DRINKS ON ONE OCCASION?: NEVER

## 2020-10-20 SDOH — SOCIAL STABILITY: SOCIAL NETWORK: HOW OFTEN DO YOU ATTEND CHURCH OR RELIGIOUS SERVICES?: 1 TO 4 TIMES PER YEAR

## 2020-10-20 SDOH — SOCIAL STABILITY: SOCIAL NETWORK
DO YOU BELONG TO ANY CLUBS OR ORGANIZATIONS SUCH AS CHURCH GROUPS UNIONS, FRATERNAL OR ATHLETIC GROUPS, OR SCHOOL GROUPS?: YES

## 2020-10-20 SDOH — SOCIAL STABILITY: SOCIAL NETWORK: ARE YOU MARRIED, WIDOWED, DIVORCED, SEPARATED, NEVER MARRIED, OR LIVING WITH A PARTNER?: WIDOWED

## 2020-10-20 SDOH — ECONOMIC STABILITY: HOUSING INSECURITY: IN THE LAST 12 MONTHS, HOW MANY PLACES HAVE YOU LIVED?: 1

## 2020-10-20 SDOH — ECONOMIC STABILITY: TRANSPORTATION INSECURITY
IN THE PAST 12 MONTHS, HAS LACK OF TRANSPORTATION KEPT YOU FROM MEETINGS, WORK, OR FROM GETTING THINGS NEEDED FOR DAILY LIVING?: NO

## 2020-10-20 SDOH — ECONOMIC STABILITY: TRANSPORTATION INSECURITY
IN THE PAST 12 MONTHS, HAS LACK OF RELIABLE TRANSPORTATION KEPT YOU FROM MEDICAL APPOINTMENTS, MEETINGS, WORK OR FROM GETTING THINGS NEEDED FOR DAILY LIVING?: NO

## 2020-10-20 SDOH — HEALTH STABILITY: PHYSICAL HEALTH: ON AVERAGE, HOW MANY MINUTES DO YOU ENGAGE IN EXERCISE AT THIS LEVEL?: 150+ MIN

## 2020-10-20 SDOH — SOCIAL STABILITY: SOCIAL NETWORK
IN A TYPICAL WEEK, HOW MANY TIMES DO YOU TALK ON THE PHONE WITH FAMILY, FRIENDS, OR NEIGHBORS?: MORE THAN THREE TIMES A WEEK

## 2020-10-20 SDOH — SOCIAL STABILITY: SOCIAL NETWORK: HOW OFTEN DO YOU ATTENT MEETINGS OF THE CLUB OR ORGANIZATION YOU BELONG TO?: 1 TO 4 TIMES PER YEAR

## 2020-10-20 ASSESSMENT — SOCIAL DETERMINANTS OF HEALTH (SDOH)
WITHIN THE PAST 12 MONTHS, YOU WORRIED THAT YOUR FOOD WOULD RUN OUT BEFORE YOU GOT THE MONEY TO BUY MORE: SOMETIMES TRUE
HOW OFTEN DO YOU HAVE SIX OR MORE DRINKS ON ONE OCCASION: NEVER
HOW OFTEN DO YOU HAVE A DRINK CONTAINING ALCOHOL: 4 OR MORE TIMES A WEEK
HOW MANY DRINKS CONTAINING ALCOHOL DO YOU HAVE ON A TYPICAL DAY WHEN YOU ARE DRINKING: 1 OR 2
WITHIN THE PAST 12 MONTHS, THE FOOD YOU BOUGHT JUST DIDN'T LAST AND YOU DIDN'T HAVE MONEY TO GET MORE: NEVER TRUE
DO YOU BELONG TO ANY CLUBS OR ORGANIZATIONS SUCH AS CHURCH GROUPS UNIONS, FRATERNAL OR ATHLETIC GROUPS, OR SCHOOL GROUPS?: YES
HOW OFTEN DO YOU GET TOGETHER WITH FRIENDS OR RELATIVES?: NEVER
IN A TYPICAL WEEK, HOW MANY TIMES DO YOU TALK ON THE PHONE WITH FAMILY, FRIENDS, OR NEIGHBORS?: MORE THAN THREE TIMES A WEEK
HOW OFTEN DO YOU ATTEND CHURCH OR RELIGIOUS SERVICES?: 1 TO 4 TIMES PER YEAR
HOW HARD IS IT FOR YOU TO PAY FOR THE VERY BASICS LIKE FOOD, HOUSING, MEDICAL CARE, AND HEATING?: NOT VERY HARD
HOW OFTEN DO YOU ATTENT MEETINGS OF THE CLUB OR ORGANIZATION YOU BELONG TO?: 1 TO 4 TIMES PER YEAR

## 2020-10-20 ASSESSMENT — FIBROSIS 4 INDEX: FIB4 SCORE: 1.42

## 2020-10-20 NOTE — ASSESSMENT & PLAN NOTE
Patient has history of vitamin D deficiency, currently taking 2000 international units  Recent vitamin D level 79  Advised to take 1000 international units daily
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
## 2020-10-20 NOTE — ASSESSMENT & PLAN NOTE
The ASCVD Risk score (Averycathy TILLEY Jr, et al., 2013) failed to calculate.  Patient on simvastatin 20 mg daily  Recent labs well within normal limits  Discussed the importance of ongoing lifestyle modifications

## 2020-10-20 NOTE — PATIENT INSTRUCTIONS
Schedule your mammogram and Dexa scan    Take meds as prescribed    Tizanidine for headache, do not drive while taking due to sedating effect    Follow up annually and as needed

## 2020-10-22 DIAGNOSIS — E78.5 DYSLIPIDEMIA: ICD-10-CM

## 2020-10-22 RX ORDER — SIMVASTATIN 20 MG
TABLET ORAL
Qty: 90 TAB | Refills: 3 | Status: SHIPPED | OUTPATIENT
Start: 2020-10-22 | End: 2021-10-20 | Stop reason: SDUPTHER

## 2020-11-14 ENCOUNTER — OFFICE VISIT (OUTPATIENT)
Dept: URGENT CARE | Facility: PHYSICIAN GROUP | Age: 70
End: 2020-11-14
Payer: MEDICARE

## 2020-11-14 ENCOUNTER — HOSPITAL ENCOUNTER (OUTPATIENT)
Facility: MEDICAL CENTER | Age: 70
End: 2020-11-14
Attending: PHYSICIAN ASSISTANT
Payer: MEDICARE

## 2020-11-14 VITALS
WEIGHT: 109 LBS | RESPIRATION RATE: 12 BRPM | DIASTOLIC BLOOD PRESSURE: 80 MMHG | HEART RATE: 59 BPM | HEIGHT: 64 IN | BODY MASS INDEX: 18.61 KG/M2 | TEMPERATURE: 98.2 F | SYSTOLIC BLOOD PRESSURE: 118 MMHG | OXYGEN SATURATION: 97 %

## 2020-11-14 DIAGNOSIS — R09.81 SINUS CONGESTION: ICD-10-CM

## 2020-11-14 DIAGNOSIS — J06.9 UPPER RESPIRATORY TRACT INFECTION, UNSPECIFIED TYPE: ICD-10-CM

## 2020-11-14 DIAGNOSIS — H10.13 ALLERGIC CONJUNCTIVITIS OF BOTH EYES: ICD-10-CM

## 2020-11-14 PROCEDURE — U0003 INFECTIOUS AGENT DETECTION BY NUCLEIC ACID (DNA OR RNA); SEVERE ACUTE RESPIRATORY SYNDROME CORONAVIRUS 2 (SARS-COV-2) (CORONAVIRUS DISEASE [COVID-19]), AMPLIFIED PROBE TECHNIQUE, MAKING USE OF HIGH THROUGHPUT TECHNOLOGIES AS DESCRIBED BY CMS-2020-01-R: HCPCS

## 2020-11-14 PROCEDURE — 99214 OFFICE O/P EST MOD 30 MIN: CPT | Performed by: PHYSICIAN ASSISTANT

## 2020-11-14 RX ORDER — OLOPATADINE HYDROCHLORIDE 1 MG/ML
1 SOLUTION/ DROPS OPHTHALMIC 2 TIMES DAILY
Qty: 5 ML | Refills: 0 | Status: SHIPPED | OUTPATIENT
Start: 2020-11-14 | End: 2021-04-15

## 2020-11-14 ASSESSMENT — FIBROSIS 4 INDEX: FIB4 SCORE: 1.42

## 2020-11-14 NOTE — PROGRESS NOTES
Chief Complaint   Patient presents with   • Eye Problem     blurry to see    • Sinus Problem     stuffy and wont come out       HISTORY OF PRESENT ILLNESS: Patient is a 70 y.o. female who presents today because she has a 4-day history of right eye itchiness and mild watery blurred sensation, she also has nasal and sinus congestion.  She has not been taking any medications for her current symptoms.  She did have a headache yesterday, that is resolved.    Patient Active Problem List    Diagnosis Date Noted   • Grief reaction 02/21/2017     Priority: High   • Mid back pain on left side 08/14/2015     Priority: High   • Osteoporosis 08/14/2015     Priority: High   • Insomnia 02/27/2014     Priority: High   • Seasonal allergies 05/22/2013     Priority: Medium   • Vitamin D deficiency 02/28/2013     Priority: Medium   • Interstitial cystitis 07/07/2016     Priority: Low   • Headache 10/03/2014     Priority: Low   • Dyslipidemia 10/24/2012     Priority: Low   • Family history of thyroid cancer 09/21/2020   • Annual physical exam 09/21/2020   • Vaginal atrophy 05/13/2020   • Acute left ankle pain 03/15/2019   • Hemorrhoids 03/15/2019   • Anal itching 03/14/2019   • Elevated blood pressure reading 09/12/2018   • Nicotine dependence with current use 09/12/2018   • Gastroesophageal reflux disease without esophagitis 12/27/2017   • Adjustment disorder with mixed anxiety and depressed mood 04/12/2017   • Varicose vein of leg 03/21/2017       Allergies:Tylenol, Asa [aspirin], Influenza virus vacc, Nsaids, Pcn [penicillins], and Sulfa drugs    Current Outpatient Medications Ordered in Epic   Medication Sig Dispense Refill   • olopatadine (PATANOL) 0.1 % ophthalmic solution Administer 1 Drop into both eyes 2 times a day. 5 mL 0   • simvastatin (ZOCOR) 20 MG Tab TAKE 1 TABLET BY MOUTH EVERY EVENING 90 Tab 3   • tizanidine (ZANAFLEX) 2 MG capsule Take 1 Cap by mouth 3 times a day. 90 Cap 1   • multivitamin (THERAGRAN) Tab Take 1 Tab  by mouth every day.     • fluticasone (FLONASE) 50 MCG/ACT nasal spray Spray 1 Spray in nose every day. 48 g 3   • VITAMIN D PO Take 2,000 mcg by mouth.     • CRANBERRY PO Take  by mouth.     • ascorbic acid (ASCORBIC ACID) 500 MG Tab Take 500 mg by mouth every day.     • Calcium Carbonate-Vitamin D (CALTRATE 600+D) 600-400 MG-UNIT TABS Take  by mouth 2 Times a Day.         No current Epic-ordered facility-administered medications on file.        Past Medical History:   Diagnosis Date   • Cervical cancer screening 2013    Due to repeat around .   • Dyslipidemia 10/24/2012   • Dysuria 10/2/2014    Seen  for uti, given macrobid Feels the same as  Feels more like vaginal pain     • Hyperlipidemia    • Insomnia 2014    Not sleeping, can't shut brain off Making her really tired during the day X 1 month plus but worse for a month   • Seasonal allergies 2013   • Shingles        Social History     Tobacco Use   • Smoking status: Current Every Day Smoker     Packs/day: 0.50     Years: 31.00     Pack years: 15.50     Types: Cigarettes   • Smokeless tobacco: Never Used   Substance Use Topics   • Alcohol use: Yes     Alcohol/week: 4.2 oz     Types: 7 Glasses of wine per week     Frequency: 4 or more times a week     Drinks per session: 1 or 2     Binge frequency: Never     Comment: 1 glass of wine daily   • Drug use: No     Comment: denies h/o heroin, cocaine, meth, IVDU       Family Status   Relation Name Status   • Mo   at age 83        heart failure   • Fa   at age 69        quad bpass, coma   • Bro  Alive        handicap at a facility   • Bro  Alive   • Bro  Alive     Family History   Problem Relation Age of Onset   • Hypertension Mother    • Hyperlipidemia Mother    • Thyroid Mother    • Lung Disease Mother    • Heart Disease Father    • Hypertension Father    • Hyperlipidemia Father        ROS:  Review of Systems   Constitutional: Negative for fever, chills, weight loss and  "malaise/fatigue.   HENT: Negative for ear pain, nosebleeds, positive for nasal and sinus congestion, no sore throat and neck pain.    Eyes: Positive for right watery blurred vision.   Respiratory: Negative for cough, sputum production, shortness of breath and wheezing.    Cardiovascular: Negative for chest pain, palpitations, orthopnea and leg swelling.   Gastrointestinal: Negative for heartburn, nausea, vomiting and abdominal pain.   Genitourinary: Negative for dysuria, urgency and frequency.     Exam:  /80   Pulse (!) 59   Temp 36.8 °C (98.2 °F)   Resp 12   Ht 1.626 m (5' 4\")   Wt 49.4 kg (109 lb)   SpO2 97%   General:  Well nourished, well developed female in NAD  Head:Normocephalic, atraumatic  Eyes: PERRLA, EOM within normal limits, mild right conjunctival injection, no scleral icterus  Ears: Normal shape and symmetry, no tenderness, no discharge. External canals are without any significant edema or erythema. Tympanic membranes are without any inflammation, no effusion. Gross auditory acuity is intact  Nose: Symmetrical without tenderness, no discharge.  Nasal mucosa is pale edematous bilaterally  Mouth: reasonable hygiene, no erythema exudates or tonsillar enlargement.  Neck: no masses, range of motion within normal limits, no tracheal deviation. No obvious thyroid enlargement.  Pulmonary: chest is symmetrical with respiration, no wheezes, crackles, or rhonchi.  Cardiovascular: regular rate and rhythm without murmurs, rubs, or gallops.  Extremities: no clubbing, cyanosis, or edema.    Please note that this dictation was created using voice recognition software. I have made every reasonable attempt to correct obvious errors, but I expect that there are errors of grammar and possibly content that I did not discover before finalizing the note.    Assessment/Plan:  1. Allergic conjunctivitis of both eyes  olopatadine (PATANOL) 0.1 % ophthalmic solution   2. Sinus congestion  COVID/SARS COV-2 PCR   3. " Upper respiratory tract infection, unspecified type  COVID/SARS COV-2 PCR   Discussed strict isolation until Covid test returns, over-the-counter symptomatic relief as needed    Followup with primary care in the next 7-10 days if not significantly improving, return to the urgent care or go to the emergency room sooner for any worsening of symptoms.

## 2020-11-15 DIAGNOSIS — J06.9 UPPER RESPIRATORY TRACT INFECTION, UNSPECIFIED TYPE: ICD-10-CM

## 2020-11-15 DIAGNOSIS — R09.81 SINUS CONGESTION: ICD-10-CM

## 2020-11-15 LAB — COVID ORDER STATUS COVID19: NORMAL

## 2020-11-16 LAB
SARS-COV-2 RNA RESP QL NAA+PROBE: NOTDETECTED
SPECIMEN SOURCE: NORMAL

## 2020-11-17 ENCOUNTER — TELEPHONE (OUTPATIENT)
Dept: MEDICAL GROUP | Facility: PHYSICIAN GROUP | Age: 70
End: 2020-11-17

## 2020-11-17 DIAGNOSIS — H10.13 ALLERGIC CONJUNCTIVITIS OF BOTH EYES: ICD-10-CM

## 2020-11-17 RX ORDER — AZELASTINE HYDROCHLORIDE 0.5 MG/ML
1 SOLUTION/ DROPS OPHTHALMIC 2 TIMES DAILY
Qty: 6 ML | Refills: 0 | Status: SHIPPED | OUTPATIENT
Start: 2020-11-17 | End: 2021-04-15

## 2020-11-17 NOTE — TELEPHONE ENCOUNTER
Nikita- You saw pt in urgent care on Sat and prescribed Patanol. Pharmacy states it is not covered but states that cromolyn, Pazeo or azelatine are. Please change as you see fit.

## 2020-12-16 ENCOUNTER — OFFICE VISIT (OUTPATIENT)
Dept: URGENT CARE | Facility: PHYSICIAN GROUP | Age: 70
End: 2020-12-16
Payer: MEDICARE

## 2020-12-16 ENCOUNTER — HOSPITAL ENCOUNTER (OUTPATIENT)
Facility: MEDICAL CENTER | Age: 70
End: 2020-12-16
Attending: PHYSICIAN ASSISTANT
Payer: MEDICARE

## 2020-12-16 VITALS
TEMPERATURE: 97.5 F | BODY MASS INDEX: 19.12 KG/M2 | SYSTOLIC BLOOD PRESSURE: 136 MMHG | HEIGHT: 64 IN | WEIGHT: 112 LBS | OXYGEN SATURATION: 99 % | DIASTOLIC BLOOD PRESSURE: 82 MMHG | HEART RATE: 65 BPM | RESPIRATION RATE: 16 BRPM

## 2020-12-16 DIAGNOSIS — J01.90 ACUTE SINUSITIS, RECURRENCE NOT SPECIFIED, UNSPECIFIED LOCATION: ICD-10-CM

## 2020-12-16 DIAGNOSIS — Z20.822 CLOSE EXPOSURE TO COVID-19 VIRUS: ICD-10-CM

## 2020-12-16 LAB — COVID ORDER STATUS COVID19: NORMAL

## 2020-12-16 PROCEDURE — U0003 INFECTIOUS AGENT DETECTION BY NUCLEIC ACID (DNA OR RNA); SEVERE ACUTE RESPIRATORY SYNDROME CORONAVIRUS 2 (SARS-COV-2) (CORONAVIRUS DISEASE [COVID-19]), AMPLIFIED PROBE TECHNIQUE, MAKING USE OF HIGH THROUGHPUT TECHNOLOGIES AS DESCRIBED BY CMS-2020-01-R: HCPCS

## 2020-12-16 PROCEDURE — 99214 OFFICE O/P EST MOD 30 MIN: CPT | Mod: CS | Performed by: PHYSICIAN ASSISTANT

## 2020-12-16 RX ORDER — DOXYCYCLINE HYCLATE 100 MG
100 TABLET ORAL 2 TIMES DAILY
Qty: 14 TAB | Refills: 0 | Status: SHIPPED | OUTPATIENT
Start: 2020-12-16 | End: 2021-04-15

## 2020-12-16 ASSESSMENT — FIBROSIS 4 INDEX: FIB4 SCORE: 1.42

## 2020-12-16 NOTE — PROGRESS NOTES
Chief Complaint   Patient presents with   • Head Pain     x4-5days    • Chills       HISTORY OF PRESENT ILLNESS: Patient is a 70 y.o. female who presents today for the following:    Patient comes in for persistent sinus pressure across her cheeks and behind her eyes for the past 6 weeks.  She has been tested twice for Covid, both negative.  She has not had any nasal drainage to speak of but continues to have headaches and facial pressure.  She does have new onset chills over the last few days.  She has had Covid exposure since her last test.  She denies overt fever and has not noticed any body aches.    Patient Active Problem List    Diagnosis Date Noted   • Grief reaction 02/21/2017     Priority: High   • Mid back pain on left side 08/14/2015     Priority: High   • Osteoporosis 08/14/2015     Priority: High   • Insomnia 02/27/2014     Priority: High   • Seasonal allergies 05/22/2013     Priority: Medium   • Vitamin D deficiency 02/28/2013     Priority: Medium   • Interstitial cystitis 07/07/2016     Priority: Low   • Headache 10/03/2014     Priority: Low   • Dyslipidemia 10/24/2012     Priority: Low   • Family history of thyroid cancer 09/21/2020   • Annual physical exam 09/21/2020   • Vaginal atrophy 05/13/2020   • Acute left ankle pain 03/15/2019   • Hemorrhoids 03/15/2019   • Anal itching 03/14/2019   • Elevated blood pressure reading 09/12/2018   • Nicotine dependence with current use 09/12/2018   • Gastroesophageal reflux disease without esophagitis 12/27/2017   • Adjustment disorder with mixed anxiety and depressed mood 04/12/2017   • Varicose vein of leg 03/21/2017       Allergies:Tylenol, Asa [aspirin], Influenza virus vacc, Nsaids, Pcn [penicillins], and Sulfa drugs    Current Outpatient Medications Ordered in Epic   Medication Sig Dispense Refill   • doxycycline (VIBRAMYCIN) 100 MG Tab Take 1 Tab by mouth 2 times a day. 14 Tab 0   • azelastine (OPTIVAR) 0.05 % ophthalmic solution Administer 1 Drop into  both eyes 2 times a day. 6 mL 0   • olopatadine (PATANOL) 0.1 % ophthalmic solution Administer 1 Drop into both eyes 2 times a day. 5 mL 0   • simvastatin (ZOCOR) 20 MG Tab TAKE 1 TABLET BY MOUTH EVERY EVENING 90 Tab 3   • multivitamin (THERAGRAN) Tab Take 1 Tab by mouth every day.     • fluticasone (FLONASE) 50 MCG/ACT nasal spray Spray 1 Spray in nose every day. 48 g 3   • VITAMIN D PO Take 2,000 mcg by mouth.     • CRANBERRY PO Take  by mouth.     • ascorbic acid (ASCORBIC ACID) 500 MG Tab Take 500 mg by mouth every day.     • Calcium Carbonate-Vitamin D (CALTRATE 600+D) 600-400 MG-UNIT TABS Take  by mouth 2 Times a Day.       • tizanidine (ZANAFLEX) 2 MG capsule Take 1 Cap by mouth 3 times a day. (Patient not taking: Reported on 2020) 90 Cap 1     No current Epic-ordered facility-administered medications on file.        Past Medical History:   Diagnosis Date   • Cervical cancer screening 2013    Due to repeat around .   • Dyslipidemia 10/24/2012   • Dysuria 10/2/2014    Seen  for uti, given macrobid Feels the same as  Feels more like vaginal pain     • Hyperlipidemia    • Insomnia 2014    Not sleeping, can't shut brain off Making her really tired during the day X 1 month plus but worse for a month   • Seasonal allergies 2013   • Shingles        Social History     Tobacco Use   • Smoking status: Current Every Day Smoker     Packs/day: 0.50     Years: 31.00     Pack years: 15.50     Types: Cigarettes   • Smokeless tobacco: Never Used   Substance Use Topics   • Alcohol use: Yes     Alcohol/week: 4.2 oz     Types: 7 Glasses of wine per week     Frequency: 4 or more times a week     Drinks per session: 1 or 2     Binge frequency: Never     Comment: 1 glass of wine daily   • Drug use: No     Comment: denies h/o heroin, cocaine, meth, IVDU       Family Status   Relation Name Status   • Mo   at age 83        heart failure   • Fa   at age 69        quad bpass, coma   • Bro  " Alive        handicap at a facility   • Bro  Alive   • Bro  Alive     Family History   Problem Relation Age of Onset   • Hypertension Mother    • Hyperlipidemia Mother    • Thyroid Mother    • Lung Disease Mother    • Heart Disease Father    • Hypertension Father    • Hyperlipidemia Father        Review of Systems:    Constitutional ROS: No unexpected change in weight, No weakness, No fatigue  Eye ROS: No recent significant change in vision, No eye pain, redness, discharge  Ear ROS: No drainage, No tinnitus or vertigo, No recent change in hearing  Mouth/Throat ROS: No teeth or gum problems, No bleeding gums, No tongue complaints  Neck ROS: No swollen glands, No significant pain in neck  Pulmonary ROS: No chronic cough, sputum, or hemoptysis, No dyspnea on exertion, No wheezing  Cardiovascular ROS: No diaphoresis, No edema, No palpitations  Musculoskeletal/Extremities ROS: No peripheral edema, No pain, redness or swelling on the joints  Hematologic/Lymphatic ROS: No chills, No night sweats, No weight loss  Skin/Integumentary ROS: No edema, No evidence of rash, No itching    Exam:  /82   Pulse 65   Temp 36.4 °C (97.5 °F) (Temporal)   Resp 16   Ht 1.626 m (5' 4\")   Wt 50.8 kg (112 lb)   SpO2 99%   General: Well developed, well nourished. No distress.    Eye: PERRL/EOMI; conjunctivae clear, lids normal.  ENMT: Lips without lesions, MMM. Oropharynx is clear. Bilateral TMs are within normal limits.  No facial swelling or facial tenderness noted.  Pulmonary: Unlabored respiratory effort. Lungs clear to auscultation, no wheezes, no rhonchi.    Cardiovascular: Regular rate and rhythm without murmur.   Neurologic: Grossly nonfocal. No facial asymmetry noted.  Lymph: No cervical lymphadenopathy noted.  Skin: Warm, dry, good turgor. No rashes in visible areas.   Psych: Normal mood. Alert and oriented to person, place and time.    Assessment/Plan:  Given the longevity of the sinus pressure, will start antibiotics.  " We will repeat Covid testing given the new onset chills and Covid exposure.  Discussed appropriate over-the-counter symptomatic medication, and when to return to clinic. Follow up for worsening or persistent symptoms.  1. Acute sinusitis, recurrence not specified, unspecified location  doxycycline (VIBRAMYCIN) 100 MG Tab    COVID/SARS COV-2 PCR   2. Close exposure to COVID-19 virus  COVID/SARS COV-2 PCR

## 2020-12-16 NOTE — LETTER
December 16, 2020         Patient: Tanisha Haile   YOB: 1950   Date of Visit: 12/16/2020           To Whom it May Concern:    Tanisha Haile was seen in my clinic on 12/16/2020.     Please excuse patient for missing school/work until COVID results are available, typically taking 1-3 days.  They have been advised to quarantine during this time.      If you have any questions or concerns, please don't hesitate to call.        Sincerely,           Trini Oakley P.A.-C.  Electronically Signed

## 2020-12-18 LAB
SARS-COV-2 RNA RESP QL NAA+PROBE: NOTDETECTED
SPECIMEN SOURCE: NORMAL

## 2021-04-15 ENCOUNTER — OFFICE VISIT (OUTPATIENT)
Dept: URGENT CARE | Facility: PHYSICIAN GROUP | Age: 71
End: 2021-04-15
Payer: MEDICARE

## 2021-04-15 VITALS
DIASTOLIC BLOOD PRESSURE: 86 MMHG | RESPIRATION RATE: 12 BRPM | HEART RATE: 61 BPM | OXYGEN SATURATION: 99 % | SYSTOLIC BLOOD PRESSURE: 116 MMHG | TEMPERATURE: 97.3 F | WEIGHT: 116 LBS | BODY MASS INDEX: 19.81 KG/M2 | HEIGHT: 64 IN

## 2021-04-15 DIAGNOSIS — R21 RASH: ICD-10-CM

## 2021-04-15 DIAGNOSIS — T50.Z95A ADVERSE EFFECT OF VACCINE, INITIAL ENCOUNTER: ICD-10-CM

## 2021-04-15 DIAGNOSIS — R51.9 INTRACTABLE EPISODIC HEADACHE, UNSPECIFIED HEADACHE TYPE: ICD-10-CM

## 2021-04-15 PROCEDURE — 99214 OFFICE O/P EST MOD 30 MIN: CPT | Performed by: NURSE PRACTITIONER

## 2021-04-15 RX ORDER — TRIAMCINOLONE ACETONIDE 5 MG/G
CREAM TOPICAL
Qty: 60 G | Refills: 0 | Status: SHIPPED | OUTPATIENT
Start: 2021-04-15 | End: 2022-09-06 | Stop reason: SDUPTHER

## 2021-04-15 ASSESSMENT — ENCOUNTER SYMPTOMS
HEADACHES: 1
CHILLS: 0
FEVER: 0

## 2021-04-15 ASSESSMENT — FIBROSIS 4 INDEX: FIB4 SCORE: 1.42
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
## 2021-05-05 ENCOUNTER — OFFICE VISIT (OUTPATIENT)
Dept: MEDICAL GROUP | Facility: PHYSICIAN GROUP | Age: 71
End: 2021-05-05
Payer: MEDICARE

## 2021-05-05 VITALS
RESPIRATION RATE: 12 BRPM | SYSTOLIC BLOOD PRESSURE: 116 MMHG | HEART RATE: 96 BPM | BODY MASS INDEX: 19.81 KG/M2 | HEIGHT: 64 IN | DIASTOLIC BLOOD PRESSURE: 82 MMHG | OXYGEN SATURATION: 97 % | TEMPERATURE: 98.5 F | WEIGHT: 116 LBS

## 2021-05-05 DIAGNOSIS — Z71.85 VACCINE COUNSELING: ICD-10-CM

## 2021-05-05 PROCEDURE — 99212 OFFICE O/P EST SF 10 MIN: CPT | Performed by: NURSE PRACTITIONER

## 2021-05-05 ASSESSMENT — FIBROSIS 4 INDEX: FIB4 SCORE: 1.42

## 2021-05-05 ASSESSMENT — PATIENT HEALTH QUESTIONNAIRE - PHQ9: CLINICAL INTERPRETATION OF PHQ2 SCORE: 0

## 2021-05-05 NOTE — PROGRESS NOTES
Chief Complaint   Patient presents with   • Headache       HISTORY OF PRESENT ILLNESS: Patient is a 70 y.o. female established patient who presents today to discuss concerns regarding Covid vaccine    Vaccine counseling  Very pleasant 70-year-old female patient presents today for vaccine counseling  She has had her first Covid vaccine, she received the Moderna and was scheduled to have her repeat vaccine today  She is hesitant to have her second shot because she developed the typical rash associated with this particular vaccine, otherwise that she had the chills which only lasted for about a day  Had a long discussion regarding the safety of this vaccine and the importance of getting the second 1 for maximum benefit  Much reassurance was provided to patient, she states she will schedule her second shot as soon as possible      Patient Active Problem List    Diagnosis Date Noted   • Grief reaction 02/21/2017   • Mid back pain on left side 08/14/2015   • Osteoporosis 08/14/2015   • Insomnia 02/27/2014   • Seasonal allergies 05/22/2013   • Vitamin D deficiency 02/28/2013   • Interstitial cystitis 07/07/2016   • Headache 10/03/2014   • Dyslipidemia 10/24/2012   • Vaccine counseling 05/06/2021   • Family history of thyroid cancer 09/21/2020   • Annual physical exam 09/21/2020   • Vaginal atrophy 05/13/2020   • Acute left ankle pain 03/15/2019   • Hemorrhoids 03/15/2019   • Anal itching 03/14/2019   • Elevated blood pressure reading 09/12/2018   • Nicotine dependence with current use 09/12/2018   • Gastroesophageal reflux disease without esophagitis 12/27/2017   • Adjustment disorder with mixed anxiety and depressed mood 04/12/2017   • Varicose vein of leg 03/21/2017       Allergies:Tylenol, Asa [aspirin], Influenza virus vacc, Nsaids, Pcn [penicillins], and Sulfa drugs    Current Outpatient Medications   Medication Sig Dispense Refill   • triamcinolone acetonide (KENALOG) 0.5 % Cream Apply 1g three times a day for 7 days  to affected area 60 g 0   • simvastatin (ZOCOR) 20 MG Tab TAKE 1 TABLET BY MOUTH EVERY EVENING 90 Tab 3   • tizanidine (ZANAFLEX) 2 MG capsule Take 1 Cap by mouth 3 times a day. 90 Cap 1   • multivitamin (THERAGRAN) Tab Take 1 Tab by mouth every day.     • fluticasone (FLONASE) 50 MCG/ACT nasal spray Spray 1 Spray in nose every day. 48 g 3   • VITAMIN D PO Take 2,000 mcg by mouth.     • CRANBERRY PO Take  by mouth.     • ascorbic acid (ASCORBIC ACID) 500 MG Tab Take 500 mg by mouth every day.     • Calcium Carbonate-Vitamin D (CALTRATE 600+D) 600-400 MG-UNIT TABS Take  by mouth 2 Times a Day.         No current facility-administered medications for this visit.       Social History     Tobacco Use   • Smoking status: Current Every Day Smoker     Packs/day: 0.50     Years: 31.00     Pack years: 15.50     Types: Cigarettes   • Smokeless tobacco: Never Used   Substance Use Topics   • Alcohol use: Yes     Alcohol/week: 4.2 oz     Types: 7 Glasses of wine per week     Comment: 1 glass of wine daily   • Drug use: No     Comment: denies h/o heroin, cocaine, meth, IVDU       Family Status   Relation Name Status   • Mo   at age 83        heart failure   • Fa   at age 69        quad bpass, coma   • Bro  Alive        handicap at a facility   • Bro  Alive   • Bro  Alive     Family History   Problem Relation Age of Onset   • Hypertension Mother    • Hyperlipidemia Mother    • Thyroid Mother    • Lung Disease Mother    • Heart Disease Father    • Hypertension Father    • Hyperlipidemia Father        Review of Systems:   Constitutional: Negative for fever, chills, weight loss and malaise/fatigue.   Respiratory: Negative for cough, sputum production, shortness of breath and wheezing.    Cardiovascular: Negative for chest pain, palpitations, orthopnea and leg swelling.   Gastrointestinal: Negative for heartburn, nausea, vomiting and abdominal pain.   Genitourinary/Renal: Negative for dysuria, urgency and frequency.  "  Musculoskeletal: Negative for myalgias, back pain and joint pain.   Skin: Negative for rash and itching.   Neurological: Negative for dizziness, tingling, tremors, sensory change, focal weakness and headaches.       Exam:  /82   Pulse 96   Temp 36.9 °C (98.5 °F) (Temporal)   Resp 12   Ht 1.626 m (5' 4\")   Wt 52.6 kg (116 lb)   SpO2 97%   General:  Well nourished, well developed female in NAD  Skin: warm, dry, intact, no evidence of rash or concerning lesions  Head: is grossly normal.  HEENT: eyes clear, conjunctiva normal, PERRLA,TMs normal; bilaterally  Neck: Supple without JVD or bruit. Thyroid is not enlarged.  Pulmonary: Regular rate, rhythm and effort  Musculoskeletal: no clubbing, cyanosis, or edema.  Psych/mental: no depression, anxiety, hallucinations  Neuro: alert, intact, CN 2-12 grossly intact    Medical decision-making and discussion:    As mentioned above, patient will schedule her second Covid vaccine as soon as possible        Assessment/Plan:  Tanisha was seen today for headache.    Diagnoses and all orders for this visit:    Vaccine counseling         Return if symptoms worsen or fail to improve, for Follow-up.    Please note that this dictation was created using voice recognition software. I have made every reasonable attempt to correct obvious errors, but I expect that there are errors of grammar and possibly content that I did not discover before finalizing the note.    "

## 2021-05-06 PROBLEM — Z71.85 VACCINE COUNSELING: Status: ACTIVE | Noted: 2021-05-06

## 2021-05-06 NOTE — ASSESSMENT & PLAN NOTE
Very pleasant 70-year-old female patient presents today for vaccine counseling  She has had her first Covid vaccine, she received the Moderna and was scheduled to have her repeat vaccine today  She is hesitant to have her second shot because she developed the typical rash associated with this particular vaccine, otherwise that she had the chills which only lasted for about a day  Had a long discussion regarding the safety of this vaccine and the importance of getting the second 1 for maximum benefit  Much reassurance was provided to patient, she states she will schedule her second shot as soon as possible

## 2021-06-28 ENCOUNTER — OFFICE VISIT (OUTPATIENT)
Dept: MEDICAL GROUP | Facility: PHYSICIAN GROUP | Age: 71
End: 2021-06-28
Payer: MEDICARE

## 2021-06-28 VITALS
RESPIRATION RATE: 14 BRPM | TEMPERATURE: 98.7 F | WEIGHT: 112 LBS | BODY MASS INDEX: 19.12 KG/M2 | HEIGHT: 64 IN | DIASTOLIC BLOOD PRESSURE: 80 MMHG | OXYGEN SATURATION: 98 % | HEART RATE: 78 BPM | SYSTOLIC BLOOD PRESSURE: 122 MMHG

## 2021-06-28 DIAGNOSIS — M25.512 ACUTE PAIN OF LEFT SHOULDER: ICD-10-CM

## 2021-06-28 PROCEDURE — 99213 OFFICE O/P EST LOW 20 MIN: CPT | Performed by: NURSE PRACTITIONER

## 2021-06-28 RX ORDER — TIZANIDINE HYDROCHLORIDE 2 MG/1
2 CAPSULE, GELATIN COATED ORAL 3 TIMES DAILY
Qty: 60 CAPSULE | Refills: 0 | Status: SHIPPED | OUTPATIENT
Start: 2021-06-28 | End: 2021-11-02 | Stop reason: SDUPTHER

## 2021-06-28 ASSESSMENT — FIBROSIS 4 INDEX: FIB4 SCORE: 1.44

## 2021-06-28 NOTE — PROGRESS NOTES
Chief Complaint   Patient presents with   • Shoulder Pain     left shoulder pain fell on 06/17 at home        HISTORY OF PRESENT ILLNESS: Patient is a 71 y.o. female established patient who presents today to discuss left shoulder pain    Acute pain of left shoulder  Pt fell onto L shoulder on 06/17, tripped over one of her dogs toys  She is having pain over her left scapula as well as her bicep tendon  She does have mildly decreased range of motion to the left upper extremity/shoulder  She does agree to imaging, she will be notified of results and further actions if needed  She has been taking over-the-counter analgesics such as ibuprofen or acetaminophen, will trial a low-dose of muscle relaxer, warned not to drive while taking due to sedating effects  Discussed the use of heat or ice as well as gentle range of motion exercises and stretches        Patient Active Problem List    Diagnosis Date Noted   • Acute pain of left shoulder 06/28/2021   • Vaccine counseling 05/06/2021   • Family history of thyroid cancer 09/21/2020   • Annual physical exam 09/21/2020   • Vaginal atrophy 05/13/2020   • Acute left ankle pain 03/15/2019   • Hemorrhoids 03/15/2019   • Anal itching 03/14/2019   • Elevated blood pressure reading 09/12/2018   • Nicotine dependence with current use 09/12/2018   • Gastroesophageal reflux disease without esophagitis 12/27/2017   • Adjustment disorder with mixed anxiety and depressed mood 04/12/2017   • Varicose vein of leg 03/21/2017   • Grief reaction 02/21/2017   • Interstitial cystitis 07/07/2016   • Mid back pain on left side 08/14/2015   • Osteoporosis 08/14/2015   • Headache 10/03/2014   • Insomnia 02/27/2014   • Seasonal allergies 05/22/2013   • Vitamin D deficiency 02/28/2013   • Dyslipidemia 10/24/2012       Allergies:Tylenol, Asa [aspirin], Influenza virus vacc, Nsaids, Pcn [penicillins], and Sulfa drugs    Current Outpatient Medications   Medication Sig Dispense Refill   • tizanidine  (ZANAFLEX) 2 MG capsule Take 1 capsule by mouth 3 times a day. 60 capsule 0   • triamcinolone acetonide (KENALOG) 0.5 % Cream Apply 1g three times a day for 7 days to affected area 60 g 0   • simvastatin (ZOCOR) 20 MG Tab TAKE 1 TABLET BY MOUTH EVERY EVENING 90 Tab 3   • tizanidine (ZANAFLEX) 2 MG capsule Take 1 Cap by mouth 3 times a day. 90 Cap 1   • multivitamin (THERAGRAN) Tab Take 1 Tab by mouth every day.     • fluticasone (FLONASE) 50 MCG/ACT nasal spray Spray 1 Spray in nose every day. 48 g 3   • VITAMIN D PO Take 2,000 mcg by mouth.     • CRANBERRY PO Take  by mouth.     • ascorbic acid (ASCORBIC ACID) 500 MG Tab Take 500 mg by mouth every day.     • Calcium Carbonate-Vitamin D (CALTRATE 600+D) 600-400 MG-UNIT TABS Take  by mouth 2 Times a Day.         No current facility-administered medications for this visit.       Social History     Tobacco Use   • Smoking status: Current Every Day Smoker     Packs/day: 0.50     Years: 31.00     Pack years: 15.50     Types: Cigarettes   • Smokeless tobacco: Never Used   Vaping Use   • Vaping Use: Never used   Substance Use Topics   • Alcohol use: Yes     Alcohol/week: 4.2 oz     Types: 7 Glasses of wine per week     Comment: 1 glass of wine daily   • Drug use: No     Comment: denies h/o heroin, cocaine, meth, IVDU       Family Status   Relation Name Status   • Mo   at age 83        heart failure   • Fa   at age 69        quad bpass, coma   • Bro  Alive        handicap at a facility   • Bro  Alive   • Bro  Alive     Family History   Problem Relation Age of Onset   • Hypertension Mother    • Hyperlipidemia Mother    • Thyroid Mother    • Lung Disease Mother    • Heart Disease Father    • Hypertension Father    • Hyperlipidemia Father        Review of Systems:   Constitutional: Negative for fever, chills, weight loss and malaise/fatigue.   Respiratory: Negative for cough, sputum production, shortness of breath and wheezing.    Cardiovascular: Negative for  "chest pain, palpitations, orthopnea and leg swelling.   Gastrointestinal: Negative for heartburn, nausea, vomiting and abdominal pain.   Genitourinary/Renal: Negative for dysuria, urgency and frequency.   Musculoskeletal: Positive per HPI  Skin: Negative for rash and itching.   Neurological: Negative for dizziness, tingling, tremors, sensory change, focal weakness and headaches.       Exam:  /80 (BP Location: Left arm, Patient Position: Sitting, BP Cuff Size: Adult)   Pulse 78   Temp 37.1 °C (98.7 °F) (Temporal)   Resp 14   Ht 1.626 m (5' 4\")   Wt 50.8 kg (112 lb)   SpO2 98%   General:  Well nourished, well developed female in NAD  Skin: warm, dry, intact, no evidence of rash or concerning lesions  Head: is grossly normal.  HEENT: eyes clear, conjunctiva normal, PERRLA  Pulmonary: Normal rate, rhythm and effort  Musculoskeletal: no clubbing, cyanosis, or edema.  Positive for decreased range of motion to left upper extremity/shoulder, tenderness to palpation over left scapula  Psych/mental: no depression, anxiety, hallucinations  Neuro: alert, intact, CN 2-12 grossly intact    Medical decision-making and discussion:    As mentioned above we will get imaging of her left shoulder and scapula, she will be notified of results and further actions if needed.  Discussed the importance of supportive measures including ice and/or heat, over-the-counter analgesics, will trial low-dose muscle relaxer, warned not to drive while taking due to sedating effects        Assessment/Plan:  Tanisha was seen today for shoulder pain.    Diagnoses and all orders for this visit:    Acute pain of left shoulder  -     DX-SHOULDER 2+ LEFT; Future  -     DX-SCAPULA LEFT; Future  -     tizanidine (ZANAFLEX) 2 MG capsule; Take 1 capsule by mouth 3 times a day.         Return if symptoms worsen or fail to improve, for Follow-up.    Please note that this dictation was created using voice recognition software. I have made every reasonable " attempt to correct obvious errors, but I expect that there are errors of grammar and possibly content that I did not discover before finalizing the note.

## 2021-06-28 NOTE — ASSESSMENT & PLAN NOTE
Pt fell onto L shoulder on 06/17, tripped over one of her dogs toys  She is having pain over her left scapula as well as her bicep tendon  She does have mildly decreased range of motion to the left upper extremity/shoulder  She does agree to imaging, she will be notified of results and further actions if needed  She has been taking over-the-counter analgesics such as ibuprofen or acetaminophen, will trial a low-dose of muscle relaxer, warned not to drive while taking due to sedating effects  Discussed the use of heat or ice as well as gentle range of motion exercises and stretches

## 2021-06-28 NOTE — LETTER
June 28, 2021         Patient: Tanisha Haile   YOB: 1950   Date of Visit: 6/28/2021           To Whom it May Concern:    Tanisha Haile was seen in my clinic on 6/28/2021. She may return to work on 07/01/21.    If you have any questions or concerns, please don't hesitate to call.        Sincerely,           MARTI Chawla.  Electronically Signed

## 2021-06-30 ENCOUNTER — TELEPHONE (OUTPATIENT)
Dept: URGENT CARE | Facility: PHYSICIAN GROUP | Age: 71
End: 2021-06-30

## 2021-06-30 ENCOUNTER — TELEPHONE (OUTPATIENT)
Dept: MEDICAL GROUP | Facility: PHYSICIAN GROUP | Age: 71
End: 2021-06-30

## 2021-06-30 DIAGNOSIS — M25.512 ACUTE PAIN OF LEFT SHOULDER: ICD-10-CM

## 2021-06-30 NOTE — TELEPHONE ENCOUNTER
Please the patient know that I have reviewed the x-ray results of her shoulder and scapula, they are both normal, no evidence of fracture or dislocation.  I am not sure if she wants to keep her upcoming appointment with me this afternoon to go over the results.  Because her x-rays were normal does not mean there is not a tendon or ligament issue which would be better visualized with a CT scan or MRI if she is still having significant pain and decreased range of motion.

## 2021-06-30 NOTE — TELEPHONE ENCOUNTER
Called patient she still wants you to see her.  She needs a note for work because she lift from 2 - 50lbs.

## 2021-10-20 DIAGNOSIS — E55.9 VITAMIN D DEFICIENCY: ICD-10-CM

## 2021-10-20 DIAGNOSIS — E78.5 DYSLIPIDEMIA: ICD-10-CM

## 2021-10-20 DIAGNOSIS — Z13.0 ENCOUNTER FOR SCREENING FOR HEMATOLOGIC DISORDER: ICD-10-CM

## 2021-10-20 RX ORDER — SIMVASTATIN 20 MG
TABLET ORAL
Qty: 90 TABLET | Refills: 0 | Status: SHIPPED | OUTPATIENT
Start: 2021-10-20 | End: 2021-11-02 | Stop reason: SDUPTHER

## 2021-10-20 NOTE — TELEPHONE ENCOUNTER
Received request via: Patient    Was the patient seen in the last year in this department? Yes    Does the patient have an active prescription (recently filled or refills available) for medication(s) requested? No    Requested Prescriptions     Pending Prescriptions Disp Refills   • simvastatin (ZOCOR) 20 MG Tab 90 Tablet 3     Sig: TAKE 1 TABLET BY MOUTH EVERY EVENING

## 2021-10-26 ENCOUNTER — APPOINTMENT (OUTPATIENT)
Dept: RADIOLOGY | Facility: IMAGING CENTER | Age: 71
End: 2021-10-26
Attending: PHYSICIAN ASSISTANT
Payer: MEDICARE

## 2021-10-26 ENCOUNTER — OFFICE VISIT (OUTPATIENT)
Dept: URGENT CARE | Facility: PHYSICIAN GROUP | Age: 71
End: 2021-10-26
Payer: MEDICARE

## 2021-10-26 VITALS
OXYGEN SATURATION: 99 % | TEMPERATURE: 97.3 F | SYSTOLIC BLOOD PRESSURE: 122 MMHG | WEIGHT: 109 LBS | BODY MASS INDEX: 21.4 KG/M2 | RESPIRATION RATE: 16 BRPM | DIASTOLIC BLOOD PRESSURE: 86 MMHG | HEART RATE: 68 BPM | HEIGHT: 60 IN
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
## 2021-10-26 DIAGNOSIS — R07.81 RIB PAIN: ICD-10-CM

## 2021-10-26 DIAGNOSIS — S20.212A CONTUSION OF RIB ON LEFT SIDE, INITIAL ENCOUNTER: ICD-10-CM

## 2021-10-26 PROCEDURE — 71101 X-RAY EXAM UNILAT RIBS/CHEST: CPT | Mod: TC,FY,LT | Performed by: PHYSICIAN ASSISTANT

## 2021-10-26 PROCEDURE — 99214 OFFICE O/P EST MOD 30 MIN: CPT | Performed by: PHYSICIAN ASSISTANT

## 2021-10-26 ASSESSMENT — ENCOUNTER SYMPTOMS
BACK PAIN: 1
TINGLING: 0
NECK PAIN: 0
FALLS: 1
PERIANAL NUMBNESS: 0
SENSORY CHANGE: 0
BOWEL INCONTINENCE: 0

## 2021-10-26 ASSESSMENT — FIBROSIS 4 INDEX: FIB4 SCORE: 1.44

## 2021-10-26 NOTE — LETTER
October 26, 2021         Patient: Tanisha Haile   YOB: 1950   Date of Visit: 10/26/2021           To Whom it May Concern:    Tanisha Haile was seen in my clinic on 10/26/2021. Please excuse this patient from work today (10/26- 10/28) due to recent injury   If you have any questions or concerns, please don't hesitate to call.        Sincerely,           Butch Waite P.A.-C.  Electronically Signed

## 2021-10-26 NOTE — PROGRESS NOTES
"Subjective     Tanisha Haile is a 71 y.o. female who presents with Back Pain (fell backwards on to a railing for her stairs x4 days ago)            Patient is a 71-year-old female who presents to urgent care with left sided back-rib pain after falling backwards and hitting a railing of her stairs 4 days ago.  Patient denies any significant shortness of breath although does report significant discomfort if taking a deep breath.  She is not been taking anything for pain as she is \"allergic to several things \".  She reports leaning over in particular worsens her systems.  Upon the fall she did not hit her head or neck and denies loss of consciousness.    Back Pain  This is a new problem. The current episode started in the past 7 days. The problem occurs constantly. The problem has been gradually worsening since onset. The pain is present in the thoracic spine. The quality of the pain is described as aching. The pain is moderate. Pertinent negatives include no bladder incontinence, bowel incontinence, chest pain, pelvic pain, perianal numbness or tingling. She has tried nothing for the symptoms.       Review of Systems   Cardiovascular: Negative for chest pain.   Gastrointestinal: Negative for bowel incontinence.   Genitourinary: Negative for bladder incontinence and pelvic pain.   Musculoskeletal: Positive for back pain and falls. Negative for joint pain and neck pain.   Neurological: Negative for tingling and sensory change.   All other systems reviewed and are negative.             Objective     /86   Pulse 68   Temp 36.3 °C (97.3 °F)   Resp 16   Ht 1.524 m (5')   Wt 49.4 kg (109 lb)   LMP  (LMP Unknown)   SpO2 99%   BMI 21.29 kg/m²      Physical Exam  Vitals reviewed.   Constitutional:       General: She is not in acute distress.     Appearance: She is well-developed.   HENT:      Head: Normocephalic and atraumatic.   Eyes:      Conjunctiva/sclera: Conjunctivae normal.      Pupils: Pupils are equal, " round, and reactive to light.   Neck:      Trachea: No tracheal deviation.   Cardiovascular:      Rate and Rhythm: Normal rate.   Pulmonary:      Effort: Pulmonary effort is normal.      Breath sounds: Normal breath sounds.   Musculoskeletal:      Cervical back: Normal range of motion and neck supple.        Back:       Comments: Left-sided posterior ribs diffusely tender without bony step-off or noted deformity.  Faint ecchymosis noted without swelling.   Skin:     General: Skin is warm.      Findings: No rash.      Comments: No rash to area exposed during the visit today.    Neurological:      Mental Status: She is alert and oriented to person, place, and time.      Coordination: Coordination normal.   Psychiatric:         Behavior: Behavior normal.         Thought Content: Thought content normal.         Judgment: Judgment normal.                             Assessment & Plan        1. Contusion of rib on left side, initial encounter  2. Fall, initial encounter  - [id number] (WITH 1-VIEW CXR) LEFT; Future    3. Rib pain  - [id number] (WITH 1-VIEW CXR) LEFT; Future              RADIOLOGY RESULTS   [id number] (WITH 1-VIEW CXR) LEFT    Result Date: 10/26/2021  10/26/2021 10:51 AM HISTORY/REASON FOR EXAM:  Pain Following Trauma; LEft posterior lower ribs.. TECHNIQUE/EXAM DESCRIPTION AND NUMBER OF VIEWS:  5 images of the ribs and chest. COMPARISON: 5/31/2019 FINDINGS: No fractures or acute bony changes are noted.  There is no evidence of a hemo or pneumothorax. Lungs appear hyperinflated bilaterally.    Normal rib series. The lungs appear hyperinflated.       Discussed rib x-ray with the patient, encouraged breathing exercises to avoid atelectasis.  Encouraged topical agents, light stretching.  Work note was provided as well.  Appropriate PPE worn at all times by provider.   Pt. Had face mask on throughout entirety of the visit other than oropharyngeal examination today.     Side effects of OTC  or prescribed medications discussed.     DDX, Supportive care, and indications for immediate follow-up discussed with patient.    Instructed to return to clinic or nearest emergency department if we are not available for any change in condition, further concerns, or worsening of symptoms.    The patient and/or guardian demonstrated a good understanding and agreed with the treatment plan.    Please note that this dictation was created using voice recognition software. I have made every reasonable attempt to correct obvious errors, but I expect that there are errors of grammar and possibly content that I did not discover before finalizing the note.

## 2021-11-01 ENCOUNTER — HOSPITAL ENCOUNTER (OUTPATIENT)
Dept: LAB | Facility: MEDICAL CENTER | Age: 71
End: 2021-11-01
Attending: NURSE PRACTITIONER
Payer: MEDICARE

## 2021-11-01 DIAGNOSIS — E55.9 VITAMIN D DEFICIENCY: ICD-10-CM

## 2021-11-01 DIAGNOSIS — Z13.0 ENCOUNTER FOR SCREENING FOR HEMATOLOGIC DISORDER: ICD-10-CM

## 2021-11-01 DIAGNOSIS — E78.5 DYSLIPIDEMIA: ICD-10-CM

## 2021-11-01 LAB
25(OH)D3 SERPL-MCNC: 69 NG/ML (ref 30–100)
ALBUMIN SERPL BCP-MCNC: 4.2 G/DL (ref 3.2–4.9)
ALBUMIN/GLOB SERPL: 2.1 G/DL
ALP SERPL-CCNC: 67 U/L (ref 30–99)
ALT SERPL-CCNC: 14 U/L (ref 2–50)
ANION GAP SERPL CALC-SCNC: 9 MMOL/L (ref 7–16)
AST SERPL-CCNC: 22 U/L (ref 12–45)
BASOPHILS # BLD AUTO: 0.8 % (ref 0–1.8)
BASOPHILS # BLD: 0.06 K/UL (ref 0–0.12)
BILIRUB SERPL-MCNC: 0.5 MG/DL (ref 0.1–1.5)
BUN SERPL-MCNC: 15 MG/DL (ref 8–22)
CALCIUM SERPL-MCNC: 9.4 MG/DL (ref 8.5–10.5)
CHLORIDE SERPL-SCNC: 106 MMOL/L (ref 96–112)
CHOLEST SERPL-MCNC: 162 MG/DL (ref 100–199)
CO2 SERPL-SCNC: 29 MMOL/L (ref 20–33)
CREAT SERPL-MCNC: 0.56 MG/DL (ref 0.5–1.4)
EOSINOPHIL # BLD AUTO: 0.69 K/UL (ref 0–0.51)
EOSINOPHIL NFR BLD: 9.7 % (ref 0–6.9)
ERYTHROCYTE [DISTWIDTH] IN BLOOD BY AUTOMATED COUNT: 48.1 FL (ref 35.9–50)
FASTING STATUS PATIENT QL REPORTED: NORMAL
GLOBULIN SER CALC-MCNC: 2 G/DL (ref 1.9–3.5)
GLUCOSE SERPL-MCNC: 91 MG/DL (ref 65–99)
HCT VFR BLD AUTO: 41.7 % (ref 37–47)
HDLC SERPL-MCNC: 98 MG/DL
HGB BLD-MCNC: 14.2 G/DL (ref 12–16)
IMM GRANULOCYTES # BLD AUTO: 0.01 K/UL (ref 0–0.11)
IMM GRANULOCYTES NFR BLD AUTO: 0.1 % (ref 0–0.9)
LDLC SERPL CALC-MCNC: 54 MG/DL
LYMPHOCYTES # BLD AUTO: 1.37 K/UL (ref 1–4.8)
LYMPHOCYTES NFR BLD: 19.3 % (ref 22–41)
MCH RBC QN AUTO: 33.1 PG (ref 27–33)
MCHC RBC AUTO-ENTMCNC: 34.1 G/DL (ref 33.6–35)
MCV RBC AUTO: 97.2 FL (ref 81.4–97.8)
MONOCYTES # BLD AUTO: 0.48 K/UL (ref 0–0.85)
MONOCYTES NFR BLD AUTO: 6.8 % (ref 0–13.4)
NEUTROPHILS # BLD AUTO: 4.5 K/UL (ref 2–7.15)
NEUTROPHILS NFR BLD: 63.3 % (ref 44–72)
NRBC # BLD AUTO: 0 K/UL
NRBC BLD-RTO: 0 /100 WBC
PLATELET # BLD AUTO: 258 K/UL (ref 164–446)
PMV BLD AUTO: 9.8 FL (ref 9–12.9)
POTASSIUM SERPL-SCNC: 4.8 MMOL/L (ref 3.6–5.5)
PROT SERPL-MCNC: 6.2 G/DL (ref 6–8.2)
RBC # BLD AUTO: 4.29 M/UL (ref 4.2–5.4)
SODIUM SERPL-SCNC: 144 MMOL/L (ref 135–145)
TRIGL SERPL-MCNC: 48 MG/DL (ref 0–149)
WBC # BLD AUTO: 7.1 K/UL (ref 4.8–10.8)

## 2021-11-01 PROCEDURE — 85025 COMPLETE CBC W/AUTO DIFF WBC: CPT

## 2021-11-01 PROCEDURE — 80053 COMPREHEN METABOLIC PANEL: CPT

## 2021-11-01 PROCEDURE — 80061 LIPID PANEL: CPT

## 2021-11-01 PROCEDURE — 82306 VITAMIN D 25 HYDROXY: CPT

## 2021-11-01 PROCEDURE — 36415 COLL VENOUS BLD VENIPUNCTURE: CPT

## 2021-11-02 ENCOUNTER — OFFICE VISIT (OUTPATIENT)
Dept: MEDICAL GROUP | Facility: PHYSICIAN GROUP | Age: 71
End: 2021-11-02
Payer: MEDICARE

## 2021-11-02 VITALS
OXYGEN SATURATION: 100 % | RESPIRATION RATE: 18 BRPM | HEART RATE: 73 BPM | TEMPERATURE: 98.3 F | SYSTOLIC BLOOD PRESSURE: 128 MMHG | BODY MASS INDEX: 18.82 KG/M2 | DIASTOLIC BLOOD PRESSURE: 86 MMHG | HEIGHT: 64 IN | WEIGHT: 110.2 LBS

## 2021-11-02 DIAGNOSIS — Z78.0 POSTMENOPAUSAL STATUS (AGE-RELATED) (NATURAL): ICD-10-CM

## 2021-11-02 DIAGNOSIS — Z12.31 ENCOUNTER FOR SCREENING MAMMOGRAM FOR BREAST CANCER: ICD-10-CM

## 2021-11-02 DIAGNOSIS — E78.5 DYSLIPIDEMIA: ICD-10-CM

## 2021-11-02 DIAGNOSIS — M25.512 ACUTE PAIN OF LEFT SHOULDER: ICD-10-CM

## 2021-11-02 PROCEDURE — 99213 OFFICE O/P EST LOW 20 MIN: CPT | Performed by: NURSE PRACTITIONER

## 2021-11-02 RX ORDER — FLUTICASONE PROPIONATE 50 MCG
1 SPRAY, SUSPENSION (ML) NASAL DAILY
Qty: 48 G | Refills: 3 | Status: SHIPPED | OUTPATIENT
Start: 2021-11-02 | End: 2022-09-06 | Stop reason: SDUPTHER

## 2021-11-02 RX ORDER — TIZANIDINE HYDROCHLORIDE 2 MG/1
2 CAPSULE, GELATIN COATED ORAL 3 TIMES DAILY
Qty: 60 CAPSULE | Refills: 2 | Status: SHIPPED | OUTPATIENT
Start: 2021-11-02 | End: 2022-04-18

## 2021-11-02 RX ORDER — SIMVASTATIN 20 MG
TABLET ORAL
Qty: 90 TABLET | Refills: 3 | Status: SHIPPED | OUTPATIENT
Start: 2021-11-02 | End: 2022-09-06 | Stop reason: SDUPTHER

## 2021-11-02 ASSESSMENT — FIBROSIS 4 INDEX: FIB4 SCORE: 1.62

## 2021-11-03 NOTE — ASSESSMENT & PLAN NOTE
Patient does have chronic pain in her left shoulder after tripping over her dog this past summer  Continues to have pain that comes and goes  Adequately controlled with as needed use of tizanidine she is requesting a refill  Again reminded her not to take while driving due to sedating effects

## 2021-11-03 NOTE — ASSESSMENT & PLAN NOTE
The 10-year ASCVD risk score (Avery TILLEY Jr., et al., 2013) is: 14.4%  Is a controlled and stable  Appropriately on statin  Takes simvastatin 20 mg daily  She does need refills  Discussed the importance of ongoing healthy lifestyle modifications

## 2021-11-03 NOTE — PROGRESS NOTES
Chief Complaint   Patient presents with   • Medication Refill     xanaflex, flonase       HISTORY OF PRESENT ILLNESS: Patient is a 71 y.o. female established patient who presents today for follow-up, medication refills    Dyslipidemia  The 10-year ASCVD risk score (Pariscathy TILLEY Jr., et al., 2013) is: 14.4%  Is a controlled and stable  Appropriately on statin  Takes simvastatin 20 mg daily  She does need refills  Discussed the importance of ongoing healthy lifestyle modifications    Acute pain of left shoulder  Patient does have chronic pain in her left shoulder after tripping over her dog this past summer  Continues to have pain that comes and goes  Adequately controlled with as needed use of tizanidine she is requesting a refill  Again reminded her not to take while driving due to sedating effects      Patient Active Problem List    Diagnosis Date Noted   • Acute pain of left shoulder 06/28/2021   • Vaccine counseling 05/06/2021   • Family history of thyroid cancer 09/21/2020   • Annual physical exam 09/21/2020   • Vaginal atrophy 05/13/2020   • Acute left ankle pain 03/15/2019   • Hemorrhoids 03/15/2019   • Anal itching 03/14/2019   • Elevated blood pressure reading 09/12/2018   • Nicotine dependence with current use 09/12/2018   • Gastroesophageal reflux disease without esophagitis 12/27/2017   • Adjustment disorder with mixed anxiety and depressed mood 04/12/2017   • Varicose vein of leg 03/21/2017   • Grief reaction 02/21/2017   • Interstitial cystitis 07/07/2016   • Mid back pain on left side 08/14/2015   • Osteoporosis 08/14/2015   • Headache 10/03/2014   • Insomnia 02/27/2014   • Seasonal allergies 05/22/2013   • Vitamin D deficiency 02/28/2013   • Dyslipidemia 10/24/2012       Allergies:Tylenol, Asa [aspirin], Influenza virus vacc, Nsaids, Pcn [penicillins], and Sulfa drugs    Current Outpatient Medications   Medication Sig Dispense Refill   • simvastatin (ZOCOR) 20 MG Tab TAKE 1 TABLET BY MOUTH EVERY EVENING 90  Tablet 3   • fluticasone (FLONASE) 50 MCG/ACT nasal spray Administer 1 Spray into affected nostril(S) every day. 48 g 3   • tizanidine (ZANAFLEX) 2 MG capsule Take 1 Capsule by mouth 3 times a day. 60 Capsule 2   • triamcinolone acetonide (KENALOG) 0.5 % Cream Apply 1g three times a day for 7 days to affected area 60 g 0   • multivitamin (THERAGRAN) Tab Take 1 Tab by mouth every day.     • VITAMIN D PO Take 2,000 mcg by mouth.     • CRANBERRY PO Take  by mouth.     • ascorbic acid (ASCORBIC ACID) 500 MG Tab Take 500 mg by mouth every day.     • Calcium Carbonate-Vitamin D (CALTRATE 600+D) 600-400 MG-UNIT TABS Take  by mouth 2 Times a Day.         No current facility-administered medications for this visit.       Social History     Tobacco Use   • Smoking status: Current Every Day Smoker     Packs/day: 0.50     Years: 31.00     Pack years: 15.50     Types: Cigarettes   • Smokeless tobacco: Never Used   Vaping Use   • Vaping Use: Never used   Substance Use Topics   • Alcohol use: Yes     Alcohol/week: 4.2 oz     Types: 7 Glasses of wine per week     Comment: 1 glass of wine daily   • Drug use: No     Comment: denies h/o heroin, cocaine, meth, IVDU       Family Status   Relation Name Status   • Mo   at age 83        heart failure   • Fa   at age 69        quad bpass, coma   • Bro  Alive        handicap at a facility   • Bro  Alive   • Bro  Alive     Family History   Problem Relation Age of Onset   • Hypertension Mother    • Hyperlipidemia Mother    • Thyroid Mother    • Lung Disease Mother    • Heart Disease Father    • Hypertension Father    • Hyperlipidemia Father        Review of Systems:   Constitutional: Negative for fever, chills, weight loss and malaise/fatigue.   Respiratory: Negative for cough, sputum production, shortness of breath and wheezing.    Cardiovascular: Negative for chest pain, palpitations, orthopnea and leg swelling.   Gastrointestinal: Negative for heartburn, nausea, vomiting and  "abdominal pain.   Genitourinary/Renal: Negative for dysuria, urgency and frequency.   Musculoskeletal: Negative for myalgias, back pain and joint pain.   Skin: Negative for rash and itching.   Neurological: Negative for dizziness, tingling, tremors, sensory change, focal weakness and headaches.   Endo/Heme/Allergies: Does not bruise/bleed easily.       Exam:  /86 (Patient Position: Sitting)   Pulse 73   Temp 36.8 °C (98.3 °F) (Temporal)   Resp 18   Ht 1.626 m (5' 4\")   Wt 50 kg (110 lb 3.2 oz)   SpO2 100%   General:  Well nourished, well developed female in NAD  Skin: warm, dry, intact, no evidence of rash or concerning lesions  Head: is grossly normal.  HEENT: eyes clear, conjunctiva normal, PERRLA  Pulmonary: Clear to ausculation. Normal effort. No rales, ronchi, or wheezing.  Cardiovascular: Regular rate and rhythm without murmur. Carotid and radial pulses are intact and equal bilaterally.  Abdomen: soft, non-tender, positive bowel sounds  Musculoskeletal: no clubbing, cyanosis, or edema.  Psych/mental: no depression, anxiety, hallucinations  Neuro: alert, intact, CN 2-12 grossly intact        Assessment/Plan:    1. Dyslipidemia  -take meds as prescribed and call for refills when needed  -have labs done when due/ordered  -continue with healthy lifestyle modifications    - simvastatin (ZOCOR) 20 MG Tab; TAKE 1 TABLET BY MOUTH EVERY EVENING  Dispense: 90 Tablet; Refill: 3    2. Acute pain of left shoulder  Tizanidine refilled, advised menstrual taking due to sedating effects  - tizanidine (ZANAFLEX) 2 MG capsule; Take 1 Capsule by mouth 3 times a day.  Dispense: 60 Capsule; Refill: 2    3. Encounter for screening mammogram for breast cancer  Due for annual mammogram and DEXA scan, these been ordered  - MA-SCREENING MAMMO BILAT W/TOMOSYNTHESIS W/CAD; Future    4. Postmenopausal status (age-related) (natural)    - DS-BONE DENSITY STUDY (DEXA); Future           Return in about 1 year (around 11/2/2022) for " Wellness / Physical Exam.        Please note that this dictation was created using voice recognition software. I have made every reasonable attempt to correct obvious errors, but I expect that there are errors of grammar and possibly content that I did not discover before finalizing the note.

## 2021-12-15 ENCOUNTER — HOSPITAL ENCOUNTER (OUTPATIENT)
Dept: RADIOLOGY | Facility: MEDICAL CENTER | Age: 71
End: 2021-12-15
Attending: NURSE PRACTITIONER
Payer: MEDICARE

## 2021-12-15 DIAGNOSIS — Z78.0 POSTMENOPAUSAL STATUS (AGE-RELATED) (NATURAL): ICD-10-CM

## 2021-12-15 DIAGNOSIS — Z12.31 ENCOUNTER FOR SCREENING MAMMOGRAM FOR BREAST CANCER: ICD-10-CM

## 2021-12-15 PROCEDURE — 77080 DXA BONE DENSITY AXIAL: CPT

## 2021-12-15 PROCEDURE — 77063 BREAST TOMOSYNTHESIS BI: CPT

## 2021-12-18 ENCOUNTER — TELEPHONE (OUTPATIENT)
Dept: URGENT CARE | Facility: PHYSICIAN GROUP | Age: 71
End: 2021-12-18

## 2021-12-18 NOTE — TELEPHONE ENCOUNTER
----- Message from Jayden Cabrera M.D. sent at 12/16/2021 10:47 AM PST -----  This patient needs an appointment within the next week. Please schedule this with the patient so we may discuss their lab results

## 2022-01-11 ENCOUNTER — HOSPITAL ENCOUNTER (OUTPATIENT)
Facility: MEDICAL CENTER | Age: 72
End: 2022-01-11
Attending: FAMILY MEDICINE
Payer: MEDICARE

## 2022-01-11 ENCOUNTER — OFFICE VISIT (OUTPATIENT)
Dept: URGENT CARE | Facility: PHYSICIAN GROUP | Age: 72
End: 2022-01-11
Payer: MEDICARE

## 2022-01-11 VITALS
HEIGHT: 64 IN | SYSTOLIC BLOOD PRESSURE: 142 MMHG | OXYGEN SATURATION: 99 % | RESPIRATION RATE: 16 BRPM | HEART RATE: 71 BPM | TEMPERATURE: 96.9 F | DIASTOLIC BLOOD PRESSURE: 98 MMHG | BODY MASS INDEX: 18.61 KG/M2 | WEIGHT: 109 LBS

## 2022-01-11 DIAGNOSIS — J98.8 RTI (RESPIRATORY TRACT INFECTION): ICD-10-CM

## 2022-01-11 DIAGNOSIS — J02.9 SORETHROAT: ICD-10-CM

## 2022-01-11 LAB
INT CON NEG: NEGATIVE
INT CON POS: POSITIVE
S PYO AG THROAT QL: NEGATIVE

## 2022-01-11 PROCEDURE — 87880 STREP A ASSAY W/OPTIC: CPT | Performed by: FAMILY MEDICINE

## 2022-01-11 PROCEDURE — 0240U HCHG SARS-COV-2 COVID-19 NFCT DS RESP RNA 3 TRGT MIC: CPT

## 2022-01-11 PROCEDURE — 99213 OFFICE O/P EST LOW 20 MIN: CPT | Performed by: FAMILY MEDICINE

## 2022-01-11 ASSESSMENT — ENCOUNTER SYMPTOMS
SORE THROAT: 1
COUGH: 1

## 2022-01-11 ASSESSMENT — FIBROSIS 4 INDEX: FIB4 SCORE: 1.62

## 2022-01-11 NOTE — LETTER
January 11, 2022         Patient: Tanisha Haile   YOB: 1950   Date of Visit: 1/11/2022           To Whom it May Concern:    Tansiha Haile was seen in my clinic on 1/11/2022. She may return to work in 2-3 days.    If you have any questions or concerns, please don't hesitate to call.        Sincerely,           Dimas Lam M.D.  Electronically Signed
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
## 2022-01-12 LAB
FLUAV RNA SPEC QL NAA+PROBE: NEGATIVE
FLUBV RNA SPEC QL NAA+PROBE: NEGATIVE
SARS-COV-2 RNA RESP QL NAA+PROBE: NOTDETECTED
SPECIMEN SOURCE: NORMAL

## 2022-03-01 ENCOUNTER — OFFICE VISIT (OUTPATIENT)
Dept: MEDICAL GROUP | Facility: PHYSICIAN GROUP | Age: 72
End: 2022-03-01
Payer: MEDICARE

## 2022-03-01 VITALS
OXYGEN SATURATION: 100 % | TEMPERATURE: 97.1 F | HEART RATE: 68 BPM | DIASTOLIC BLOOD PRESSURE: 82 MMHG | BODY MASS INDEX: 18.78 KG/M2 | HEIGHT: 64 IN | WEIGHT: 110 LBS | SYSTOLIC BLOOD PRESSURE: 120 MMHG | RESPIRATION RATE: 16 BRPM

## 2022-03-01 DIAGNOSIS — F43.23 ADJUSTMENT DISORDER WITH MIXED ANXIETY AND DEPRESSED MOOD: ICD-10-CM

## 2022-03-01 DIAGNOSIS — E78.5 DYSLIPIDEMIA: ICD-10-CM

## 2022-03-01 DIAGNOSIS — F17.200 NICOTINE DEPENDENCE WITH CURRENT USE: ICD-10-CM

## 2022-03-01 DIAGNOSIS — M81.6 LOCALIZED OSTEOPOROSIS, UNSPECIFIED PATHOLOGICAL FRACTURE PRESENCE: ICD-10-CM

## 2022-03-01 DIAGNOSIS — R03.0 ELEVATED BLOOD PRESSURE READING: ICD-10-CM

## 2022-03-01 DIAGNOSIS — Z76.89 ENCOUNTER TO ESTABLISH CARE: ICD-10-CM

## 2022-03-01 PROBLEM — N95.2 VAGINAL ATROPHY: Status: RESOLVED | Noted: 2020-05-13 | Resolved: 2022-03-01

## 2022-03-01 PROBLEM — M25.572 ACUTE LEFT ANKLE PAIN: Status: RESOLVED | Noted: 2019-03-15 | Resolved: 2022-03-01

## 2022-03-01 PROCEDURE — 99406 BEHAV CHNG SMOKING 3-10 MIN: CPT | Performed by: NURSE PRACTITIONER

## 2022-03-01 PROCEDURE — 99214 OFFICE O/P EST MOD 30 MIN: CPT | Mod: 25 | Performed by: NURSE PRACTITIONER

## 2022-03-01 ASSESSMENT — FIBROSIS 4 INDEX: FIB4 SCORE: 1.62

## 2022-03-01 ASSESSMENT — PATIENT HEALTH QUESTIONNAIRE - PHQ9: CLINICAL INTERPRETATION OF PHQ2 SCORE: 0

## 2022-03-01 NOTE — PATIENT INSTRUCTIONS
Denosumab injection  What is this medicine?  DENOSUMAB (den oh oksana mab) slows bone breakdown. Prolia is used to treat osteoporosis in women after menopause and in men, and in people who are taking corticosteroids for 6 months or more. Xgeva is used to treat a high calcium level due to cancer and to prevent bone fractures and other bone problems caused by multiple myeloma or cancer bone metastases. Xgeva is also used to treat giant cell tumor of the bone.  This medicine may be used for other purposes; ask your health care provider or pharmacist if you have questions.  COMMON BRAND NAME(S): Prolia, XGEVA  What should I tell my health care provider before I take this medicine?  They need to know if you have any of these conditions:  · dental disease  · having surgery or tooth extraction  · infection  · kidney disease  · low levels of calcium or Vitamin D in the blood  · malnutrition  · on hemodialysis  · skin conditions or sensitivity  · thyroid or parathyroid disease  · an unusual reaction to denosumab, other medicines, foods, dyes, or preservatives  · pregnant or trying to get pregnant  · breast-feeding  How should I use this medicine?  This medicine is for injection under the skin. It is given by a health care professional in a hospital or clinic setting.  A special MedGuide will be given to you before each treatment. Be sure to read this information carefully each time.  For Prolia, talk to your pediatrician regarding the use of this medicine in children. Special care may be needed. For Xgeva, talk to your pediatrician regarding the use of this medicine in children. While this drug may be prescribed for children as young as 13 years for selected conditions, precautions do apply.  Overdosage: If you think you have taken too much of this medicine contact a poison control center or emergency room at once.  NOTE: This medicine is only for you. Do not share this medicine with others.  What if I miss a dose?  It is  important not to miss your dose. Call your doctor or health care professional if you are unable to keep an appointment.  What may interact with this medicine?  Do not take this medicine with any of the following medications:  · other medicines containing denosumab  This medicine may also interact with the following medications:  · medicines that lower your chance of fighting infection  · steroid medicines like prednisone or cortisone  This list may not describe all possible interactions. Give your health care provider a list of all the medicines, herbs, non-prescription drugs, or dietary supplements you use. Also tell them if you smoke, drink alcohol, or use illegal drugs. Some items may interact with your medicine.  What should I watch for while using this medicine?  Visit your doctor or health care professional for regular checks on your progress. Your doctor or health care professional may order blood tests and other tests to see how you are doing.  Call your doctor or health care professional for advice if you get a fever, chills or sore throat, or other symptoms of a cold or flu. Do not treat yourself. This drug may decrease your body's ability to fight infection. Try to avoid being around people who are sick.  You should make sure you get enough calcium and vitamin D while you are taking this medicine, unless your doctor tells you not to. Discuss the foods you eat and the vitamins you take with your health care professional.  See your dentist regularly. Brush and floss your teeth as directed. Before you have any dental work done, tell your dentist you are receiving this medicine.  Do not become pregnant while taking this medicine or for 5 months after stopping it. Talk with your doctor or health care professional about your birth control options while taking this medicine. Women should inform their doctor if they wish to become pregnant or think they might be pregnant. There is a potential for serious side  effects to an unborn child. Talk to your health care professional or pharmacist for more information.  What side effects may I notice from receiving this medicine?  Side effects that you should report to your doctor or health care professional as soon as possible:  · allergic reactions like skin rash, itching or hives, swelling of the face, lips, or tongue  · bone pain  · breathing problems  · dizziness  · jaw pain, especially after dental work  · redness, blistering, peeling of the skin  · signs and symptoms of infection like fever or chills; cough; sore throat; pain or trouble passing urine  · signs of low calcium like fast heartbeat, muscle cramps or muscle pain; pain, tingling, numbness in the hands or feet; seizures  · unusual bleeding or bruising  · unusually weak or tired  Side effects that usually do not require medical attention (report to your doctor or health care professional if they continue or are bothersome):  · constipation  · diarrhea  · headache  · joint pain  · loss of appetite  · muscle pain  · runny nose  · tiredness  · upset stomach  This list may not describe all possible side effects. Call your doctor for medical advice about side effects. You may report side effects to FDA at 8-275-FDA-0347.  Where should I keep my medicine?  This medicine is only given in a clinic, doctor's office, or other health care setting and will not be stored at home.  NOTE: This sheet is a summary. It may not cover all possible information. If you have questions about this medicine, talk to your doctor, pharmacist, or health care provider.  © 2020 Elsevier/Gold Standard (2019-04-26 16:10:44)    Eating Plan for Osteoporosis  Osteoporosis causes your bones to become weak and brittle. This puts you at greater risk for bone breaks (fractures) from small bumps or falls. Making changes to your diet and increasing your physical activity can help strengthen your bones and improve your overall health.  Calcium and vitamin D  are nutrients that play an important role in bone health. Vitamin D helps your body use calcium and strengthen bones. Therefore, it is important to get enough calcium and vitamin D as part of your eating plan for osteoporosis.  What are tips for following this plan?  Reading food labels  · Try to get at least 1,000 milligrams (mg) of calcium each day.  · Look for foods that have at least 50 mg of calcium per serving.  · Talk with your health care provider about taking a calcium supplement if you do not get enough calcium from food.  · Do not have more than 2,500 mg of calcium each day. This is the upper limit for food and nutritional supplements combined. Too much calcium may cause constipation and prevent you from absorbing other important nutrients.  · Choose foods that contain vitamin D.  · Take a daily vitamin supplement that contains 800-1,000 international units (IU) of vitamin D. The amount may be different depending on your age, body weight, ethnicity, and where you live. Talk with your dietitian or health care provider about how much vitamin D is right for you.  · Avoid foods that have more than 300 mg of sodium per serving. Too much sodium can cause your body to lose calcium.  · Talk with your dietitian or health care provider about how much sodium you are allowed each day.  Shopping  · Do not buy foods with added salt, including:  ? Salted snacks.  ? Pickles.  ? Canned soups.  ? Canned meats.  ? Processed meats, such as carroll or cold cuts.  ? Smoked fish.  Meal planning  · Eat balanced meals that contain protein foods, fruits and vegetables, and foods rich in calcium and vitamin D.  · Eat at least 5 servings of fruits and vegetables each day.  · Eat 5-6 oz. of lean meat, poultry, fish, eggs, or beans each day.  Lifestyle  · Do not use any products that contain nicotine or tobacco, such as cigarettes and e-cigarettes. If you need help quitting, ask your health care provider.  · If your health care provider  recommends that you lose weight:  ? Work with a dietitian to develop an eating plan that will help you reach your desired weight goal.  ? Exercise for at least 30 minutes a day, 5 or more days a week, or as told by your health care provider.  · Work with a physical therapist to develop an exercise plan that includes flexibility, balance, and strength exercises.  · If you drink alcohol, limit how much you have. This means:  ? 0-1 drink a day for women.  ? 0-2 drinks a day for men.  ? Be aware of how much alcohol is in your drink. In the [place] one drink equals one typical bottle of beer (12 oz), one-half glass of wine (5 oz), or one shot of hard liquor (1½ oz).  What foods should I eat?  Foods high in calcium    · Yogurt. Yogurt with fruit.  · Milk. Evaporated skim milk. Dry milk powder.  · Calcium-fortified orange juice.  · Parmesan cheese. Part-skim ricotta cheese. Natural hard cheese. Cream cheese. Cottage cheese.  · Canned sardines. Canned salmon.  · Calcium-treated tofu. Calcium-fortified cereal bar. Calcium-fortified cereal. Calcium-fortified sumeet crackers.  · Cooked cuca greens. Turnip greens. Broccoli. Kale.  · Almonds.  · White beans.  · Corn tortilla.  Foods high in vitamin D  · Cod liver oil. Fatty fish, such as tuna, mackerel, and salmon.  · Milk. Fortified soy milk. Fortified fruit juice.  · Yogurt. Margarine.  · Egg yolks.  Foods high in protein  · Beef. Lamb. Pork tenderloin.  · Chicken breast.  · Tuna (canned). Fish fillet.  · Tofu.   · Soy beans (cooked). Soy duyen. Beans (canned or cooked).  · Cottage cheese.  · Yogurt.  · Peanut butter.  · Pumpkin seeds. Nuts. Sunflower seeds.  · Hard cheese.  · Milk or other milk products, such as soy milk.  The items listed above may not be a complete list of foods and beverages you can eat. Contact a dietitian for more options.  Summary  · Calcium and vitamin D are nutrients that play an important role in bone health and are an important part of your eating  plan for osteoporosis.  · Eat balanced meals that contain protein foods, fruits and vegetables, and foods rich in calcium and vitamin D.  · Avoid foods that have more than 300 mg of sodium per serving. Too much sodium can cause your body to lose calcium.  · Exercise is an important part of prevention and treatment of osteoporosis. Aim for at least 30 minutes a day, 5 days a week.  This information is not intended to replace advice given to you by your health care provider. Make sure you discuss any questions you have with your health care provider.  Document Released: 02/25/2019 Document Revised: 02/25/2019 Document Reviewed: 02/25/2019  Elsevier Patient Education © 2020 [employer] Inc[employer]

## 2022-03-01 NOTE — PROGRESS NOTES
Chief Complaint   Patient presents with   • New Patient     Est care       Subjective:     HPI:   Tanisha Haile is a 71 y.o. female here to discuss the evaluation and management of:      Adjustment disorder with mixed anxiety and depressed mood  Depression Screen (PHQ-2/PHQ-9) 5/13/2020 5/5/2021 3/1/2022   PHQ-2 Total Score - - -   PHQ-2 Total Score 0 0 0   PHQ-9 Total Score - - -       Interpretation of PHQ-9 Total Score   Score Severity   1-4 No Depression   5-9 Mild Depression   10-14 Moderate Depression   15-19 Moderately Severe Depression   20-27 Severe Depression    Is a chronic and well-controlled condition.  Patient is not currently taking any SSRI/SNRIs to help with depression anxiety symptoms.  Patient reports that her depression and anxiety were related to the death of her  several years ago.  She denies any suicidal homicidal ideations today.    Plan  We will continue to monitor at future appointments for symptoms is anxiety and depression.    Encounter to establish care  Very pleasant 71-year-old female presents today to establish care and discuss DEXA scan results.  I reviewed and updated her medical history, surgical history, family history, medications, allergies, social terms of health.  I reviewed care gaps with patient indicating that she is due for her zoster vaccine.  Encouraged her to get this done at an outside pharmacy as we do not provide it in clinic.    She is due for her annual Medicare wellness visit.  Encourage patient to schedule this at her earliest convenience.    Patient is up-to-date and posted for COVID vaccine.    Patient is up-to-date with labs and colorectal cancer screening.    Osteoporosis  This is a chronic condition.  Patient has been previously diagnosed with osteoporosis and has not been able to tolerate Fosamax due to GI upset and increased acid reflux symptoms.  She does continue on calcium and vitamin D supplementation.  She does continue to smoke  cigarettes.    Plan  In-depth conversation with patient regards to DEXA scan results, osteoporosis, and starting Prolia injections.  Lab orders have been placed and they should be done 3060 days prior to Prolia injection.  Handouts were provided in discharge instructions    Dyslipidemia  Chronic stable condition.  Patient is appropriately on statin therapy taking simvastatin 20 mg tablet daily and denies any adverse effects including any myalgias.  Patient denies any refills of this medication at this time.  Patient is up-to-date with labs and will be due in November 2023.  Patient does continue to smoke cigarettes and is aware the risk associated in doing so.    Plan  Patient will continue on simvastatin 20 mg daily.  Discussed appropriate diet lifestyle modifications.    Nicotine dependence with current use  This is a chronic and ongoing health concern.  Patient is aware the risk associated with continuing to smoke cigarettes and reports today that she does not want to quit.    Counseled was given about risk associated with smoking and possible pharmacological management for 3 to 5 minutes during her exam today in clinic.    Plan  Patient is denied any smoking cessation at this time.  We will continue to encourage her to decrease and quit smoking.          ROS:  Gen: no fevers/chills, no changes in weight  Eyes: no changes in vision  ENT: no sore throat, no hearing loss, no bloody nose  Pulm: no sob, no cough  CV: no chest pain, no palpitations  GI: no nausea/vomiting, no diarrhea  MSk: no myalgias  Neuro: no headaches, no numbness/tingling  Heme/Lymph: no easy bruising    Allergies   Allergen Reactions   • Tylenol Anaphylaxis   • Asa [Aspirin]    • Fosamax [Alendronate Sodium] Nausea   • Influenza Virus Vacc    • Meperidine    • Nsaids Rash and Itching   • Pcn [Penicillins] Itching   • Propoxyphene    • Sulfa Drugs        Current medicines (including changes today)  Current Outpatient Medications   Medication Sig  "Dispense Refill   • denosumab (PROLIA) 60 MG/ML Solution Prefilled Syringe injection Inject 1 mL under the skin every 6 months. 1 mL 0   • simvastatin (ZOCOR) 20 MG Tab TAKE 1 TABLET BY MOUTH EVERY EVENING 90 Tablet 3   • fluticasone (FLONASE) 50 MCG/ACT nasal spray Administer 1 Spray into affected nostril(S) every day. 48 g 3   • tizanidine (ZANAFLEX) 2 MG capsule Take 1 Capsule by mouth 3 times a day. 60 Capsule 2   • triamcinolone acetonide (KENALOG) 0.5 % Cream Apply 1g three times a day for 7 days to affected area 60 g 0   • multivitamin (THERAGRAN) Tab Take 1 Tab by mouth every day.     • VITAMIN D PO Take 2,000 mcg by mouth.     • CRANBERRY PO Take  by mouth.     • ascorbic acid (ASCORBIC ACID) 500 MG Tab Take 500 mg by mouth every day.     • Calcium Carbonate-Vitamin D 600-400 MG-UNIT Tab Take  by mouth 2 times a day.         No current facility-administered medications for this visit.          Objective:     No visits with results within 1 Month(s) from this visit.   Latest known visit with results is:   Hospital Outpatient Visit on 01/11/2022   Component Date Value Ref Range Status   • Influenza virus A RNA 01/11/2022 Negative  Negative Final   • Influenza virus B, PCR 01/11/2022 Negative  Negative Final   • SARS-CoV-2 by PCR 01/11/2022 NotDetected   Final    Comment: PATIENTS: Important information regarding your results and instructions can  be found at [email or web address]   \"After your  Covid-19 Test\"    [facility] providers: PLEASE REFER TO DE-ESCALATION AND RETESTING PROTOCOL  on [email or web address]    **The Roche SARS-CoV-2 RT-PCR test has been made available for use under the  Emergency Use Authorization (EUA) only.     • SARS-CoV-2 Source 01/11/2022 Nasal Swab   Final       /82   Pulse 68   Temp 36.2 °C (97.1 °F)   Resp 16   Ht 1.626 m (5' 4\")   Wt 49.9 kg (110 lb)   SpO2 100%  Body mass index is 18.88 kg/m².    Physical Exam:  Constitutional: Well-developed and " well-nourished female in NAD. Not diaphoretic. No distress.   Head: Atraumatic without lesions.  Eyes: Conjunctivae and extraocular motions are normal. Pupils are equal, round, and reactive to light. No scleral icterus.   Ears:  External ears unremarkable. TMs normal; bilaterally  Mouth/Throat: Tongue normal. Oropharynx is clear and moist. Posterior pharynx without erythema or exudates.  Neck: Supple, trachea midline. No thyromegaly present. No cervical or supraclavicular lymphadenopathy.  Cardiovascular: Regular rate and rhythm without murmur. Radial pulses are intact and equal bilaterally.  Pulmonary: Clear to ausculation. Normal effort. No rales, ronchi, or wheezing.  Abdomen: Soft, non tender, and without distention. Active bowel sounds in all four quadrants.   Extremities: No cyanosis, clubbing, erythema, nor edema.   Neurological: Alert and oriented x 3.   Psychiatric:  Behavior, mood, and affect are appropriate.    Assessment and Plan:     The following treatment plan was discussed:  Reviewed previous primary care provider notes, labs, and DEXA scan results with patient and answered all questions.  Patient will be due for annual labs in November 2023.  Lab orders have been placed and patient was encouraged to get these done prior to her appointment.  New medication of Prolia was sent to pharmacy and side effects of medication were discussed and handouts were provided in discharge instructions.    1. Encounter to establish care    2. Nicotine dependence with current use  - CBC WITH DIFFERENTIAL; Future    3. Localized osteoporosis, unspecified pathological fracture presence  - denosumab (PROLIA) 60 MG/ML Solution Prefilled Syringe injection; Inject 1 mL under the skin every 6 months.  Dispense: 1 mL; Refill: 0  - PHOSPHORUS; Future  - CALCIUM (CA); Future  - Comp Metabolic Panel; Future  - VITAMIN D,25 HYDROXY; Future    4. Dyslipidemia  - Comp Metabolic Panel; Future  - Lipid Profile; Future    5. Adjustment  disorder with mixed anxiety and depressed mood    6. Elevated blood pressure reading  - Comp Metabolic Panel; Future      Any change or worsening of signs or symptoms, patient encouraged to follow-up or report to emergency room for further evaluation. Patient verbalizes understanding and agrees.    Follow-Up: Return in about 8 months (around 11/1/2022) for Follow up labs.      PLEASE NOTE: This dictation was created using voice recognition software. I have made every reasonable attempt to correct obvious errors, but I expect that there are errors of grammar and possibly content that I did not discover before finalizing the note.

## 2022-03-01 NOTE — ASSESSMENT & PLAN NOTE
This is a chronic condition.  Patient has been previously diagnosed with osteoporosis and has not been able to tolerate Fosamax due to GI upset and increased acid reflux symptoms.  She does continue on calcium and vitamin D supplementation.  She does continue to smoke cigarettes.    Plan  In-depth conversation with patient regards to DEXA scan results, osteoporosis, and starting Prolia injections.  Lab orders have been placed and they should be done 3060 days prior to Prolia injection.  Handouts were provided in discharge instructions

## 2022-03-01 NOTE — ASSESSMENT & PLAN NOTE
Chronic stable condition.  Patient is appropriately on statin therapy taking simvastatin 20 mg tablet daily and denies any adverse effects including any myalgias.  Patient denies any refills of this medication at this time.  Patient is up-to-date with labs and will be due in November 2023.  Patient does continue to smoke cigarettes and is aware the risk associated in doing so.    Plan  Patient will continue on simvastatin 20 mg daily.  Discussed appropriate diet lifestyle modifications.

## 2022-03-01 NOTE — ASSESSMENT & PLAN NOTE
This is a chronic and ongoing health concern.  Patient is aware the risk associated with continuing to smoke cigarettes and reports today that she does not want to quit.    Counseled was given about risk associated with smoking and possible pharmacological management for 3 to 5 minutes during her exam today in clinic.    Plan  Patient is denied any smoking cessation at this time.  We will continue to encourage her to decrease and quit smoking.

## 2022-03-01 NOTE — ASSESSMENT & PLAN NOTE
Very pleasant 71-year-old female presents today to establish care and discuss DEXA scan results.  I reviewed and updated her medical history, surgical history, family history, medications, allergies, social terms of health.  I reviewed care gaps with patient indicating that she is due for her zoster vaccine.  Encouraged her to get this done at an outside pharmacy as we do not provide it in clinic.    She is due for her annual Medicare wellness visit.  Encourage patient to schedule this at her earliest convenience.    Patient is up-to-date and posted for COVID vaccine.    Patient is up-to-date with labs and colorectal cancer screening.

## 2022-03-01 NOTE — ASSESSMENT & PLAN NOTE
Depression Screen (PHQ-2/PHQ-9) 5/13/2020 5/5/2021 3/1/2022   PHQ-2 Total Score - - -   PHQ-2 Total Score 0 0 0   PHQ-9 Total Score - - -       Interpretation of PHQ-9 Total Score   Score Severity   1-4 No Depression   5-9 Mild Depression   10-14 Moderate Depression   15-19 Moderately Severe Depression   20-27 Severe Depression    Is a chronic and well-controlled condition.  Patient is not currently taking any SSRI/SNRIs to help with depression anxiety symptoms.  Patient reports that her depression and anxiety were related to the death of her  several years ago.  She denies any suicidal homicidal ideations today.    Plan  We will continue to monitor at future appointments for symptoms is anxiety and depression.

## 2022-04-13 ENCOUNTER — OFFICE VISIT (OUTPATIENT)
Dept: URGENT CARE | Facility: PHYSICIAN GROUP | Age: 72
End: 2022-04-13
Payer: MEDICARE

## 2022-04-13 ENCOUNTER — HOSPITAL ENCOUNTER (OUTPATIENT)
Facility: MEDICAL CENTER | Age: 72
End: 2022-04-13
Attending: PHYSICIAN ASSISTANT
Payer: MEDICARE

## 2022-04-13 VITALS
WEIGHT: 115 LBS | OXYGEN SATURATION: 99 % | SYSTOLIC BLOOD PRESSURE: 136 MMHG | TEMPERATURE: 97.3 F | HEART RATE: 70 BPM | BODY MASS INDEX: 19.63 KG/M2 | HEIGHT: 64 IN | DIASTOLIC BLOOD PRESSURE: 84 MMHG | RESPIRATION RATE: 12 BRPM

## 2022-04-13 DIAGNOSIS — H57.89 IRRITATION OF BOTH EYES: ICD-10-CM

## 2022-04-13 DIAGNOSIS — N30.01 ACUTE CYSTITIS WITH HEMATURIA: ICD-10-CM

## 2022-04-13 PROCEDURE — 87086 URINE CULTURE/COLONY COUNT: CPT

## 2022-04-13 PROCEDURE — 99214 OFFICE O/P EST MOD 30 MIN: CPT | Performed by: PHYSICIAN ASSISTANT

## 2022-04-13 RX ORDER — ERYTHROMYCIN 5 MG/G
OINTMENT OPHTHALMIC
Qty: 3.5 G | Refills: 0 | Status: SHIPPED | OUTPATIENT
Start: 2022-04-13 | End: 2022-04-18

## 2022-04-13 RX ORDER — NITROFURANTOIN 25; 75 MG/1; MG/1
100 CAPSULE ORAL 2 TIMES DAILY
Qty: 14 CAPSULE | Refills: 0 | Status: SHIPPED | OUTPATIENT
Start: 2022-04-13 | End: 2022-04-18

## 2022-04-13 RX ORDER — CEFDINIR 300 MG/1
300 CAPSULE ORAL EVERY 12 HOURS
Qty: 10 CAPSULE | Refills: 0 | Status: CANCELLED | OUTPATIENT
Start: 2022-04-13 | End: 2022-04-18

## 2022-04-13 ASSESSMENT — ENCOUNTER SYMPTOMS
ABDOMINAL PAIN: 0
NAUSEA: 0
CHILLS: 0
VOMITING: 0
EYE REDNESS: 1
FLANK PAIN: 0
DOUBLE VISION: 0
FEVER: 0
EYE DISCHARGE: 1
EYE PAIN: 0
BLURRED VISION: 0
PHOTOPHOBIA: 0

## 2022-04-13 ASSESSMENT — FIBROSIS 4 INDEX: FIB4 SCORE: 1.62

## 2022-04-13 ASSESSMENT — VISUAL ACUITY: OU: 1

## 2022-04-13 NOTE — PROGRESS NOTES
Subjective:   Tanisha Haile  is a 71 y.o. female who presents for Dysuria (Burning, frequency, x1week )      Dysuria   This is a new problem. The current episode started in the past 7 days. Associated symptoms include frequency and urgency. Pertinent negatives include no chills, flank pain, hematuria, nausea or vomiting.   Patient presents urgent care noting last 7 days of dysuria frequency urgency.  She denies fevers but has had some chills and general malaise over the last 24 to 48 hours.  She denies abdominal pain but states she feels slightly bloated.  She denies flank pain. She denies hematuria. Denies history of pyelonephritis or complicated UTI.  Patient is postmenopausal.  Patient chronically takes Azo for UTI symptoms.  States it has been over a year since her last UTI.    Additionally patient complains of bilateral eye irritation right worse than left over the last years.  She notes she suspected this was associated with mask wearing due to the pandemic.  She did follow-up with specialist in [place] as well as her doctor in [place].  They recommended this may persist as long as she is wearing a mask.  Recently she has begun taking her mask off and not wearing it as much and has had continued symptoms.  She notes clear drainage.  She denies eye pain rather itch and irritation.      Review of Systems   Constitutional: Negative for chills and fever.   Eyes: Positive for discharge and redness. Negative for blurred vision, double vision, photophobia and pain.   Gastrointestinal: Negative for abdominal pain, nausea and vomiting.   Genitourinary: Positive for dysuria, frequency and urgency. Negative for flank pain and hematuria.   Skin: Negative for rash.       Allergies   Allergen Reactions   • Tylenol Anaphylaxis   • Asa [Aspirin]    • Fosamax [Alendronate Sodium] Nausea   • Influenza Virus Vacc    • Meperidine    • Nsaids Rash and Itching   • Pcn [Penicillins] Itching   • Propoxyphene    • Sulfa Drugs          "Objective:   /84   Pulse 70   Temp 36.3 °C (97.3 °F) (Temporal)   Resp 12   Ht 1.626 m (5' 4\")   Wt 52.2 kg (115 lb)   LMP  (LMP Unknown)   SpO2 99%   BMI 19.74 kg/m²     Physical Exam  Vitals and nursing note reviewed.   Constitutional:       General: She is not in acute distress.     Appearance: She is well-developed. She is not diaphoretic.   HENT:      Head: Normocephalic and atraumatic.      Right Ear: External ear normal.      Left Ear: External ear normal.      Nose: Nose normal.   Eyes:      General: Lids are normal. Vision grossly intact. No scleral icterus.        Right eye: No discharge.         Left eye: No discharge.      Extraocular Movements: Extraocular movements intact.      Conjunctiva/sclera: Conjunctivae normal.      Right eye: Right conjunctiva is not injected. No chemosis, exudate or hemorrhage.     Left eye: Left conjunctiva is not injected. No chemosis, exudate or hemorrhage.  Pulmonary:      Effort: Pulmonary effort is normal. No respiratory distress.   Abdominal:      Tenderness: There is no right CVA tenderness or left CVA tenderness.   Musculoskeletal:         General: Normal range of motion.      Cervical back: Neck supple.   Skin:     General: Skin is warm and dry.      Coloration: Skin is not pale.      Findings: No erythema.   Neurological:      Mental Status: She is alert and oriented to person, place, and time. She is not disoriented.   Psychiatric:         Speech: Speech normal.         Behavior: Behavior normal.         Assessment/Plan:   1. Acute cystitis with hematuria  - nitrofurantoin (MACROBID) 100 MG Cap; Take 1 Capsule by mouth 2 times a day for 7 days.  Dispense: 14 Capsule; Refill: 0  - URINE CULTURE(NEW); Future    2. Irritation of both eyes  - erythromycin 5 MG/GM Ointment; Apply 1/2\" ribbon to lower lid of affected eye 3-4x's/day x 5days  Dispense: 3.5 g; Refill: 0  Supportive care is reviewed with patient/caregiver - recommend to push PO fluids and " electrolytes, nsaids/tylenol, rest, full course of abx, probiotics w/ abx, azo/cran, observe for resolution  Return to clinic with lack of resolution or progression of symptoms.  Will call if change of abx is indicated by urine cx  Sent with erythromycin ointment to aid in lubricating eye surface  Follow up with PCP /ophthalmologist once again    I have worn an N95 mask, gloves and eye protection for the entire encounter with this patient.     Differential diagnosis, natural history, supportive care, and indications for immediate follow-up discussed.

## 2022-04-13 NOTE — LETTER
April 13, 2022       Patient: Tanisha Haile   YOB: 1950   Date of Visit: 4/13/2022         To Whom It May Concern:    In my medical opinion, I recommend that Tanisha Haile should be excused from work for today; 4/13/22.      If you have any questions or concerns, please don't hesitate to call [phone]          Sincerely,          Mike Yuan P.A.-C.  Electronically Signed
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
## 2022-04-15 LAB
BACTERIA UR CULT: NORMAL
SIGNIFICANT IND 70042: NORMAL
SITE SITE: NORMAL
SOURCE SOURCE: NORMAL

## 2022-04-18 ENCOUNTER — HOSPITAL ENCOUNTER (OUTPATIENT)
Facility: MEDICAL CENTER | Age: 72
End: 2022-04-18
Attending: NURSE PRACTITIONER
Payer: MEDICARE

## 2022-04-18 ENCOUNTER — APPOINTMENT (OUTPATIENT)
Dept: RADIOLOGY | Facility: IMAGING CENTER | Age: 72
End: 2022-04-18
Attending: NURSE PRACTITIONER
Payer: MEDICARE

## 2022-04-18 ENCOUNTER — OFFICE VISIT (OUTPATIENT)
Dept: MEDICAL GROUP | Facility: PHYSICIAN GROUP | Age: 72
End: 2022-04-18
Payer: MEDICARE

## 2022-04-18 VITALS
DIASTOLIC BLOOD PRESSURE: 80 MMHG | BODY MASS INDEX: 19.84 KG/M2 | TEMPERATURE: 97.9 F | HEART RATE: 76 BPM | HEIGHT: 64 IN | WEIGHT: 116.2 LBS | SYSTOLIC BLOOD PRESSURE: 118 MMHG

## 2022-04-18 DIAGNOSIS — R10.9 RIGHT FLANK PAIN: ICD-10-CM

## 2022-04-18 DIAGNOSIS — R35.0 URINARY FREQUENCY: ICD-10-CM

## 2022-04-18 DIAGNOSIS — M81.0 AGE-RELATED OSTEOPOROSIS WITHOUT CURRENT PATHOLOGICAL FRACTURE: ICD-10-CM

## 2022-04-18 DIAGNOSIS — R31.9 HEMATURIA, UNSPECIFIED TYPE: ICD-10-CM

## 2022-04-18 DIAGNOSIS — J30.2 SEASONAL ALLERGIES: ICD-10-CM

## 2022-04-18 DIAGNOSIS — R30.0 DYSURIA: ICD-10-CM

## 2022-04-18 LAB
APPEARANCE UR: CLEAR
BILIRUB UR STRIP-MCNC: NEGATIVE MG/DL
COLOR UR AUTO: YELLOW
GLUCOSE UR STRIP.AUTO-MCNC: NEGATIVE MG/DL
KETONES UR STRIP.AUTO-MCNC: NEGATIVE MG/DL
LEUKOCYTE ESTERASE UR QL STRIP.AUTO: NEGATIVE
NITRITE UR QL STRIP.AUTO: NEGATIVE
PH UR STRIP.AUTO: 7 [PH] (ref 5–8)
PROT UR QL STRIP: NEGATIVE MG/DL
RBC UR QL AUTO: ABNORMAL
SP GR UR STRIP.AUTO: 1.01
UROBILINOGEN UR STRIP-MCNC: 0.2 MG/DL

## 2022-04-18 PROCEDURE — 74018 RADEX ABDOMEN 1 VIEW: CPT | Mod: TC,FY | Performed by: NURSE PRACTITIONER

## 2022-04-18 PROCEDURE — 81002 URINALYSIS NONAUTO W/O SCOPE: CPT | Performed by: NURSE PRACTITIONER

## 2022-04-18 PROCEDURE — 99214 OFFICE O/P EST MOD 30 MIN: CPT | Performed by: NURSE PRACTITIONER

## 2022-04-18 PROCEDURE — 81001 URINALYSIS AUTO W/SCOPE: CPT

## 2022-04-18 ASSESSMENT — ENCOUNTER SYMPTOMS
DIARRHEA: 0
PALPITATIONS: 0
SORE THROAT: 0
CHILLS: 0
VOMITING: 0
DIZZINESS: 0
NAUSEA: 0
EYE DISCHARGE: 1
FEVER: 0
SINUS PAIN: 0
EYE PAIN: 0
ABDOMINAL PAIN: 0
FLANK PAIN: 1
WEIGHT LOSS: 0
BLURRED VISION: 0
COUGH: 0

## 2022-04-18 ASSESSMENT — FIBROSIS 4 INDEX: FIB4 SCORE: 1.62

## 2022-04-18 NOTE — ASSESSMENT & PLAN NOTE
Patient was seen in the urgent care on 4- and diagnosed with UTI.  She was placed on nitrofurantoin however once culture resulted it was negative for UTI.    Patient today reports that she has increasing urinary frequency, urgency, suprapubic bloating, and has right flank pain.    She denies any nausea, vomiting, fever, chills, malodorous urine, or blood in her urine.    Plan  Point-of-care urinalysis was positive for blood.  Negative UTI.  Order placed for KUB.  Reviewed urgent care notes, urinalysis, and culture.  Discussed with patient that I believe she may have passed or be passing a kidney stone.  Discussed multiple other differential diagnoses include but not limited to hydronephrosis, pyelonephritis, bladder tumor, or gynecological issue.  Discussed with patient getting ultrasound however she declined at this time would like referral to urologist.  Urologist referral was placed.  Encourage patient to stay well-hydrated.  ER precautions were discussed and signs and symptoms of pyelonephritis.

## 2022-04-18 NOTE — PROGRESS NOTES
Chief Complaint   Patient presents with   • GI Problem     Bloating an pain, thinks it is from Nitrofurantoin she was taking for a UTI   • Osteoporosis     Was supposed to get a shot , but has not heard anything       Subjective:     HPI:   Tanisha Haile is a 71 y.o. female here to discuss the evaluation and management of:      Urinary frequency  Patient was seen in the urgent care on 4- and diagnosed with UTI.  She was placed on nitrofurantoin however once culture resulted it was negative for UTI.    Patient today reports that she has increasing urinary frequency, urgency, suprapubic bloating, and has right flank pain.    She denies any nausea, vomiting, fever, chills, malodorous urine, or blood in her urine.    Plan  Point-of-care urinalysis was positive for blood.  Negative UTI.  Order placed for KUB.  Reviewed urgent care notes, urinalysis, and culture.  Discussed with patient that I believe she may have passed or be passing a kidney stone.  Discussed multiple other differential diagnoses include but not limited to hydronephrosis, pyelonephritis, bladder tumor, or gynecological issue.  Discussed with patient getting ultrasound however she declined at this time would like referral to urologist.  Urologist referral was placed.  Encourage patient to stay well-hydrated.  ER precautions were discussed and signs and symptoms of pyelonephritis.    Seasonal allergies  Chronic condition with exacerbation.  Patient reports that she has bilateral eye watering with runny nose.  She was recently seen in the urgent care for eye irritation placed on azithromycin ointment however did not see very much relief.  She continues to express watery eyes and nose.    She denies any sore throat, fever, chills, cough, ear pain, or congestion.    Plan  Advised nasal saline and steroid sprays daily.  OTC anti-histamines (Zyrtec) or Claritin as needed. Encouraged allergen avoidence and environment modification when possible.  "      Osteoporosis  Previous Prolia order has not gotten administered.  Patient reports that she has not been contacted for her infusion.    Plan  New Prolia order was placed today.  Filled out infusion center order form.        Review of Systems   Constitutional: Negative for chills, fever and weight loss.   HENT: Negative for congestion, ear pain, sinus pain and sore throat.    Eyes: Positive for discharge. Negative for blurred vision and pain.   Respiratory: Negative for cough.    Cardiovascular: Negative for chest pain and palpitations.   Gastrointestinal: Negative for abdominal pain, diarrhea, nausea and vomiting.   Genitourinary: Positive for flank pain, frequency and urgency.   Neurological: Negative for dizziness.          Allergies   Allergen Reactions   • Tylenol Anaphylaxis   • Asa [Aspirin]    • Fosamax [Alendronate Sodium] Nausea   • Influenza Virus Vacc    • Meperidine    • Nsaids Rash and Itching   • Pcn [Penicillins] Itching   • Propoxyphene    • Sulfa Drugs        Current medicines (including changes today)  Current Outpatient Medications   Medication Sig Dispense Refill   • simvastatin (ZOCOR) 20 MG Tab TAKE 1 TABLET BY MOUTH EVERY EVENING 90 Tablet 3   • fluticasone (FLONASE) 50 MCG/ACT nasal spray Administer 1 Spray into affected nostril(S) every day. 48 g 3   • triamcinolone acetonide (KENALOG) 0.5 % Cream Apply 1g three times a day for 7 days to affected area 60 g 0   • multivitamin (THERAGRAN) Tab Take 1 Tab by mouth every day.     • VITAMIN D PO Take 2,000 mcg by mouth.     • CRANBERRY PO Take  by mouth.     • ascorbic acid (ASCORBIC ACID) 500 MG Tab Take 500 mg by mouth every day.     • Calcium Carbonate-Vitamin D 600-400 MG-UNIT Tab Take  by mouth 2 times a day.         No current facility-administered medications for this visit.          Objective:       /80   Pulse 76   Temp 36.6 °C (97.9 °F) (Temporal)   Ht 1.626 m (5' 4\")   Wt 52.7 kg (116 lb 3.2 oz)  Body mass index is 19.95 " kg/m².    Physical Exam:  Constitutional: Well-developed and well-nourished female in NAD. Not diaphoretic. No distress.   Skin: warm, dry, intact  Cardiovascular: Regular rate and rhythm without murmur. Radial pulses are intact and equal bilaterally.  Pulmonary: Clear to ausculation. Normal effort. No rales, ronchi, or wheezing.  Physical Exam  Abdominal:      General: Abdomen is flat. Bowel sounds are normal. There is no distension.      Palpations: Abdomen is soft.      Tenderness: There is abdominal tenderness in the right upper quadrant, epigastric area and suprapubic area. There is right CVA tenderness. There is no left CVA tenderness, guarding or rebound. Positive signs include Bennett's sign.           Extremities: No cyanosis, clubbing, erythema, nor edema.   Neurological: Alert and oriented x 3.   Psychiatric:  Behavior, mood, and affect are appropriate.      X-ray KUB 4- 2  4/18/2022 2:55 PM     HISTORY/REASON FOR EXAM:  Right Flank Pain.        TECHNIQUE/EXAM DESCRIPTION AND NUMBER OF VIEWS:   2 views of the abdomen.     COMPARISON: None     FINDINGS:  There is no evidence of bowel obstruction. There is a nonspecific bowel gas pattern.  No free intraperitoneal air is identified though evaluation is limited on supine imaging.  There is a moderate amount of colonic stool. There are degenerative changes   at the hips bilaterally. No urolithiasis is identified. Visualized lung bases are clear.     IMPRESSION:     1.  No definite urolithiasis is identified.  2.  Moderate amount of colonic stool. No evidence of bowel obstruction.4/18/2022 2:55 PM     HISTORY/REASON FOR EXAM:  Right Flank Pain.        TECHNIQUE/EXAM DESCRIPTION AND NUMBER OF VIEWS:   2 views of the abdomen.     COMPARISON: None     FINDINGS:  There is no evidence of bowel obstruction. There is a nonspecific bowel gas pattern.  No free intraperitoneal air is identified though evaluation is limited on supine imaging.  There is a moderate  amount of colonic stool. There are degenerative changes   at the hips bilaterally. No urolithiasis is identified. Visualized lung bases are clear.     IMPRESSION:     1.  No definite urolithiasis is identified.  2.  Moderate amount of colonic stool. No evidence of bowel obstruction.    Assessment and Plan:     The following treatment plan was discussed:  Reviewed urgent care notes and labs with patient answered all questions.  Discussed findings of KUB with patient via MyChart.  Encourage patient to increase fiber in her diet and try taking stool softener in the next few days to see if it improves her abdominal bloating.    1. Urinary frequency  - POCT Urinalysis  - Referral to Urology  - URINALYSIS; Future    2. Hematuria, unspecified type  - Referral to Urology  - URINALYSIS; Future    3. Right flank pain  - [id number] VIEW; Future  - URINALYSIS; Future    4. Dysuria  - URINALYSIS; Future    5. Seasonal allergies    6. Localized osteoporosis, unspecified pathological fracture presence      Any change or worsening of signs or symptoms, patient encouraged to follow-up or report to urgent care or emergency room for further evaluation. Patient verbalizes understanding and agrees.    Follow-Up: Return if symptoms worsen or fail to improve.      PLEASE NOTE: This dictation was created using voice recognition software. I have made every reasonable attempt to correct obvious errors, but I expect that there are errors of grammar and possibly content that I did not discover before finalizing the note.

## 2022-04-18 NOTE — PATIENT INSTRUCTIONS
· Please start taking over-the-counter antihistamine such as Claritin (blue) or Zyrtec (green).  Please start using your Flonase daily.    Allergic Rhinitis, Adult  Allergic rhinitis is a reaction to allergens in the air. Allergens are tiny specks (particles) in the air that cause your body to have an allergic reaction. This condition cannot be passed from person to person (is not contagious). Allergic rhinitis cannot be cured, but it can be controlled.  There are two types of allergic rhinitis:  · Seasonal. This type is also called hay fever. It happens only during certain times of the year.  · Perennial. This type can happen at any time of the year.  What are the causes?  This condition may be caused by:  · Pollen from grasses, trees, and weeds.  · House dust mites.  · Pet dander.  · Mold.  What are the signs or symptoms?  Symptoms of this condition include:  · Sneezing.  · Runny or stuffy nose (nasal congestion).  · A lot of mucus in the back of the throat (postnasal drip).  · Itchy nose.  · Tearing of the eyes.  · Trouble sleeping.  · Being sleepy during day.  How is this treated?  There is no cure for this condition. You should avoid things that trigger your symptoms (allergens). Treatment can help to relieve symptoms. This may include:  · Medicines that block allergy symptoms, such as antihistamines. These may be given as a shot, nasal spray, or pill.  · Shots that are given until your body becomes less sensitive to the allergen (desensitization).  · Stronger medicines, if all other treatments have not worked.  Follow these instructions at home:  Avoiding allergens    · Find out what you are allergic to. Common allergens include smoke, dust, and pollen.  · Avoid them if you can. These are some of the things that you can do to avoid allergens:  ? Replace carpet with wood, tile, or vinyl brittny. Carpet can trap dander and dust.  ? Clean any mold found in the home.  ? Do not smoke. Do not allow smoking in your  home.  ? Change your heating and air conditioning filter at least once a month.  ? During allergy season:  § Keep windows closed as much as you can. If possible, use air conditioning when there is a lot of pollen in the air.  § Use a special filter for allergies with your furnace and air conditioner.  § Plan outdoor activities when pollen counts are lowest. This is usually during the early morning or evening hours.  § If you do go outdoors when pollen count is high, wear a special mask for people with allergies.  § When you come indoors, take a shower and change your clothes before sitting on furniture or bedding.  General instructions  · Do not use fans in your home.  · Do not hang clothes outside to dry.  · Wear sunglasses to keep pollen out of your eyes.  · Wash your hands right away after you touch household pets.  · Take over-the-counter and prescription medicines only as told by your doctor.  · Keep all follow-up visits as told by your doctor. This is important.  Contact a doctor if:  · You have a fever.  · You have a cough that does not go away (is persistent).  · You start to make whistling sounds when you breathe (wheeze).  · Your symptoms do not get better with treatment.  · You have thick fluid coming from your nose.  · You start to have nosebleeds.  Get help right away if:  · Your tongue or your lips are swollen.  · You have trouble breathing.  · You feel dizzy or you feel like you are going to pass out (faint).  · You have cold sweats.  Summary  · Allergic rhinitis is a reaction to allergens in the air.  · This condition may be caused by allergens. These include pollen, dust mites, pet dander, and mold.  · Symptoms include a runny, itchy nose, sneezing, or tearing eyes. You may also have trouble sleeping or feel sleepy during the day.  · Treatment includes taking medicines and avoiding allergens. You may also get shots or take stronger medicines.  · Get help if you have a fever or a cough that does not  stop. Get help right away if you are short of breath.  This information is not intended to replace advice given to you by your health care provider. Make sure you discuss any questions you have with your health care provider.  Document Released: 04/18/2012 Document Revised: 04/07/2020 Document Reviewed: 07/09/2019  Elsevier Patient Education © 2020 [employer]      Flank Pain, Adult  Flank pain is pain in your side. The flank is the area of your side between your upper belly (abdomen) and your back. The pain may occur over a short time (acute), or it may be long-term or come back often (chronic). It may be mild or very bad. Pain in this area can be caused by many different things.  Follow these instructions at home:    · Drink enough fluid to keep your pee (urine) clear or pale yellow.  · Rest as told by your doctor.  · Take over-the-counter and prescription medicines only as told by your doctor.  · Keep a journal to keep track of:  ? What has caused your flank pain.  ? What has made it feel better.  · Keep all follow-up visits as told by your doctor. This is important.  Contact a doctor if:  · Medicine does not help your pain.  · You have new symptoms.  · Your pain gets worse.  · You have a fever.  · Your symptoms last longer than 2-3 days.  · You have trouble peeing.  · You are peeing more often than normal.  Get help right away if:  · You have trouble breathing.  · You are short of breath.  · Your belly hurts, or it is swollen or red.  · You feel sick to your stomach (nauseous).  · You throw up (vomit).  · You feel like you will pass out, or you do pass out (faint).  · You have blood in your pee.  Summary  · Flank pain is pain in your side. The flank is the area of your side between your upper belly (abdomen) and your back.  · Flank pain may occur over a short time (acute), or it may be long-term or come back often (chronic). It may be mild or very bad.  · Pain in this area can be caused by many different  things.  · Contact your doctor if your symptoms get worse or they last longer than 2-3 days.  This information is not intended to replace advice given to you by your health care provider. Make sure you discuss any questions you have with your health care provider.  Document Released: 09/26/2009 Document Revised: 11/30/2018 Document Reviewed: 04/09/2018  Elsevier Patient Education © 2020 [employer]

## 2022-04-18 NOTE — ASSESSMENT & PLAN NOTE
Chronic condition with exacerbation.  Patient reports that she has bilateral eye watering with runny nose.  She was recently seen in the urgent care for eye irritation placed on azithromycin ointment however did not see very much relief.  She continues to express watery eyes and nose.    She denies any sore throat, fever, chills, cough, ear pain, or congestion.    Plan  Advised nasal saline and steroid sprays daily.  OTC anti-histamines (Zyrtec) or Claritin as needed. Encouraged allergen avoidence and environment modification when possible.

## 2022-04-18 NOTE — ASSESSMENT & PLAN NOTE
Previous Prolia order has not gotten administered.  Patient reports that she has not been contacted for her infusion.    Plan  New Prolia order was placed today.  Filled out infusion center order form.

## 2022-04-19 LAB
APPEARANCE UR: CLEAR
BACTERIA #/AREA URNS HPF: NEGATIVE /HPF
BILIRUB UR QL STRIP.AUTO: NEGATIVE
COLOR UR: YELLOW
EPI CELLS #/AREA URNS HPF: NEGATIVE /HPF
GLUCOSE UR STRIP.AUTO-MCNC: NEGATIVE MG/DL
HYALINE CASTS #/AREA URNS LPF: ABNORMAL /LPF
KETONES UR STRIP.AUTO-MCNC: NEGATIVE MG/DL
LEUKOCYTE ESTERASE UR QL STRIP.AUTO: NEGATIVE
MICRO URNS: ABNORMAL
NITRITE UR QL STRIP.AUTO: NEGATIVE
PH UR STRIP.AUTO: 7 [PH] (ref 5–8)
PROT UR QL STRIP: NEGATIVE MG/DL
RBC # URNS HPF: ABNORMAL /HPF
RBC UR QL AUTO: ABNORMAL
SP GR UR STRIP.AUTO: 1
UROBILINOGEN UR STRIP.AUTO-MCNC: 0.2 MG/DL
WBC #/AREA URNS HPF: ABNORMAL /HPF

## 2022-08-11 ENCOUNTER — HOSPITAL ENCOUNTER (OUTPATIENT)
Dept: LAB | Facility: MEDICAL CENTER | Age: 72
End: 2022-08-11
Attending: NURSE PRACTITIONER
Payer: MEDICARE

## 2022-08-11 ENCOUNTER — OFFICE VISIT (OUTPATIENT)
Dept: MEDICAL GROUP | Facility: PHYSICIAN GROUP | Age: 72
End: 2022-08-11
Payer: MEDICARE

## 2022-08-11 VITALS
HEIGHT: 64 IN | TEMPERATURE: 96 F | RESPIRATION RATE: 16 BRPM | OXYGEN SATURATION: 99 % | WEIGHT: 113.2 LBS | BODY MASS INDEX: 19.33 KG/M2 | HEART RATE: 60 BPM | DIASTOLIC BLOOD PRESSURE: 80 MMHG | SYSTOLIC BLOOD PRESSURE: 120 MMHG

## 2022-08-11 DIAGNOSIS — R30.0 DYSURIA: ICD-10-CM

## 2022-08-11 DIAGNOSIS — F17.200 NICOTINE DEPENDENCE WITH CURRENT USE: ICD-10-CM

## 2022-08-11 DIAGNOSIS — K21.9 GASTROESOPHAGEAL REFLUX DISEASE WITHOUT ESOPHAGITIS: ICD-10-CM

## 2022-08-11 DIAGNOSIS — R14.0 ABDOMINAL BLOATING: ICD-10-CM

## 2022-08-11 PROCEDURE — 99214 OFFICE O/P EST MOD 30 MIN: CPT | Performed by: NURSE PRACTITIONER

## 2022-08-11 PROCEDURE — 83013 H PYLORI (C-13) BREATH: CPT

## 2022-08-11 PROCEDURE — 87086 URINE CULTURE/COLONY COUNT: CPT

## 2022-08-11 PROCEDURE — 81002 URINALYSIS NONAUTO W/O SCOPE: CPT | Performed by: NURSE PRACTITIONER

## 2022-08-11 RX ORDER — OMEPRAZOLE 20 MG/1
20 CAPSULE, DELAYED RELEASE ORAL DAILY
Qty: 90 CAPSULE | Refills: 3 | Status: SHIPPED | OUTPATIENT
Start: 2022-08-11 | End: 2022-08-15

## 2022-08-11 RX ORDER — NITROFURANTOIN 25; 75 MG/1; MG/1
100 CAPSULE ORAL 2 TIMES DAILY
Qty: 6 CAPSULE | Refills: 0 | Status: SHIPPED | OUTPATIENT
Start: 2022-08-11 | End: 2022-08-14

## 2022-08-11 ASSESSMENT — FIBROSIS 4 INDEX: FIB4 SCORE: 1.64

## 2022-08-11 NOTE — ASSESSMENT & PLAN NOTE
This is a new condition.    Patient reports since last Saturday she has had intermittent dysuria symptoms.  She reports last Saturday every time that she sat down she would urinate a little. This has now resolve.   Associated symptoms include abdominal bloating, painful urination, incontinence, and frequency in urination.  Patient denies any nausea, vomiting, CVA tenderness, blood in her urine.

## 2022-08-11 NOTE — ASSESSMENT & PLAN NOTE
Chronic health problem.  Patient reports increased symptoms over the past year intermittently.  Today she does have complaints of abdominal bloating.  She does continue to smoke cigarettes and drink alcohol.  Not currently on PPI.  Negative for red flag symptoms.  Does have previous history of gastric ulcer.

## 2022-08-11 NOTE — PROGRESS NOTES
Chief Complaint   Patient presents with    Bloating     belly    Dysuria     Roughly one month    Enuresis     incontinence       Subjective:     HPI:   Tanisha Haile is a 72 y.o. female here to discuss the evaluation and management of:      Dysuria  This is a new condition.    Patient reports since last Saturday she has had intermittent dysuria symptoms.  She reports last Saturday every time that she sat down she would urinate a little. This has now resolve.   Associated symptoms include abdominal bloating, painful urination, incontinence, and frequency in urination.  Patient denies any nausea, vomiting, CVA tenderness, blood in her urine.     Abdominal bloating  This is a chronic condition newly being assessed today.  Patient reports that she has noticed an increase in abdominal bloating, decreased appetite, and intermittent gastric burning over the past year  Patient denies any nausea, vomiting, diarrhea, constipation, or fevers.    Patient reports normal stools once to twice in the morning.  Up-to-date on colonoscopy.    Previous history of gastric ulcer.  Patient does drink alcohol 1 to 1/2 glasses of wine daily.  Smokes approximately 6 cigarettes/day.        Gastroesophageal reflux disease without esophagitis  Chronic health problem.  Patient reports increased symptoms over the past year intermittently.  Today she does have complaints of abdominal bloating.  She does continue to smoke cigarettes and drink alcohol.  Not currently on PPI.  Negative for red flag symptoms.  Does have previous history of gastric ulcer.    Nicotine dependence with current use  Chronic and ongoing health concern.  Patient does continue to smoke cigarettes.  She reports she is currently smoking approximately 6/day.  She is aware of the risks associated with long-term smoking.  Smoking cessation was discussed in clinic today for 4 minutes.  Patient declined any form logical medication at this time.  Discussed tips on how to help quit  "smoking.        ROS:  Gen: no fevers/chills, no changes in weight  Pulm: no sob, no cough  CV: no chest pain, no palpitations  GI: Positive per HPI  Neuro: no headaches, no numbness/tingling      Allergies   Allergen Reactions    Tylenol Anaphylaxis    Asa [Aspirin]     Fosamax [Alendronate Sodium] Nausea    Influenza Virus Vacc     Meperidine     Nsaids Rash and Itching    Pcn [Penicillins] Itching    Propoxyphene     Sulfa Drugs        Current medicines (including changes today)  Current Outpatient Medications   Medication Sig Dispense Refill    Probiotic Product (PROBIOTIC-10 PO) Take  by mouth every day.      nitrofurantoin (MACROBID) 100 MG Cap Take 1 Capsule by mouth 2 times a day for 3 days. 6 Capsule 0    omeprazole (PRILOSEC) 20 MG delayed-release capsule Take 1 Capsule by mouth every day for 360 days. 90 Capsule 3    simvastatin (ZOCOR) 20 MG Tab TAKE 1 TABLET BY MOUTH EVERY EVENING 90 Tablet 3    fluticasone (FLONASE) 50 MCG/ACT nasal spray Administer 1 Spray into affected nostril(S) every day. 48 g 3    triamcinolone acetonide (KENALOG) 0.5 % Cream Apply 1g three times a day for 7 days to affected area 60 g 0    multivitamin (THERAGRAN) Tab Take 1 Tab by mouth every day.      VITAMIN D PO Take 2,000 mcg by mouth.      CRANBERRY PO Take  by mouth.      ascorbic acid (ASCORBIC ACID) 500 MG Tab Take 500 mg by mouth every day.      Calcium Carbonate-Vitamin D 600-400 MG-UNIT Tab Take  by mouth 2 times a day.         No current facility-administered medications for this visit.          Objective:       /80 (BP Location: Left arm)   Pulse 60   Temp (!) 35.6 °C (96 °F) (Temporal)   Resp 16   Ht 1.626 m (5' 4\")   Wt 51.3 kg (113 lb 3.2 oz)   SpO2 99%  Body mass index is 19.43 kg/m².    Physical Exam:  Constitutional: Well-developed and well-nourished female in NAD. Not diaphoretic. No distress.   Skin: warm, dry, intact  Cardiovascular: Regular rate and rhythm without murmur. Radial pulses are intact " and equal bilaterally.  Pulmonary: Clear to ausculation. Normal effort. No rales, ronchi, or wheezing.  Abdomen: Soft, non tender, and without distention. Active bowel sounds in all four quadrants.  Negative for CVA tenderness.  Negative for suprapubic pain.  Neurological: Alert and oriented x 3.   Psychiatric:  Behavior, mood, and affect are appropriate.    Assessment and Plan:     The following treatment plan was discussed:    1. Abdominal bloating  Chronic health concern  VSS today.  Intermittent abdominal bloating reported for the past year with acid reflux symptoms.  Patient not currently on PPI.  Previous history of gastric ulcer.  At length conversation with patient regards to multiple differential diagnosis included but not limited to possible GI ulcer, gastritis, esophagitis, constipation, or cancer.  Patient does continue to smoke cigarettes.  At length conversation with patient regards to smoking cessation and tips and tricks to help quit smoking for 4 minutes during our exam.  Patient is open to the idea of quitting smoking.  She does not want any form logical medications at this time.  CDC guidelines were discussed about alcohol intake recommending only one 6 ounce pour per night of wine.  Patient will have labs completed prior to follow-up appointment orders were placed at previous appointment.  Screening for H. pylori will be obtained today.  Patient will then start on omeprazole 1 tablet daily.  Follow-up in clinic in 3 weeks.  - H.PYLORI AB SCREEN; Future    2. Dysuria  Due to duration and symptoms patient will be treated empirically for urinary tract infection.  Point-of-care urinalysis was negative for leukocytes, nitrates, or glucose.  Positive for small amount of blood.  Discussed results with patient and will repeat urine prior to follow-up appointment.  Urine was sent for complete urinalysis and culture.  Patient was started on Macrobid as she has tolerated this before in the past.  Signs  symptoms of worsening condition were discussed and when to follow-up in clinic, urgent care, or ER.  - POCT Urinalysis  - nitrofurantoin (MACROBID) 100 MG Cap; Take 1 Capsule by mouth 2 times a day for 3 days.  Dispense: 6 Capsule; Refill: 0  - URINALYSIS, COMPLETE  - URINE CULTURE(NEW); Future  - URINALYSIS,CULTURE IF INDICATED; Future    3. Gastroesophageal reflux disease without esophagitis  Current condition with exacerbation.  Testing for H. pylori will be obtained today.  Patient will then started on PPI due to previous history of gastric ulcer.  No red flag symptoms.  - H.PYLORI AB SCREEN; Future  - Probiotic Product (PROBIOTIC-10 PO); Take  by mouth every day.  - omeprazole (PRILOSEC) 20 MG delayed-release capsule; Take 1 Capsule by mouth every day for 360 days.  Dispense: 90 Capsule; Refill: 3    4. Nicotine dependence with current use  Discussed smoking cessation for 3 minutes during our exam.  Patient was encouraged to quit smoking.    Other orders  - Probiotic Product (PROBIOTIC-10 PO); Take  by mouth every day.      Any change or worsening of signs or symptoms, patient encouraged to follow-up or report to urgent care or emergency room for further evaluation. Patient verbalizes understanding and agrees.    Follow-Up: Return in about 3 weeks (around 9/1/2022) for Abdominal bloating and dysuria symptoms..      PLEASE NOTE: This dictation was created using voice recognition software. I have made every reasonable attempt to correct obvious errors, but I expect that there are errors of grammar and possibly content that I did not discover before finalizing the note.

## 2022-08-11 NOTE — ASSESSMENT & PLAN NOTE
Chronic and ongoing health concern.  Patient does continue to smoke cigarettes.  She reports she is currently smoking approximately 6/day.  She is aware of the risks associated with long-term smoking.  Smoking cessation was discussed in clinic today for 4 minutes.  Patient declined any form logical medication at this time.  Discussed tips on how to help quit smoking.

## 2022-08-11 NOTE — ASSESSMENT & PLAN NOTE
This is a chronic condition newly being assessed today.  Patient reports that she has noticed an increase in abdominal bloating, decreased appetite, and intermittent gastric burning over the past year  Patient denies any nausea, vomiting, diarrhea, constipation, or fevers.    Patient reports normal stools once to twice in the morning.  Up-to-date on colonoscopy.    Previous history of gastric ulcer.  Patient does drink alcohol 1 to 1/2 glasses of wine daily.  Smokes approximately 6 cigarettes/day.

## 2022-08-13 LAB
BACTERIA UR CULT: NORMAL
SIGNIFICANT IND 70042: NORMAL
SITE SITE: NORMAL
SOURCE SOURCE: NORMAL
UREA BREATH TEST QL: POSITIVE

## 2022-08-15 ENCOUNTER — TELEPHONE (OUTPATIENT)
Dept: MEDICAL GROUP | Facility: PHYSICIAN GROUP | Age: 72
End: 2022-08-15
Payer: MEDICARE

## 2022-08-15 DIAGNOSIS — B96.81 HELICOBACTER PYLORI GASTRITIS: ICD-10-CM

## 2022-08-15 DIAGNOSIS — A04.8 H. PYLORI INFECTION: ICD-10-CM

## 2022-08-15 DIAGNOSIS — K29.70 HELICOBACTER PYLORI GASTRITIS: ICD-10-CM

## 2022-08-15 RX ORDER — OMEPRAZOLE 20 MG/1
20 CAPSULE, DELAYED RELEASE ORAL 2 TIMES DAILY
Qty: 60 CAPSULE | Refills: 0 | Status: SHIPPED | OUTPATIENT
Start: 2022-08-15 | End: 2022-09-06 | Stop reason: SDUPTHER

## 2022-08-15 RX ORDER — CLARITHROMYCIN 500 MG/1
500 TABLET, COATED ORAL 2 TIMES DAILY
Qty: 28 TABLET | Refills: 0 | Status: SHIPPED | OUTPATIENT
Start: 2022-08-15 | End: 2022-08-29

## 2022-08-15 RX ORDER — METRONIDAZOLE 500 MG/1
500 TABLET ORAL 3 TIMES DAILY
Qty: 42 TABLET | Refills: 0 | Status: SHIPPED | OUTPATIENT
Start: 2022-08-15 | End: 2022-09-06

## 2022-08-15 RX ORDER — CLARITHROMYCIN 500 MG/1
500 TABLET, COATED ORAL 2 TIMES DAILY
Qty: 14 TABLET | Refills: 0 | Status: SHIPPED | OUTPATIENT
Start: 2022-08-15 | End: 2022-08-22

## 2022-08-15 RX ORDER — OMEPRAZOLE 20 MG/1
CAPSULE, DELAYED RELEASE ORAL
Qty: 40 CAPSULE | Refills: 0 | Status: SHIPPED | OUTPATIENT
Start: 2022-08-15 | End: 2022-08-15

## 2022-08-15 RX ORDER — METRONIDAZOLE 500 MG/1
500 TABLET ORAL 3 TIMES DAILY
Qty: 21 TABLET | Refills: 0 | Status: SHIPPED | OUTPATIENT
Start: 2022-08-15 | End: 2022-08-22

## 2022-08-16 NOTE — TELEPHONE ENCOUNTER
Page received for positive H. Pylori. Chart reviewed, confirmed H pylori positive on 8/11/22. Call returned at , message left for pt to initiate eradication treatment. H. Pylori can be passed through direct contact saliva, vomit,stool etc. Do not share utensils, water, foot.  Triple therapy regimen selected and sent to pt's pharmacy. Treatment  x 14 days, follow up w / PCP in 2-3 wks or sooner if have concerns. Avoid triggering foods such as spicy foods, caffeine, alcohol etc.

## 2022-08-25 ENCOUNTER — HOSPITAL ENCOUNTER (OUTPATIENT)
Dept: LAB | Facility: MEDICAL CENTER | Age: 72
End: 2022-08-25
Attending: NURSE PRACTITIONER
Payer: MEDICARE

## 2022-08-25 DIAGNOSIS — E78.5 DYSLIPIDEMIA: ICD-10-CM

## 2022-08-25 DIAGNOSIS — F17.200 NICOTINE DEPENDENCE WITH CURRENT USE: ICD-10-CM

## 2022-08-25 DIAGNOSIS — R03.0 ELEVATED BLOOD PRESSURE READING: ICD-10-CM

## 2022-08-25 DIAGNOSIS — M81.6 LOCALIZED OSTEOPOROSIS, UNSPECIFIED PATHOLOGICAL FRACTURE PRESENCE: ICD-10-CM

## 2022-08-25 DIAGNOSIS — R30.0 DYSURIA: ICD-10-CM

## 2022-08-25 LAB
25(OH)D3 SERPL-MCNC: 59 NG/ML (ref 30–100)
ALBUMIN SERPL BCP-MCNC: 4.2 G/DL (ref 3.2–4.9)
ALBUMIN/GLOB SERPL: 2.2 G/DL
ALP SERPL-CCNC: 71 U/L (ref 30–99)
ALT SERPL-CCNC: 16 U/L (ref 2–50)
ANION GAP SERPL CALC-SCNC: 7 MMOL/L (ref 7–16)
APPEARANCE UR: CLEAR
AST SERPL-CCNC: 19 U/L (ref 12–45)
BASOPHILS # BLD AUTO: 0.7 % (ref 0–1.8)
BASOPHILS # BLD: 0.04 K/UL (ref 0–0.12)
BILIRUB SERPL-MCNC: 0.3 MG/DL (ref 0.1–1.5)
BILIRUB UR QL STRIP.AUTO: NEGATIVE
BUN SERPL-MCNC: 18 MG/DL (ref 8–22)
CALCIUM SERPL-MCNC: 9 MG/DL (ref 8.5–10.5)
CHLORIDE SERPL-SCNC: 104 MMOL/L (ref 96–112)
CHOLEST SERPL-MCNC: 165 MG/DL (ref 100–199)
CO2 SERPL-SCNC: 27 MMOL/L (ref 20–33)
COLOR UR: YELLOW
CREAT SERPL-MCNC: 0.63 MG/DL (ref 0.5–1.4)
EOSINOPHIL # BLD AUTO: 0.36 K/UL (ref 0–0.51)
EOSINOPHIL NFR BLD: 6 % (ref 0–6.9)
ERYTHROCYTE [DISTWIDTH] IN BLOOD BY AUTOMATED COUNT: 47.5 FL (ref 35.9–50)
FASTING STATUS PATIENT QL REPORTED: NORMAL
GFR SERPLBLD CREATININE-BSD FMLA CKD-EPI: 94 ML/MIN/1.73 M 2
GLOBULIN SER CALC-MCNC: 1.9 G/DL (ref 1.9–3.5)
GLUCOSE SERPL-MCNC: 103 MG/DL (ref 65–99)
GLUCOSE UR STRIP.AUTO-MCNC: NEGATIVE MG/DL
HCT VFR BLD AUTO: 40.2 % (ref 37–47)
HDLC SERPL-MCNC: 81 MG/DL
HGB BLD-MCNC: 13.3 G/DL (ref 12–16)
IMM GRANULOCYTES # BLD AUTO: 0.01 K/UL (ref 0–0.11)
IMM GRANULOCYTES NFR BLD AUTO: 0.2 % (ref 0–0.9)
KETONES UR STRIP.AUTO-MCNC: NEGATIVE MG/DL
LDLC SERPL CALC-MCNC: 75 MG/DL
LEUKOCYTE ESTERASE UR QL STRIP.AUTO: NEGATIVE
LYMPHOCYTES # BLD AUTO: 1.27 K/UL (ref 1–4.8)
LYMPHOCYTES NFR BLD: 21 % (ref 22–41)
MCH RBC QN AUTO: 31.5 PG (ref 27–33)
MCHC RBC AUTO-ENTMCNC: 33.1 G/DL (ref 33.6–35)
MCV RBC AUTO: 95.3 FL (ref 81.4–97.8)
MICRO URNS: NORMAL
MONOCYTES # BLD AUTO: 0.47 K/UL (ref 0–0.85)
MONOCYTES NFR BLD AUTO: 7.8 % (ref 0–13.4)
NEUTROPHILS # BLD AUTO: 3.89 K/UL (ref 2–7.15)
NEUTROPHILS NFR BLD: 64.3 % (ref 44–72)
NITRITE UR QL STRIP.AUTO: NEGATIVE
NRBC # BLD AUTO: 0 K/UL
NRBC BLD-RTO: 0 /100 WBC
PH UR STRIP.AUTO: 6.5 [PH] (ref 5–8)
PHOSPHATE SERPL-MCNC: 3.5 MG/DL (ref 2.5–4.5)
PLATELET # BLD AUTO: 277 K/UL (ref 164–446)
PMV BLD AUTO: 9.8 FL (ref 9–12.9)
POTASSIUM SERPL-SCNC: 3.8 MMOL/L (ref 3.6–5.5)
PROT SERPL-MCNC: 6.1 G/DL (ref 6–8.2)
PROT UR QL STRIP: NEGATIVE MG/DL
RBC # BLD AUTO: 4.22 M/UL (ref 4.2–5.4)
RBC UR QL AUTO: NEGATIVE
SODIUM SERPL-SCNC: 138 MMOL/L (ref 135–145)
SP GR UR STRIP.AUTO: 1.01
TRIGL SERPL-MCNC: 43 MG/DL (ref 0–149)
UROBILINOGEN UR STRIP.AUTO-MCNC: 0.2 MG/DL
WBC # BLD AUTO: 6 K/UL (ref 4.8–10.8)

## 2022-08-25 PROCEDURE — 85025 COMPLETE CBC W/AUTO DIFF WBC: CPT

## 2022-08-25 PROCEDURE — 84100 ASSAY OF PHOSPHORUS: CPT

## 2022-08-25 PROCEDURE — 80053 COMPREHEN METABOLIC PANEL: CPT

## 2022-08-25 PROCEDURE — 82306 VITAMIN D 25 HYDROXY: CPT | Mod: GA

## 2022-08-25 PROCEDURE — 81003 URINALYSIS AUTO W/O SCOPE: CPT

## 2022-08-25 PROCEDURE — 36415 COLL VENOUS BLD VENIPUNCTURE: CPT

## 2022-08-25 PROCEDURE — 80061 LIPID PANEL: CPT

## 2022-09-06 ENCOUNTER — OFFICE VISIT (OUTPATIENT)
Dept: MEDICAL GROUP | Facility: PHYSICIAN GROUP | Age: 72
End: 2022-09-06
Payer: MEDICARE

## 2022-09-06 VITALS
OXYGEN SATURATION: 99 % | SYSTOLIC BLOOD PRESSURE: 118 MMHG | HEIGHT: 64 IN | WEIGHT: 110.5 LBS | HEART RATE: 84 BPM | TEMPERATURE: 97.1 F | DIASTOLIC BLOOD PRESSURE: 82 MMHG | BODY MASS INDEX: 18.86 KG/M2 | RESPIRATION RATE: 12 BRPM

## 2022-09-06 DIAGNOSIS — J30.2 SEASONAL ALLERGIES: ICD-10-CM

## 2022-09-06 DIAGNOSIS — E78.5 DYSLIPIDEMIA: ICD-10-CM

## 2022-09-06 DIAGNOSIS — F17.200 NICOTINE DEPENDENCE WITH CURRENT USE: ICD-10-CM

## 2022-09-06 DIAGNOSIS — K29.70 HELICOBACTER PYLORI GASTRITIS: ICD-10-CM

## 2022-09-06 DIAGNOSIS — B96.81 HELICOBACTER PYLORI GASTRITIS: ICD-10-CM

## 2022-09-06 DIAGNOSIS — K21.9 GASTROESOPHAGEAL REFLUX DISEASE WITHOUT ESOPHAGITIS: ICD-10-CM

## 2022-09-06 DIAGNOSIS — Z86.19 HISTORY OF HELICOBACTER PYLORI INFECTION: ICD-10-CM

## 2022-09-06 DIAGNOSIS — R21 RASH: ICD-10-CM

## 2022-09-06 PROCEDURE — 99214 OFFICE O/P EST MOD 30 MIN: CPT | Performed by: NURSE PRACTITIONER

## 2022-09-06 RX ORDER — SIMVASTATIN 20 MG
TABLET ORAL
Qty: 90 TABLET | Refills: 3 | Status: SHIPPED | OUTPATIENT
Start: 2022-09-06 | End: 2023-10-18

## 2022-09-06 RX ORDER — FLUTICASONE PROPIONATE 50 MCG
1 SPRAY, SUSPENSION (ML) NASAL DAILY
Qty: 48 G | Refills: 3 | Status: SHIPPED | OUTPATIENT
Start: 2022-09-06 | End: 2022-12-22

## 2022-09-06 RX ORDER — OMEPRAZOLE 20 MG/1
20 CAPSULE, DELAYED RELEASE ORAL 2 TIMES DAILY
Qty: 180 CAPSULE | Refills: 3 | Status: SHIPPED | OUTPATIENT
Start: 2022-09-06 | End: 2023-06-22 | Stop reason: SDUPTHER

## 2022-09-06 RX ORDER — TRIAMCINOLONE ACETONIDE 5 MG/G
CREAM TOPICAL
Qty: 60 G | Refills: 0 | Status: SHIPPED | OUTPATIENT
Start: 2022-09-06

## 2022-09-06 ASSESSMENT — FIBROSIS 4 INDEX: FIB4 SCORE: 1.234657039711191336

## 2022-09-06 NOTE — PATIENT INSTRUCTIONS
"Smoking Cessation, Tips for Success  If you are ready to quit smoking, congratulations! You have chosen to help yourself be healthier. Cigarettes bring nicotine, tar, carbon monoxide, and other irritants into your body. Your lungs, heart, and blood vessels will be able to work better without these poisons. There are many different ways to quit smoking. Nicotine gum, nicotine patches, a nicotine inhaler, or nicotine nasal spray can help with physical craving. Hypnosis, support groups, and medicines help break the habit of smoking.  WHAT THINGS CAN I DO TO MAKE QUITTING EASIER?   Here are some tips to help you quit for good:  Pick a date when you will quit smoking completely. Tell all of your friends and family about your plan to quit on that date.  Do not try to slowly cut down on the number of cigarettes you are smoking. Pick a quit date and quit smoking completely starting on that day.  Throw away all cigarettes.    Clean and remove all ashtrays from your home, work, and car.  On a card, write down your reasons for quitting. Carry the card with you and read it when you get the urge to smoke.  Cleanse your body of nicotine. Drink enough water and fluids to keep your urine clear or pale yellow. Do this after quitting to flush the nicotine from your body.  Learn to predict your moods. Do not let a bad situation be your excuse to have a cigarette. Some situations in your life might tempt you into wanting a cigarette.  Never have \"just one\" cigarette. It leads to wanting another and another. Remind yourself of your decision to quit.  Change habits associated with smoking. If you smoked while driving or when feeling stressed, try other activities to replace smoking. Stand up when drinking your coffee. Brush your teeth after eating. Sit in a different chair when you read the paper. Avoid alcohol while trying to quit, and try to drink fewer caffeinated beverages. Alcohol and caffeine may urge you to smoke.  Avoid foods and " "drinks that can trigger a desire to smoke, such as sugary or spicy foods and alcohol.  Ask people who smoke not to smoke around you.  Have something planned to do right after eating or having a cup of coffee. For example, plan to take a walk or exercise.  Try a relaxation exercise to calm you down and decrease your stress. Remember, you may be tense and nervous for the first 2 weeks after you quit, but this will pass.  Find new activities to keep your hands busy. Play with a pen, coin, or rubber band. Doodle or draw things on paper.  Brush your teeth right after eating. This will help cut down on the craving for the taste of tobacco after meals. You can also try mouthwash.    Use oral substitutes in place of cigarettes. Try using lemon drops, carrots, cinnamon sticks, or chewing gum. Keep them handy so they are available when you have the urge to smoke.  When you have the urge to smoke, try deep breathing.  Designate your home as a nonsmoking area.  If you are a heavy smoker, ask your health care provider about a prescription for nicotine chewing gum. It can ease your withdrawal from nicotine.  Reward yourself. Set aside the cigarette money you save and buy yourself something nice.  Look for support from others. Join a support group or smoking cessation program. Ask someone at home or at work to help you with your plan to quit smoking.  Always ask yourself, \"Do I need this cigarette or is this just a reflex?\" Tell yourself, \"Today, I choose not to smoke,\" or \"I do not want to smoke.\" You are reminding yourself of your decision to quit.  Do not replace cigarette smoking with electronic cigarettes (commonly called e-cigarettes). The safety of e-cigarettes is unknown, and some may contain harmful chemicals.  If you relapse, do not give up! Plan ahead and think about what you will do the next time you get the urge to smoke.  HOW WILL I FEEL WHEN I QUIT SMOKING?  You may have symptoms of withdrawal because your body is " used to nicotine (the addictive substance in cigarettes). You may crave cigarettes, be irritable, feel very hungry, cough often, get headaches, or have difficulty concentrating. The withdrawal symptoms are only temporary. They are strongest when you first quit but will go away within 10-14 days. When withdrawal symptoms occur, stay in control. Think about your reasons for quitting. Remind yourself that these are signs that your body is healing and getting used to being without cigarettes. Remember that withdrawal symptoms are easier to treat than the major diseases that smoking can cause.   Even after the withdrawal is over, expect periodic urges to smoke. However, these cravings are generally short lived and will go away whether you smoke or not. Do not smoke!  WHAT RESOURCES ARE AVAILABLE TO HELP ME QUIT SMOKING?  Your health care provider can direct you to community resources or hospitals for support, which may include:  Group support.  Education.  Hypnosis.  Therapy.     This information is not intended to replace advice given to you by your health care provider. Make sure you discuss any questions you have with your health care provider.     Document Released: 09/15/2005 Document Revised: 01/08/2016 Document Reviewed: 06/05/2014  [employer] Interactive Patient Education ©2016 [employer]  Gastroesophageal Reflux Disease, Adult  Gastroesophageal reflux (RAOUL) happens when acid from the stomach flows up into the tube that connects the mouth and the stomach (esophagus). Normally, food travels down the esophagus and stays in the stomach to be digested. With RAOUL, food and stomach acid sometimes move back up into the esophagus. You may have a disease called gastroesophageal reflux disease (GERD) if the reflux:  Happens often.  Causes frequent or very bad symptoms.  Causes problems such as damage to the esophagus.  When this happens, the esophagus becomes sore and swollen (inflamed). Over time, GERD can make small  holes (ulcers) in the lining of the esophagus.  What are the causes?  This condition is caused by a problem with the muscle between the esophagus and the stomach. When this muscle is weak or not normal, it does not close properly to keep food and acid from coming back up from the stomach. The muscle can be weak because of:  Tobacco use.  Pregnancy.  Having a certain type of hernia (hiatal hernia).  Alcohol use.  Certain foods and drinks, such as coffee, chocolate, onions, and peppermint.  What increases the risk?  You are more likely to develop this condition if you:  Are overweight.  Have a disease that affects your connective tissue.  Use NSAID medicines.  What are the signs or symptoms?  Symptoms of this condition include:  Heartburn.  Difficult or painful swallowing.  The feeling of having a lump in the throat.  A bitter taste in the mouth.  Bad breath.  Having a lot of saliva.  Having an upset or bloated stomach.  Belching.  Chest pain. Different conditions can cause chest pain. Make sure you see your doctor if you have chest pain.  Shortness of breath or noisy breathing (wheezing).  Ongoing (chronic) cough or a cough at night.  Wearing away of the surface of teeth (tooth enamel).  Weight loss.  How is this treated?  Treatment will depend on how bad your symptoms are. Your doctor may suggest:  Changes to your diet.  Medicine.  Surgery.  Follow these instructions at home:  Eating and drinking    Follow a diet as told by your doctor. You may need to avoid foods and drinks such as:  Coffee and tea (with or without caffeine).  Drinks that contain alcohol.  Energy drinks and sports drinks.  Bubbly (carbonated) drinks or sodas.  Chocolate and cocoa.  Peppermint and mint flavorings.  Garlic and onions.  Horseradish.  Spicy and acidic foods. These include peppers, chili powder, brown powder, vinegar, hot sauces, and BBQ sauce.  Citrus fruit juices and citrus fruits, such as oranges, олег, and limes.  Tomato-based  foods. These include red sauce, chili, salsa, and pizza with red sauce.  Fried and fatty foods. These include donuts, french fries, potato chips, and high-fat dressings.  High-fat meats. These include hot dogs, rib eye steak, sausage, ham, and carroll.  High-fat dairy items, such as whole milk, butter, and cream cheese.  Eat small meals often. Avoid eating large meals.  Avoid drinking large amounts of liquid with your meals.  Avoid eating meals during the 2-3 hours before bedtime.  Avoid lying down right after you eat.  Do not exercise right after you eat.  Lifestyle    Do not use any products that contain nicotine or tobacco. These include cigarettes, e-cigarettes, and chewing tobacco. If you need help quitting, ask your doctor.  Try to lower your stress. If you need help doing this, ask your doctor.  If you are overweight, lose an amount of weight that is healthy for you. Ask your doctor about a safe weight loss goal.  General instructions  Pay attention to any changes in your symptoms.  Take over-the-counter and prescription medicines only as told by your doctor. Do not take aspirin, ibuprofen, or other NSAIDs unless your doctor says it is okay.  Wear loose clothes. Do not wear anything tight around your waist.  Raise (elevate) the head of your bed about 6 inches (15 cm).  Avoid bending over if this makes your symptoms worse.  Keep all follow-up visits as told by your doctor. This is important.  Contact a doctor if:  You have new symptoms.  You lose weight and you do not know why.  You have trouble swallowing or it hurts to swallow.  You have wheezing or a cough that keeps happening.  Your symptoms do not get better with treatment.  You have a hoarse voice.  Get help right away if:  You have pain in your arms, neck, jaw, teeth, or back.  You feel sweaty, dizzy, or light-headed.  You have chest pain or shortness of breath.  You throw up (vomit) and your throw-up looks like blood or coffee grounds.  You pass out  (faint).  Your poop (stool) is bloody or black.  You cannot swallow, drink, or eat.  Summary  If a person has gastroesophageal reflux disease (GERD), food and stomach acid move back up into the esophagus and cause symptoms or problems such as damage to the esophagus.  Treatment will depend on how bad your symptoms are.  Follow a diet as told by your doctor.  Take all medicines only as told by your doctor.  This information is not intended to replace advice given to you by your health care provider. Make sure you discuss any questions you have with your health care provider.  Document Released: 06/05/2009 Document Revised: 06/26/2019 Document Reviewed: 06/26/2019  [employer] Patient Education © 2020 [employer]

## 2022-09-06 NOTE — ASSESSMENT & PLAN NOTE
Patient continues to have acid reflux symptoms worse after eating however indicates that her abdominal pain and bloating has subsided.  Patient did finish metronidazole course with only mild side effects of feeling dizzy.

## 2022-09-06 NOTE — ASSESSMENT & PLAN NOTE
Chronic well-controlled condition.  Patient is up-to-date with labs.  Currently on simvastatin 20 mg daily.  Patient denies any adverse effects including any myalgias.

## 2022-09-06 NOTE — ASSESSMENT & PLAN NOTE
Chronic and ongoing health concern.  Patient does continue to smoke cigarettes.  She is aware the risk associated in continuing to smoke and will continue to work on her own on decreasing her cigarette smoking and eventually quitting.    Patient declined smoking cessation today.

## 2022-09-06 NOTE — PROGRESS NOTES
Chief Complaint   Patient presents with    Follow-Up     Labs- wants to start b12       Subjective:     HPI:   Tanisha Haile is a 72 y.o. female here to discuss the evaluation and management of:      Gastroesophageal reflux disease without esophagitis  Chronic health concern with exacerbation.  Patient has had previous endoscopy in 2014 and patient reports negative for esophagitis.  Recently treated for H. pylori and reports that her abdominal bloating and discomfort has subsided.  She does continue to have acid reflux symptoms worse immediately after eating.  Currently on omeprazole 20 mg once daily.  Prescription was written for 20 mg twice daily however patient did not follow the label as directed.    She denies any red flag symptoms.    History of Helicobacter pylori infection  Patient continues to have acid reflux symptoms worse after eating however indicates that her abdominal pain and bloating has subsided.  Patient did finish metronidazole course with only mild side effects of feeling dizzy.    Dyslipidemia  Chronic well-controlled condition.  Patient is up-to-date with labs.  Currently on simvastatin 20 mg daily.  Patient denies any adverse effects including any myalgias.    Nicotine dependence with current use  Chronic and ongoing health concern.  Patient does continue to smoke cigarettes.  She is aware the risk associated in continuing to smoke and will continue to work on her own on decreasing her cigarette smoking and eventually quitting.    Patient declined smoking cessation today.      ROS:  Gen: no fevers/chills, no changes in weight  Pulm: no sob, no cough  CV: no chest pain, no palpitations  GI: Positive per HPI  MSk: no myalgias  Neuro: no headaches, no numbness/tingling      Allergies   Allergen Reactions    Tylenol Anaphylaxis    Asa [Aspirin]     Fosamax [Alendronate Sodium] Nausea    Influenza Virus Vacc     Meperidine     Nsaids Rash and Itching    Pcn [Penicillins] Itching    Propoxyphene   "   Sulfa Drugs        Current medicines (including changes today)  Current Outpatient Medications   Medication Sig Dispense Refill    simvastatin (ZOCOR) 20 MG Tab TAKE 1 TABLET BY MOUTH EVERY EVENING 90 Tablet 3    fluticasone (FLONASE) 50 MCG/ACT nasal spray Administer 1 Spray into affected nostril(S) every day. 48 g 3    omeprazole (PRILOSEC) 20 MG delayed-release capsule Take 1 Capsule by mouth 2 times a day for 360 days. 180 Capsule 3    triamcinolone acetonide (KENALOG) 0.5 % Cream Apply 1g three times a day for 7 days to affected area 60 g 0    Probiotic Product (PROBIOTIC-10 PO) Take  by mouth every day.      multivitamin (THERAGRAN) Tab Take 1 Tab by mouth every day.      VITAMIN D PO Take 2,000 mcg by mouth.      CRANBERRY PO Take  by mouth.      ascorbic acid (ASCORBIC ACID) 500 MG Tab Take 500 mg by mouth every day.      Calcium Carbonate-Vitamin D 600-400 MG-UNIT Tab Take  by mouth 2 times a day.         No current facility-administered medications for this visit.          Objective:       /82 (BP Location: Left arm)   Pulse 84   Temp 36.2 °C (97.1 °F) (Temporal)   Resp 12   Ht 1.626 m (5' 4\")   Wt 50.1 kg (110 lb 8 oz)   SpO2 99%  Body mass index is 18.97 kg/m².    Physical Exam:  Constitutional: Well-developed and well-nourished female in NAD. Not diaphoretic. No distress.   Skin: warm, dry, intact  Cardiovascular: Regular rate and rhythm without murmur. Radial pulses are intact and equal bilaterally.  Pulmonary: Clear to ausculation. Normal effort. No rales, ronchi, or wheezing.  Abdomen: Soft, non tender, and without distention. Active bowel sounds in all four quadrants.   Extremities: No cyanosis, clubbing, erythema, nor edema.   Neurological: Alert and oriented x 3.   Psychiatric:  Behavior, mood, and affect are appropriate.      Assessment and Plan:     The following treatment plan was discussed:  I have reviewed labs with patient and answered all questions.    1. Nicotine dependence " with current use     was given to quit smoking, also discussed smoking cessation.  Patient will continue working on her own to decrease her cigarette use.      2. Gastroesophageal reflux disease without esophagitis  Current condition with exacerbation.  Recommended patient take omeprazole 20 mg twice daily until she is able to get in with gastroenterology.  Happy to report that H. pylori symptoms have resolved since antibiotic treatment.  Discussed the importance of staying away from aggravating foods and drinks.  Handouts were provided discharge instructions.  Patient does continue to smoke cigarettes.  Discussed risks associated with possible esophagitis.  - Referral to Gastroenterology  - Comp Metabolic Panel; Future  - omeprazole (PRILOSEC) 20 MG delayed-release capsule; Take 1 Capsule by mouth 2 times a day for 360 days.  Dispense: 180 Capsule; Refill: 3    3. History of Helicobacter pylori infection  Patient reports symptoms have resolved.  Patient does continue to have acid reflux symptoms.  Referrals placed today for gastroenterology consultation.  - Referral to Gastroenterology    4. Dyslipidemia  Chronic well-controlled.  Patient is up-to-date with labs.  Continue on simvastatin.  Refill sent to pharmacy.  Discussed ongoing diet lifestyle modifications.  - simvastatin (ZOCOR) 20 MG Tab; TAKE 1 TABLET BY MOUTH EVERY EVENING  Dispense: 90 Tablet; Refill: 3    6. Rash  Chronic and stable.  May continue using triamcinolone cream as needed.  - triamcinolone acetonide (KENALOG) 0.5 % Cream; Apply 1g three times a day for 7 days to affected area  Dispense: 60 g; Refill: 0    7. Seasonal allergies  Chronic and stable.  Refill Flonase per pharmacy.  - fluticasone (FLONASE) 50 MCG/ACT nasal spray; Administer 1 Spray into affected nostril(S) every day.  Dispense: 48 g; Refill: 3      Any change or worsening of signs or symptoms, patient encouraged to follow-up or report to urgent care or emergency room for  further evaluation. Patient verbalizes understanding and agrees.    Follow-Up: Return in about 8 weeks (around 11/1/2022) for [employer].      PLEASE NOTE: This dictation was created using voice recognition software. I have made every reasonable attempt to correct obvious errors, but I expect that there are errors of grammar and possibly content that I did not discover before finalizing the note.

## 2022-09-06 NOTE — ASSESSMENT & PLAN NOTE
Chronic health concern with exacerbation.  Patient has had previous endoscopy in 2014 and patient reports negative for esophagitis.  Recently treated for H. pylori and reports that her abdominal bloating and discomfort has subsided.  She does continue to have acid reflux symptoms worse immediately after eating.  Currently on omeprazole 20 mg once daily.  Prescription was written for 20 mg twice daily however patient did not follow the label as directed.    She denies any red flag symptoms.

## 2022-10-26 ENCOUNTER — OFFICE VISIT (OUTPATIENT)
Dept: MEDICAL GROUP | Facility: PHYSICIAN GROUP | Age: 72
End: 2022-10-26
Payer: MEDICARE

## 2022-10-26 VITALS
OXYGEN SATURATION: 100 % | HEART RATE: 71 BPM | DIASTOLIC BLOOD PRESSURE: 78 MMHG | SYSTOLIC BLOOD PRESSURE: 130 MMHG | WEIGHT: 111.4 LBS | TEMPERATURE: 97.1 F | HEIGHT: 64 IN | BODY MASS INDEX: 19.02 KG/M2 | RESPIRATION RATE: 16 BRPM

## 2022-10-26 DIAGNOSIS — M54.9 MID BACK PAIN ON LEFT SIDE: ICD-10-CM

## 2022-10-26 PROCEDURE — 99214 OFFICE O/P EST MOD 30 MIN: CPT | Performed by: NURSE PRACTITIONER

## 2022-10-26 RX ORDER — METHOCARBAMOL 750 MG/1
750 TABLET, FILM COATED ORAL 4 TIMES DAILY
Qty: 28 TABLET | Refills: 0 | Status: SHIPPED | OUTPATIENT
Start: 2022-10-26 | End: 2022-11-02

## 2022-10-26 RX ORDER — LIDOCAINE AND MENTHOL 40; 40 MG/1; MG/1
1 PATCH TOPICAL
Qty: 15 PATCH | Refills: 1 | Status: SHIPPED | OUTPATIENT
Start: 2022-10-26 | End: 2022-11-01

## 2022-10-26 ASSESSMENT — FIBROSIS 4 INDEX: FIB4 SCORE: 1.234657039711191336

## 2022-10-26 NOTE — ASSESSMENT & PLAN NOTE
Chronic conation with exenteration.   Patient reports of reoccurring mid back pain on left side started approximately 1 month ago.  Starts with a pins-and-needles type of pain in the morning and progresses to a burning/dull ache as the day goes on.  She denies any injury or trauma.  She does report that her job is very physical with a lot of bending, pulling, twisting, and pushing.   Patient denies any numbness or tingling running down her arms or her back. Pain level is 6/10.  Patient denies any muscle weakness.

## 2022-10-26 NOTE — PROGRESS NOTES
Chief Complaint   Patient presents with    Back Pain     Mid back (L) side pain. X6 weeks pt states if she is not active within 2 hours pain comes on.        Subjective:     HPI:   Tanisha Haile is a 72 y.o. female here to discuss the evaluation and management of:      Mid back pain on left side  Chronic conation with exenteration.   Patient reports of reoccurring mid back pain on left side started approximately 1 month ago.  Starts with a pins-and-needles type of pain in the morning and progresses to a burning/dull ache as the day goes on.  She denies any injury or trauma.  She does report that her job is very physical with a lot of bending, pulling, twisting, and pushing.   Patient denies any numbness or tingling running down her arms or her back. Pain level is 6/10.  Patient denies any muscle weakness.          ROS:  Gen: no fevers/chills, no changes in weight  Pulm: no sob, no cough  CV: no chest pain, no palpitations  MSk: Positive per HPI  Neuro: no headaches, no numbness/tingling      Allergies   Allergen Reactions    Tylenol Anaphylaxis    Asa [Aspirin]     Fosamax [Alendronate Sodium] Nausea    Influenza Virus Vacc     Meperidine     Nsaids Rash and Itching    Pcn [Penicillins] Itching    Propoxyphene     Sulfa Drugs        Current medicines (including changes today)  Current Outpatient Medications   Medication Sig Dispense Refill    Lidocaine-Menthol 4-4 % Patch Apply 1 Application topically every 24 hours as needed (Muscle spasm). 15 Patch 1    methocarbamol (ROBAXIN) 750 MG Tab Take 1 Tablet by mouth 4 times a day for 7 days. 28 Tablet 0    simvastatin (ZOCOR) 20 MG Tab TAKE 1 TABLET BY MOUTH EVERY EVENING 90 Tablet 3    fluticasone (FLONASE) 50 MCG/ACT nasal spray Administer 1 Spray into affected nostril(S) every day. 48 g 3    omeprazole (PRILOSEC) 20 MG delayed-release capsule Take 1 Capsule by mouth 2 times a day for 360 days. 180 Capsule 3    triamcinolone acetonide (KENALOG) 0.5 % Cream Apply 1g  "three times a day for 7 days to affected area 60 g 0    Probiotic Product (PROBIOTIC-10 PO) Take  by mouth every day.      multivitamin (THERAGRAN) Tab Take 1 Tab by mouth every day.      VITAMIN D PO Take 2,000 mcg by mouth.      CRANBERRY PO Take  by mouth.      ascorbic acid (ASCORBIC ACID) 500 MG Tab Take 500 mg by mouth every day.      Calcium Carbonate-Vitamin D 600-400 MG-UNIT Tab Take  by mouth 2 times a day.         No current facility-administered medications for this visit.          Objective:       /78   Pulse 71   Temp 36.2 °C (97.1 °F) (Temporal)   Resp 16   Ht 1.626 m (5' 4\")   Wt 50.5 kg (111 lb 6.4 oz)   SpO2 100%  Body mass index is 19.12 kg/m².    Physical Exam:  Constitutional: Well-developed and well-nourished female in NAD. Not diaphoretic. No distress.   Skin: warm, dry, intact  Cardiovascular: Regular rate and rhythm without murmur. Radial pulses are intact and equal bilaterally.  Pulmonary: Clear to ausculation. Normal effort. No rales, ronchi, or wheezing.  Back: Full ROM, 5/5 LE strength, sensation intact bilaterally in LE, no TTP over spinous processes, paraspinals positive for tenderness and palpated muscle spasm on left distal trapezius, SI joint negative, straight leg raise   Neurological: Alert and oriented x 3.   Psychiatric:  Behavior, mood, and affect are appropriate.      Assessment and Plan:     The following treatment plan was discussed:    1. Mid back pain on left side  Current condition with exacerbation.  Upon physical exam patient was found to have muscle spasm in left distal trapezius muscle that was tender to the touch with palpation.  Unfortunately patient is intolerant to acetaminophen and NSAIDs so management is difficult.  Patient was agreeable to trial of Robaxin half to 1 tablet as needed to help release muscle spasm and to help with sleep.  Side effects to medication were discussed.  New prescription of lidocaine/ethinyl patches sent to pharmacy.  " Patient will also continue with use of over-the-counter Biofreeze, heat and cold therapy.  At length discussion with patient regards to appropriate stretching exercises and handouts were provided discharge instructions.  Recommended physical therapy however patient declined at this time due to inability to take time off of work.  Will follow-up in clinic on 11/1/2022.    - Lidocaine-Menthol 4-4 % Patch; Apply 1 Application topically every 24 hours as needed (Muscle spasm).  Dispense: 15 Patch; Refill: 1  - methocarbamol (ROBAXIN) 750 MG Tab; Take 1 Tablet by mouth 4 times a day for 7 days.  Dispense: 28 Tablet; Refill: 0          Any change or worsening of signs or symptoms, patient encouraged to follow-up or report to urgent care or emergency room for further evaluation. Patient verbalizes understanding and agrees.    Follow-Up: Return in about 6 days (around 11/1/2022), or if symptoms worsen or fail to improve, for back pain.      PLEASE NOTE: This dictation was created using voice recognition software. I have made every reasonable attempt to correct obvious errors, but I expect that there are errors of grammar and possibly content that I did not discover before finalizing the note.

## 2022-11-01 ENCOUNTER — OFFICE VISIT (OUTPATIENT)
Dept: MEDICAL GROUP | Facility: PHYSICIAN GROUP | Age: 72
End: 2022-11-01
Payer: MEDICARE

## 2022-11-01 VITALS
HEART RATE: 60 BPM | OXYGEN SATURATION: 100 % | WEIGHT: 112 LBS | HEIGHT: 64 IN | TEMPERATURE: 97.6 F | BODY MASS INDEX: 19.12 KG/M2 | RESPIRATION RATE: 14 BRPM | DIASTOLIC BLOOD PRESSURE: 80 MMHG | SYSTOLIC BLOOD PRESSURE: 128 MMHG

## 2022-11-01 DIAGNOSIS — E55.9 VITAMIN D DEFICIENCY: ICD-10-CM

## 2022-11-01 DIAGNOSIS — Z80.8 FAMILY HISTORY OF THYROID CANCER: ICD-10-CM

## 2022-11-01 DIAGNOSIS — M25.512 ACUTE PAIN OF LEFT SHOULDER: ICD-10-CM

## 2022-11-01 DIAGNOSIS — Z00.00 ENCOUNTER FOR MEDICARE ANNUAL WELLNESS EXAM: Primary | ICD-10-CM

## 2022-11-01 DIAGNOSIS — E78.5 DYSLIPIDEMIA: ICD-10-CM

## 2022-11-01 DIAGNOSIS — F43.23 ADJUSTMENT DISORDER WITH MIXED ANXIETY AND DEPRESSED MOOD: ICD-10-CM

## 2022-11-01 DIAGNOSIS — I83.93 VARICOSE VEINS OF BOTH LOWER EXTREMITIES, UNSPECIFIED WHETHER COMPLICATED: ICD-10-CM

## 2022-11-01 DIAGNOSIS — R30.0 DYSURIA: ICD-10-CM

## 2022-11-01 DIAGNOSIS — F51.01 PRIMARY INSOMNIA: ICD-10-CM

## 2022-11-01 DIAGNOSIS — M54.9 MID BACK PAIN ON LEFT SIDE: ICD-10-CM

## 2022-11-01 DIAGNOSIS — F43.21 GRIEF REACTION: ICD-10-CM

## 2022-11-01 DIAGNOSIS — R10.9 RIGHT FLANK PAIN: ICD-10-CM

## 2022-11-01 DIAGNOSIS — L29.0 PRURITUS ANI: ICD-10-CM

## 2022-11-01 DIAGNOSIS — F17.200 NICOTINE DEPENDENCE WITH CURRENT USE: ICD-10-CM

## 2022-11-01 DIAGNOSIS — K21.9 GASTROESOPHAGEAL REFLUX DISEASE WITHOUT ESOPHAGITIS: ICD-10-CM

## 2022-11-01 DIAGNOSIS — R14.0 ABDOMINAL BLOATING: ICD-10-CM

## 2022-11-01 DIAGNOSIS — J30.2 SEASONAL ALLERGIES: ICD-10-CM

## 2022-11-01 DIAGNOSIS — R03.0 ELEVATED BLOOD PRESSURE READING: ICD-10-CM

## 2022-11-01 DIAGNOSIS — M81.0 AGE-RELATED OSTEOPOROSIS WITHOUT CURRENT PATHOLOGICAL FRACTURE: ICD-10-CM

## 2022-11-01 DIAGNOSIS — K64.9 HEMORRHOIDS, UNSPECIFIED HEMORRHOID TYPE: ICD-10-CM

## 2022-11-01 DIAGNOSIS — Z86.19 HISTORY OF HELICOBACTER PYLORI INFECTION: ICD-10-CM

## 2022-11-01 DIAGNOSIS — N30.10 CHRONIC INTERSTITIAL CYSTITIS: ICD-10-CM

## 2022-11-01 DIAGNOSIS — R35.0 URINARY FREQUENCY: ICD-10-CM

## 2022-11-01 DIAGNOSIS — R51.9 INTRACTABLE EPISODIC HEADACHE, UNSPECIFIED HEADACHE TYPE: ICD-10-CM

## 2022-11-01 PROCEDURE — 99406 BEHAV CHNG SMOKING 3-10 MIN: CPT | Performed by: NURSE PRACTITIONER

## 2022-11-01 PROCEDURE — G0439 PPPS, SUBSEQ VISIT: HCPCS | Mod: 25 | Performed by: NURSE PRACTITIONER

## 2022-11-01 RX ORDER — LIDOCAINE PAIN RELIEF 40 MG/1000MG
PATCH TOPICAL
COMMUNITY
Start: 2022-10-26 | End: 2022-11-01

## 2022-11-01 RX ORDER — AZELASTINE 1 MG/ML
1 SPRAY, METERED NASAL 2 TIMES DAILY
Qty: 30 ML | Refills: 3 | Status: SHIPPED | OUTPATIENT
Start: 2022-11-01 | End: 2023-10-03

## 2022-11-01 ASSESSMENT — PATIENT HEALTH QUESTIONNAIRE - PHQ9: CLINICAL INTERPRETATION OF PHQ2 SCORE: 0

## 2022-11-01 ASSESSMENT — ENCOUNTER SYMPTOMS: GENERAL WELL-BEING: GOOD

## 2022-11-01 ASSESSMENT — ACTIVITIES OF DAILY LIVING (ADL): BATHING_REQUIRES_ASSISTANCE: 0

## 2022-11-01 ASSESSMENT — FIBROSIS 4 INDEX: FIB4 SCORE: 1.234657039711191336

## 2022-11-01 NOTE — ASSESSMENT & PLAN NOTE
Chronic and ongoing health concern.  Patient does acknowledge that she needs to quit smoking.  She does continue to work on cutting down on her cigarettes and she is currently at 6 cigarettes/day.  Patient continues to decline any smoking cessation.  Discussed tips and tricks on quitting smoking as well the risk associated with smoking cigarettes for 3 minutes during our exam.
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
## 2022-11-01 NOTE — ASSESSMENT & PLAN NOTE
Chronic and well-controlled condition.  Currently on simvastatin.  Patient is up-to-date with labs.  Patient denies myalgias.

## 2022-11-01 NOTE — ASSESSMENT & PLAN NOTE
Chronic and stable.  Patient continues to work on Dash diet.  Not currently on antihypertension medications.  Up-to-date labs.  Patient does continue to smoke cigarettes 1-1/2 and does drink alcohol 1 to 1-1/2 glasses of wine last night.

## 2022-11-01 NOTE — ASSESSMENT & PLAN NOTE
Chronic condition.  Patient continues to express nasal congestion, sinus pressure, postnasal drip, and bilateral itchy watery eyes.  She has not tried any over-the-counter antihistamine treatments including Zyrtec, Claritin, or Xyzal as she is afraid of possible side effects.  She does use Flonase however it only gives minimal relief of symptoms.

## 2022-11-01 NOTE — ASSESSMENT & PLAN NOTE
Resolved.  Patient continues to be asymptomatic for H. pylori.  Was previously treated this year.  Does continue on probiotics and omeprazole.

## 2022-11-01 NOTE — ASSESSMENT & PLAN NOTE
Chronic well-controlled condition.  Patient is on omeprazole 20 mg daily.  Patient was treated this past year for H. pylori and is doing well post treatment.
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
## 2022-11-01 NOTE — ASSESSMENT & PLAN NOTE
Chronic condition.  Patient reports less than 15 headaches per month.  Patient reports headaches usually resolved in several minutes to about 30 minutes.  She does use a warm rag occasionally which does help.   Patient denies any vision changes or nausea, no photosensitivity.   Unfortunately she is intolerant to aspirin, NSAIDs, and acetaminophen due to allergy.

## 2022-11-01 NOTE — ASSESSMENT & PLAN NOTE
Patient reports that back pain has significantly improved.  She was unable to use the lidocaine patches as they rolled on her back and she did not like the way they felt.  Patient did not take the methocarbamol as she was worried about side effects.  Patient denies any radiculopathy.

## 2022-11-01 NOTE — ASSESSMENT & PLAN NOTE
Patient continues to grieve for her .  I do believe this is normal grief process.  She denies any suicidal homicidal ideations.  She does not socialize much with friends however did advise her to look into the senior center/life center here in [place].
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
## 2022-11-01 NOTE — ASSESSMENT & PLAN NOTE
Chronic condition.  Patient is intolerant to Fosamax due to increased acid reflux symptoms.  She has not followed up with previous order placed for Prolia as she was concerned about cost.  Patient does continue on vitamin D and calcium supplementation.  Discussed at length the risks associated with untreated osteoporosis and risk for falls and fractures.  Patient is agreeable to schedule appointment for Prolia infusion.

## 2022-11-01 NOTE — ASSESSMENT & PLAN NOTE
Patient reports normal urination.  No issues with interstitial cystitis at this time.  Patient has not currently followed by urology.  We will continue to monitor.

## 2022-11-01 NOTE — ASSESSMENT & PLAN NOTE
Chronic and stable.  Patient denies any depression, anxiety, or SI thoughts today.  She is doing well with coping skills.

## 2022-11-17 ENCOUNTER — OFFICE VISIT (OUTPATIENT)
Dept: URGENT CARE | Facility: PHYSICIAN GROUP | Age: 72
End: 2022-11-17
Payer: MEDICARE

## 2022-11-17 VITALS
WEIGHT: 112 LBS | RESPIRATION RATE: 16 BRPM | OXYGEN SATURATION: 96 % | BODY MASS INDEX: 19.12 KG/M2 | SYSTOLIC BLOOD PRESSURE: 132 MMHG | HEIGHT: 64 IN | DIASTOLIC BLOOD PRESSURE: 72 MMHG | TEMPERATURE: 96.6 F | HEART RATE: 135 BPM

## 2022-11-17 DIAGNOSIS — J02.9 SORE THROAT: ICD-10-CM

## 2022-11-17 DIAGNOSIS — J01.10 ACUTE NON-RECURRENT FRONTAL SINUSITIS: ICD-10-CM

## 2022-11-17 DIAGNOSIS — R05.1 ACUTE COUGH: ICD-10-CM

## 2022-11-17 LAB
EXTERNAL QUALITY CONTROL: NORMAL
INT CON NEG: NORMAL
INT CON NEG: NORMAL
INT CON POS: NORMAL
INT CON POS: NORMAL
S PYO AG THROAT QL: NEGATIVE
SARS-COV+SARS-COV-2 AG RESP QL IA.RAPID: NEGATIVE

## 2022-11-17 PROCEDURE — 87880 STREP A ASSAY W/OPTIC: CPT | Performed by: PHYSICIAN ASSISTANT

## 2022-11-17 PROCEDURE — 99213 OFFICE O/P EST LOW 20 MIN: CPT | Mod: CS | Performed by: PHYSICIAN ASSISTANT

## 2022-11-17 PROCEDURE — 87426 SARSCOV CORONAVIRUS AG IA: CPT | Performed by: PHYSICIAN ASSISTANT

## 2022-11-17 RX ORDER — DOXYCYCLINE HYCLATE 100 MG
100 TABLET ORAL 2 TIMES DAILY
Qty: 14 TABLET | Refills: 0 | Status: SHIPPED | OUTPATIENT
Start: 2022-11-17 | End: 2022-11-24

## 2022-11-17 ASSESSMENT — ENCOUNTER SYMPTOMS
SINUS PAIN: 1
CHILLS: 1
FEVER: 0
COUGH: 1

## 2022-11-17 ASSESSMENT — FIBROSIS 4 INDEX: FIB4 SCORE: 1.234657039711191336
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
## 2022-11-17 NOTE — LETTER
November 17, 2022         Patient: Tanisha Haile   YOB: 1950   Date of Visit: 11/17/2022           To Whom it May Concern:    Tanisha Haile was seen in my clinic on 11/17/2022.  Please excuse patient's absence today.    If you have any questions or concerns, please don't hesitate to call.        Sincerely,           Juan Jose Alvarez P.A.-C.  Electronically Signed

## 2022-12-16 ENCOUNTER — OFFICE VISIT (OUTPATIENT)
Dept: URGENT CARE | Facility: PHYSICIAN GROUP | Age: 72
End: 2022-12-16
Payer: MEDICARE

## 2022-12-16 ENCOUNTER — APPOINTMENT (OUTPATIENT)
Dept: RADIOLOGY | Facility: IMAGING CENTER | Age: 72
End: 2022-12-16
Attending: FAMILY MEDICINE
Payer: MEDICARE

## 2022-12-16 VITALS
HEART RATE: 91 BPM | BODY MASS INDEX: 19.46 KG/M2 | TEMPERATURE: 97.1 F | WEIGHT: 114 LBS | SYSTOLIC BLOOD PRESSURE: 134 MMHG | RESPIRATION RATE: 16 BRPM | DIASTOLIC BLOOD PRESSURE: 88 MMHG | HEIGHT: 64 IN | OXYGEN SATURATION: 97 %

## 2022-12-16 DIAGNOSIS — S20.211A CONTUSION OF RIB ON RIGHT SIDE, INITIAL ENCOUNTER: ICD-10-CM

## 2022-12-16 PROCEDURE — 99213 OFFICE O/P EST LOW 20 MIN: CPT | Performed by: FAMILY MEDICINE

## 2022-12-16 PROCEDURE — 71101 X-RAY EXAM UNILAT RIBS/CHEST: CPT | Mod: TC,FY,RT | Performed by: NURSE PRACTITIONER

## 2022-12-16 ASSESSMENT — FIBROSIS 4 INDEX: FIB4 SCORE: 1.234657039711191336

## 2022-12-16 ASSESSMENT — PAIN SCALES - GENERAL: PAINLEVEL: 8=MODERATE-SEVERE PAIN

## 2022-12-16 NOTE — LETTER
December 16, 2022         Patient: Tanisha Haile   YOB: 1950   Date of Visit: 12/16/2022           To Whom it May Concern:    Tanisha Haile was seen in my clinic on 12/16/2022. She may return to work on Monday.    If you have any questions or concerns, please don't hesitate to call.        Sincerely,           Carlos Manuel Earl M.D.  Electronically Signed

## 2022-12-16 NOTE — PROGRESS NOTES
Subjective:         Chief Complaint   Patient presents with    Fall       Injured R side of ribs, x1day                   Rib Pain       Pt c/o sharp, constant right lower ribcage pain x 2 days after ground level fall.    Pain is constant and worse with deep breathing.       Pain not improved with motrin.        denies dyspnea.       Pertinent negatives include no claudication, cough, exertional chest pressure, fever, nausea, near-syncope, numbness, syncope or vomiting.           Past Medical History:   Diagnosis Date    Anemia     Arrhythmia     Arthritis     Cervical cancer screening 5/22/2013    Due to repeat around 2015.    Dyslipidemia 10/24/2012    Dysuria 10/2/2014    Seen 9/27 for uti, given macrobid Feels the same as 9/27 Feels more like vaginal pain      GERD (gastroesophageal reflux disease)     Heart murmur     Hyperlipidemia     Insomnia 2/27/2014    Not sleeping, can't shut brain off Making her really tired during the day X 1 month plus but worse for a month    Migraine     Seasonal allergies 5/22/2013    Shingles     Thyroid disease          Social History     Tobacco Use    Smoking status: Every Day     Packs/day: 0.50     Years: 20.00     Pack years: 10.00     Types: Cigarettes    Smokeless tobacco: Never   Vaping Use    Vaping Use: Never used   Substance Use Topics    Alcohol use: Yes     Alcohol/week: 4.2 oz     Types: 7 Glasses of wine per week     Comment: 1 glass of wine daily    Drug use: No     Comment: denies h/o heroin, cocaine, meth, IVDU         Family history was reviewed and not pertinent       Review of Systems:  Review of Systems   Constitutional: Negative for fever.   HENT: no neck pain, headache or dizziness  Eyes: denies vision changes  Respiratory: no cough, congestion, SOB  Cardiovascular: denies palpations     Gastrointestinal: denies diarrhea, abdominal pain or constipation.  No blood in stool.  Musculoskeletal: denies back pain or joint pain    Skin: no itching or  "rash  Neurological: No numbness or tingling.   10 point ROS otherwise negative, except per HPI         Objective:      /88   Pulse 91   Temp 36.2 °C (97.1 °F) (Temporal)   Resp 16   Ht 1.626 m (5' 4\")   Wt 51.7 kg (114 lb)   SpO2 97%         Physical Exam   Constitutional: pt is oriented to person, place, and time. Pt appears well-developed and well-nourished.   HENT:   Head: Normocephalic and atraumatic.   Mouth/Throat: Oropharynx is clear and moist.   Eyes: Conjunctivae and EOM are normal. Pupils are equal, round, and reactive to light. No scleral icterus.   Neck: Normal range of motion. Neck supple. No JVD present. No thyromegaly present.   Cardiovascular: Normal rate, regular rhythm, normal heart sounds and intact distal pulses.  Exam reveals no gallop and no friction rub.    No murmur heard.  Pulmonary/Chest: Effort normal and breath sounds normal. No respiratory distress. Pt has no wheezes. Pt has no rales. Pt exhibits point tenderness over several lower ribs on right side   Abdominal: Soft.   Musculoskeletal: Normal range of motion. Pt exhibits no edema.   Lymphadenopathy:     Pt has no cervical adenopathy.   Neurological: pt is alert and oriented to person, place, and time. No cranial nerve deficit.   Skin: Skin is warm and dry. No erythema.   Psychiatric: pt has a normal mood and affect. patient's behavior is normal.     Details    Reading Physician Reading Date Result Priority   Pancho Carr M.D.  [phone] 12/16/2022 Urgent Care     Narrative & Impression     12/16/2022 1:03 PM     HISTORY/REASON FOR EXAM:  Pain Following Trauma.     TECHNIQUE/EXAM DESCRIPTION AND NUMBER OF VIEWS:  3 images of the right ribs and chest.     COMPARISON: NONE     FINDINGS:  No fractures or acute bony changes are noted.  There is no evidence of a hemo or pneumothorax.     IMPRESSION:     Unremarkable right rib series.           Exam Ended: 12/16/22  1:20 PM Last Resulted: 12/16/22  1:22 PM            "   Assessment/Plan:      1. Contusion of rib on right side, initial encounter     X-rays were personally reviewed by myself.   There is no rib fracture  noted.         adrianna Lopez      Differential diagnosis, natural history, supportive care, and indications for immediate follow-up discussed. All questions answered. Patient agrees with the plan of care.     Follow-up as needed if symptoms worsen or fail to improve to PCP, Urgent care or Emergency Room.     I have personally reviewed prior external notes and test results pertinent to today's visit.  I have independently reviewed and interpreted all diagnostics ordered during this urgent care acute visit.

## 2022-12-22 ENCOUNTER — APPOINTMENT (OUTPATIENT)
Dept: RADIOLOGY | Facility: IMAGING CENTER | Age: 72
End: 2022-12-22
Attending: NURSE PRACTITIONER
Payer: MEDICARE

## 2022-12-22 ENCOUNTER — OFFICE VISIT (OUTPATIENT)
Dept: MEDICAL GROUP | Facility: PHYSICIAN GROUP | Age: 72
End: 2022-12-22
Payer: MEDICARE

## 2022-12-22 VITALS
SYSTOLIC BLOOD PRESSURE: 138 MMHG | DIASTOLIC BLOOD PRESSURE: 90 MMHG | WEIGHT: 114 LBS | TEMPERATURE: 97.3 F | OXYGEN SATURATION: 99 % | HEIGHT: 64 IN | RESPIRATION RATE: 16 BRPM | HEART RATE: 69 BPM | BODY MASS INDEX: 19.46 KG/M2
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
## 2022-12-22 DIAGNOSIS — S20.211S RIB CONTUSION, RIGHT, SEQUELA: ICD-10-CM
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
## 2022-12-22 PROCEDURE — 73521 X-RAY EXAM HIPS BI 2 VIEWS: CPT | Mod: TC,FY | Performed by: PHYSICIAN ASSISTANT

## 2022-12-22 PROCEDURE — 99214 OFFICE O/P EST MOD 30 MIN: CPT | Performed by: NURSE PRACTITIONER

## 2022-12-22 RX ORDER — IBUPROFEN 600 MG/1
600 TABLET ORAL EVERY 8 HOURS PRN
Qty: 60 TABLET | Refills: 0 | Status: SHIPPED | OUTPATIENT
Start: 2022-12-22 | End: 2023-06-22

## 2022-12-22 RX ORDER — LIDOCAINE 4 G/G
1 PATCH TOPICAL
Qty: 30 PATCH | Refills: 1 | Status: SHIPPED | OUTPATIENT
Start: 2022-12-22 | End: 2022-12-28 | Stop reason: SDUPTHER

## 2022-12-22 ASSESSMENT — FIBROSIS 4 INDEX: FIB4 SCORE: 1.234657039711191336

## 2022-12-22 NOTE — PROGRESS NOTES
Chief Complaint   Patient presents with    Fall     Fell 8 days ago. Has pain on right side. Went to  24 hours after the fall and had x-rays. Pt states the pain is no longer in rib cage but I now having pain in right hip.       Subjective:     HPI:   Tanisha Haile is a 72 y.o. female here to discuss the evaluation and management of:      Fall  GLF trip and fall on a metal door runner 12/15/2022.   Patient reports that she was seen in urgent care the day after the fall.  She reports that she did have x-rays completed and reports that there were no fractures.   She reports that she fell/off to her right onto a concrete slab in the front of her house.  Patient denies losing consciousness.  Patient is not on any blood thinners  Denies taking any aspirin reports allergy.    Today has complaints of right hip pain and pain when taking a deep breath on the right side.   Pain in her hip is worse when sitting.  She reports normal gait.  Patient denies cough, external chest pressure, near syncopal event, or dyspnea.    Patient has had relief with lidocaine patches.  Patient has not tried Motrin as recommended at urgent care discharge.  She does report history of allergy to NSAIDs however it was many years ago and is unsure if it is a true allergy and reports only a mild rash or skin itching.  She reports that she cannot completely link it to NSAID use.  She does have an allergy to aspirin stating that she gets red and swollen.        ROS:  Gen: no fevers/chills, no changes in weight  Pulm: Positive per HPI  CV: no chest pain, no palpitations  MSk: Positive per HPI  Neuro: no headaches, no numbness/tingling      Allergies   Allergen Reactions    Tylenol Anaphylaxis    Asa [Aspirin]     Fosamax [Alendronate Sodium] Nausea    Influenza Virus Vacc     Meperidine     Nsaids Rash and Itching    Pcn [Penicillins] Itching    Propoxyphene     Sulfa Drugs        Current medicines (including changes today)  Current Outpatient  "Medications   Medication Sig Dispense Refill    ibuprofen (MOTRIN) 600 MG Tab Take 1 Tablet by mouth every 8 hours as needed for Moderate Pain. 60 Tablet 0    Lidocaine 4 % Patch Apply 1 Application topically every 12 weeks for 7 days. 30 Patch 1    azelastine (ASTELIN) 137 MCG/SPRAY nasal spray Administer 1 Spray into affected nostril(S) 2 times a day. 30 mL 3    simvastatin (ZOCOR) 20 MG Tab TAKE 1 TABLET BY MOUTH EVERY EVENING 90 Tablet 3    omeprazole (PRILOSEC) 20 MG delayed-release capsule Take 1 Capsule by mouth 2 times a day for 360 days. 180 Capsule 3    triamcinolone acetonide (KENALOG) 0.5 % Cream Apply 1g three times a day for 7 days to affected area 60 g 0    Probiotic Product (PROBIOTIC-10 PO) Take  by mouth every day.      multivitamin (THERAGRAN) Tab Take 1 Tablet by mouth every day.      VITAMIN D PO Take 2,000 mcg by mouth.      CRANBERRY PO Take  by mouth.      ascorbic acid (ASCORBIC ACID) 500 MG Tab Take 1 Tablet by mouth every day.      Calcium Carbonate-Vitamin D 600-400 MG-UNIT Tab Take  by mouth 2 times a day.         No current facility-administered medications for this visit.          Objective:       BP (!) 138/90 (BP Location: Left arm, Patient Position: Sitting, BP Cuff Size: Small adult)   Pulse 69   Temp 36.3 °C (97.3 °F) (Temporal)   Resp 16   Ht 1.626 m (5' 4\")   Wt 51.7 kg (114 lb)   SpO2 99%  Body mass index is 19.57 kg/m².    Physical Exam:  Constitutional: Well-developed and well-nourished female in NAD. Not diaphoretic. No distress.   Skin: warm, dry, intact  Cardiovascular: Regular rate and rhythm without murmur. Radial pulses are intact and equal bilaterally.  Pulmonary: Clear to ausculation. Normal effort. No rales, ronchi, or wheezing.  Patient is positive for point tenderness over several right lower ribs with no step-offs or crepitus noted.  No ecchymosis noted.  Abdomen: Soft, non tender, and without distention. Active bowel sounds in all four quadrants. "   Extremities: No cyanosis, clubbing, erythema, nor edema.  Normal gait with ambulation.  Patient does have tenderness to palpation over right SI joint and iliac crest.  No ecchymosis noted.  Neurological: Alert and oriented x 3.   Psychiatric:  Behavior, mood, and affect are appropriate.    DX- RIBs   12/16/2022 1:03 PM     HISTORY/REASON FOR EXAM:  Pain Following Trauma.     TECHNIQUE/EXAM DESCRIPTION AND NUMBER OF VIEWS:  3 images of the right ribs and chest.     COMPARISON: NONE     FINDINGS:  No fractures or acute bony changes are noted.  There is no evidence of a hemo or pneumothorax.     IMPRESSION:     Unremarkable right rib series.      DX- hip-Bilateral  12/22/2022 2:18 PM     HISTORY/REASON FOR EXAM:  Bilateral hip pain.        TECHNIQUE/EXAM DESCRIPTION AND NUMBER OF VIEWS:  3 views of the bilateral hip.     COMPARISON: 12/30/2019     FINDINGS:  There is no evidence of acute fracture involving the pelvis. No proximal femoral fracture is identified.  There is no evidence of femoral head flattening. No femoral head deformity is identified. No bone erosions are appreciated.  There is moderate joint space narrowing and periarticular sclerosis and marginal spurring which is greater in the left hip.     IMPRESSION:     1.  No radiographic evidence of acute traumatic injury.     2.  Findings are consistent with moderate osteoarthritis. Findings are slightly more prominent than on prior exam and greater on the left side.    Assessment and Plan:     The following treatment plan was discussed:  I have personally reviewed prior external notes and test results pertinent to patient's visit today including recent urgent care visit.  I have independently reviewed and interpreted all diagnostics ordered during this visit.  Discussed rib series x-ray results with patient and answered all questions.    Patient is a 72-year-old non-ill-appearing, no acute distress female presenting today for sequential visit for  ground-level fall that happened last Thursday.  Patient is expressing worsening pain with deep inspiration on ribs on the right lower side and right iliac crest pain.  Upon physical exam patient is tender over right lower rib cage, right iliac crest, and right SI joint.  X-ray right hip order was placed today.  Discussed with patient at length about allergy to NSAIDs and patient is unable to distinctly identify if it is a true allergy and reports that she thinks it is more of an intolerance that may have caused itching once many years ago.  She does report a true allergy to aspirin including getting red and swollen.  Discussed the risk versus benefits of trying NSAIDs today and patient was agreeable to try ibuprofen 600 mg tablet once every 8 hours as needed for pain and inflammation.  Refill lidocaine patches was sent to the pharmacy as those have helped with her pain.  Unfortunately patient reports allergy to acetaminophen.  Educated patient regards to the importance of cryotherapy, the importance of taking deep breaths and using splinting to help with pain.    1508: Reviewed x-ray results which did not show any evidence of any fracture or traumatic injury.  X-rays do show moderate osteoarthritis greater on left than right.  Results discussed with patient via [facility] message.    1. Fall, subsequent encounter  - DX-HIP-BILATERAL-WITH PELVIS-2 VIEWS; Future    2. Rib contusion, right, sequela  - ibuprofen (MOTRIN) 600 MG Tab; Take 1 Tablet by mouth every 8 hours as needed for Moderate Pain.  Dispense: 60 Tablet; Refill: 0  - Lidocaine 4 % Patch; Apply 1 Application topically every 12 weeks for 7 days.  Dispense: 30 Patch; Refill: 1        Any change or worsening of signs or symptoms, patient encouraged to follow-up or report to urgent care or emergency room for further evaluation. Patient verbalizes understanding and agrees.    Follow-Up: Return for 3-5 day is symptoms worsen, 7-10 if symptoms continue..      PLEASE  NOTE: This dictation was created using voice recognition software. I have made every reasonable attempt to correct obvious errors, but I expect that there are errors of grammar and possibly content that I did not discover before finalizing the note.

## 2022-12-22 NOTE — ASSESSMENT & PLAN NOTE
GLF trip and fall on a metal door runner 12/15/2022.   Patient reports that she was seen in urgent care the day after the fall.  She reports that she did have x-rays completed and reports that there were no fractures.   She reports that she fell/off to her right onto a concrete slab in the front of her house.  Patient denies losing consciousness.  Patient is not on any blood thinners  Denies taking any aspirin reports allergy.    Today has complaints of right hip pain and pain when taking a deep breath on the right side.   Pain in her hip is worse when sitting.  She reports normal gait.  Patient denies cough, external chest pressure, near syncopal event, or dyspnea.    Patient has had relief with lidocaine patches.  Patient has not tried Motrin as recommended at urgent care discharge.  She does report history of allergy to NSAIDs however it was many years ago and is unsure if it is a true allergy and reports only a mild rash or skin itching.  She reports that she cannot completely link it to NSAID use.  She does have an allergy to aspirin stating that she gets red and swollen.

## 2022-12-22 NOTE — LETTER
[address] [address]     December 22, 2022    Patient: Tanisha Haile   YOB: 1950   Date of Visit: 12/22/2022       To Whom It May Concern:    Tanisha Haile was seen and treated in our department on 12/22/2022.  Patient will be out of work due to injury/illness and may return to work on 12- without restrictions.        Sincerely,         MARTI Lopes.

## 2022-12-28 ENCOUNTER — OFFICE VISIT (OUTPATIENT)
Dept: MEDICAL GROUP | Facility: PHYSICIAN GROUP | Age: 72
End: 2022-12-28
Payer: MEDICARE

## 2022-12-28 VITALS
SYSTOLIC BLOOD PRESSURE: 132 MMHG | DIASTOLIC BLOOD PRESSURE: 88 MMHG | TEMPERATURE: 97.3 F | WEIGHT: 113 LBS | RESPIRATION RATE: 18 BRPM | HEIGHT: 64 IN | HEART RATE: 68 BPM | OXYGEN SATURATION: 99 % | BODY MASS INDEX: 19.29 KG/M2

## 2022-12-28 DIAGNOSIS — R10.31 RLQ ABDOMINAL PAIN: ICD-10-CM
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
## 2022-12-28 DIAGNOSIS — S20.211S RIB CONTUSION, RIGHT, SEQUELA: ICD-10-CM

## 2022-12-28 PROCEDURE — 99213 OFFICE O/P EST LOW 20 MIN: CPT | Performed by: NURSE PRACTITIONER

## 2022-12-28 RX ORDER — LIDOCAINE 4 G/G
1 PATCH TOPICAL
Qty: 30 PATCH | Refills: 1 | Status: SHIPPED | OUTPATIENT
Start: 2022-12-28 | End: 2023-01-04

## 2022-12-28 ASSESSMENT — FIBROSIS 4 INDEX: FIB4 SCORE: 1.234657039711191336

## 2022-12-28 NOTE — ASSESSMENT & PLAN NOTE
Chronic condition.  Patient continues to express that she has pain on her right lower quadrant above her right hip she also indicates mild nausea when eating, no vomiting, fever, or change in bowel movements.   Patient does indicate that she has anxiety in regards to her pain.  She indicates that her right rib pain has improved and she is able to take deep breaths with only minimal pain.   Patient is able to ambulate with normal gait.  She reports tenderness to palpation on right flank/right lower quadrant just above the iliac crest.  No bruising or ecchymosis noted.    Patient has not tried any naproxen or Motrin indicating that she is scared to try them due to possible allergy in the past that she has difficulty recalling.  Patient reports that heat and lidocaine patches do help.

## 2022-12-28 NOTE — PROGRESS NOTES
Chief Complaint   Patient presents with    Fall     Follow up on fall from 12/22, still having pain on R side of waist, feels like she has a knot, hard time sleeping from it, getting sick feeling from eating and having SOB        Subjective:     HPI:   Tanisha Haile is a 72 y.o. female here to discuss the evaluation and management of:      Fall  Chronic condition.  Patient continues to express that she has pain on her right lower quadrant above her right hip she also indicates mild nausea when eating, no vomiting, fever, or change in bowel movements.   Patient does indicate that she has anxiety in regards to her pain.  She indicates that her right rib pain has improved and she is able to take deep breaths with only minimal pain.   Patient is able to ambulate with normal gait.  She reports tenderness to palpation on right flank/right lower quadrant just above the iliac crest.  No bruising or ecchymosis noted.    Patient has not tried any naproxen or Motrin indicating that she is scared to try them due to possible allergy in the past that she has difficulty recalling.  Patient reports that heat and lidocaine patches do help.      ROS:  per HPI      Allergies   Allergen Reactions    Tylenol Anaphylaxis    Asa [Aspirin]     Fosamax [Alendronate Sodium] Nausea    Influenza Virus Vacc     Meperidine     Nsaids Rash and Itching    Pcn [Penicillins] Itching    Propoxyphene     Sulfa Drugs        Current medicines (including changes today)  Current Outpatient Medications   Medication Sig Dispense Refill    Lidocaine 4 % Patch Apply 1 Application topically every 12 weeks for 7 days. 30 Patch 1    diclofenac sodium (VOLTAREN) 1 % Gel Apply 2 g topically 2 times a day. 100 g 3    ibuprofen (MOTRIN) 600 MG Tab Take 1 Tablet by mouth every 8 hours as needed for Moderate Pain. 60 Tablet 0    azelastine (ASTELIN) 137 MCG/SPRAY nasal spray Administer 1 Spray into affected nostril(S) 2 times a day. 30 mL 3    simvastatin (ZOCOR) 20  "MG Tab TAKE 1 TABLET BY MOUTH EVERY EVENING 90 Tablet 3    omeprazole (PRILOSEC) 20 MG delayed-release capsule Take 1 Capsule by mouth 2 times a day for 360 days. 180 Capsule 3    triamcinolone acetonide (KENALOG) 0.5 % Cream Apply 1g three times a day for 7 days to affected area 60 g 0    Probiotic Product (PROBIOTIC-10 PO) Take  by mouth every day.      multivitamin (THERAGRAN) Tab Take 1 Tablet by mouth every day.      VITAMIN D PO Take 2,000 mcg by mouth.      CRANBERRY PO Take  by mouth.      ascorbic acid (ASCORBIC ACID) 500 MG Tab Take 1 Tablet by mouth every day.      Calcium Carbonate-Vitamin D 600-400 MG-UNIT Tab Take  by mouth 2 times a day.         No current facility-administered medications for this visit.          Objective:       /88   Pulse 68   Temp 36.3 °C (97.3 °F) (Temporal)   Resp 18   Ht 1.626 m (5' 4\")   Wt 51.3 kg (113 lb)   SpO2 99%  Body mass index is 19.4 kg/m².    Physical Exam:  Constitutional: Well-developed and well-nourished female in NAD. Not diaphoretic. No distress.   Skin: warm, dry, intact  Cardiovascular: Regular rate and rhythm without murmur. Radial pulses are intact and equal bilaterally.  Pulmonary: Clear to ausculation. Normal effort. No rales, ronchi, or wheezing.  Abdomen: Soft, superficial tender with palpation just above iliac crest, and without distention. Active bowel sounds in all four quadrants.   No hernias, masses, swelling, or bruising noted.  Neurological: Alert and oriented x 3.   Psychiatric:  Behavior, mood, and affect are appropriate.      Assessment and Plan:     The following treatment plan was discussed:    1. Fall, subsequent encounter  Patient does continue to heal from rib contusions.  We refilled lidocaine patches sent to pharmacy as patient indicates they did not receive the last refill request.  Encourage patient to continue taking deep breaths.  Trial of diclofenac gel sent to pharmacy to help both with rib contusions and right lower " quadrant/flank pain.  Ultrasound order was placed today for evaluation of right lower quadrant/flank pain. Highly suspect musculoskeletal in nature.  Recommended patient try ibuprofen to help with the pain as that is most likely causing her nausea symptoms.  Patient will follow-up in clinic in 1 week.  Work note provided indicating patient may return to work on January 8, 2023 without restrictions.  Did discuss ER precautions.  - US-ABDOMEN COMPLETE SURVEY; Future    2. RLQ abdominal pain  - US-ABDOMEN COMPLETE SURVEY; Future    3. Rib contusion, right, sequela  - Lidocaine 4 % Patch; Apply 1 Application topically every 12 weeks for 7 days.  Dispense: 30 Patch; Refill: 1  - diclofenac sodium (VOLTAREN) 1 % Gel; Apply 2 g topically 2 times a day.  Dispense: 100 g; Refill: 3          Any change or worsening of signs or symptoms, patient encouraged to follow-up or report to urgent care or emergency room for further evaluation. Patient verbalizes understanding and agrees.    Follow-Up: Return in about 1 week (around 1/4/2023) for Fall.      PLEASE NOTE: This dictation was created using voice recognition software. I have made every reasonable attempt to correct obvious errors, but I expect that there are errors of grammar and possibly content that I did not discover before finalizing the note.

## 2022-12-28 NOTE — LETTER
[address] [address]     December 28, 2022    Patient: Tanisha Haile   YOB: 1950   Date of Visit: 12/28/2022       To Whom It May Concern:    Tanisha Haile was seen and treated in our department on 12/28/2022.  Patient will need to be out of work due to medical condition and may return to work on 1-8-2023 without restrictions.    Sincerely,         MARTI Lopes.

## 2023-01-03 ENCOUNTER — APPOINTMENT (OUTPATIENT)
Dept: RADIOLOGY | Facility: MEDICAL CENTER | Age: 73
End: 2023-01-03
Attending: NURSE PRACTITIONER
Payer: MEDICARE

## 2023-01-05 ENCOUNTER — APPOINTMENT (OUTPATIENT)
Dept: RADIOLOGY | Facility: IMAGING CENTER | Age: 73
End: 2023-01-05
Attending: NURSE PRACTITIONER
Payer: MEDICARE

## 2023-01-05 ENCOUNTER — OFFICE VISIT (OUTPATIENT)
Dept: URGENT CARE | Facility: PHYSICIAN GROUP | Age: 73
End: 2023-01-05
Payer: MEDICARE

## 2023-01-05 VITALS
HEART RATE: 77 BPM | OXYGEN SATURATION: 99 % | TEMPERATURE: 97 F | SYSTOLIC BLOOD PRESSURE: 122 MMHG | RESPIRATION RATE: 16 BRPM | BODY MASS INDEX: 19.12 KG/M2 | DIASTOLIC BLOOD PRESSURE: 64 MMHG | WEIGHT: 112 LBS | HEIGHT: 64 IN

## 2023-01-05 DIAGNOSIS — S40.811A ABRASION OF RIGHT UPPER EXTREMITY, INITIAL ENCOUNTER: ICD-10-CM

## 2023-01-05 DIAGNOSIS — M53.3 SACRAL PAIN: ICD-10-CM

## 2023-01-05 DIAGNOSIS — S30.0XXA CONTUSION OF SACRUM, INITIAL ENCOUNTER: ICD-10-CM
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
## 2023-01-05 PROCEDURE — 99214 OFFICE O/P EST MOD 30 MIN: CPT | Performed by: NURSE PRACTITIONER

## 2023-01-05 PROCEDURE — 72220 X-RAY EXAM SACRUM TAILBONE: CPT | Mod: TC,FY | Performed by: NURSE PRACTITIONER

## 2023-01-05 ASSESSMENT — FIBROSIS 4 INDEX: FIB4 SCORE: 1.234657039711191336

## 2023-01-05 NOTE — PROGRESS NOTES
Chief Complaint   Patient presents with    Fall     Slipped on ice on stairs.   Hit right arm and bum on the steps. X 1 day about 730pm    Arm Injury       HISTORY OF PRESENT ILLNESS: Patient is a pleasant 72 y.o. female who presents to urgent care today with physical complaints after a fall yesterday.  Patient notes that she excellently slipped on the ice, falling straight on her sacral region and hitting her right forearm.  The pain to her right forearm is minimal.  She reports moderate pain to her sacrum. Denies saddle anesthesia, numbness or tingling, unilateral weakness, or loss of bowel or bladder.  She has tried a heating pad for symptom relief.      Patient Active Problem List    Diagnosis Date Noted    Fall 12/22/2022    History of Helicobacter pylori infection 08/15/2022    Abdominal bloating 08/11/2022    Urinary frequency 04/18/2022    Right flank pain 04/18/2022    Encounter to establish care 03/01/2022    Vaccine counseling 05/06/2021    Family history of thyroid cancer 09/21/2020    Annual physical exam 09/21/2020    Hemorrhoids 03/15/2019    Elevated blood pressure reading 09/12/2018    Nicotine dependence with current use 09/12/2018    Gastroesophageal reflux disease without esophagitis 12/27/2017    Adjustment disorder with mixed anxiety and depressed mood 04/12/2017    Varicose vein of leg 03/21/2017    Grief reaction 02/21/2017    Interstitial cystitis 07/07/2016    Mid back pain on left side 08/14/2015    Age-related osteoporosis without current pathological fracture 08/14/2015    Headache 10/03/2014    Insomnia 02/27/2014    Seasonal allergies 05/22/2013    Vitamin D deficiency 02/28/2013    Dyslipidemia 10/24/2012       Allergies:Tylenol, Asa [aspirin], Fosamax [alendronate sodium], Influenza virus vacc, Meperidine, Nsaids, Pcn [penicillins], Propoxyphene, Sulfa drugs, and Voltaren [diclofenac]    Current Outpatient Medications Ordered in Epic   Medication Sig Dispense Refill    ibuprofen  (MOTRIN) 600 MG Tab Take 1 Tablet by mouth every 8 hours as needed for Moderate Pain. 60 Tablet 0    azelastine (ASTELIN) 137 MCG/SPRAY nasal spray Administer 1 Spray into affected nostril(S) 2 times a day. 30 mL 3    simvastatin (ZOCOR) 20 MG Tab TAKE 1 TABLET BY MOUTH EVERY EVENING 90 Tablet 3    omeprazole (PRILOSEC) 20 MG delayed-release capsule Take 1 Capsule by mouth 2 times a day for 360 days. 180 Capsule 3    triamcinolone acetonide (KENALOG) 0.5 % Cream Apply 1g three times a day for 7 days to affected area 60 g 0    Probiotic Product (PROBIOTIC-10 PO) Take  by mouth every day.      multivitamin (THERAGRAN) Tab Take 1 Tablet by mouth every day.      VITAMIN D PO Take 2,000 mcg by mouth.      CRANBERRY PO Take  by mouth.      ascorbic acid (ASCORBIC ACID) 500 MG Tab Take 1 Tablet by mouth every day.      Calcium Carbonate-Vitamin D 600-400 MG-UNIT Tab Take  by mouth 2 times a day.        diclofenac sodium (VOLTAREN) 1 % Gel Apply 2 g topically 2 times a day. (Patient not taking: Reported on 1/5/2023) 100 g 3     No current [facility]-ordered facility-administered medications on file.       Past Medical History:   Diagnosis Date    Anemia     Arrhythmia     Arthritis     Cervical cancer screening 5/22/2013    Due to repeat around 2015.    Dyslipidemia 10/24/2012    Dysuria 10/2/2014    Seen 9/27 for uti, given macrobid Feels the same as 9/27 Feels more like vaginal pain      Fall 12/22/2022    GERD (gastroesophageal reflux disease)     Heart murmur     Hyperlipidemia     Insomnia 2/27/2014    Not sleeping, can't shut brain off Making her really tired during the day X 1 month plus but worse for a month    Migraine     Seasonal allergies 5/22/2013    Shingles     Thyroid disease        Social History     Tobacco Use    Smoking status: Every Day     Packs/day: 0.25     Years: 20.00     Pack years: 5.00     Types: Cigarettes    Smokeless tobacco: Never   Vaping Use    Vaping Use: Never used   Substance Use Topics     "Alcohol use: Yes     Alcohol/week: 4.2 oz     Types: 7 Glasses of wine per week     Comment: 1 glass of wine daily    Drug use: No     Comment: denies h/o heroin, cocaine, meth, IVDU       Family Status   Relation Name Status    Mo   at age 83        heart failure    Fa   at age 69        quad bpass, coma    Bro  Alive        handicap at a facility    Bro  Alive    Bro  Alive    MGMo      MGFa      PGMo      PGFa      Jerman  Alive     Family History   Problem Relation Age of Onset    Hypertension Mother     Hyperlipidemia Mother     Thyroid Mother     Lung Disease Mother     Heart Disease Father     Hypertension Father     Hyperlipidemia Father     Hypertension Brother     Hypertension Brother     Atrial fibrillation Brother     Stroke Maternal Grandmother     Arthritis Maternal Grandmother     Hypertension Maternal Grandfather     No Known Problems Paternal Grandmother     No Known Problems Paternal Grandfather     Thyroid cancer Daughter        ROS:  Review of Systems   Constitutional: Negative for fever, chills, weight loss, malaise, and fatigue.   HENT: Negative for ear pain, nosebleeds, congestion, sore throat and neck pain.    Eyes: Negative for vision changes.   Neuro: Negative for headache, sensory changes, weakness, seizure, LOC.   Cardiovascular: Negative for chest pain, palpitations, orthopnea and leg swelling.   Respiratory: Negative for cough, sputum production, shortness of breath and wheezing.   Gastrointestinal: Negative for abdominal pain, nausea, vomiting or diarrhea.   Genitourinary: Negative for dysuria, urgency and frequency.  Musculoskeletal: Positive for falls, low back and forearm pain.  Neck pain, myalgias.   Skin: Negative for rash, diaphoresis.     Exam:  /64   Pulse 77   Temp 36.1 °C (97 °F) (Temporal)   Resp 16   Ht 1.626 m (5' 4\")   Wt 50.8 kg (112 lb)   SpO2 99%   General: well-nourished, well-developed female in NAD  Head: " "normocephalic, atraumatic  Eyes: PERRLA, no conjunctival injection, acuity grossly intact, lids normal.  Ears: normal shape and symmetry, no tenderness, no discharge. External canals are without any significant edema or erythema. Tympanic membranes are without any inflammation, no effusion. Gross auditory acuity is intact.  Nose: symmetrical without tenderness, no discharge.  Mouth/Throat: reasonable hygiene, no erythema, exudates or tonsillar enlargement.  Neck: no masses, range of motion within normal limits, no tracheal deviation. No obvious thyroid enlargement.   Lymph: no cervical adenopathy. No supraclavicular adenopathy.   Neuro: alert and oriented. Cranial nerves 1-12 grossly intact. No sensory deficit.   Cardiovascular: regular rate and rhythm. No edema.  Pulmonary: no distress. Chest is symmetrical with respiration, no wheezes, crackles, or rhonchi.   Musculoskeletal: no clubbing, appropriate muscle tone, gait is stable.  No midline cervical, thoracic or lumbar spine tenderness.  There is tenderness to sacrum, midline, without deformity.  Right forearm: There is a faint abrasion noted to mid forearm without any underlying bony tenderness.  Arm has full range of motion.  Skin: warm, dry, intact, no clubbing, no cyanosis, no rashes.   Psych: appropriate mood, affect, judgement.         DX sacrum radiology reading \"There is no acute displaced fracture of the sacrum or coccyx.\"        Assessment/Plan:  1. Fall due to slipping on ice or snow, initial encounter        2. Contusion of sacrum, initial encounter  DX-SACRUM AND COCCYX 2+      3. Abrasion of right upper extremity, initial encounter              Patient presents with right arm pain and sacral pain after an axonal fall yesterday.  X-ray of sacrum is negative for any acute fracture, suspect contusion.  Ice and heat therapy encouraged.  Topical OTC Arnica encouraged.  Regarding her arm injury, presentation consistent with faint abrasion, wound care " discussed.  Supportive care, differential diagnoses, and indications for immediate follow-up discussed with patient.   Pathogenesis of diagnosis discussed including typical length and natural progression.   Instructed to return to clinic or nearest emergency department for any change in condition, further concerns, or worsening of symptoms.  Patient states understanding of the plan of care and discharge instructions.  Instructed to make an appointment, for follow up, with her primary care provider.        Please note that this dictation was created using voice recognition software. I have made every reasonable attempt to correct obvious errors, but I expect that there are errors of grammar and possibly content that I did not discover before finalizing the note. I spent a total of 30 minutes with record review, exam, communication with the patient, and documentation of this encounter.        MARTI Hood.

## 2023-01-05 NOTE — LETTER
January 5, 2023         Patient: Tanisha Haile   YOB: 1950   Date of Visit: 1/5/2023           To Whom it May Concern:    Tanisha Haile was seen in my clinic on 1/5/2023. She may be excused from work for her next four shifts.     If you have any questions or concerns, please don't hesitate to call.        Sincerely,           MARTI Hood.  Electronically Signed

## 2023-01-12 ENCOUNTER — OFFICE VISIT (OUTPATIENT)
Dept: MEDICAL GROUP | Facility: PHYSICIAN GROUP | Age: 73
End: 2023-01-12
Payer: MEDICARE

## 2023-01-12 VITALS
RESPIRATION RATE: 18 BRPM | DIASTOLIC BLOOD PRESSURE: 78 MMHG | HEART RATE: 92 BPM | TEMPERATURE: 96.3 F | HEIGHT: 64 IN | WEIGHT: 113.4 LBS | SYSTOLIC BLOOD PRESSURE: 128 MMHG | BODY MASS INDEX: 19.36 KG/M2 | OXYGEN SATURATION: 97 %
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
## 2023-01-12 PROCEDURE — 99213 OFFICE O/P EST LOW 20 MIN: CPT | Performed by: NURSE PRACTITIONER

## 2023-01-12 ASSESSMENT — PATIENT HEALTH QUESTIONNAIRE - PHQ9: CLINICAL INTERPRETATION OF PHQ2 SCORE: 0

## 2023-01-12 ASSESSMENT — FIBROSIS 4 INDEX: FIB4 SCORE: 1.234657039711191336

## 2023-01-12 NOTE — PROGRESS NOTES
Chief Complaint   Patient presents with    Follow-Up     Pt is here for follow up in fall and medication refill, was not able to do labs because of weather        Subjective:     HPI:   Tanisha Haile is a 72 y.o. female here to discuss the evaluation and management of:      Fall  Patient is here today for consecutive evaluation for multiple falls.  First fall was in December 15, 2022 where patient had right rib contusions.  She recently fell again on 1-4-2023 and was seen in the urgent care on 1-5-2023 after slipping and falling on ice on her stairs in her backyard.  She was found to have sacral contusion and right arm contusion without any fractures noted.  Patient reports that her recent fall injuries have improved since the incident but she continues to have mild to moderate pain on sacral area and right arm where bruising is noted.  She reports that she is able to take in deep breaths and denies any pain with deep inspiration since first fall in December.  Patient is here for [facility] paperwork to be completed as patient does not feel that she can return to work at any capacity at this time due to pain and requirements of her job duties.        ROS:  Gen: no fevers/chills, no changes in weight  Pulm: no sob, no cough  CV: no chest pain, no palpitations  GI: no nausea/vomiting, no diarrhea  MSk:Positive HPI      Allergies   Allergen Reactions    Tylenol Anaphylaxis    Asa [Aspirin]     Fosamax [Alendronate Sodium] Nausea    Influenza Virus Vacc     Meperidine     Nsaids Rash and Itching    Pcn [Penicillins] Itching    Propoxyphene     Sulfa Drugs     Voltaren [Diclofenac]      Nausea and vomiting        Current medicines (including changes today)  Current Outpatient Medications   Medication Sig Dispense Refill    ibuprofen (MOTRIN) 600 MG Tab Take 1 Tablet by mouth every 8 hours as needed for Moderate Pain. 60 Tablet 0    azelastine (ASTELIN) 137 MCG/SPRAY nasal spray Administer 1 Spray into affected nostril(S) 2  "times a day. 30 mL 3    simvastatin (ZOCOR) 20 MG Tab TAKE 1 TABLET BY MOUTH EVERY EVENING 90 Tablet 3    omeprazole (PRILOSEC) 20 MG delayed-release capsule Take 1 Capsule by mouth 2 times a day for 360 days. 180 Capsule 3    triamcinolone acetonide (KENALOG) 0.5 % Cream Apply 1g three times a day for 7 days to affected area 60 g 0    Probiotic Product (PROBIOTIC-10 PO) Take  by mouth every day.      multivitamin (THERAGRAN) Tab Take 1 Tablet by mouth every day.      VITAMIN D PO Take 2,000 mcg by mouth.      CRANBERRY PO Take  by mouth.      ascorbic acid (ASCORBIC ACID) 500 MG Tab Take 1 Tablet by mouth every day.      Calcium Carbonate-Vitamin D 600-400 MG-UNIT Tab Take  by mouth 2 times a day.        diclofenac sodium (VOLTAREN) 1 % Gel Apply 2 g topically 2 times a day. (Patient not taking: Reported on 1/12/2023) 100 g 3     No current facility-administered medications for this visit.          Objective:       /78   Pulse 92   Temp (!) 35.7 °C (96.3 °F) (Temporal)   Resp 18   Ht 1.626 m (5' 4\")   Wt 51.4 kg (113 lb 6.4 oz)   SpO2 97%  Body mass index is 19.47 kg/m².    Physical Exam:  Constitutional: Well-developed and well-nourished female in NAD. Not diaphoretic. No distress.   Skin: warm, dry, intact.  Contusion noted to right forearm just below elbow Musculoskeletal: There is tenderness to sacrum, midline and paraspinal without deformity.  No tenderness to palpation in lumbar region or paraspinal lumbar area.  Cardiovascular: Regular rate and rhythm without murmur. Radial pulses are intact and equal bilaterally.  Pulmonary: Clear to ausculation. Normal effort. No rales, ronchi, or wheezing.  Neurological: Alert and oriented x 3.   Psychiatric:  Behavior, mood, and affect are appropriate.      Assessment and Plan:     The following treatment plan was discussed:  I have personally thoroughly reviewed urgent care visit notes and imaging from December 16, 2022 and January 5, 2023.  Recommended " patient continue use over-the-counter analgesics such as Tylenol and ibuprofen for the pain.  Patient may continue using heat or cryotherapy.  Discussed the importance of fall risk precaution and recommended patient use deicer on her steps and sidewalk during bad weather.  FMLA paperwork was completed and all questions were answered.  Patient will need to be off work and is unable to perform any job duties until reevaluation on 2-.    1. Fall, subsequent encounter      Any change or worsening of signs or symptoms, patient encouraged to follow-up or report to urgent care or emergency room for further evaluation. Patient verbalizes understanding and agrees.    Follow-Up: Return in about 27 days (around 2/8/2023) for [employer] FMLA Paper work and release back to work.      PLEASE NOTE: This dictation was created using voice recognition software. I have made every reasonable attempt to correct obvious errors, but I expect that there are errors of grammar and possibly content that I did not discover before finalizing the note.   n/a

## 2023-01-12 NOTE — ASSESSMENT & PLAN NOTE
Patient is here today for consecutive evaluation for multiple falls.  First fall was in December 15, 2022 where patient had right rib contusions.  She recently fell again on 1-4-2023 and was seen in the urgent care on 1-5-2023 after slipping and falling on ice on her stairs in her backyard.  She was found to have sacral contusion and right arm contusion without any fractures noted.  Patient reports that her recent fall injuries have improved since the incident but she continues to have mild to moderate pain on sacral area and right arm where bruising is noted.  She reports that she is able to take in deep breaths and denies any pain with deep inspiration since first fall in December.  Patient is here for [facility] paperwork to be completed as patient does not feel that she can return to work at any capacity at this time due to pain and requirements of her job duties.

## 2023-01-18 ENCOUNTER — APPOINTMENT (OUTPATIENT)
Dept: RADIOLOGY | Facility: MEDICAL CENTER | Age: 73
End: 2023-01-18
Attending: NURSE PRACTITIONER
Payer: MEDICARE

## 2023-02-07 ENCOUNTER — HOSPITAL ENCOUNTER (OUTPATIENT)
Dept: RADIOLOGY | Facility: MEDICAL CENTER | Age: 73
End: 2023-02-07
Attending: NURSE PRACTITIONER
Payer: MEDICARE

## 2023-02-07 DIAGNOSIS — Z12.31 VISIT FOR SCREENING MAMMOGRAM: ICD-10-CM

## 2023-02-07 DIAGNOSIS — R10.31 RLQ ABDOMINAL PAIN: ICD-10-CM
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
## 2023-02-07 PROCEDURE — 77063 BREAST TOMOSYNTHESIS BI: CPT

## 2023-02-07 PROCEDURE — 76705 ECHO EXAM OF ABDOMEN: CPT

## 2023-02-14 SDOH — ECONOMIC STABILITY: INCOME INSECURITY: HOW HARD IS IT FOR YOU TO PAY FOR THE VERY BASICS LIKE FOOD, HOUSING, MEDICAL CARE, AND HEATING?: NOT HARD AT ALL

## 2023-02-14 SDOH — ECONOMIC STABILITY: FOOD INSECURITY: WITHIN THE PAST 12 MONTHS, THE FOOD YOU BOUGHT JUST DIDN'T LAST AND YOU DIDN'T HAVE MONEY TO GET MORE.: NEVER TRUE

## 2023-02-14 SDOH — ECONOMIC STABILITY: INCOME INSECURITY: IN THE LAST 12 MONTHS, WAS THERE A TIME WHEN YOU WERE NOT ABLE TO PAY THE MORTGAGE OR RENT ON TIME?: NO

## 2023-02-14 SDOH — HEALTH STABILITY: PHYSICAL HEALTH: ON AVERAGE, HOW MANY DAYS PER WEEK DO YOU ENGAGE IN MODERATE TO STRENUOUS EXERCISE (LIKE A BRISK WALK)?: 1 DAY

## 2023-02-14 SDOH — HEALTH STABILITY: MENTAL HEALTH
STRESS IS WHEN SOMEONE FEELS TENSE, NERVOUS, ANXIOUS, OR CAN'T SLEEP AT NIGHT BECAUSE THEIR MIND IS TROUBLED. HOW STRESSED ARE YOU?: ONLY A LITTLE

## 2023-02-14 SDOH — ECONOMIC STABILITY: FOOD INSECURITY: WITHIN THE PAST 12 MONTHS, YOU WORRIED THAT YOUR FOOD WOULD RUN OUT BEFORE YOU GOT MONEY TO BUY MORE.: SOMETIMES TRUE

## 2023-02-14 SDOH — HEALTH STABILITY: PHYSICAL HEALTH: ON AVERAGE, HOW MANY MINUTES DO YOU ENGAGE IN EXERCISE AT THIS LEVEL?: 60 MIN

## 2023-02-14 SDOH — ECONOMIC STABILITY: HOUSING INSECURITY

## 2023-02-14 ASSESSMENT — LIFESTYLE VARIABLES
HOW MANY STANDARD DRINKS CONTAINING ALCOHOL DO YOU HAVE ON A TYPICAL DAY: 1 OR 2
SKIP TO QUESTIONS 9-10: 1
AUDIT-C TOTAL SCORE: 4
HOW OFTEN DO YOU HAVE SIX OR MORE DRINKS ON ONE OCCASION: NEVER
HOW OFTEN DO YOU HAVE A DRINK CONTAINING ALCOHOL: 4 OR MORE TIMES A WEEK

## 2023-02-14 ASSESSMENT — SOCIAL DETERMINANTS OF HEALTH (SDOH)
WITHIN THE PAST 12 MONTHS, YOU WORRIED THAT YOUR FOOD WOULD RUN OUT BEFORE YOU GOT THE MONEY TO BUY MORE: SOMETIMES TRUE
HOW OFTEN DO YOU ATTEND CHURCH OR RELIGIOUS SERVICES?: 1 TO 4 TIMES PER YEAR
HOW OFTEN DO YOU ATTENT MEETINGS OF THE CLUB OR ORGANIZATION YOU BELONG TO?: NEVER
DO YOU BELONG TO ANY CLUBS OR ORGANIZATIONS SUCH AS CHURCH GROUPS UNIONS, FRATERNAL OR ATHLETIC GROUPS, OR SCHOOL GROUPS?: NO
HOW HARD IS IT FOR YOU TO PAY FOR THE VERY BASICS LIKE FOOD, HOUSING, MEDICAL CARE, AND HEATING?: NOT HARD AT ALL
HOW OFTEN DO YOU GET TOGETHER WITH FRIENDS OR RELATIVES?: ONCE A WEEK
HOW OFTEN DO YOU GET TOGETHER WITH FRIENDS OR RELATIVES?: ONCE A WEEK
HOW OFTEN DO YOU HAVE A DRINK CONTAINING ALCOHOL: 4 OR MORE TIMES A WEEK
HOW OFTEN DO YOU ATTEND CHURCH OR RELIGIOUS SERVICES?: 1 TO 4 TIMES PER YEAR
IN A TYPICAL WEEK, HOW MANY TIMES DO YOU TALK ON THE PHONE WITH FAMILY, FRIENDS, OR NEIGHBORS?: THREE TIMES A WEEK
DO YOU BELONG TO ANY CLUBS OR ORGANIZATIONS SUCH AS CHURCH GROUPS UNIONS, FRATERNAL OR ATHLETIC GROUPS, OR SCHOOL GROUPS?: NO
HOW OFTEN DO YOU HAVE SIX OR MORE DRINKS ON ONE OCCASION: NEVER
HOW OFTEN DO YOU ATTENT MEETINGS OF THE CLUB OR ORGANIZATION YOU BELONG TO?: NEVER
HOW MANY DRINKS CONTAINING ALCOHOL DO YOU HAVE ON A TYPICAL DAY WHEN YOU ARE DRINKING: 1 OR 2
IN A TYPICAL WEEK, HOW MANY TIMES DO YOU TALK ON THE PHONE WITH FAMILY, FRIENDS, OR NEIGHBORS?: THREE TIMES A WEEK

## 2023-02-15 ENCOUNTER — OFFICE VISIT (OUTPATIENT)
Dept: MEDICAL GROUP | Facility: PHYSICIAN GROUP | Age: 73
End: 2023-02-15
Payer: MEDICARE

## 2023-02-15 VITALS
BODY MASS INDEX: 19.97 KG/M2 | OXYGEN SATURATION: 97 % | TEMPERATURE: 97.8 F | WEIGHT: 117 LBS | HEART RATE: 72 BPM | DIASTOLIC BLOOD PRESSURE: 80 MMHG | SYSTOLIC BLOOD PRESSURE: 122 MMHG | HEIGHT: 64 IN | RESPIRATION RATE: 14 BRPM

## 2023-02-15 DIAGNOSIS — R10.9 RIGHT FLANK PAIN: ICD-10-CM

## 2023-02-15 DIAGNOSIS — F17.200 NICOTINE DEPENDENCE WITH CURRENT USE: ICD-10-CM

## 2023-02-15 DIAGNOSIS — R73.01 ELEVATED FASTING GLUCOSE: ICD-10-CM

## 2023-02-15 DIAGNOSIS — E78.5 DYSLIPIDEMIA: ICD-10-CM

## 2023-02-15 DIAGNOSIS — K21.9 GASTROESOPHAGEAL REFLUX DISEASE WITHOUT ESOPHAGITIS: ICD-10-CM

## 2023-02-15 DIAGNOSIS — E55.9 VITAMIN D DEFICIENCY: ICD-10-CM

## 2023-02-15 PROCEDURE — 99214 OFFICE O/P EST MOD 30 MIN: CPT | Performed by: FAMILY MEDICINE

## 2023-02-15 ASSESSMENT — FIBROSIS 4 INDEX: FIB4 SCORE: 1.234657039711191336

## 2023-02-15 NOTE — ASSESSMENT & PLAN NOTE
Patient likely tore her abdominal muscle on right flank causing severe pain with twisiting or lifting. Her job requires a lot of lifting, pushing. She is excused from work till 2/28/23, she will likely need extended time.   She had fell on her concrete in front of her home. Two falls once in Dec and one in Jan when she walked out steps of her back porch and fell on her back after she slipped on ice.   rx for flexeril provided in the past, she has it at home   Ref to PT provided

## 2023-02-15 NOTE — PROGRESS NOTES
Subjective:   Tanisha Haile is a 72 y.o. female here today for evaluation and management of:     Right flank pain  Patient likely tore her abdominal muscle on right flank causing severe pain with twisiting or lifting. Her job requires a lot of lifting, pushing. She is excused from work till 2/28/23, she will likely need extended time.   She had fell on her concrete in front of her home. Two falls once in Dec and one in Jan when she walked out steps of her back porch and fell on her back after she slipped on ice.   rx for flexeril provided in the past, she has it at home   Ref to PT provided             Current medicines (including changes today)  Current Outpatient Medications   Medication Sig Dispense Refill    ibuprofen (MOTRIN) 600 MG Tab Take 1 Tablet by mouth every 8 hours as needed for Moderate Pain. 60 Tablet 0    azelastine (ASTELIN) 137 MCG/SPRAY nasal spray Administer 1 Spray into affected nostril(S) 2 times a day. 30 mL 3    simvastatin (ZOCOR) 20 MG Tab TAKE 1 TABLET BY MOUTH EVERY EVENING 90 Tablet 3    omeprazole (PRILOSEC) 20 MG delayed-release capsule Take 1 Capsule by mouth 2 times a day for 360 days. 180 Capsule 3    triamcinolone acetonide (KENALOG) 0.5 % Cream Apply 1g three times a day for 7 days to affected area 60 g 0    Probiotic Product (PROBIOTIC-10 PO) Take  by mouth every day.      multivitamin (THERAGRAN) Tab Take 1 Tablet by mouth every day.      VITAMIN D PO Take 2,000 mcg by mouth.      CRANBERRY PO Take  by mouth.      ascorbic acid (ASCORBIC ACID) 500 MG Tab Take 1 Tablet by mouth every day.      Calcium Carbonate-Vitamin D 600-400 MG-UNIT Tab Take  by mouth 2 times a day.         No current facility-administered medications for this visit.     She  has a past medical history of Anemia, Arrhythmia, Arthritis, Cervical cancer screening (5/22/2013), Dyslipidemia (10/24/2012), Dysuria (10/2/2014), Fall (12/22/2022), GERD (gastroesophageal reflux disease), Heart murmur, Hyperlipidemia,  "Insomnia (2/27/2014), Migraine, Seasonal allergies (5/22/2013), Shingles, and Thyroid disease.    She has no past medical history of Anxiety, Asthma, Blood transfusion without reported diagnosis, Breast cancer (HCC), Cancer (HCC), Cataract, CHF (congestive heart failure) (HCC), Clotting disorder (HCC), COPD (chronic obstructive pulmonary disease) (HCC), Depression, Diabetes (HCC), Heart attack (HCC), HIV (human immunodeficiency virus infection) (HCC), Hypertension, Kidney disease, Seizure (HCC), Stroke (HCC), or Substance abuse (HCC).    ROS  No chest pain, no shortness of breath, no abdominal pain       Objective:     /80 (BP Location: Left arm, Patient Position: Sitting, BP Cuff Size: Adult)   Pulse 72   Temp 36.6 °C (97.8 °F) (Temporal)   Resp 14   Ht 1.626 m (5' 4\")   Wt 53.1 kg (117 lb)   SpO2 97%  Body mass index is 20.08 kg/m².   Physical Exam:  Constitutional: Alert, no distress.  Skin: Warm, dry, good turgor, no rashes in visible areas.  Eye: Equal, round and reactive, conjunctiva clear, lids normal.  ENMT: Lips without lesions, good dentition, oropharynx clear.  Neck: Trachea midline, no masses, no thyromegaly. No cervical or supraclavicular lymphadenopathy  Respiratory: Unlabored respiratory effort, lungs clear to auscultation, no wheezes, no ronchi.  Cardiovascular: Normal S1, S2, no murmur, no edema.  Abdomen: Soft, mild tenderness to right upper abdominal wall, no masses, no hepatosplenomegaly. Old well healed RUQ scar  Psych: Alert and oriented x3, normal affect and mood.        Assessment and Plan:   The following treatment plan was discussed    1. Right flank pain  - Referral to Physical Therapy    2. Dyslipidemia  - Comp Metabolic Panel; Future  - Lipid Profile; Future    3. Elevated fasting glucose  - HEMOGLOBIN A1C; Future    4. Vitamin D deficiency  - VITAMIN D,25 HYDROXY (DEFICIENCY); Future      Followup: Return for appt before 2/28 for FMLA.           "

## 2023-02-15 NOTE — ASSESSMENT & PLAN NOTE
Encouraged to quit smoking completely.  She has been actively trying to quit, went to small thin cigarettes

## 2023-02-22 ENCOUNTER — OFFICE VISIT (OUTPATIENT)
Dept: MEDICAL GROUP | Facility: PHYSICIAN GROUP | Age: 73
End: 2023-02-22
Payer: MEDICARE

## 2023-02-22 VITALS
WEIGHT: 115 LBS | HEART RATE: 66 BPM | BODY MASS INDEX: 19.63 KG/M2 | DIASTOLIC BLOOD PRESSURE: 70 MMHG | SYSTOLIC BLOOD PRESSURE: 110 MMHG | RESPIRATION RATE: 14 BRPM | TEMPERATURE: 97.4 F | HEIGHT: 64 IN | OXYGEN SATURATION: 96 %

## 2023-02-22 DIAGNOSIS — R10.9 RIGHT FLANK PAIN: ICD-10-CM

## 2023-02-22 PROBLEM — R35.0 URINARY FREQUENCY: Status: RESOLVED | Noted: 2022-04-18 | Resolved: 2023-02-22

## 2023-02-22 PROBLEM — Z76.89 ENCOUNTER TO ESTABLISH CARE: Status: RESOLVED | Noted: 2022-03-01 | Resolved: 2023-02-22
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
## 2023-02-22 PROCEDURE — 99214 OFFICE O/P EST MOD 30 MIN: CPT | Performed by: FAMILY MEDICINE

## 2023-02-22 ASSESSMENT — FIBROSIS 4 INDEX: FIB4 SCORE: 1.234657039711191336

## 2023-02-22 NOTE — ASSESSMENT & PLAN NOTE
Overall improving, but right side pain flares up with activity.   Extended off work till 3/28/22 while she continues with PT  She had a fall on concrete in Dec and Jan  Will see her again mid March for clearance back to work or extension if still having persistent pain.   Continue for now with rest, ice, muscle relaxants, tylenol in small amounts.

## 2023-02-22 NOTE — PROGRESS NOTES
Subjective:   Tanisha Haile is a 72 y.o. female here today for evaluation and management of:     Right flank pain  Overall improving, but right side pain flares up with activity.   Extended off work till 3/28/22 while she continues with PT  She had a fall on concrete in [place] and [place]  Will see her again mid March for clearance back to work or extension if still having persistent pain.   Continue for now with rest, ice, muscle relaxants, tylenol in small amounts.              Current medicines (including changes today)  Current Outpatient Medications   Medication Sig Dispense Refill    ibuprofen (MOTRIN) 600 MG Tab Take 1 Tablet by mouth every 8 hours as needed for Moderate Pain. 60 Tablet 0    azelastine (ASTELIN) 137 MCG/SPRAY nasal spray Administer 1 Spray into affected nostril(S) 2 times a day. 30 mL 3    simvastatin (ZOCOR) 20 MG Tab TAKE 1 TABLET BY MOUTH EVERY EVENING 90 Tablet 3    omeprazole (PRILOSEC) 20 MG delayed-release capsule Take 1 Capsule by mouth 2 times a day for 360 days. 180 Capsule 3    triamcinolone acetonide (KENALOG) 0.5 % Cream Apply 1g three times a day for 7 days to affected area 60 g 0    Probiotic Product (PROBIOTIC-10 PO) Take  by mouth every day.      multivitamin (THERAGRAN) Tab Take 1 Tablet by mouth every day.      VITAMIN D PO Take 2,000 mcg by mouth.      CRANBERRY PO Take  by mouth.      ascorbic acid (ASCORBIC ACID) 500 MG Tab Take 1 Tablet by mouth every day.      Calcium Carbonate-Vitamin D 600-400 MG-UNIT Tab Take  by mouth 2 times a day.         No current facility-administered medications for this visit.     She  has a past medical history of Anemia, Arrhythmia, Arthritis, Cervical cancer screening (5/22/2013), Dyslipidemia (10/24/2012), Dysuria (10/2/2014), Fall (12/22/2022), GERD (gastroesophageal reflux disease), Heart murmur, Hyperlipidemia, Insomnia (2/27/2014), Migraine, Seasonal allergies (5/22/2013), Shingles, and Thyroid disease.    She has no past medical history  "of Anxiety, Asthma, Blood transfusion without reported diagnosis, Breast cancer (HCC), Cancer (HCC), Cataract, CHF (congestive heart failure) (HCC), Clotting disorder (HCC), COPD (chronic obstructive pulmonary disease) (HCC), Depression, Diabetes (HCC), Heart attack (HCC), HIV (human immunodeficiency virus infection) (HCC), Hypertension, Kidney disease, Seizure (HCC), Stroke (HCC), or Substance abuse (HCC).    ROS  No chest pain, no shortness of breath, no abdominal pain       Objective:     /70 (BP Location: Left arm, Patient Position: Sitting, BP Cuff Size: Adult)   Pulse 66   Temp 36.3 °C (97.4 °F) (Temporal)   Resp 14   Ht 1.626 m (5' 4\")   Wt 52.2 kg (115 lb)   SpO2 96%  Body mass index is 19.74 kg/m².   Physical Exam:  Constitutional: Alert, no distress, well-groomed.  Skin: No rashes in visible areas.  Eye: Round. Conjunctiva clear, lids normal. No icterus.   ENMT: Lips pink without lesions, good dentition, moist mucous membranes. Phonation normal.  Neck: No masses, no thyromegaly. Moves freely without pain.  Respiratory: Unlabored respiratory effort, no cough or audible wheeze  Psych: Alert and oriented x3, normal affect and mood.          Assessment and Plan:   The following treatment plan was discussed    1. Right flank pain      Followup: Return in about 1 month (around 3/22/2023) for return to work clearance, scan paperwork pls.           "

## 2023-03-15 ENCOUNTER — OFFICE VISIT (OUTPATIENT)
Dept: MEDICAL GROUP | Facility: PHYSICIAN GROUP | Age: 73
End: 2023-03-15
Payer: MEDICARE

## 2023-03-15 VITALS
HEIGHT: 64 IN | OXYGEN SATURATION: 100 % | HEART RATE: 65 BPM | DIASTOLIC BLOOD PRESSURE: 70 MMHG | BODY MASS INDEX: 20.14 KG/M2 | TEMPERATURE: 97.6 F | SYSTOLIC BLOOD PRESSURE: 110 MMHG | RESPIRATION RATE: 16 BRPM | WEIGHT: 118 LBS

## 2023-03-15 DIAGNOSIS — R10.9 RIGHT FLANK PAIN: ICD-10-CM

## 2023-03-15 DIAGNOSIS — R73.01 ELEVATED FASTING GLUCOSE: ICD-10-CM

## 2023-03-15 PROCEDURE — 99214 OFFICE O/P EST MOD 30 MIN: CPT | Performed by: FAMILY MEDICINE

## 2023-03-15 ASSESSMENT — FIBROSIS 4 INDEX: FIB4 SCORE: 1.234657039711191336

## 2023-03-15 NOTE — ASSESSMENT & PLAN NOTE
Gradual improvement of right flank pain likely from a muscle tear. About 40-55% improvement with PTstill unable to work. Her FMLA expires 3/28/23.   She will see if she can return to work then.   Continue for now with ice, rest, muscle relaxant, tylenol as needed.   She still has pain with stretching, twisting, reaching.   She had a fall on concrete in Dec and Jan  She is continuing her exercises diligently twice a day.

## 2023-03-15 NOTE — ASSESSMENT & PLAN NOTE
Mild elevated fasting glucose  She has a sweet tooth and yesterday 3/14/23 was national pie (Pi) day.   Encouraged her to cut back on refined starches and sweets.

## 2023-03-15 NOTE — PROGRESS NOTES
Subjective:   Tanisha Haile is a 72 y.o. female here today for evaluation and management of:     Right flank pain  Gradual improvement of right flank pain likely from a muscle tear. About 40-55% improvement with PTstill unable to work. Her FMLA expires 3/28/23.   She will see if she can return to work then.   Continue for now with ice, rest, muscle relaxant, tylenol as needed.   She still has pain with stretching, twisting, reaching.   She had a fall on concrete in Dec and Jan  She is continuing her exercises diligently twice a day.     Elevated fasting glucose  Mild elevated fasting glucose  She has a sweet tooth and yesterday 3/14/23 was national pie (Pi) day.   Encouraged her to cut back on refined starches and sweets.                Current medicines (including changes today)  Current Outpatient Medications   Medication Sig Dispense Refill    ibuprofen (MOTRIN) 600 MG Tab Take 1 Tablet by mouth every 8 hours as needed for Moderate Pain. 60 Tablet 0    azelastine (ASTELIN) 137 MCG/SPRAY nasal spray Administer 1 Spray into affected nostril(S) 2 times a day. 30 mL 3    simvastatin (ZOCOR) 20 MG Tab TAKE 1 TABLET BY MOUTH EVERY EVENING 90 Tablet 3    omeprazole (PRILOSEC) 20 MG delayed-release capsule Take 1 Capsule by mouth 2 times a day for 360 days. 180 Capsule 3    triamcinolone acetonide (KENALOG) 0.5 % Cream Apply 1g three times a day for 7 days to affected area 60 g 0    Probiotic Product (PROBIOTIC-10 PO) Take  by mouth every day.      multivitamin (THERAGRAN) Tab Take 1 Tablet by mouth every day.      VITAMIN D PO Take 2,000 mcg by mouth.      CRANBERRY PO Take  by mouth.      ascorbic acid (ASCORBIC ACID) 500 MG Tab Take 1 Tablet by mouth every day.      Calcium Carbonate-Vitamin D 600-400 MG-UNIT Tab Take  by mouth 2 times a day.         No current facility-administered medications for this visit.     She  has a past medical history of Anemia, Arrhythmia, Arthritis, Cervical cancer screening (5/22/2013),  "Dyslipidemia (10/24/2012), Dysuria (10/2/2014), Fall (12/22/2022), GERD (gastroesophageal reflux disease), Heart murmur, Hyperlipidemia, Insomnia (2/27/2014), Migraine, Seasonal allergies (5/22/2013), Shingles, and Thyroid disease.    She has no past medical history of Anxiety, Asthma, Blood transfusion without reported diagnosis, Breast cancer (HCC), Cancer (HCC), Cataract, CHF (congestive heart failure) (HCC), Clotting disorder (HCC), COPD (chronic obstructive pulmonary disease) (HCC), Depression, Diabetes (HCC), Heart attack (HCC), HIV (human immunodeficiency virus infection) (HCC), Hypertension, Kidney disease, Seizure (HCC), Stroke (HCC), or Substance abuse (HCC).    ROS  No chest pain, no shortness of breath, no abdominal pain       Objective:     /70 (BP Location: Left arm, Patient Position: Sitting, BP Cuff Size: Adult)   Pulse 65   Temp 36.4 °C (97.6 °F) (Temporal)   Resp 16   Ht 1.626 m (5' 4\")   Wt 53.5 kg (118 lb)   SpO2 100%  Body mass index is 20.25 kg/m².   Physical Exam:  Constitutional: Alert, no distress.  Skin: Warm, dry, good turgor, no rashes in visible areas.  Eye: Equal, round and reactive, conjunctiva clear, lids normal.  ENMT: Lips without lesions, good dentition, oropharynx clear.  Neck: Trachea midline, no masses, no thyromegaly. No cervical or supraclavicular lymphadenopathy  Respiratory: Unlabored respiratory effort, lungs clear to auscultation, no wheezes, no ronchi.  Cardiovascular: Normal S1, S2, no murmur, no edema.  Abdomen: Soft, non-tender, no masses, no hepatosplenomegaly.  Psych: Alert and oriented x3, normal affect and mood.        Assessment and Plan:   The following treatment plan was discussed    1. Right flank pain    2. Elevated fasting glucose  Continue PT     Followup: No follow-ups on file.           "

## 2023-03-28 ENCOUNTER — OFFICE VISIT (OUTPATIENT)
Dept: MEDICAL GROUP | Facility: PHYSICIAN GROUP | Age: 73
End: 2023-03-28
Payer: MEDICARE

## 2023-03-28 ENCOUNTER — APPOINTMENT (OUTPATIENT)
Dept: RADIOLOGY | Facility: IMAGING CENTER | Age: 73
End: 2023-03-28
Attending: FAMILY MEDICINE
Payer: MEDICARE

## 2023-03-28 VITALS
SYSTOLIC BLOOD PRESSURE: 122 MMHG | DIASTOLIC BLOOD PRESSURE: 68 MMHG | HEART RATE: 72 BPM | WEIGHT: 116 LBS | BODY MASS INDEX: 19.81 KG/M2 | HEIGHT: 64 IN | TEMPERATURE: 97.6 F | OXYGEN SATURATION: 100 % | RESPIRATION RATE: 16 BRPM

## 2023-03-28 DIAGNOSIS — M25.521 RIGHT ELBOW PAIN: ICD-10-CM

## 2023-03-28 DIAGNOSIS — M79.672 LEFT FOOT PAIN: ICD-10-CM
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
## 2023-03-28 PROCEDURE — 99214 OFFICE O/P EST MOD 30 MIN: CPT | Performed by: FAMILY MEDICINE

## 2023-03-28 PROCEDURE — 73620 X-RAY EXAM OF FOOT: CPT | Mod: TC,FY,LT | Performed by: FAMILY MEDICINE

## 2023-03-28 PROCEDURE — 73080 X-RAY EXAM OF ELBOW: CPT | Mod: TC,FY,RT | Performed by: FAMILY MEDICINE

## 2023-03-28 ASSESSMENT — FIBROSIS 4 INDEX: FIB4 SCORE: 1.234657039711191336

## 2023-03-28 NOTE — ASSESSMENT & PLAN NOTE
pain and bruising base of 1st toe left foot after lid of pot fell on it a 5 days ago  She slipped and fell at the same time and hit her right elbow  Had normal ROm of left toe and right elbow but has sig TTP over base of left great toe proximally and lateral epicondyle of right elbow  Advised ice, rest, xray ordered.

## 2023-03-28 NOTE — PROGRESS NOTES
Subjective:   Tanisha Haile is a 72 y.o. female here today for evaluation and management of:     Fall  pain and bruising base of 1st toe left foot after lid of pot fell on it a 5 days ago  She slipped and fell at the same time and hit her right elbow  Had normal ROm of left toe and right elbow but has sig TTP over base of left great toe proximally and lateral epicondyle of right elbow  Advised ice, rest, xray ordered.              Current medicines (including changes today)  Current Outpatient Medications   Medication Sig Dispense Refill    ibuprofen (MOTRIN) 600 MG Tab Take 1 Tablet by mouth every 8 hours as needed for Moderate Pain. 60 Tablet 0    azelastine (ASTELIN) 137 MCG/SPRAY nasal spray Administer 1 Spray into affected nostril(S) 2 times a day. 30 mL 3    simvastatin (ZOCOR) 20 MG Tab TAKE 1 TABLET BY MOUTH EVERY EVENING 90 Tablet 3    omeprazole (PRILOSEC) 20 MG delayed-release capsule Take 1 Capsule by mouth 2 times a day for 360 days. 180 Capsule 3    triamcinolone acetonide (KENALOG) 0.5 % Cream Apply 1g three times a day for 7 days to affected area 60 g 0    Probiotic Product (PROBIOTIC-10 PO) Take  by mouth every day.      multivitamin (THERAGRAN) Tab Take 1 Tablet by mouth every day.      VITAMIN D PO Take 2,000 mcg by mouth.      CRANBERRY PO Take  by mouth.      ascorbic acid (ASCORBIC ACID) 500 MG Tab Take 1 Tablet by mouth every day.      Calcium Carbonate-Vitamin D 600-400 MG-UNIT Tab Take  by mouth 2 times a day.         No current facility-administered medications for this visit.     She  has a past medical history of Anemia, Arrhythmia, Arthritis, Cervical cancer screening (5/22/2013), Dyslipidemia (10/24/2012), Dysuria (10/2/2014), Fall (12/22/2022), GERD (gastroesophageal reflux disease), Heart murmur, Hyperlipidemia, Insomnia (2/27/2014), Migraine, Seasonal allergies (5/22/2013), Shingles, and Thyroid disease.    She has no past medical history of Anxiety, Asthma, Blood transfusion  "without reported diagnosis, Breast cancer (HCC), Cancer (HCC), Cataract, CHF (congestive heart failure) (HCC), Clotting disorder (HCC), COPD (chronic obstructive pulmonary disease) (HCC), Depression, Diabetes (HCC), Heart attack (HCC), HIV (human immunodeficiency virus infection) (HCC), Hypertension, Kidney disease, Seizure (HCC), Stroke (HCC), or Substance abuse (HCC).    ROS  No chest pain, no shortness of breath, no abdominal pain       Objective:     /68 (BP Location: Left arm, Patient Position: Sitting, BP Cuff Size: Adult)   Pulse 72   Temp 36.4 °C (97.6 °F) (Temporal)   Resp 16   Ht 1.626 m (5' 4\")   Wt 52.6 kg (116 lb)   SpO2 100%  Body mass index is 19.91 kg/m².   Physical Exam:  Constitutional: Alert, no distress.  Skin: Warm, dry, good turgor, no rashes in visible areas.  Eye: Equal, round and reactive, conjunctiva clear, lids normal.  ENMT: Lips without lesions, good dentition, oropharynx clear.  Neck: Trachea midline, no masses, no thyromegaly. No cervical or supraclavicular lymphadenopathy  Respiratory: Unlabored respiratory effort, lungs clear to auscultation, no wheezes, no ronchi.  Cardiovascular: Normal S1, S2, no murmur, no edema.  Abdomen: Soft, non-tender, no masses, no hepatosplenomegaly.  Psych: Alert and oriented x3, normal affect and mood.        Assessment and Plan:   The following treatment plan was discussed    1. Left foot pain  - DX-FOOT-2- LEFT; Future    2. Right elbow pain  - DX-ELBOW-COMPLETE 3+ RIGHT; Future    3. Fall, subsequent encounter      Followup: Return in about 3 months (around 6/28/2023) for pls scan, fax ppwk, foot pain, elbow pain, flank pain.           "

## 2023-04-13 ENCOUNTER — OFFICE VISIT (OUTPATIENT)
Dept: MEDICAL GROUP | Facility: PHYSICIAN GROUP | Age: 73
End: 2023-04-13
Payer: MEDICARE

## 2023-04-13 VITALS
DIASTOLIC BLOOD PRESSURE: 76 MMHG | SYSTOLIC BLOOD PRESSURE: 128 MMHG | RESPIRATION RATE: 14 BRPM | OXYGEN SATURATION: 97 % | HEIGHT: 64 IN | WEIGHT: 118 LBS | BODY MASS INDEX: 20.14 KG/M2 | TEMPERATURE: 97.3 F

## 2023-04-13 DIAGNOSIS — R10.9 RIGHT FLANK PAIN: ICD-10-CM

## 2023-04-13 DIAGNOSIS — R73.01 ELEVATED FASTING GLUCOSE: ICD-10-CM

## 2023-04-13 DIAGNOSIS — E55.9 VITAMIN D DEFICIENCY: ICD-10-CM

## 2023-04-13 DIAGNOSIS — R10.31 RLQ ABDOMINAL PAIN: ICD-10-CM

## 2023-04-13 DIAGNOSIS — E78.5 DYSLIPIDEMIA: ICD-10-CM

## 2023-04-13 PROCEDURE — 99214 OFFICE O/P EST MOD 30 MIN: CPT | Performed by: FAMILY MEDICINE

## 2023-04-13 ASSESSMENT — FIBROSIS 4 INDEX: FIB4 SCORE: 1.234657039711191336

## 2023-04-13 NOTE — LETTER
April 13, 2023    To whom it may concern:     Tanisha Haile was evaluated by me in clinic today. She can return to work on 4/16/23 with restrictions:   Working 5 hrs a day,   Not lifting more than 10 lbs  Restrictions will be reevaluated at follow up visit on 6/22/23.   She is gradually improving with less pain.     Thank you,         Cristy Gleason M.D.

## 2023-04-13 NOTE — ASSESSMENT & PLAN NOTE
She has right flank pain from an injury probably a muscle tear.   She has been gradually improving, and returns to work on 4/16/22 with restrictions:   Patient cannot lift 40 lb boxes of water  She is able to lift 0-10 lbs  Unable to work more than 5 hrs due to pain.   She has lifting, walking, twisting, bending limited to 5 hrs /day.   Work form and work letter provided.

## 2023-04-15 NOTE — PROGRESS NOTES
Subjective:   Tanisha Haile is a 72 y.o. female here today for evaluation and management of:     Right flank pain  She has right flank pain from an injury probably a muscle tear.   She has been gradually improving, and returns to work on 4/16/22 with restrictions:   Patient cannot lift 40 lb boxes of water  She is able to lift 0-10 lbs  Unable to work more than 5 hrs due to pain.   She has lifting, walking, twisting, bending limited to 5 hrs /day.   Work form and work letter provided.                Current medicines (including changes today)  Current Outpatient Medications   Medication Sig Dispense Refill    ibuprofen (MOTRIN) 600 MG Tab Take 1 Tablet by mouth every 8 hours as needed for Moderate Pain. 60 Tablet 0    azelastine (ASTELIN) 137 MCG/SPRAY nasal spray Administer 1 Spray into affected nostril(S) 2 times a day. 30 mL 3    simvastatin (ZOCOR) 20 MG Tab TAKE 1 TABLET BY MOUTH EVERY EVENING 90 Tablet 3    omeprazole (PRILOSEC) 20 MG delayed-release capsule Take 1 Capsule by mouth 2 times a day for 360 days. 180 Capsule 3    triamcinolone acetonide (KENALOG) 0.5 % Cream Apply 1g three times a day for 7 days to affected area 60 g 0    Probiotic Product (PROBIOTIC-10 PO) Take  by mouth every day.      multivitamin (THERAGRAN) Tab Take 1 Tablet by mouth every day.      VITAMIN D PO Take 2,000 mcg by mouth.      CRANBERRY PO Take  by mouth.      ascorbic acid (ASCORBIC ACID) 500 MG Tab Take 1 Tablet by mouth every day.      Calcium Carbonate-Vitamin D 600-400 MG-UNIT Tab Take  by mouth 2 times a day.         No current facility-administered medications for this visit.     She  has a past medical history of Anemia, Arrhythmia, Arthritis, Cervical cancer screening (5/22/2013), Dyslipidemia (10/24/2012), Dysuria (10/2/2014), Fall (12/22/2022), GERD (gastroesophageal reflux disease), Heart murmur, Hyperlipidemia, Insomnia (2/27/2014), Migraine, Seasonal allergies (5/22/2013), Shingles, and Thyroid disease.    She  "has no past medical history of Anxiety, Asthma, Blood transfusion without reported diagnosis, Breast cancer (HCC), Cancer (HCC), Cataract, CHF (congestive heart failure) (HCC), Clotting disorder (HCC), COPD (chronic obstructive pulmonary disease) (HCC), Depression, Diabetes (HCC), Heart attack (HCC), HIV (human immunodeficiency virus infection) (HCC), Hypertension, Kidney disease, Seizure (HCC), Stroke (HCC), or Substance abuse (HCC).    ROS  No chest pain, no shortness of breath, no abdominal pain       Objective:     /76 (BP Location: Left arm, Patient Position: Sitting, BP Cuff Size: Adult)   Temp 36.3 °C (97.3 °F) (Temporal)   Resp 14   Ht 1.626 m (5' 4\")   Wt 53.5 kg (118 lb)   SpO2 97%  Body mass index is 20.25 kg/m².   Physical Exam:  Constitutional: Alert, no distress.  Skin: Warm, dry, good turgor, no rashes in visible areas.  Eye: Equal, round and reactive, conjunctiva clear, lids normal.  ENMT: Lips without lesions, good dentition, oropharynx clear.  Neck: Trachea midline, no masses, no thyromegaly. No cervical or supraclavicular lymphadenopathy  Respiratory: Unlabored respiratory effort, lungs clear to auscultation, no wheezes, no ronchi.  Cardiovascular: Normal S1, S2, no murmur, no edema.  Abdomen: Soft, non-tender, no masses, no hepatosplenomegaly.  Psych: Alert and oriented x3, normal affect and mood.        Assessment and Plan:   The following treatment plan was discussed    1. Right flank pain    2. Dyslipidemia  - Lipid Profile; Future    3. Elevated fasting glucose  - Comp Metabolic Panel; Future  - HEMOGLOBIN A1C; Future    4. RLQ abdominal pain  - CBC WITH DIFFERENTIAL; Future    5. Vitamin D deficiency  - VITAMIN D,25 HYDROXY (DEFICIENCY); Future      Followup: Return for As Scheduled please scan and fax and copy to pt. .           "

## 2023-05-21 ENCOUNTER — OFFICE VISIT (OUTPATIENT)
Dept: URGENT CARE | Facility: PHYSICIAN GROUP | Age: 73
End: 2023-05-21
Payer: MEDICARE

## 2023-05-21 VITALS
BODY MASS INDEX: 20.14 KG/M2 | HEART RATE: 88 BPM | HEIGHT: 64 IN | RESPIRATION RATE: 16 BRPM | OXYGEN SATURATION: 97 % | SYSTOLIC BLOOD PRESSURE: 108 MMHG | WEIGHT: 118 LBS | TEMPERATURE: 97.6 F | DIASTOLIC BLOOD PRESSURE: 70 MMHG

## 2023-05-21 DIAGNOSIS — R06.7 SNEEZING: ICD-10-CM

## 2023-05-21 DIAGNOSIS — J01.90 ACUTE BACTERIAL SINUSITIS: ICD-10-CM

## 2023-05-21 DIAGNOSIS — J34.89 RHINORRHEA: ICD-10-CM

## 2023-05-21 DIAGNOSIS — B96.89 ACUTE BACTERIAL SINUSITIS: ICD-10-CM

## 2023-05-21 PROCEDURE — 3078F DIAST BP <80 MM HG: CPT | Performed by: FAMILY MEDICINE

## 2023-05-21 PROCEDURE — 99213 OFFICE O/P EST LOW 20 MIN: CPT | Performed by: FAMILY MEDICINE

## 2023-05-21 PROCEDURE — 3074F SYST BP LT 130 MM HG: CPT | Performed by: FAMILY MEDICINE

## 2023-05-21 RX ORDER — AZELASTINE 1 MG/ML
1 SPRAY, METERED NASAL 2 TIMES DAILY
Qty: 30 ML | Refills: 3 | Status: SHIPPED | OUTPATIENT
Start: 2023-05-21 | End: 2023-09-01

## 2023-05-21 RX ORDER — AZITHROMYCIN 250 MG/1
TABLET, FILM COATED ORAL
Qty: 6 TABLET | Refills: 0 | Status: SHIPPED | OUTPATIENT
Start: 2023-05-21 | End: 2023-05-26

## 2023-05-21 ASSESSMENT — FIBROSIS 4 INDEX: FIB4 SCORE: 1.25
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
## 2023-06-22 ENCOUNTER — OFFICE VISIT (OUTPATIENT)
Dept: MEDICAL GROUP | Facility: PHYSICIAN GROUP | Age: 73
End: 2023-06-22
Payer: MEDICARE

## 2023-06-22 VITALS
TEMPERATURE: 98.6 F | SYSTOLIC BLOOD PRESSURE: 142 MMHG | HEIGHT: 64 IN | WEIGHT: 119.2 LBS | BODY MASS INDEX: 20.35 KG/M2 | RESPIRATION RATE: 16 BRPM | OXYGEN SATURATION: 100 % | HEART RATE: 66 BPM | DIASTOLIC BLOOD PRESSURE: 82 MMHG

## 2023-06-22 DIAGNOSIS — I10 PRIMARY HYPERTENSION: ICD-10-CM

## 2023-06-22 DIAGNOSIS — Z12.83 SKIN CANCER SCREENING: ICD-10-CM

## 2023-06-22 DIAGNOSIS — K21.9 GASTROESOPHAGEAL REFLUX DISEASE WITHOUT ESOPHAGITIS: ICD-10-CM

## 2023-06-22 DIAGNOSIS — M81.0 AGE-RELATED OSTEOPOROSIS WITHOUT CURRENT PATHOLOGICAL FRACTURE: ICD-10-CM

## 2023-06-22 DIAGNOSIS — R10.9 RIGHT FLANK PAIN: ICD-10-CM

## 2023-06-22 PROCEDURE — 3077F SYST BP >= 140 MM HG: CPT | Performed by: FAMILY MEDICINE

## 2023-06-22 PROCEDURE — 3079F DIAST BP 80-89 MM HG: CPT | Performed by: FAMILY MEDICINE

## 2023-06-22 PROCEDURE — 99214 OFFICE O/P EST MOD 30 MIN: CPT | Performed by: FAMILY MEDICINE

## 2023-06-22 RX ORDER — LISINOPRIL 10 MG/1
10 TABLET ORAL DAILY
Qty: 90 TABLET | Refills: 3 | Status: SHIPPED | OUTPATIENT
Start: 2023-06-22 | End: 2024-01-08 | Stop reason: SINTOL

## 2023-06-22 RX ORDER — OMEPRAZOLE 20 MG/1
20 CAPSULE, DELAYED RELEASE ORAL 2 TIMES DAILY
Qty: 180 CAPSULE | Refills: 3 | Status: SHIPPED | OUTPATIENT
Start: 2023-06-22 | End: 2024-06-16

## 2023-06-22 RX ORDER — ALENDRONATE SODIUM 70 MG/1
70 TABLET ORAL
Qty: 12 TABLET | Refills: 3 | Status: SHIPPED | OUTPATIENT
Start: 2023-06-22

## 2023-06-22 ASSESSMENT — FIBROSIS 4 INDEX: FIB4 SCORE: 1.25

## 2023-06-22 NOTE — PROGRESS NOTES
Subjective:   Tanisha Haile is a 73 y.o. female here today for evaluation and management of:     Right flank pain  Improved, able to return to work  Clearance paperwork and letter provided.   Encouraged to use safe lifting and pushing techniques at work.       Primary hypertension  Take daily lisinopril 10 mg   Her parents and brother also have HTN  Pt encouraged to quit smoking  Eat a low salt diet.     Check cmp    Age-related osteoporosis without current pathological fracture  Chronic condition, she has GERD, did not take fosamax in the past. Will try it again.   Last dexa Dec 2021 showed osteoporosis  She also had some falls, no fracture.   rx for omeprazole provided.   Advised on potential side effects.                Current medicines (including changes today)  Current Outpatient Medications   Medication Sig Dispense Refill    lisinopril (PRINIVIL) 10 MG Tab Take 1 Tablet by mouth every day. 90 Tablet 3    alendronate (FOSAMAX) 70 MG Tab Take 1 Tablet by mouth every 7 days. For osteoporosis 12 Tablet 3    omeprazole (PRILOSEC) 20 MG delayed-release capsule Take 1 Capsule by mouth 2 times a day for 360 days. 180 Capsule 3    azelastine (ASTELIN) 137 MCG/SPRAY nasal spray Administer 1 Spray into affected nostril(S) 2 times a day. 30 mL 3    azelastine (ASTELIN) 137 MCG/SPRAY nasal spray Administer 1 Spray into affected nostril(S) 2 times a day. 30 mL 3    simvastatin (ZOCOR) 20 MG Tab TAKE 1 TABLET BY MOUTH EVERY EVENING 90 Tablet 3    triamcinolone acetonide (KENALOG) 0.5 % Cream Apply 1g three times a day for 7 days to affected area 60 g 0    Probiotic Product (PROBIOTIC-10 PO) Take  by mouth every day.      multivitamin (THERAGRAN) Tab Take 1 Tablet by mouth every day.      VITAMIN D PO Take 2,000 mcg by mouth.      CRANBERRY PO Take  by mouth.      ascorbic acid (ASCORBIC ACID) 500 MG Tab Take 1 Tablet by mouth every day.      Calcium Carbonate-Vitamin D 600-400 MG-UNIT Tab Take  by mouth 2 times a day.      "    No current facility-administered medications for this visit.     She  has a past medical history of Anemia, Arrhythmia, Arthritis, Cervical cancer screening (5/22/2013), Dyslipidemia (10/24/2012), Dysuria (10/2/2014), Fall (12/22/2022), GERD (gastroesophageal reflux disease), Heart murmur, Hyperlipidemia, Insomnia (2/27/2014), Migraine, Primary hypertension (6/22/2023), Seasonal allergies (5/22/2013), Shingles, and Thyroid disease.    She has no past medical history of Anxiety, Asthma, Blood transfusion without reported diagnosis, Breast cancer (HCC), Cancer (HCC), Cataract, CHF (congestive heart failure) (HCC), Clotting disorder (HCC), COPD (chronic obstructive pulmonary disease) (HCC), Depression, Diabetes (HCC), Heart attack (HCC), HIV (human immunodeficiency virus infection) (HCC), Kidney disease, Seizure (HCC), Stroke (HCC), or Substance abuse (HCC).    ROS  No chest pain, no shortness of breath, no abdominal pain       Objective:     BP (!) 142/82 (BP Location: Left arm, Patient Position: Sitting, BP Cuff Size: Adult)   Pulse 66   Temp 37 °C (98.6 °F) (Temporal)   Resp 16   Ht 1.626 m (5' 4\")   Wt 54.1 kg (119 lb 3.2 oz)   SpO2 100%  Body mass index is 20.46 kg/m².   Physical Exam:  Constitutional: Alert, no distress.  Skin: Warm, dry, good turgor, no rashes in visible areas.  Eye: Equal, round and reactive, conjunctiva clear, lids normal.  ENMT: Lips without lesions, good dentition, oropharynx clear.  Neck: Trachea midline, no masses, no thyromegaly. No cervical or supraclavicular lymphadenopathy  Respiratory: Unlabored respiratory effort, lungs clear to auscultation, no wheezes, no ronchi.  Cardiovascular: Normal S1, S2, no murmur, no edema.  Abdomen: Soft, non-tender, no masses, no hepatosplenomegaly.  Psych: Alert and oriented x3, normal affect and mood.        Assessment and Plan:   The following treatment plan was discussed    1. Right flank pain    2. Primary hypertension    3. Age-related " osteoporosis without current pathological fracture    4. Gastroesophageal reflux disease without esophagitis  - omeprazole (PRILOSEC) 20 MG delayed-release capsule; Take 1 Capsule by mouth 2 times a day for 360 days.  Dispense: 180 Capsule; Refill: 3    Other orders  - lisinopril (PRINIVIL) 10 MG Tab; Take 1 Tablet by mouth every day.  Dispense: 90 Tablet; Refill: 3  - alendronate (FOSAMAX) 70 MG Tab; Take 1 Tablet by mouth every 7 days. For osteoporosis  Dispense: 12 Tablet; Refill: 3      Followup: Return in about 3 months (around 9/22/2023) for pls scan form. , Hypertension, Lab Review.

## 2023-06-22 NOTE — ASSESSMENT & PLAN NOTE
Improved, able to return to work  Clearance paperwork and letter provided.   Encouraged to use safe lifting and pushing techniques at work.

## 2023-06-22 NOTE — LETTER
June 22, 2023    To whom it may concern:     Tanisha Haile was seen by me in clinic today. She is cleared to return to work with no restrictions.       Thank you            Cristy Gleason M.D.

## 2023-06-22 NOTE — ASSESSMENT & PLAN NOTE
Take daily lisinopril 10 mg   Her parents and brother also have HTN  Pt encouraged to quit smoking  Eat a low salt diet.     Check cmp

## 2023-06-22 NOTE — ASSESSMENT & PLAN NOTE
Chronic condition, she has GERD, did not take fosamax in the past. Will try it again.   Last dexa Dec 2021 showed osteoporosis  She also had some falls, no fracture.   rx for omeprazole provided.   Advised on potential side effects.

## 2023-07-03 NOTE — ASSESSMENT & PLAN NOTE
This is a chronic health problem that is well controlled with current medications and lifestyle measures.  Patient takes simvastatin 20 mg daily.  She exercises daily walking her dog. Last lipid profile was normal.  Will recheck labs before next appointment.    Component      Latest Ref Rng & Units 1/2/2018           7:46 AM   Cholesterol,Tot      100 - 199 mg/dL 169   Triglycerides      0 - 149 mg/dL 52   HDL      >=40 mg/dL 90   LDL      <100 mg/dL 69        Quality 226: Preventive Care And Screening: Tobacco Use: Screening And Cessation Intervention: Patient screened for tobacco use and is an ex/non-smoker Quality 130: Documentation Of Current Medications In The Medical Record: Current Medications Documented Detail Level: Detailed Quality 110: Preventive Care And Screening: Influenza Immunization: Influenza Immunization Administered during Influenza season

## 2023-07-17 ENCOUNTER — OFFICE VISIT (OUTPATIENT)
Dept: URGENT CARE | Facility: PHYSICIAN GROUP | Age: 73
End: 2023-07-17
Payer: MEDICARE

## 2023-07-17 VITALS
RESPIRATION RATE: 16 BRPM | OXYGEN SATURATION: 97 % | BODY MASS INDEX: 20.66 KG/M2 | DIASTOLIC BLOOD PRESSURE: 68 MMHG | HEART RATE: 61 BPM | SYSTOLIC BLOOD PRESSURE: 122 MMHG | WEIGHT: 121 LBS | TEMPERATURE: 96.7 F | HEIGHT: 64 IN

## 2023-07-17 DIAGNOSIS — J06.9 VIRAL URI: ICD-10-CM

## 2023-07-17 LAB
FLUAV RNA SPEC QL NAA+PROBE: NEGATIVE
FLUBV RNA SPEC QL NAA+PROBE: NEGATIVE
RSV RNA SPEC QL NAA+PROBE: NEGATIVE
SARS-COV-2 RNA RESP QL NAA+PROBE: NEGATIVE

## 2023-07-17 PROCEDURE — 3074F SYST BP LT 130 MM HG: CPT | Performed by: NURSE PRACTITIONER

## 2023-07-17 PROCEDURE — 3078F DIAST BP <80 MM HG: CPT | Performed by: NURSE PRACTITIONER

## 2023-07-17 PROCEDURE — 99213 OFFICE O/P EST LOW 20 MIN: CPT | Performed by: NURSE PRACTITIONER

## 2023-07-17 PROCEDURE — 0241U POCT CEPHEID COV-2, FLU A/B, RSV - PCR: CPT | Performed by: NURSE PRACTITIONER

## 2023-07-17 ASSESSMENT — ENCOUNTER SYMPTOMS
HEADACHES: 1
EYE PAIN: 0
SINUS PAIN: 0
DIZZINESS: 0
CHILLS: 1
MYALGIAS: 0
SORE THROAT: 0
WHEEZING: 0
RHINORRHEA: 1
COUGH: 1
NAUSEA: 0
SHORTNESS OF BREATH: 0
FEVER: 0
VOMITING: 0

## 2023-07-17 ASSESSMENT — FIBROSIS 4 INDEX: FIB4 SCORE: 1.25
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
## 2023-07-27 ENCOUNTER — OFFICE VISIT (OUTPATIENT)
Dept: URGENT CARE | Facility: PHYSICIAN GROUP | Age: 73
End: 2023-07-27
Payer: MEDICARE

## 2023-07-27 VITALS
BODY MASS INDEX: 19.63 KG/M2 | SYSTOLIC BLOOD PRESSURE: 128 MMHG | RESPIRATION RATE: 16 BRPM | WEIGHT: 115 LBS | OXYGEN SATURATION: 97 % | HEART RATE: 68 BPM | DIASTOLIC BLOOD PRESSURE: 68 MMHG | HEIGHT: 64 IN | TEMPERATURE: 97.3 F

## 2023-07-27 DIAGNOSIS — R09.82 POSTNASAL DRIP: ICD-10-CM

## 2023-07-27 DIAGNOSIS — J30.9 ALLERGIC RHINITIS, UNSPECIFIED SEASONALITY, UNSPECIFIED TRIGGER: ICD-10-CM

## 2023-07-27 DIAGNOSIS — R05.9 COUGH IN ADULT: ICD-10-CM

## 2023-07-27 PROCEDURE — 99214 OFFICE O/P EST MOD 30 MIN: CPT | Performed by: NURSE PRACTITIONER

## 2023-07-27 PROCEDURE — 3078F DIAST BP <80 MM HG: CPT | Performed by: NURSE PRACTITIONER

## 2023-07-27 PROCEDURE — 3074F SYST BP LT 130 MM HG: CPT | Performed by: NURSE PRACTITIONER

## 2023-07-27 RX ORDER — DEXTROMETHORPHAN HYDROBROMIDE AND PROMETHAZINE HYDROCHLORIDE 15; 6.25 MG/5ML; MG/5ML
5 SYRUP ORAL EVERY 6 HOURS PRN
Qty: 100 ML | Refills: 0 | Status: SHIPPED | OUTPATIENT
Start: 2023-07-27 | End: 2023-09-01

## 2023-07-27 ASSESSMENT — FIBROSIS 4 INDEX: FIB4 SCORE: 1.25

## 2023-07-27 NOTE — PROGRESS NOTES
"Subjective:   Tanisha Haile is a 73 y.o. female who presents for Nasal Congestion (+10 days was seen 7/17, ongoing nasal congestion, cough, would like to get lungs checked, clear mucus from nose, coughing up white phlegm )    Patient is a 73-year-old female who presents to the stating 10-day history of ongoing clear to white nasal congestion, rhinitis, postnasal drip, and dry to intermittent wet cough with white phlegm.  Patient states that the cough is mainly caused from postnasal drip.  Patient was seen in urgent care on 7/17/2023 and was tested for COVID, influenza, RSV which were all negative.  Patient was treated for a viral URI.  Patient does state that she did not  the over-the-counter antihistamine as recommended.  She has been using saline nasal spray, Flonase, and Astelin.  Patient denies any fever, chills, headache, shortness of breath, wheezing, chest pain, or fatigue.  She has not tried any over-the-counter medications.      Medications, Allergies, and current problem list reviewed today in Epic.     Objective:     /68   Pulse 68   Temp 36.3 °C (97.3 °F) (Temporal)   Resp 16   Ht 1.626 m (5' 4\")   Wt 52.2 kg (115 lb)   SpO2 97%     Physical Exam  Vitals reviewed.   Constitutional:       Appearance: Normal appearance.   HENT:      Head: Normocephalic.      Right Ear: Tympanic membrane, ear canal and external ear normal.      Left Ear: Tympanic membrane, ear canal and external ear normal.      Nose: Mucosal edema and rhinorrhea present. Rhinorrhea is clear.      Right Sinus: No maxillary sinus tenderness or frontal sinus tenderness.      Left Sinus: No maxillary sinus tenderness or frontal sinus tenderness.      Mouth/Throat:      Mouth: Mucous membranes are moist.      Pharynx: No posterior oropharyngeal erythema.      Comments: Cobblestone appearance with postnasal drip.  Eyes:      Extraocular Movements: Extraocular movements intact.      Conjunctiva/sclera: Conjunctivae normal.     "  Pupils: Pupils are equal, round, and reactive to light.   Cardiovascular:      Rate and Rhythm: Normal rate and regular rhythm.   Pulmonary:      Effort: Pulmonary effort is normal.      Breath sounds: Normal breath sounds. No wheezing or rhonchi.   Musculoskeletal:         General: Normal range of motion.      Cervical back: Normal range of motion and neck supple.   Lymphadenopathy:      Cervical: No cervical adenopathy.   Skin:     General: Skin is warm and dry.   Neurological:      Mental Status: She is alert and oriented to person, place, and time.   Psychiatric:         Mood and Affect: Mood normal.         Behavior: Behavior normal.         Thought Content: Thought content normal.         Judgment: Judgment normal.         Assessment/Plan:     Diagnosis and associated orders:     1. Allergic rhinitis, unspecified seasonality, unspecified trigger        2. Postnasal drip        3. Cough in adult  promethazine-dextromethorphan (PROMETHAZINE-DM) 6.25-15 MG/5ML syrup         Comments/MDM:     Patient is a very pleasant 73-year-old female who presents to clinic nontoxic-appearing with ongoing allergic rhinitis, postnasal drip, and cough.  Prescription of Promethazine DM cough syrup sent to pharmacy and patient was instructed to use sparingly.  Recommended patient  over-the-counter Claritin and/or Allegra and take one of the medications for at least 7 days and if symptoms do not improve she may switch to the other medication.  Also recommended if symptoms continue with no improvement on the second medication may go ahead and try Zyrtec.  Recommended patient continue on Astelin nasal spray Flonase, and nasal saline.  I have personally reviewed previous office visit notes and labs and discussed results with patient.  Recommended patient follow-up in clinic or with primary care provider symptoms continue or worsen.  Possible referral to allergist in the future.         Differential diagnosis, natural history,  supportive care, and indications for immediate follow-up discussed.    Advised the patient to follow-up with the primary care physician for recheck, reevaluation, and consideration of further management.    Please note that this dictation was created using voice recognition software. I have made a reasonable attempt to correct obvious errors, but I expect that there are errors of grammar and possibly content that I did not discover before finalizing the note.

## 2023-07-27 NOTE — PATIENT INSTRUCTIONS
Please go ahead and  over-the-counter Claritin or Allegra and try 1 of these medications for at least 7 days.  If you do not have improvement please try the other medication.  If no improvement with the second medication in 7 days please switch to Zyrtec but to take this at night as it can cause drowsiness.

## 2023-07-28 ENCOUNTER — OFFICE VISIT (OUTPATIENT)
Dept: URGENT CARE | Facility: PHYSICIAN GROUP | Age: 73
End: 2023-07-28
Payer: MEDICARE

## 2023-07-28 VITALS
RESPIRATION RATE: 16 BRPM | OXYGEN SATURATION: 99 % | SYSTOLIC BLOOD PRESSURE: 124 MMHG | WEIGHT: 119.8 LBS | TEMPERATURE: 97.6 F | HEIGHT: 64 IN | HEART RATE: 67 BPM | BODY MASS INDEX: 20.45 KG/M2 | DIASTOLIC BLOOD PRESSURE: 74 MMHG

## 2023-07-28 DIAGNOSIS — M54.9 UPPER BACK PAIN ON LEFT SIDE: ICD-10-CM

## 2023-07-28 PROCEDURE — 1125F AMNT PAIN NOTED PAIN PRSNT: CPT | Performed by: FAMILY MEDICINE

## 2023-07-28 PROCEDURE — 3078F DIAST BP <80 MM HG: CPT | Performed by: FAMILY MEDICINE

## 2023-07-28 PROCEDURE — 99213 OFFICE O/P EST LOW 20 MIN: CPT | Performed by: FAMILY MEDICINE

## 2023-07-28 PROCEDURE — 3074F SYST BP LT 130 MM HG: CPT | Performed by: FAMILY MEDICINE

## 2023-07-28 RX ORDER — CYCLOBENZAPRINE HCL 10 MG
TABLET ORAL
Qty: 30 TABLET | Refills: 0 | Status: SHIPPED | OUTPATIENT
Start: 2023-07-28 | End: 2023-09-01

## 2023-07-28 ASSESSMENT — FIBROSIS 4 INDEX: FIB4 SCORE: 1.25

## 2023-07-28 ASSESSMENT — PAIN SCALES - GENERAL: PAINLEVEL: 5=MODERATE PAIN
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
## 2023-08-25 ENCOUNTER — HOSPITAL ENCOUNTER (OUTPATIENT)
Dept: LAB | Facility: MEDICAL CENTER | Age: 73
End: 2023-08-25
Attending: FAMILY MEDICINE
Payer: MEDICARE

## 2023-08-25 DIAGNOSIS — R10.31 RLQ ABDOMINAL PAIN: ICD-10-CM

## 2023-08-25 DIAGNOSIS — R73.01 ELEVATED FASTING GLUCOSE: ICD-10-CM

## 2023-08-25 DIAGNOSIS — E55.9 VITAMIN D DEFICIENCY: ICD-10-CM

## 2023-08-25 DIAGNOSIS — E78.5 DYSLIPIDEMIA: ICD-10-CM

## 2023-08-25 LAB
25(OH)D3 SERPL-MCNC: 74 NG/ML (ref 30–100)
ALBUMIN SERPL BCP-MCNC: 4.3 G/DL (ref 3.2–4.9)
ALBUMIN/GLOB SERPL: 2.2 G/DL
ALP SERPL-CCNC: 84 U/L (ref 30–99)
ALT SERPL-CCNC: 16 U/L (ref 2–50)
ANION GAP SERPL CALC-SCNC: 9 MMOL/L (ref 7–16)
AST SERPL-CCNC: 27 U/L (ref 12–45)
BASOPHILS # BLD AUTO: 0.6 % (ref 0–1.8)
BASOPHILS # BLD: 0.04 K/UL (ref 0–0.12)
BILIRUB SERPL-MCNC: 0.5 MG/DL (ref 0.1–1.5)
BUN SERPL-MCNC: 18 MG/DL (ref 8–22)
CALCIUM ALBUM COR SERPL-MCNC: 9.1 MG/DL (ref 8.5–10.5)
CALCIUM SERPL-MCNC: 9.3 MG/DL (ref 8.5–10.5)
CHLORIDE SERPL-SCNC: 104 MMOL/L (ref 96–112)
CHOLEST SERPL-MCNC: 166 MG/DL (ref 100–199)
CO2 SERPL-SCNC: 28 MMOL/L (ref 20–33)
CREAT SERPL-MCNC: 0.62 MG/DL (ref 0.5–1.4)
EOSINOPHIL # BLD AUTO: 0.63 K/UL (ref 0–0.51)
EOSINOPHIL NFR BLD: 9.8 % (ref 0–6.9)
ERYTHROCYTE [DISTWIDTH] IN BLOOD BY AUTOMATED COUNT: 47.8 FL (ref 35.9–50)
EST. AVERAGE GLUCOSE BLD GHB EST-MCNC: 111 MG/DL
FASTING STATUS PATIENT QL REPORTED: NORMAL
GFR SERPLBLD CREATININE-BSD FMLA CKD-EPI: 94 ML/MIN/1.73 M 2
GLOBULIN SER CALC-MCNC: 2 G/DL (ref 1.9–3.5)
GLUCOSE SERPL-MCNC: 92 MG/DL (ref 65–99)
HBA1C MFR BLD: 5.5 % (ref 4–5.6)
HCT VFR BLD AUTO: 42.3 % (ref 37–47)
HDLC SERPL-MCNC: 92 MG/DL
HGB BLD-MCNC: 13.9 G/DL (ref 12–16)
IMM GRANULOCYTES # BLD AUTO: 0.01 K/UL (ref 0–0.11)
IMM GRANULOCYTES NFR BLD AUTO: 0.2 % (ref 0–0.9)
LDLC SERPL CALC-MCNC: 66 MG/DL
LYMPHOCYTES # BLD AUTO: 1.62 K/UL (ref 1–4.8)
LYMPHOCYTES NFR BLD: 25.1 % (ref 22–41)
MCH RBC QN AUTO: 31.3 PG (ref 27–33)
MCHC RBC AUTO-ENTMCNC: 32.9 G/DL (ref 32.2–35.5)
MCV RBC AUTO: 95.3 FL (ref 81.4–97.8)
MONOCYTES # BLD AUTO: 0.53 K/UL (ref 0–0.85)
MONOCYTES NFR BLD AUTO: 8.2 % (ref 0–13.4)
NEUTROPHILS # BLD AUTO: 3.62 K/UL (ref 1.82–7.42)
NEUTROPHILS NFR BLD: 56.1 % (ref 44–72)
NRBC # BLD AUTO: 0 K/UL
NRBC BLD-RTO: 0 /100 WBC (ref 0–0.2)
PLATELET # BLD AUTO: 261 K/UL (ref 164–446)
PMV BLD AUTO: 10 FL (ref 9–12.9)
POTASSIUM SERPL-SCNC: 4.7 MMOL/L (ref 3.6–5.5)
PROT SERPL-MCNC: 6.3 G/DL (ref 6–8.2)
RBC # BLD AUTO: 4.44 M/UL (ref 4.2–5.4)
SODIUM SERPL-SCNC: 141 MMOL/L (ref 135–145)
TRIGL SERPL-MCNC: 42 MG/DL (ref 0–149)
WBC # BLD AUTO: 6.5 K/UL (ref 4.8–10.8)

## 2023-08-25 PROCEDURE — 80061 LIPID PANEL: CPT

## 2023-08-25 PROCEDURE — 83036 HEMOGLOBIN GLYCOSYLATED A1C: CPT | Mod: GA

## 2023-08-25 PROCEDURE — 36415 COLL VENOUS BLD VENIPUNCTURE: CPT

## 2023-08-25 PROCEDURE — 85025 COMPLETE CBC W/AUTO DIFF WBC: CPT

## 2023-08-25 PROCEDURE — 82306 VITAMIN D 25 HYDROXY: CPT

## 2023-08-25 PROCEDURE — 80053 COMPREHEN METABOLIC PANEL: CPT

## 2023-08-27 ENCOUNTER — OFFICE VISIT (OUTPATIENT)
Dept: URGENT CARE | Facility: PHYSICIAN GROUP | Age: 73
End: 2023-08-27
Payer: MEDICARE

## 2023-08-27 VITALS
OXYGEN SATURATION: 98 % | WEIGHT: 119.8 LBS | HEIGHT: 63 IN | SYSTOLIC BLOOD PRESSURE: 140 MMHG | BODY MASS INDEX: 21.23 KG/M2 | HEART RATE: 74 BPM | TEMPERATURE: 96.7 F | DIASTOLIC BLOOD PRESSURE: 82 MMHG | RESPIRATION RATE: 20 BRPM

## 2023-08-27 DIAGNOSIS — B96.89 ACUTE BACTERIAL SINUSITIS: ICD-10-CM

## 2023-08-27 DIAGNOSIS — J01.90 ACUTE BACTERIAL SINUSITIS: ICD-10-CM

## 2023-08-27 PROCEDURE — 3077F SYST BP >= 140 MM HG: CPT | Performed by: FAMILY MEDICINE

## 2023-08-27 PROCEDURE — 3079F DIAST BP 80-89 MM HG: CPT | Performed by: FAMILY MEDICINE

## 2023-08-27 PROCEDURE — 1125F AMNT PAIN NOTED PAIN PRSNT: CPT | Performed by: FAMILY MEDICINE

## 2023-08-27 PROCEDURE — 99213 OFFICE O/P EST LOW 20 MIN: CPT | Performed by: FAMILY MEDICINE

## 2023-08-27 RX ORDER — FLUTICASONE PROPIONATE 50 MCG
SPRAY, SUSPENSION (ML) NASAL
Qty: 15.8 ML | Refills: 3 | Status: SHIPPED | OUTPATIENT
Start: 2023-08-27 | End: 2023-09-01

## 2023-08-27 RX ORDER — AZITHROMYCIN 250 MG/1
TABLET, FILM COATED ORAL
Qty: 6 TABLET | Refills: 0 | Status: SHIPPED | OUTPATIENT
Start: 2023-08-27 | End: 2023-09-01

## 2023-08-27 ASSESSMENT — FIBROSIS 4 INDEX: FIB4 SCORE: 1.887931034482758621

## 2023-08-27 ASSESSMENT — PAIN SCALES - GENERAL: PAINLEVEL: 2=MINIMAL-SLIGHT
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
## 2023-09-01 ENCOUNTER — OFFICE VISIT (OUTPATIENT)
Dept: MEDICAL GROUP | Facility: PHYSICIAN GROUP | Age: 73
End: 2023-09-01
Payer: MEDICARE

## 2023-09-01 VITALS
OXYGEN SATURATION: 80 % | HEART RATE: 80 BPM | HEIGHT: 64 IN | TEMPERATURE: 97.6 F | SYSTOLIC BLOOD PRESSURE: 110 MMHG | DIASTOLIC BLOOD PRESSURE: 78 MMHG | WEIGHT: 117 LBS | RESPIRATION RATE: 16 BRPM | BODY MASS INDEX: 19.97 KG/M2

## 2023-09-01 DIAGNOSIS — I10 PRIMARY HYPERTENSION: ICD-10-CM

## 2023-09-01 DIAGNOSIS — E78.5 DYSLIPIDEMIA: ICD-10-CM

## 2023-09-01 DIAGNOSIS — M77.8 TENDONITIS OF BOTH WRISTS: ICD-10-CM

## 2023-09-01 DIAGNOSIS — J32.1 CHRONIC FRONTAL SINUSITIS: ICD-10-CM

## 2023-09-01 PROBLEM — R03.0 ELEVATED BLOOD PRESSURE READING: Status: RESOLVED | Noted: 2018-09-12 | Resolved: 2023-09-01

## 2023-09-01 PROCEDURE — 99214 OFFICE O/P EST MOD 30 MIN: CPT | Performed by: FAMILY MEDICINE

## 2023-09-01 PROCEDURE — 3078F DIAST BP <80 MM HG: CPT | Performed by: FAMILY MEDICINE

## 2023-09-01 PROCEDURE — 3074F SYST BP LT 130 MM HG: CPT | Performed by: FAMILY MEDICINE

## 2023-09-01 ASSESSMENT — ENCOUNTER SYMPTOMS
DOUBLE VISION: 0
VOMITING: 0
MYALGIAS: 0
HEADACHES: 1
PALPITATIONS: 0
SPUTUM PRODUCTION: 0
NAUSEA: 0
SINUS PAIN: 1
BLURRED VISION: 0
NECK PAIN: 0
BACK PAIN: 0
EYE PAIN: 0
ABDOMINAL PAIN: 0
COUGH: 0
PHOTOPHOBIA: 0
SORE THROAT: 0
CHILLS: 0
BLOOD IN STOOL: 0
HEARTBURN: 1
FEVER: 0
SHORTNESS OF BREATH: 0

## 2023-09-01 ASSESSMENT — FIBROSIS 4 INDEX: FIB4 SCORE: 1.887931034482758621

## 2023-09-01 NOTE — ASSESSMENT & PLAN NOTE
The 10-year ASCVD risk score (Guillermina LAWS, et al., 2019) is: 18.1%  Continue simvastatin 20 mg  She has normal lipids and lfts

## 2023-09-01 NOTE — PROGRESS NOTES
"Subjective     Tanisha Haile is a 73 y.o. female who presents with Lab Results    Patient states that she has been had Chronic sinusitis since 2012 and this causes her to have congestion, headaches, and infections. She is using a nasal spray to help her sinusitis but it still persists. She recently was prescribed azithromycin for sinus infection.  She has not been taking her alendronate because she does not know when she needs to take the medication and she often drinks her morning coffee and then realizes that she needs to take the medication but knows that she needs to have it on an empty stomach.  She also doesn't know when to take her omeprazole.     Patient states that she has been having new onset wrist inflammation. Using her wrists worsen the pain and she has considered using a wrist wrap to help the pain.    Review of Systems   Constitutional:  Negative for chills and fever.   HENT:  Positive for congestion and sinus pain. Negative for hearing loss and sore throat.    Eyes:  Negative for blurred vision, double vision, photophobia and pain.   Respiratory:  Negative for cough, sputum production and shortness of breath.    Cardiovascular:  Negative for chest pain and palpitations.   Gastrointestinal:  Positive for heartburn. Negative for abdominal pain, blood in stool, melena, nausea and vomiting.   Genitourinary:  Negative for dysuria, frequency, hematuria and urgency.   Musculoskeletal:  Positive for joint pain. Negative for back pain, myalgias and neck pain.   Neurological:  Positive for headaches.       Objective     /78   Pulse 80   Temp 36.4 °C (97.6 °F) (Temporal)   Resp 16   Ht 1.613 m (5' 3.5\")   Wt 53.1 kg (117 lb)   LMP  (LMP Unknown)   SpO2 (!) 80%   BMI 20.40 kg/m²      Physical Exam  Constitutional:       Appearance: Normal appearance. She is normal weight.   HENT:      Head: Normocephalic and atraumatic.      Right Ear: External ear normal.      Left Ear: External ear normal.      " Nose: Congestion present.      Mouth/Throat:      Mouth: Mucous membranes are moist.      Pharynx: Oropharynx is clear.   Eyes:      Extraocular Movements: Extraocular movements intact.      Pupils: Pupils are equal, round, and reactive to light.   Cardiovascular:      Rate and Rhythm: Normal rate and regular rhythm.   Pulmonary:      Effort: Pulmonary effort is normal. No respiratory distress.      Breath sounds: Normal breath sounds. No stridor. No wheezing, rhonchi or rales.   Musculoskeletal:         General: Swelling and tenderness present.   Skin:     General: Skin is warm and dry.   Neurological:      General: No focal deficit present.      Mental Status: She is alert and oriented to person, place, and time.      Cranial Nerves: No cranial nerve deficit.   Psychiatric:         Mood and Affect: Mood normal.         Behavior: Behavior normal.         Assessment & Plan     Chronic Sinusitis  Patient advised to continue using nasal spray and follow-up with the ENT that she was referred to. She has had this chronic sinusitis for a decade now and is bothering her on a consistent basis.  - ENT referral   -Nasal spray    2. Osteoporosis  Patient counseled on the need to avoid falls with the upcoming fall and winter months and to start her alendronate medication as soon as she can . Counseled on ways to help prevent forgetting to take the medication and when to take the medication.  -Start taking alendronate    3. Wrist pain  Inflammation and warmth bilaterally on the ulnar heads. Patient told to start NSAIDs and to use wraps or a wrist brace  -Wrist brace    4. GERD  Patient informed that use of the omeprazole prior to eating should alleviate her pain while eating.  - Omeprazole prior to meals.

## 2023-09-01 NOTE — ASSESSMENT & PLAN NOTE
Pt has sinus pressure, chronic x 6 months using nasal saline  Has elevated eosinophils,   Can try flonase, otc anti histamines.   Ref to ent provided

## 2023-09-01 NOTE — PROGRESS NOTES
Subjective:   Tanisha Haile is a 73 y.o. female here today for evaluation and management of:     Dyslipidemia  The 10-year ASCVD risk score (Guillermina DK, et al., 2019) is: 18.1%  Continue simvastatin 20 mg  She has normal lipids and lfts    Primary hypertension  Controlled with daily lisinopril 10 mg  No CP, LE edema, headaches, near syncope.   Normal lfts    Chronic frontal sinusitis  Pt has sinus pressure, chronic x 6 months using nasal saline  Has elevated eosinophils,   Can try flonase, otc anti histamines.   Ref to ent provided    Tendonitis of both wrists  Swelling and pain of lateral aspect of both wrists.   rec to use braces, ice             Current medicines (including changes today)  Current Outpatient Medications   Medication Sig Dispense Refill    lisinopril (PRINIVIL) 10 MG Tab Take 1 Tablet by mouth every day. 90 Tablet 3    alendronate (FOSAMAX) 70 MG Tab Take 1 Tablet by mouth every 7 days. For osteoporosis 12 Tablet 3    omeprazole (PRILOSEC) 20 MG delayed-release capsule Take 1 Capsule by mouth 2 times a day for 360 days. 180 Capsule 3    azelastine (ASTELIN) 137 MCG/SPRAY nasal spray Administer 1 Spray into affected nostril(S) 2 times a day. 30 mL 3    simvastatin (ZOCOR) 20 MG Tab TAKE 1 TABLET BY MOUTH EVERY EVENING 90 Tablet 3    triamcinolone acetonide (KENALOG) 0.5 % Cream Apply 1g three times a day for 7 days to affected area 60 g 0    Probiotic Product (PROBIOTIC-10 PO) Take  by mouth every day.      multivitamin (THERAGRAN) Tab Take 1 Tablet by mouth every day.      VITAMIN D PO Take 2,000 mcg by mouth.      CRANBERRY PO Take  by mouth.      ascorbic acid (ASCORBIC ACID) 500 MG Tab Take 1 Tablet by mouth every day.      Calcium Carbonate-Vitamin D 600-400 MG-UNIT Tab Take  by mouth 2 times a day.        azithromycin (ZITHROMAX) 250 MG Tab 2 TABS BY MOUTH ON DAY 1, 1 TAB ON DAYS 2-5. (Patient not taking: Reported on 9/1/2023) 6 Tablet 0    fluticasone (FLONASE) 50 MCG/ACT nasal spray 2  "SPRAYS IN EACH NOSTRIL ONCE A DAY IF NEEDED FOR NASAL SYMPTOMS. (Patient not taking: Reported on 9/1/2023) 15.8 mL 3    cyclobenzaprine (FLEXERIL) 10 mg Tab 1 TAB BY MOUTH EVERY 8 HOURS ONLY IF NEEDED FOR PAIN, MUSCLE SPASM, AND/OR MUSCLE TIGHTNESS. MAY CAUSE DROWSINESS. (Patient not taking: Reported on 9/1/2023) 30 Tablet 0    promethazine-dextromethorphan (PROMETHAZINE-DM) 6.25-15 MG/5ML syrup Take 5 mL by mouth every 6 hours as needed for Cough for up to 20 doses. (Patient not taking: Reported on 9/1/2023) 100 mL 0    azelastine (ASTELIN) 137 MCG/SPRAY nasal spray Administer 1 Spray into affected nostril(S) 2 times a day. (Patient not taking: Reported on 9/1/2023) 30 mL 3     No current facility-administered medications for this visit.     She  has a past medical history of Anemia, Arrhythmia, Arthritis, Cervical cancer screening (5/22/2013), Dyslipidemia (10/24/2012), Dysuria (10/2/2014), Fall (12/22/2022), GERD (gastroesophageal reflux disease), Heart murmur, Hyperlipidemia, Insomnia (2/27/2014), Migraine, Primary hypertension (6/22/2023), Seasonal allergies (5/22/2013), Shingles, and Thyroid disease.    She has no past medical history of Anxiety, Asthma, Blood transfusion without reported diagnosis, Breast cancer ([facility]), Cancer (HCC), Cataract, CHF (congestive heart failure) ([facility]), Clotting disorder (HCC), COPD (chronic obstructive pulmonary disease) (HCC), Depression, Diabetes (HCC), Heart attack (HCC), HIV (human immunodeficiency virus infection) (HCC), Kidney disease, Seizure (HCC), Stroke (HCC), or Substance abuse (HCC).    ROS  No chest pain, no shortness of breath, no abdominal pain       Objective:     /78   Pulse 80   Temp 36.4 °C (97.6 °F) (Temporal)   Resp 16   Ht 1.613 m (5' 3.5\")   Wt 53.1 kg (117 lb)   SpO2 (!) 80%  Body mass index is 20.4 kg/m².   Physical Exam:  Constitutional: Alert, no distress.  Skin: Warm, dry, good turgor, no rashes in visible areas.  Eye: Equal, round and " reactive, conjunctiva clear, lids normal.  ENMT: Lips without lesions, good dentition, oropharynx clear.  Neck: Trachea midline, no masses, no thyromegaly. No cervical or supraclavicular lymphadenopathy  Respiratory: Unlabored respiratory effort, lungs clear to auscultation, no wheezes, no ronchi.  Cardiovascular: Normal S1, S2, no murmur, no edema.  Abdomen: Soft, non-tender, no masses, no hepatosplenomegaly.  Psych: Alert and oriented x3, normal affect and mood.        Assessment and Plan:   The following treatment plan was discussed    1. Dyslipidemia    2. Primary hypertension    3. Chronic frontal sinusitis  - Referral to ENT    4. Tendonitis of both wrists      Followup: Return in about 6 months (around 3/1/2024) for please provide b/l wrist braces.

## 2023-10-03 ENCOUNTER — OFFICE VISIT (OUTPATIENT)
Dept: URGENT CARE | Facility: PHYSICIAN GROUP | Age: 73
End: 2023-10-03
Payer: MEDICARE

## 2023-10-03 VITALS
HEIGHT: 64 IN | DIASTOLIC BLOOD PRESSURE: 78 MMHG | WEIGHT: 118 LBS | OXYGEN SATURATION: 100 % | BODY MASS INDEX: 20.14 KG/M2 | HEART RATE: 68 BPM | RESPIRATION RATE: 16 BRPM | SYSTOLIC BLOOD PRESSURE: 120 MMHG | TEMPERATURE: 97.3 F

## 2023-10-03 DIAGNOSIS — J01.01 ACUTE RECURRENT MAXILLARY SINUSITIS: ICD-10-CM

## 2023-10-03 PROCEDURE — 3078F DIAST BP <80 MM HG: CPT | Performed by: NURSE PRACTITIONER

## 2023-10-03 PROCEDURE — 3074F SYST BP LT 130 MM HG: CPT | Performed by: NURSE PRACTITIONER

## 2023-10-03 PROCEDURE — 99214 OFFICE O/P EST MOD 30 MIN: CPT | Performed by: NURSE PRACTITIONER

## 2023-10-03 RX ORDER — AZELASTINE 1 MG/ML
1 SPRAY, METERED NASAL 2 TIMES DAILY
Qty: 30 ML | Refills: 1 | Status: SHIPPED | OUTPATIENT
Start: 2023-10-03 | End: 2023-12-13 | Stop reason: SDUPTHER

## 2023-10-03 RX ORDER — AZITHROMYCIN 250 MG/1
TABLET, FILM COATED ORAL
Qty: 6 TABLET | Refills: 0 | Status: SHIPPED | OUTPATIENT
Start: 2023-10-03 | End: 2023-10-31

## 2023-10-03 ASSESSMENT — ENCOUNTER SYMPTOMS
SINUS PAIN: 1
SORE THROAT: 1
MYALGIAS: 0
WHEEZING: 0
FEVER: 0
SPUTUM PRODUCTION: 0
CHILLS: 0
DIZZINESS: 1
HEADACHES: 1
SHORTNESS OF BREATH: 0
COUGH: 1

## 2023-10-03 ASSESSMENT — FIBROSIS 4 INDEX: FIB4 SCORE: 1.887931034482758621

## 2023-10-03 NOTE — PROGRESS NOTES
"Subjective:   Tanisha Haile is a 73 y.o. female who presents for Sinus Pain (Hx of constant sinus infections)    Patient is a 73-year-old female presenting to clinic today with 2-week history of sinus pain and pressure, clear nasal discharge, postnasal drip causing sore throat, and cough.  She denies any fevers, chills, ear pain, shortness of breath, or wheezing  She has been using Astelin nasal spray with some symptom relief. The end of August she was treated for acute bacterial sinusitis with azithromycin and Flonase with resolution of symptoms.        Review of Systems   Constitutional:  Negative for chills and fever.   HENT:  Positive for congestion, sinus pain and sore throat. Negative for ear pain.    Respiratory:  Positive for cough. Negative for sputum production, shortness of breath and wheezing.    Musculoskeletal:  Negative for myalgias.   Neurological:  Positive for dizziness and headaches.       Medications, Allergies, and current problem list reviewed today in Epic.     Objective:     /78   Pulse 68   Temp 36.3 °C (97.3 °F) (Temporal)   Resp 16   Ht 1.613 m (5' 3.5\")   Wt 53.5 kg (118 lb)   SpO2 100%     Physical Exam  Vitals reviewed.   Constitutional:       General: She is not in acute distress.     Appearance: Normal appearance. She is not ill-appearing or toxic-appearing.   HENT:      Head: Normocephalic.      Nose: Mucosal edema, congestion and rhinorrhea present. Rhinorrhea is clear.      Right Sinus: No maxillary sinus tenderness or frontal sinus tenderness.      Left Sinus: Maxillary sinus tenderness and frontal sinus tenderness present.        Mouth/Throat:      Mouth: Mucous membranes are moist.   Eyes:      Extraocular Movements: Extraocular movements intact.      Conjunctiva/sclera: Conjunctivae normal.      Pupils: Pupils are equal, round, and reactive to light.   Cardiovascular:      Rate and Rhythm: Normal rate and regular rhythm.   Pulmonary:      Effort: Pulmonary effort " is normal.      Breath sounds: Normal breath sounds.   Musculoskeletal:         General: Normal range of motion.      Cervical back: Normal range of motion and neck supple.   Lymphadenopathy:      Cervical: No cervical adenopathy.   Skin:     General: Skin is warm and dry.   Neurological:      Mental Status: She is alert and oriented to person, place, and time.   Psychiatric:         Mood and Affect: Mood normal.         Behavior: Behavior normal.         Thought Content: Thought content normal.         Judgment: Judgment normal.         Assessment/Plan:     Diagnosis and associated orders:     1. Acute recurrent maxillary sinusitis  azelastine (ASTELIN) 137 MCG/SPRAY nasal spray    azithromycin (ZITHROMAX) 250 MG Tab         Comments/MDM:     This is a current condition with exacerbation.  Patient has had multiple sinus infections over the 6 months.  She has been seen multiple times in the urgent care as well as with her primary care provider.  She was last treated in August with a Z-Nadeem and Flonase and did respond well to treatment.  I have personally reviewed previous office visit notes pertinent to today's visit.  Recommended patient perform nasal toileting with nasal spray.  Start taking over-the-counter Claritin or Zyrtec daily.  Refill of Astelin nasal spray sent to pharmacy.  Restart back on Flonase.  Written prescription of azithromycin given to patient to fill if symptoms worsen or fever arises.  Call/follow-up with ENT referral.         Differential diagnosis, natural history, supportive care, and indications for immediate follow-up discussed.    Advised the patient to follow-up with the primary care physician for recheck, reevaluation, and consideration of further management.    Please note that this dictation was created using voice recognition software. I have made a reasonable attempt to correct obvious errors, but I expect that there are errors of grammar and possibly content that I did not discover  before finalizing the note.

## 2023-10-16 DIAGNOSIS — E78.5 DYSLIPIDEMIA: ICD-10-CM

## 2023-10-17 NOTE — TELEPHONE ENCOUNTER
Requested Prescriptions     Pending Prescriptions Disp Refills    simvastatin (ZOCOR) 20 MG Tab [Pharmacy Med Name: SIMVASTATIN 20MG TABLETS] 90 Tablet 3     Sig: TAKE 1 TABLET BY MOUTH EVERY EVENING      Last office visit: 9/1/23  Last lab: 8/25/23

## 2023-10-18 RX ORDER — SIMVASTATIN 20 MG
TABLET ORAL
Qty: 90 TABLET | Refills: 3 | Status: SHIPPED | OUTPATIENT
Start: 2023-10-18

## 2023-10-19 ENCOUNTER — OFFICE VISIT (OUTPATIENT)
Dept: URGENT CARE | Facility: PHYSICIAN GROUP | Age: 73
End: 2023-10-19
Payer: MEDICARE

## 2023-10-19 VITALS
DIASTOLIC BLOOD PRESSURE: 64 MMHG | HEART RATE: 67 BPM | OXYGEN SATURATION: 100 % | TEMPERATURE: 97 F | HEIGHT: 63 IN | RESPIRATION RATE: 18 BRPM | SYSTOLIC BLOOD PRESSURE: 116 MMHG | WEIGHT: 119 LBS | BODY MASS INDEX: 21.09 KG/M2

## 2023-10-19 DIAGNOSIS — J06.9 VIRAL URI WITH COUGH: ICD-10-CM

## 2023-10-19 PROCEDURE — 3078F DIAST BP <80 MM HG: CPT | Performed by: STUDENT IN AN ORGANIZED HEALTH CARE EDUCATION/TRAINING PROGRAM

## 2023-10-19 PROCEDURE — 99213 OFFICE O/P EST LOW 20 MIN: CPT | Performed by: STUDENT IN AN ORGANIZED HEALTH CARE EDUCATION/TRAINING PROGRAM

## 2023-10-19 PROCEDURE — 0241U POCT CEPHEID COV-2, FLU A/B, RSV - PCR: CPT | Performed by: STUDENT IN AN ORGANIZED HEALTH CARE EDUCATION/TRAINING PROGRAM

## 2023-10-19 PROCEDURE — 3074F SYST BP LT 130 MM HG: CPT | Performed by: STUDENT IN AN ORGANIZED HEALTH CARE EDUCATION/TRAINING PROGRAM

## 2023-10-19 ASSESSMENT — ENCOUNTER SYMPTOMS
ABDOMINAL PAIN: 0
CONSTIPATION: 0
SORE THROAT: 0
WHEEZING: 0
PALPITATIONS: 0
SHORTNESS OF BREATH: 0
DIZZINESS: 0
DIARRHEA: 0
CHILLS: 1
HEADACHES: 0
VOMITING: 0
SINUS PAIN: 1
NAUSEA: 0
FEVER: 0
COUGH: 1

## 2023-10-19 ASSESSMENT — FIBROSIS 4 INDEX: FIB4 SCORE: 1.887931034482758621

## 2023-10-19 NOTE — LETTER
October 19, 2023    To Whom It May Concern:         This is confirmation that Tanisha Haile attended her scheduled appointment with Diya West P.A.-C. on 10/19/23.  Please excuse work absences today through 10/20/2023 for medical reasons. Tanisha can return to work without restrictions on 9/21/2023 as long as symptoms have improved/resolved and she is without fever for 24 hours.         If you have any questions please do not hesitate to call me at the phone number listed below.    Sincerely,    Diya West P.A.-C.  [phone]                  
    October 19, 2023    To Whom It May Concern:         This is confirmation that Tanisha Haile attended her scheduled appointment with Diya West P.A.-C. on 10/19/23. Please excuse work absences today through 10/20/2023 for medical reasons. Tanisha can return to work without restrictions on 10/21/2023 as long as symptoms have improved/resolved and she is without fever for 24 hours.         If you have any questions please do not hesitate to call me at the phone number listed below.    Sincerely,      Diya West P.A.-C.  [phone]                  
(1) More than 48 hours/None

## 2023-10-19 NOTE — PROGRESS NOTES
"Subjective     Tanisha Haile is a 73 y.o. female who presents with Sinus Problem (Nose has been runny, left side of head hurts ) and Chills (X 2 days )            Tanisha is a 73 y.o. female who presents with left-sided sinus pain/pressure.  Patient also reports nasal congestion and runny nose.  Patient states symptoms started approximately 2 days ago.  She has also had symptoms of chills.  No fever.  Patient states she has been battling a cough for approximately 2 months.  Cough is dry. No SOB/wheezing. Patient unsure if symptoms are due to allergies or new illness or if these symptoms are secondary to worsening illness due to her cough for 2 months.  Patient states she was seen at [facility] urgent care earlier this month and given prescription for antibiotics which she just filled today but has not started.        Review of Systems   Constitutional:  Positive for chills. Negative for fever and malaise/fatigue.   HENT:  Positive for congestion and sinus pain. Negative for ear pain and sore throat.    Respiratory:  Positive for cough. Negative for shortness of breath and wheezing.    Cardiovascular:  Negative for chest pain and palpitations.   Gastrointestinal:  Negative for abdominal pain, constipation, diarrhea, nausea and vomiting.   Neurological:  Negative for dizziness and headaches.   All other systems reviewed and are negative.             Objective     /64   Pulse 67   Temp 36.1 °C (97 °F) (Temporal)   Resp 18   Ht 1.6 m (5' 3\")   Wt 54 kg (119 lb)   LMP  (LMP Unknown)   SpO2 100%   BMI 21.08 kg/m²      Physical Exam  Vitals reviewed.   Constitutional:       General: She is not in acute distress.     Appearance: Normal appearance. She is not toxic-appearing.   HENT:      Head: Normocephalic and atraumatic.      Right Ear: Tympanic membrane, ear canal and external ear normal.      Left Ear: Tympanic membrane, ear canal and external ear normal.      Nose: Congestion present.      Left Sinus: " Maxillary sinus tenderness present.      Mouth/Throat:      Mouth: Mucous membranes are moist.      Pharynx: Oropharynx is clear.   Eyes:      Extraocular Movements: Extraocular movements intact.      Conjunctiva/sclera: Conjunctivae normal.      Pupils: Pupils are equal, round, and reactive to light.   Cardiovascular:      Rate and Rhythm: Normal rate and regular rhythm.   Pulmonary:      Effort: Pulmonary effort is normal.      Breath sounds: Normal breath sounds.   Skin:     General: Skin is warm and dry.   Neurological:      General: No focal deficit present.      Mental Status: She is alert. Mental status is at baseline.                             Assessment & Plan        1. Viral URI with cough  - POCT CoV-2, Flu A/B, RSV by PCR  collected.  Patient will be notified of any positive results.  Results will be available via [employer].    I personally reviewed prior external notes and test results pertinent to today's visit, including urgent care visit on 10/3/23. At 10/3/23 visit patient was given prescription for Z-Nadeem to treat acute recurrent maxillary sinusitis.  Patient states she dropped prescription off at pharmacy this morning. If  POCT viral testing is negative, patient to start antibiotics as prescribed at previous visit.    Differential diagnoses, supportive care measures (hydration, throat lozenges, nasal saline rinses, OTC antihistamine, humidified air) and indications for immediate follow-up discussed with patient. Pathogenesis of diagnosis discussed including typical length and natural progression.  Follow up with PCP.    Instructed to return to urgent care or nearest emergency department if symptoms fail to improve, for any change in condition, further concerns, or new concerning symptoms.    Patient states understanding and agrees with the plan of care and discharge instructions.

## 2023-10-30 ENCOUNTER — OFFICE VISIT (OUTPATIENT)
Dept: URGENT CARE | Facility: PHYSICIAN GROUP | Age: 73
End: 2023-10-30
Payer: MEDICARE

## 2023-10-30 VITALS
OXYGEN SATURATION: 98 % | DIASTOLIC BLOOD PRESSURE: 82 MMHG | HEIGHT: 64 IN | TEMPERATURE: 97.2 F | HEART RATE: 59 BPM | BODY MASS INDEX: 20.14 KG/M2 | SYSTOLIC BLOOD PRESSURE: 120 MMHG | RESPIRATION RATE: 16 BRPM | WEIGHT: 118 LBS

## 2023-10-30 DIAGNOSIS — B96.89 ACUTE BACTERIAL SINUSITIS: ICD-10-CM

## 2023-10-30 DIAGNOSIS — J01.90 ACUTE BACTERIAL SINUSITIS: ICD-10-CM

## 2023-10-30 PROCEDURE — 99213 OFFICE O/P EST LOW 20 MIN: CPT | Performed by: NURSE PRACTITIONER

## 2023-10-30 PROCEDURE — 3079F DIAST BP 80-89 MM HG: CPT | Performed by: NURSE PRACTITIONER

## 2023-10-30 PROCEDURE — 3074F SYST BP LT 130 MM HG: CPT | Performed by: NURSE PRACTITIONER

## 2023-10-30 RX ORDER — DOXYCYCLINE HYCLATE 100 MG
100 TABLET ORAL 2 TIMES DAILY
Qty: 14 TABLET | Refills: 0 | Status: SHIPPED | OUTPATIENT
Start: 2023-10-30 | End: 2023-11-06

## 2023-10-30 ASSESSMENT — FIBROSIS 4 INDEX: FIB4 SCORE: 1.887931034482758621

## 2023-10-31 ASSESSMENT — ENCOUNTER SYMPTOMS
CHILLS: 0
EYES NEGATIVE: 1
MUSCULOSKELETAL NEGATIVE: 1
SINUS PAIN: 1
SORE THROAT: 0
HEADACHES: 1
CARDIOVASCULAR NEGATIVE: 1
SINUS PRESSURE: 1
DIAPHORESIS: 0
RESPIRATORY NEGATIVE: 1
FEVER: 0
GASTROINTESTINAL NEGATIVE: 1

## 2023-11-01 NOTE — PROGRESS NOTES
Subjective:   Tanisha Haile is a 73 y.o. female who presents for Sinusitis (X 6 days with sinus pain, sinus congestion, cough and chest congestion. Hx of sinusitis. )      Patient presents for evaluation of sinusitis which has been worsening over the last 8 days.  Patient has had multiple sinus infections over the past year, at least 6-8.  Patient states that she has been seen by her primary care provider recently who placed a referral for her to see ENT and patient is pending appointment with ENT at this time.  Patient states she is getting sinus pain and pressure in the maxillary sinuses, follow-up sinus headaches behind her eyes as well as thick, green, chunks that come out of her nose when she can get them out.  She does use decongestions, and Nasacort, and has been placed on a couple of trials of antibiotics.  Patient states that despite most of these measures, sometimes she gets some temporary relief but for the most part she is always congested and painful over this past year and a half.  Patient denies fever or chills.  She does endorse fatigue.    Sinusitis  This is a new problem. Episode onset: 8 days. The problem has been gradually worsening since onset. There has been no fever. Her pain is at a severity of 5/10. The pain is moderate. Associated symptoms include congestion, ear pain, headaches and sinus pressure. Pertinent negatives include no chills, diaphoresis, sneezing or sore throat. Past treatments include nasal decongestants, oral decongestants and lying down. The treatment provided mild relief.       Review of Systems   Constitutional:  Positive for malaise/fatigue. Negative for chills, diaphoresis and fever.   HENT:  Positive for congestion, ear pain, sinus pressure and sinus pain. Negative for sneezing and sore throat.    Eyes: Negative.    Respiratory: Negative.     Cardiovascular: Negative.    Gastrointestinal: Negative.    Genitourinary: Negative.    Musculoskeletal: Negative.    Skin:  "Negative.    Neurological:  Positive for headaches.       Medications, Allergies, and current problem list reviewed today in Epic.     Objective:     /82   Pulse (!) 59   Temp 36.2 °C (97.2 °F) (Temporal)   Resp 16   Ht 1.613 m (5' 3.5\")   Wt 53.5 kg (118 lb)   SpO2 98%     Physical Exam  Vitals reviewed.   Constitutional:       General: She is not in acute distress.     Appearance: Normal appearance. She is ill-appearing.   HENT:      Head: Normocephalic and atraumatic.      Right Ear: Tympanic membrane, ear canal and external ear normal.      Left Ear: Tympanic membrane, ear canal and external ear normal.      Nose:      Right Nostril: Occlusion present.      Left Nostril: Occlusion present.      Right Turbinates: Enlarged.      Left Turbinates: Enlarged.      Right Sinus: Maxillary sinus tenderness present.      Left Sinus: Maxillary sinus tenderness present.      Mouth/Throat:      Mouth: Mucous membranes are moist.      Pharynx: Oropharynx is clear.   Eyes:      Extraocular Movements: Extraocular movements intact.      Conjunctiva/sclera: Conjunctivae normal.      Pupils: Pupils are equal, round, and reactive to light.   Cardiovascular:      Rate and Rhythm: Normal rate and regular rhythm.   Pulmonary:      Effort: Pulmonary effort is normal.      Breath sounds: Normal breath sounds.   Abdominal:      General: Abdomen is flat.      Palpations: Abdomen is soft.   Musculoskeletal:         General: Normal range of motion.      Cervical back: Normal range of motion and neck supple.   Skin:     General: Skin is warm and dry.      Capillary Refill: Capillary refill takes less than 2 seconds.   Neurological:      General: No focal deficit present.      Mental Status: She is alert.   Psychiatric:         Mood and Affect: Mood normal.         Behavior: Behavior normal.         Assessment/Plan:     Diagnosis and associated orders:     1. Acute bacterial sinusitis  doxycycline (VIBRAMYCIN) 100 MG Tab       "   Comments/MDM:     Due to duration of symptoms, sinus tenderness, and failure of OTC therapies- ABX was written to tx. For bacterial etiology for sinusitis.   Continue OTC supportive therapies- Flonase, OTC allergy meds, avoid night time dairy. Increase fluids. Humidification.   Patient given precautionary s/sx that mandate immediate follow up and evaluation in the ED. Advised of risks of not doing so.    DDX, Supportive care, and indications for immediate follow-up discussed with patient.    Instructed to return to clinic or nearest emergency department if we are not available for any change in condition, further concerns, or worsening of symptoms.    The patient demonstrated a good understanding and agreed with the treatment plan           Differential diagnosis, natural history, supportive care, and indications for immediate follow-up discussed.    Advised the patient to follow-up with the primary care physician for recheck, reevaluation, and consideration of further management.    Please note that this dictation was created using voice recognition software. I have made a reasonable attempt to correct obvious errors, but I expect that there are errors of grammar and possibly content that I did not discover before finalizing the note.    This note was electronically signed by FRANKLIN Ballard

## 2023-11-14 ENCOUNTER — APPOINTMENT (OUTPATIENT)
Dept: MEDICAL GROUP | Facility: PHYSICIAN GROUP | Age: 73
End: 2023-11-14
Payer: MEDICARE

## 2023-11-26 ENCOUNTER — APPOINTMENT (OUTPATIENT)
Dept: RADIOLOGY | Facility: IMAGING CENTER | Age: 73
End: 2023-11-26
Attending: NURSE PRACTITIONER
Payer: OTHER MISCELLANEOUS

## 2023-11-26 ENCOUNTER — OCCUPATIONAL MEDICINE (OUTPATIENT)
Dept: URGENT CARE | Facility: PHYSICIAN GROUP | Age: 73
End: 2023-11-26
Payer: OTHER MISCELLANEOUS

## 2023-11-26 VITALS
HEART RATE: 65 BPM | TEMPERATURE: 97.4 F | DIASTOLIC BLOOD PRESSURE: 82 MMHG | HEIGHT: 63 IN | OXYGEN SATURATION: 99 % | SYSTOLIC BLOOD PRESSURE: 114 MMHG | BODY MASS INDEX: 20.91 KG/M2 | WEIGHT: 118 LBS | RESPIRATION RATE: 14 BRPM

## 2023-11-26 DIAGNOSIS — S80.02XA CONTUSION OF LEFT KNEE, INITIAL ENCOUNTER: ICD-10-CM

## 2023-11-26 DIAGNOSIS — M25.562 ACUTE PAIN OF BOTH KNEES: ICD-10-CM

## 2023-11-26 DIAGNOSIS — S80.01XA CONTUSION OF RIGHT KNEE, INITIAL ENCOUNTER: ICD-10-CM

## 2023-11-26 DIAGNOSIS — M25.561 ACUTE PAIN OF BOTH KNEES: ICD-10-CM

## 2023-11-26 PROCEDURE — 3074F SYST BP LT 130 MM HG: CPT | Performed by: NURSE PRACTITIONER

## 2023-11-26 PROCEDURE — 73564 X-RAY EXAM KNEE 4 OR MORE: CPT | Mod: TC,FY,LT | Performed by: RADIOLOGY

## 2023-11-26 PROCEDURE — 99214 OFFICE O/P EST MOD 30 MIN: CPT | Performed by: NURSE PRACTITIONER

## 2023-11-26 PROCEDURE — 73564 X-RAY EXAM KNEE 4 OR MORE: CPT | Mod: TC,FY,RT | Performed by: RADIOLOGY

## 2023-11-26 PROCEDURE — 3079F DIAST BP 80-89 MM HG: CPT | Performed by: NURSE PRACTITIONER

## 2023-11-26 ASSESSMENT — ENCOUNTER SYMPTOMS
LOSS OF CONSCIOUSNESS: 0
FALLS: 1

## 2023-11-26 ASSESSMENT — FIBROSIS 4 INDEX: FIB4 SCORE: 1.887931034482758621

## 2023-11-26 NOTE — LETTER
"    EMPLOYEE’S CLAIM FOR COMPENSATION/ REPORT OF INITIAL TREATMENT  FORM C-4  PLEASE TYPE OR PRINT    EMPLOYEE’S CLAIM - PROVIDE ALL INFORMATION REQUESTED   First Name                    LYNETTE Garay Last Name  Mic Birthdate                    1950                Sex  []M  []F Claim Number (Insurer’s Use Only)     Home Address  [address] Age  73 y.o. Height  1.6 m (5' 3\") Weight  53.5 kg (118 lb) Social Security Number     [place] Zip  [address] Telephone  [phone] (home) [phone] (work)   Mailing Address  [address] [place] [address] Zip  [address] Primary Language Spoken  English    INSURER   [employer]       Employee's Occupation (Job Title) When Injury or Occupational Disease Occurred      Employer's Name/Company Name     Telephone      Office Mail Address (Number and Street)       Date of Injury (if applicable) 11/25/2023               Hours Injury (if applicable)            am               pm Date Employer Notified  11/27/2023 Last Day of Work after Injury or Occupational Disease  11/25/2023 Supervisor to Whom Injury     Reported  CHRIS WHEAT   Address or Location of Accident (if applicable)  Work [1]   What were you doing at the time of accident? (if applicable)  I was walking to my car after work and tripped over a plastic pallet and fell    How did this injury or occupational disease occur? (Be specific and answer in detail. Use additional sheet if necessary)  I walked around the corner. It was dark and I did not see the pallet. I caught the edge with my foot and fell on both of my knees.   If you believe that you have an occupational disease, when did you first have knowledge of the disability and its relationship to your employment?  N/A Witnesses to the Accident (if applicable)  N/A      Nature of Injury or Occupational " Disease  Strain  Part(s) of Body Injured or Affected  Knee (L) Knee (R) N/A    I CERTIFY THAT THE ABOVE IS TRUE AND CORRECT TO T HE BEST OF MY KNOWLEDGE AND THAT I HAVE PROVIDED THIS INFORMATION IN ORDER TO OBTAIN THE BENEFITS OF [place], OR [place]).  I HEREBY AUTHORIZE ANY PHYSICIAN, CHIROPRACTOR, SURGEON, PRACTITIONER OR ANY OTHER PERSON, ANY HOSPITAL, INCLUDING [facility] OR [facility], ANY  MEDICAL SERVICE ORGANIZATION, ANY INSURANCE COMPANY, OR OTHER INSTITUTION OR ORGANIZATION TO RELEASE TO EACH OTHER, ANY MEDICAL OR OTHER INFORMATION, INCLUDING BENEFITS PAID OR PAYABLE, PERTINENT TO THIS INJURY OR DISEASE, EXCEPT INFORMATION RELATIVE TO DIAGNOSIS, TREATMENT AND/OR COUNSELING FOR AIDS, PSYCHOLOGICAL CONDITIONS, ALCOHOL OR CONTROLLED SUBSTANCES, FOR WHICH I MUST GIVE SPECIFIC AUTHORIZATION.  A PHOTOSTAT OF THIS AUTHORIZATION SHALL BE VALID AS THE ORIGINAL.     Date: 11/26/2023   Place: [facility]  Employee’s Original or  *Electronic Signature   THIS REPORT MUST BE COMPLETED AND MAILED WITHIN 3 WORKING DAYS OF TREATMENT   Place  [facility]    Name of Facility  [facility]   Date 11/26/2023 Diagnosis and Description of Injury or Occupational Disease  (M25.561,  M25.562) Acute pain of both knees  The encounter diagnosis was Acute pain of both knees. Is there evidence that the injured employee was under the influence of alcohol and/or another controlled substance at the time of accident?  []No  [] Yes (if yes, please explain)   Hour 1:44 PM  No   Treatment: X-rays negative for any acute injuries.  Encouraged to rest, ice, Tyle Motrin for pain.  Provided bilateral knee braces.  Patient does have a cane at home advised to use as needed.  Patient was placed on temporary work restrictions.  Follow-up in 1 week for reevaluation    Have you advised the patient to remain off work five  days or more?   [] Yes Indicate dates: From   To    []No If no, is the injured employee capable of: [] full duty [] modified duty                                                             No     If modified duty, specify any limitations / restrictions:  No bending, squatting, climbing, stooping, pushing, pulling, limited walking, weight restrictions less than 10 pounds.                                                                                                                                                                                                                                                                                                                                                                                                               X-Ray Findings: Negative    From information given by the employee, together with medical evidence, can you directly connect this injury or occupational disease as job incurred?  []Yes   [] No Yes    Is additional medical care by a physician indicated? []Yes [] No  Yes    Do you know of any previous injury or disease contributing to this condition or occupational disease? []Yes [] No (Explain if yes)                          No   Date  11/26/2023 Print Health Care Provider’s Name  ALAN Bermudez I certify that the employer’s copy of  this form was delivered to the employer on:   Address  [address] [place] [address] INSURER'S USE ONLY                       [facility]  [id number] Provider’s Tax ID Number  [id number]   Telephone  Dept: [phone]    Health Care Provider’s Original or Electronic Signature  e-ALLIE Krueger Degree (MD,DO, DC,PA-C,APRN)  APRN  Choose (if applicable)      ORIGINAL - TREATING HEALTHCARE PROVIDER PAGE 2 - INSURER/TPA PAGE 3 - EMPLOYER PAGE 4 - EMPLOYEE             Form C-4 (rev.08/23)        BRIEF DESCRIPTION OF RIGHTS AND BENEFITS  (Pursuant to [place] 616C.050)    Notice of Injury or  "Occupational Disease (Incident Report Form C-1): If an injury or occupational disease (OD) arises out of and in the course of employment, you must provide written notice to your employer as soon as practicable, but no later than 7 days after the accident or OD. Your employer shall maintain a sufficient supply of the required forms.    Claim for Compensation (Form C-4): If medical treatment is sought, the form C-4 is available at the place of initial treatment. A completed \"Claim for Compensation\" (Form C-4) must be filed within 90 days after an accident or OD. The treating physician or chiropractor must, within 3 working days after treatment, complete and mail to the employer, the employer's insurer and third-party , the Claim for Compensation.    Medical Treatment: If you require medical treatment for your on-the-job injury or OD, you may be required to select a physician or chiropractor from a list provided by your workers’ compensation insurer, if it has contracted with an Organization for Managed Care (MCO) or Preferred Provider Organization (PPO) or providers of health care. If your employer has not entered into a contract with an MCO or PPO, you may select a physician or chiropractor from the Panel of Physicians and Chiropractors. Any medical costs related to your industrial injury or OD will be paid by your insurer.    Temporary Total Disability (TTD): If your doctor has certified that you are unable to work for a period of at least 5 consecutive days, or 5 cumulative days in a 20-day period, or places restrictions on you that your employer does not accommodate, you may be entitled to TTD compensation.    Temporary Partial Disability (TPD): If the wage you receive upon reemployment is less than the compensation for TTD to which you are entitled, the insurer may be required to pay you TPD compensation to make up the difference. TPD can only be paid for a maximum of 24 months.    Permanent Partial " Disability (PPD): When your medical condition is stable and there is an indication of a PPD as a result of your injury or OD, within 30 days, your insurer must arrange for an evaluation by a rating physician or chiropractor to determine the degree of your PPD. The amount of your PPD award depends on the date of injury, the results of the PPD evaluation, your age and wage.    Permanent Total Disability (PTD): If you are medically certified by a treating physician or chiropractor as permanently and totally disabled and have been granted a PTD status by your insurer, you are entitled to receive monthly benefits not to exceed 66 2/3% of your average monthly wage. The amount of your PTD payments is subject to reduction if you previously received a lump-sum PPD award.    Vocational Rehabilitation Services: You may be eligible for vocational rehabilitation services if you are unable to return to the job due to a permanent physical impairment or permanent restrictions as a result of your injury or occupational disease.    Transportation and Per [place] Reimbursement: You may be eligible for travel expenses and per byron associated with medical treatment.    Reopening: You may be able to reopen your claim if your condition worsens after claim closure.     Appeal Process: If you disagree with a written determination issued by the insurer or the insurer does not respond to your request, you may appeal to the Department of Administration, , by following the instructions contained in your determination letter. You must appeal the determination within 70 days from the date of the determination letter at [address], [place] [address], or [address], [place] [address]. If you disagree with the  decision, you may appeal to the Department of Administration, . You must file your appeal within 30 days from the date of the   decision letter at [address], [address], [place], [place] [address], or [address], [address], [place], [place] [address]. If you disagree with a decision of an , you may file a petition for judicial review with the District Court. You must do so within 30 days of the Appeal Officer’s decision. You may be represented by an  at your own expense or you may contact the [facility] for possible representation.    [place]  for Injured Workers (NAIW): If you disagree with a  decision, you may request that NAIW represent you without charge at an  Hearing. For information regarding denial of benefits, you may contact the [facility] at: [address], [address], [place] [address], [phone], or [address], [place] [address], [phone]    To File a Complaint with the Division: If you wish to file a complaint with the  of the Division of Industrial Relations (DIR),  please contact the Workers’ Compensation Section, [address], [address], [place], [place] [address], telephone [phone], or [address], [address], [place], [place] [address], telephone [phone].    For assistance with Workers’ Compensation Issues: You may contact the [facility] Office for Consumer Health Assistance, [address], [address], [place], [place] [address], Toll Free 1-354.746.7167, Web site: http://[facility].[place].gov/Programs/MARIELA E-mail: mariela@[facility].nv.gov              __________________________________________________________________                                    _11/26/2023____            Employee Name / Signature                                                                                                                            Date                                                                                                                                                                                                                               D-2 (rev. 10/20)

## 2023-11-26 NOTE — PROGRESS NOTES
"Subjective:   Tanisha Haile  is a 73 y.o. female who presents for Work-Related Injury (NEW WC DOI: 11-25-23 BILATERAL KNEE INJURY)    DOI: 11/25/23: Patient is a 73-year-old female presents urgent care for evaluation after she fell leaving work.  Patient states that she was walking around the corner it was dark and did not see a pallet catching her left foot causing her to fall down on her bilateral knees.  She has pain in both knees left knee hurts more than the right.  She is able to ambulate with pain.  Denies previous injuries to the bilateral knees.  Denies head injury and/or any other acute injuries.   HPI  Review of Systems   Musculoskeletal:  Positive for falls and joint pain.   Neurological:  Negative for loss of consciousness.     Allergies   Allergen Reactions    Ibuprofen Nausea    Tylenol Anaphylaxis    Asa [Aspirin]     Fosamax [Alendronate Sodium] Nausea    Influenza Virus Vacc     Meperidine     Nsaids Rash and Itching    Pcn [Penicillins] Itching    Propoxyphene     Sulfa Drugs     Voltaren [Diclofenac]      Nausea and vomiting       Objective:   /82   Pulse 65   Temp 36.3 °C (97.4 °F) (Temporal)   Resp 14   Ht 1.6 m (5' 3\")   Wt 53.5 kg (118 lb)   LMP  (LMP Unknown)   SpO2 99%   BMI 20.90 kg/m²   Physical Exam  Constitutional:       Appearance: Normal appearance. She is not ill-appearing or toxic-appearing.   HENT:      Head: Normocephalic.      Right Ear: External ear normal.      Left Ear: External ear normal.      Nose: Nose normal.      Mouth/Throat:      Lips: Pink.   Eyes:      General: Lids are normal.   Pulmonary:      Effort: Pulmonary effort is normal. No accessory muscle usage.   Musculoskeletal:      Cervical back: Full passive range of motion without pain.      Right knee: Swelling and bony tenderness present. No crepitus. Tenderness present over the medial joint line and lateral joint line.      Left knee: Swelling present. Tenderness present over the medial joint line " and lateral joint line.   Neurological:      Mental Status: She is alert and oriented to person, place, and time.   Psychiatric:         Mood and Affect: Mood normal.         Thought Content: Thought content normal.       Left knee: Skin edematous, no erythema, or abrasion, tenderness palpation to the medial lateral aspect and patella.  No crepitus.+AROM.  DTRs +2, gait antalgic.  Right knee: Skin edematous, no erythema, or abrasion, tenderness palpation to the medial lateral aspect and patella.  No crepitus.+AROM.  DTRs +2, gait antalgic.     Assessment/Plan:     1. Acute pain of both knees  DX-KNEE COMPLETE 4+ RIGHT    DX-KNEE COMPLETE 4+ LEFT      2. Contusion of left knee, initial encounter        3. Contusion of right knee, initial encounter        I independently reviewed the patient's imaging and agree with the interpretation of the radiologist.    Negative for fracture or malalignment bilateral      X-rays negative for any acute injuries.  Encouraged to rest, ice, Tyle Motrin for pain.  Provided bilateral knee braces.  Patient does have a cane at home advised to use as needed.  Patient was placed on temporary work restrictions.  Follow-up in 1 week for reevaluation    Differential diagnosis, natural history, supportive care, and indications for immediate follow-up discussed.     My total time spent caring for the patient on the day of the encounter was 31 minutes.   This does not include time spent on separately billable procedures/tests.

## 2023-11-26 NOTE — LETTER
[facility] [address] [place] [address]  Phone:  [phone] - Fax:  [phone]   [facility] Progress Report and Disability Certification  Date of Service: 11/26/2023   No Show:  No  Date / Time of Next Visit: 12/3/2023 @ 11:00 AM    Claim Information   Patient Name: Tanisha Haile  Claim Number:     Employer:   [employer]  Date of Injury: 11/25/2023     Insurer / TPA:    ID / SSN:     Occupation:   Diagnosis: The encounter diagnosis was Acute pain of both knees.    Medical Information   Related to Industrial Injury? Yes    Subjective Complaints:  DOI: 11/25/23: Patient is a 73-year-old female presents urgent care for evaluation after she fell leaving work.  Patient states that she was walking around the corner it was dark and did not see a pallet catching her left foot causing her to fall down on her bilateral knees.  She has pain in both knees left knee hurts more than the right.  She is able to ambulate with pain.  Denies previous injuries to the bilateral knees.  Denies head injury and/or any other acute injuries.   Objective Findings: Left knee: Skin edematous, no erythema, or abrasion, tenderness palpation to the medial lateral aspect and patella.  No crepitus.+AROM.  DTRs +2, gait antalgic.  Right knee: Skin edematous, no erythema, or abrasion, tenderness palpation to the medial lateral aspect and patella.  No crepitus.+AROM.  DTRs +2, gait antalgic.     Pre-Existing Condition(s):     Assessment:   Initial Visit    Status: Additional Care Required  Permanent Disability:No    Plan:      Diagnostics: X-ray    Comments:  I independently reviewed the patient's imaging and agree with the interpretation of the radiologist.    Negative for fracture or malalignment bilateral      Disability Information   Status: Released to Restricted Duty    From:  11/26/2023  Through: 12/3/2023 Restrictions are:     Physical Restrictions    Sitting:    Standin hrs/day Stoopin hrs/day Bendin hrs/day   Squattin hrs/day Walking:  < or = to 1 hr/day Climbin hrs/day Pushin hrs/day   Pullin hrs/day Other:    Reaching Above Shoulder (L):   Reaching Above Shoulder (R):       Reaching Below Shoulder (L):    Reaching Below Shoulder (R):      Not to exceed Weight Limits   Carrying(hrs):   Weight Limit(lb): < or = to 10 pounds Lifting(hrs):   Weight  Limit(lb): < or = to 10 pounds   Comments: X-rays negative for any acute injuries.  Encouraged to rest, ice, Tyle Motrin for pain.  Provided bilateral knee braces.  Patient does have a cane at home advised to use as needed.  Patient was placed on temporary work restrictions.  Follow-up in 1 week for reevaluation    Repetitive Actions   Hands: i.e. Fine Manipulations from Grasping:     Feet: i.e. Operating Foot Controls:     Driving / Operate Machinery:     Health Care Provider’s Original or Electronic Signature  ALAN Bermudez Health Care Provider’s Original or Electronic Signature    Fabian Gamez DO MPH     Clinic Name / Location: [address] [address] Clinic Phone Number: Dept: [phone]   Appointment Time: 1:30 Pm Visit Start Time: 1:44 PM   Check-In Time:  1:42 Pm Visit Discharge Time:  2:38 PM    Original-Treating Physician or Chiropractor    Page 2-Insurer/TPA    Page 3-Employer    Page 4-Employee

## 2023-12-03 ENCOUNTER — OCCUPATIONAL MEDICINE (OUTPATIENT)
Dept: URGENT CARE | Facility: PHYSICIAN GROUP | Age: 73
End: 2023-12-03
Payer: OTHER MISCELLANEOUS

## 2023-12-03 VITALS
WEIGHT: 118 LBS | DIASTOLIC BLOOD PRESSURE: 62 MMHG | BODY MASS INDEX: 20.91 KG/M2 | HEART RATE: 86 BPM | HEIGHT: 63 IN | RESPIRATION RATE: 16 BRPM | OXYGEN SATURATION: 98 % | TEMPERATURE: 97 F | SYSTOLIC BLOOD PRESSURE: 110 MMHG

## 2023-12-03 DIAGNOSIS — S80.02XD CONTUSION OF LEFT KNEE, SUBSEQUENT ENCOUNTER: ICD-10-CM

## 2023-12-03 DIAGNOSIS — M25.561 ACUTE PAIN OF BOTH KNEES: ICD-10-CM

## 2023-12-03 DIAGNOSIS — M25.562 ACUTE PAIN OF BOTH KNEES: ICD-10-CM

## 2023-12-03 PROCEDURE — 3074F SYST BP LT 130 MM HG: CPT | Performed by: NURSE PRACTITIONER

## 2023-12-03 PROCEDURE — 3078F DIAST BP <80 MM HG: CPT | Performed by: NURSE PRACTITIONER

## 2023-12-03 PROCEDURE — 99213 OFFICE O/P EST LOW 20 MIN: CPT | Performed by: NURSE PRACTITIONER

## 2023-12-03 ASSESSMENT — FIBROSIS 4 INDEX: FIB4 SCORE: 1.887931034482758621

## 2023-12-03 NOTE — PROGRESS NOTES
"Subjective:     Tanisha Haile is a 73 y.o. female who presents for Work-Related Injury ( FV DOI: 11-25-23 BILATERAL KNEE INJURY)      DOI 11/25/23   FV #1 12/3/23   Presents to clinic with her first follow-up visit.  Patient reports 80% improvement to her right knee.  She has no pain with ambulation or range of motion.  Patient's left knee she reports approximately 20% improvement.  She does have pain with ambulation or if she attempts to bend her knee.  Patient has been using ice which has been helpful.  She is unable to take Tylenol or NSAIDs.  She denies any distal numbness or tingling.  She did note bruising to her left leg.  Although she denies any other new symptoms.  She has been following her work restrictions.      PMH:   No pertinent past medical history to this problem  MEDS:  Medications were reviewed in EMR  ALLERGIES:  Allergies were reviewed in EMR  FH:   No pertinent family history to this problem       Objective:     /62   Pulse 86   Temp 36.1 °C (97 °F) (Temporal)   Resp 16   Ht 1.6 m (5' 3\")   Wt 53.5 kg (118 lb)   LMP  (LMP Unknown)   SpO2 98%   BMI 20.90 kg/m²     Physical Exam  Musculoskeletal:      Right knee: No swelling, deformity or ecchymosis. Normal range of motion. Tenderness present over the lateral joint line.      Left knee: Swelling and ecchymosis (Bruising proximal to the knee medial) present. Decreased range of motion. Tenderness (patella) present.      Right lower leg: Normal.      Left lower leg: Normal.         Assessment/Plan:     Pleasant 73-year-old female presenting clinic for her first Workmen's Comp. follow-up.  Fortunately patient is having improvement of her right leg although her left leg is still causing pain.  We will not advance work restrictions at this time.  Patient did present to clinic with a different form from her work discussing temporary disability.  Did discuss with patient and advised that she is not temporarily disabled.  She is only " placed on work restrictions.  Did advise this through the paperwork.  See scanned documentation.  Advise continue ice elevation she will continue using her knee brace on her left knee.  Previous visits and x-ray imaging reviewed    1. Acute pain of both knees    2. Contusion of left knee, subsequent encounter    Released to Restricted Duty FROM 12/3/2023 TO 12/10/2023  Recommend desk or sedentary work.       Differential diagnosis, natural history, supportive care, and indications for immediate follow-up discussed.

## 2023-12-03 NOTE — LETTER
[address] [place] [address]  Phone:  [phone] - Fax:  [phone]   [facility] Progress Report and Disability Certification  Date of Service: 12/3/2023   No Show:  No  Date / Time of Next Visit: 12/10/2023   Claim Information   Patient Name: Tanisha Haile  Claim Number:     Employer:  [employer]  Date of Injury: 11/25/2023     Insurer / TPA: Misc Workers Comp  ID / SSN:     Occupation:   Diagnosis: Diagnoses of Acute pain of both knees and Contusion of left knee, subsequent encounter were pertinent to this visit.    Medical Information   Related to Industrial Injury? Yes    Subjective Complaints:  DOI 11/25/23  Presents to clinic with her first follow-up visit.  Patient reports 80% improvement to her right knee.  She has no pain with ambulation or range of motion.  Patient's left knee she reports approximately 20% improvement.  She does have pain with ambulation or if she attempts to bend her knee.  Patient has been using ice which has been helpful.  She is unable to take Tylenol or NSAIDs.  She denies any distal numbness or tingling.  She did note bruising to her left leg.  Although she denies any other new symptoms.  She has been following her work restrictions.  FV #1 12/3/23   Right: 80% impprvemnt no pain with walking.   Left knee: 20% impovemnt pain with walking, imporve with no bending   Difficult to walk upstairs   No numbness or tingling   Not able, using ice and heat. Ice is helping      Objective Findings: Physical Exam  Musculoskeletal:      Right knee: No swelling, deformity or ecchymosis. Normal range of motion. Tenderness present over the lateral joint line.      Left knee: Swelling and ecchymosis (Bruising proximal to the knee medial) present. Decreased range of motion. Tenderness (patella) present.      Right lower leg: Normal.      Left lower leg: Normal.        Pre-Existing Condition(s):      Assessment:   Condition Improved    Status: Additional Care Required  Permanent Disability:No    Plan:   Comments:Continue wearing a brace on the left knee.  Continue ice with alternating heat.    Diagnostics:      Comments:       Disability Information   Status: Released to Restricted Duty    From:  12/3/2023  Through: 12/10/2023 Restrictions are: Temporary   Physical Restrictions   Sitting:    Standin hrs/day Stoopin hrs/day Bendin hrs/day   Squattin hrs/day Walking:  < or = to 1 hr/day Climbin hrs/day Pushin hrs/day   Pullin hrs/day Other:    Reaching Above Shoulder (L):   Reaching Above Shoulder (R):       Reaching Below Shoulder (L):    Reaching Below Shoulder (R):      Not to exceed Weight Limits   Carrying(hrs):   Weight Limit(lb):   Lifting(hrs):   Weight  Limit(lb):     Comments: Recommend desk or sedentary work.    Repetitive Actions   Hands: i.e. Fine Manipulations from Grasping:     Feet: i.e. Operating Foot Controls: 0 hrs/day   Driving / Operate Machinery:     Health Care Provider’s Original or Electronic Signature  ALAN Lacey Health Care Provider’s Original or Electronic Signature    Fabian Gamez DO MPH     Clinic Name / Location: [address] [address] Clinic Phone Number: Dept: [phone]   Appointment Time: 11:00 Am Visit Start Time: 11:00 AM   Check-In Time:  10:54 Am Visit Discharge Time:  11:28 AM    Original-Treating Physician or Chiropractor    Page 2-Insurer/TPA    Page 3-Employer    Page 4-Employee

## 2023-12-03 NOTE — PROGRESS NOTES
Date: 12/03/23     Chief Complaint:    Chief Complaint   Patient presents with    Work-Related Injury      FV DOI: 11-25-23 BILATERAL KNEE INJURY        History of Present Illness: 73 y.o.  female presents to clinic with       ROS:    As stated in HPI     Medical/SX/ Social History:  Reviewed per chart    Pertinent Medications:    Current Outpatient Medications on File Prior to Visit   Medication Sig Dispense Refill    simvastatin (ZOCOR) 20 MG Tab TAKE 1 TABLET BY MOUTH EVERY EVENING 90 Tablet 3    azelastine (ASTELIN) 137 MCG/SPRAY nasal spray Administer 1 Spray into affected nostril(S) 2 times a day. 30 mL 1    lisinopril (PRINIVIL) 10 MG Tab Take 1 Tablet by mouth every day. 90 Tablet 3    alendronate (FOSAMAX) 70 MG Tab Take 1 Tablet by mouth every 7 days. For osteoporosis 12 Tablet 3    omeprazole (PRILOSEC) 20 MG delayed-release capsule Take 1 Capsule by mouth 2 times a day for 360 days. 180 Capsule 3    triamcinolone acetonide (KENALOG) 0.5 % Cream Apply 1g three times a day for 7 days to affected area 60 g 0    multivitamin (THERAGRAN) Tab Take 1 Tablet by mouth every day.      VITAMIN D PO Take 2,000 mcg by mouth.      CRANBERRY PO Take  by mouth.      ascorbic acid (ASCORBIC ACID) 500 MG Tab Take 1 Tablet by mouth every day.      Calcium Carbonate-Vitamin D 600-400 MG-UNIT Tab Take  by mouth 2 times a day.         No current facility-administered medications on file prior to visit.        Allergies:    Ibuprofen, Tylenol, Asa [aspirin], Fosamax [alendronate sodium], Influenza virus vacc, Meperidine, Nsaids, Pcn [penicillins], Propoxyphene, Sulfa drugs, and Voltaren [diclofenac]     Problem list, medications, and allergies reviewed by myself today in Epic     Physical Exam:    Vitals:    12/03/23 1100   BP: 110/62   Pulse: 86   Resp: 16   Temp: 36.1 °C (97 °F)   SpO2: 98%             Physical Exam  Musculoskeletal:      Right knee: No swelling, deformity or ecchymosis. Normal range of motion. Tenderness  present over the lateral joint line.      Left knee: Swelling and ecchymosis (Bruising proximal to the knee medial) present. Decreased range of motion. Tenderness (patella) present.      Right lower leg: Normal.      Left lower leg: Normal.                  Diagnostics:      Results for orders placed or performed in visit on 10/19/23   POCT CoV-2, Flu A/B, RSV by PCR   Result Value Ref Range    SARS-CoV-2 by PCR Negative Negative, Invalid    Influenza virus A RNA Negative Negative, Invalid    Influenza virus B, PCR Negative Negative, Invalid    RSV, PCR Negative Negative, Invalid       Diagnostics interpreted by myself, confirmed by radiology     Medical Decision making and plan :  I personally reviewed prior external notes and test results pertinent to today's visit. Pt is clinically stable at today's acute urgent care visit.  Patient appears nontoxic with no acute distress noted. Appropriate for outpatient care at this time. The patient remained stable during the urgent care visit.     Pleasant 73 y.o. female presented clinic with ***  Shared decision-making was utilized with patient for treatment plan.  Differential Diagnosis, natural history, and supportive care discussed.             There are no diagnoses linked to this encounter.     Medication discussed included indication for use and the potential benefits and side effects. Education was provided regarding the aforementioned assessments.  All of the patient's questions were answered to their satisfaction at the time of discharge. Patient was encouraged to monitor symptoms closely. Those signs and symptoms which would warrant concern and mandate seeking a higher level of service through the emergency department discussed at length.  Patient stated agreement and understanding of this plan of care.    Disposition:  Home in stable condition ***      Voice Recognition Disclaimer:  Portions of this document were created using voice recognition software. The  software does have a chance of producing errors of grammar and possibly content. I have made every reasonable attempt to correct obvious errors, but there may be errors of grammar and possibly content that I did not discover before finalizing the documentation.    MARTI Lacey.

## 2023-12-10 ENCOUNTER — OCCUPATIONAL MEDICINE (OUTPATIENT)
Dept: URGENT CARE | Facility: PHYSICIAN GROUP | Age: 73
End: 2023-12-10
Payer: OTHER MISCELLANEOUS

## 2023-12-10 VITALS
BODY MASS INDEX: 20.91 KG/M2 | SYSTOLIC BLOOD PRESSURE: 120 MMHG | WEIGHT: 118 LBS | HEART RATE: 78 BPM | RESPIRATION RATE: 16 BRPM | OXYGEN SATURATION: 98 % | HEIGHT: 63 IN | DIASTOLIC BLOOD PRESSURE: 78 MMHG | TEMPERATURE: 97.2 F

## 2023-12-10 DIAGNOSIS — S80.02XD CONTUSION OF LEFT KNEE, SUBSEQUENT ENCOUNTER: ICD-10-CM

## 2023-12-10 DIAGNOSIS — S80.01XD CONTUSION OF RIGHT KNEE, SUBSEQUENT ENCOUNTER: ICD-10-CM

## 2023-12-10 PROCEDURE — 3074F SYST BP LT 130 MM HG: CPT | Performed by: FAMILY MEDICINE

## 2023-12-10 PROCEDURE — 99214 OFFICE O/P EST MOD 30 MIN: CPT | Performed by: FAMILY MEDICINE

## 2023-12-10 PROCEDURE — 3078F DIAST BP <80 MM HG: CPT | Performed by: FAMILY MEDICINE

## 2023-12-10 RX ORDER — PREDNISONE 20 MG/1
TABLET ORAL
Qty: 7 TABLET | Refills: 0 | Status: SHIPPED | OUTPATIENT
Start: 2023-12-10 | End: 2024-03-27

## 2023-12-10 ASSESSMENT — FIBROSIS 4 INDEX: FIB4 SCORE: 1.887931034482758621
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
## 2023-12-10 NOTE — LETTER
[address] [place] [address]  Phone:  [phone] - Fax:  [phone]   [facility] Progress Report and Disability Certification  Date of Service: 12/10/2023   No Show:  No  Date / Time of Next Visit: 12/17/2023   Claim Information   Patient Name: Tanisha Haile  Claim Number:     Employer:   [employer]  Date of Injury: 11/25/2023     Insurer / TPA: Misc Workers Comp  ID / SSN:     Occupation:   Diagnosis: Diagnoses of Contusion of left knee, subsequent encounter and Contusion of right knee, subsequent encounter were pertinent to this visit.    Medical Information   Related to Industrial Injury? Yes    Subjective Complaints:  DOI: 11/25/23. Left knee still bothering her - has not improved from last visit on 12/3/23. Still swollen anterior inferolateral aspect of left knee. Uses ice and Biofreeze. Right knee a little sore in anterior-inferior aspect - has not improved from last visit on 12/3/23. Hasn't been back to work due to work restrictions.   Objective Findings: Cautious gait. Anterior inferolateral aspect of left knee is swollen and tender to palpation. Right knee anterior-inferior aspect is tender to palpation.   Pre-Existing Condition(s):     Assessment:   Condition Same    Status: Additional Care Required  Comments:Return on 12/17/23 or sooner if needed.  Permanent Disability:No    Plan: Medication  Comments:Prednisone prescribed for anti-inflammatory effect.    Diagnostics:      Comments:       Disability Information   Status: Released to Restricted Duty    From:  12/10/2023  Through: 12/17/2023 Restrictions are: Temporary   Physical Restrictions   Sitting:    Standing:    Stooping:    Bending:      Squatting:    Walking:    Climbing:    Pushing:      Pulling:    Other:    Reaching Above Shoulder (L):   Reaching Above Shoulder (R):       Reaching Below Shoulder (L):    Reaching Below Shoulder (R):      Not to  exceed Weight Limits   Carrying(hrs):   Weight Limit(lb):   Lifting(hrs):   Weight  Limit(lb):     Comments: Mostly sedentary / sit-down job is recommended to allow knees to rest and recover. Wear knee braces on knees if needed.    Repetitive Actions   Hands: i.e. Fine Manipulations from Grasping:     Feet: i.e. Operating Foot Controls:     Driving / Operate Machinery:     Health Care Provider’s Original or Electronic Signature  Dread Carey M.D. Health Care Provider’s Original or Electronic Signature    Fabian Gamez DO MPH     Clinic Name / Location: [address] [address] Clinic Phone Number: Dept: [phone]   Appointment Time: 11:00 Am Visit Start Time: 10:45 AM   Check-In Time:  10:34 Am Visit Discharge Time: 11:25 AM    Original-Treating Physician or Chiropractor    Page 2-Insurer/TPA    Page 3-Employer    Page 4-Employee

## 2023-12-11 ENCOUNTER — TELEPHONE (OUTPATIENT)
Dept: URGENT CARE | Facility: PHYSICIAN GROUP | Age: 73
End: 2023-12-11
Payer: MEDICARE

## 2023-12-13 ENCOUNTER — APPOINTMENT (OUTPATIENT)
Dept: RADIOLOGY | Facility: IMAGING CENTER | Age: 73
End: 2023-12-13
Attending: FAMILY MEDICINE
Payer: MEDICARE

## 2023-12-13 ENCOUNTER — NON-PROVIDER VISIT (OUTPATIENT)
Dept: URGENT CARE | Facility: PHYSICIAN GROUP | Age: 73
End: 2023-12-13
Payer: MEDICARE

## 2023-12-13 ENCOUNTER — OFFICE VISIT (OUTPATIENT)
Dept: MEDICAL GROUP | Facility: PHYSICIAN GROUP | Age: 73
End: 2023-12-13
Payer: MEDICARE

## 2023-12-13 VITALS
DIASTOLIC BLOOD PRESSURE: 70 MMHG | OXYGEN SATURATION: 94 % | BODY MASS INDEX: 21.26 KG/M2 | HEART RATE: 76 BPM | RESPIRATION RATE: 16 BRPM | SYSTOLIC BLOOD PRESSURE: 100 MMHG | TEMPERATURE: 97.1 F | WEIGHT: 120 LBS | HEIGHT: 63 IN

## 2023-12-13 DIAGNOSIS — Z12.11 SCREENING FOR COLORECTAL CANCER: ICD-10-CM

## 2023-12-13 DIAGNOSIS — M25.551 RIGHT HIP PAIN: ICD-10-CM

## 2023-12-13 DIAGNOSIS — J01.01 ACUTE RECURRENT MAXILLARY SINUSITIS: ICD-10-CM

## 2023-12-13 DIAGNOSIS — Z12.11 SCREENING FOR COLON CANCER: ICD-10-CM

## 2023-12-13 DIAGNOSIS — R05.1 ACUTE COUGH: ICD-10-CM

## 2023-12-13 DIAGNOSIS — Z12.12 SCREENING FOR COLORECTAL CANCER: ICD-10-CM

## 2023-12-13 PROCEDURE — 3074F SYST BP LT 130 MM HG: CPT | Performed by: FAMILY MEDICINE

## 2023-12-13 PROCEDURE — 3078F DIAST BP <80 MM HG: CPT | Performed by: FAMILY MEDICINE

## 2023-12-13 PROCEDURE — 99214 OFFICE O/P EST MOD 30 MIN: CPT | Performed by: FAMILY MEDICINE

## 2023-12-13 PROCEDURE — 73502 X-RAY EXAM HIP UNI 2-3 VIEWS: CPT | Mod: TC,FY,RT | Performed by: FAMILY MEDICINE

## 2023-12-13 RX ORDER — BENZONATATE 100 MG/1
100 CAPSULE ORAL 3 TIMES DAILY PRN
Qty: 60 CAPSULE | Refills: 0 | Status: SHIPPED | OUTPATIENT
Start: 2023-12-13 | End: 2024-03-27

## 2023-12-13 RX ORDER — AZELASTINE 1 MG/ML
1 SPRAY, METERED NASAL 2 TIMES DAILY
Qty: 30 ML | Refills: 11 | Status: SHIPPED | OUTPATIENT
Start: 2023-12-13

## 2023-12-13 RX ORDER — ALBUTEROL SULFATE 90 UG/1
2 AEROSOL, METERED RESPIRATORY (INHALATION) EVERY 4 HOURS PRN
Qty: 1 EACH | Refills: 3 | Status: SHIPPED | OUTPATIENT
Start: 2023-12-13

## 2023-12-13 ASSESSMENT — FIBROSIS 4 INDEX: FIB4 SCORE: 1.887931034482758621

## 2023-12-13 NOTE — ASSESSMENT & PLAN NOTE
After fall on knees in Nov, has pain of right hip, thinks she may have re-injured her right hip. Has pain along the iliac crest anterior. No redness, swelling.   Xray ordered. Ref to PT ordered.

## 2023-12-13 NOTE — ASSESSMENT & PLAN NOTE
Cough for 2 months with nasal congestion. She gets phlegm build up that she spits out.   Chronic cigarette smoker. Advised to quit completely.   Has no hemoptysis or LAD   Will check  if persisent cough in April.   Use mucinex, albuterol inhaler, tessalon perles, honey, warm fluids, nasal saline irrigation.

## 2023-12-14 NOTE — PROGRESS NOTES
Subjective:   Tanisha Haile is a 73 y.o. female here today for evaluation and management of:     Acute cough  Cough for 2 months with nasal congestion. She gets phlegm build up that she spits out.   Chronic cigarette smoker. Advised to quit completely.   Has no hemoptysis or LAD   Will check  if persisent cough in April.   Use mucinex, albuterol inhaler, tessalon perles, honey, warm fluids, nasal saline irrigation.       Right hip pain  After fall on knees in Nov, has pain of right hip, thinks she may have re-injured her right hip. Has pain along the iliac crest anterior. No redness, swelling.   Xray ordered. Ref to PT ordered.             Current medicines (including changes today)  Current Outpatient Medications   Medication Sig Dispense Refill    benzonatate (TESSALON) 100 MG Cap Take 1 Capsule by mouth 3 times a day as needed for Cough. 60 Capsule 0    albuterol 108 (90 Base) MCG/ACT Aero Soln inhalation aerosol Inhale 2 Puffs every four hours as needed for Shortness of Breath. 1 Each 3    azelastine (ASTELIN) 137 MCG/SPRAY nasal spray Administer 1 Spray into affected nostril(S) 2 times a day. 30 mL 11    predniSONE (DELTASONE) 20 MG Tab 1 TAB BY MOUTH ONCE A DAY ONLY IF NEEDED FOR PAIN AND/OR SWELLING IN KNEES FOR ANTI-INFLAMMATORY EFFECT. TAKE WITH FOOD. 7 Tablet 0    simvastatin (ZOCOR) 20 MG Tab TAKE 1 TABLET BY MOUTH EVERY EVENING 90 Tablet 3    lisinopril (PRINIVIL) 10 MG Tab Take 1 Tablet by mouth every day. 90 Tablet 3    alendronate (FOSAMAX) 70 MG Tab Take 1 Tablet by mouth every 7 days. For osteoporosis 12 Tablet 3    omeprazole (PRILOSEC) 20 MG delayed-release capsule Take 1 Capsule by mouth 2 times a day for 360 days. 180 Capsule 3    triamcinolone acetonide (KENALOG) 0.5 % Cream Apply 1g three times a day for 7 days to affected area 60 g 0    multivitamin (THERAGRAN) Tab Take 1 Tablet by mouth every day.      VITAMIN D PO Take 2,000 mcg by mouth.      CRANBERRY PO Take  by mouth.      ascorbic  "acid (ASCORBIC ACID) 500 MG Tab Take 1 Tablet by mouth every day.      Calcium Carbonate-Vitamin D 600-400 MG-UNIT Tab Take  by mouth 2 times a day.         No current facility-administered medications for this visit.     She  has a past medical history of Anemia, Arrhythmia, Arthritis, Cervical cancer screening (5/22/2013), Dyslipidemia (10/24/2012), Dysuria (10/2/2014), Fall (12/22/2022), GERD (gastroesophageal reflux disease), Heart murmur, Hyperlipidemia, Insomnia (2/27/2014), Migraine, Primary hypertension (6/22/2023), Seasonal allergies (5/22/2013), Shingles, and Thyroid disease.    She has no past medical history of Anxiety, Asthma, Blood transfusion without reported diagnosis, Breast cancer (HCC), Cancer (HCC), Cataract, CHF (congestive heart failure) (HCC), Clotting disorder (HCC), COPD (chronic obstructive pulmonary disease) (HCC), Depression, Diabetes (HCC), Heart attack (HCC), HIV (human immunodeficiency virus infection) (HCC), Kidney disease, Seizure (HCC), Stroke (HCC), or Substance abuse (HCC).    ROS  No chest pain, no shortness of breath, no abdominal pain       Objective:     /70 (BP Location: Left arm, Patient Position: Sitting, BP Cuff Size: Adult long)   Pulse 76   Temp 36.2 °C (97.1 °F) (Temporal)   Resp 16   Ht 1.6 m (5' 3\")   Wt 54.4 kg (120 lb)   SpO2 94%  Body mass index is 21.26 kg/m².   Physical Exam:  Constitutional: Alert, no distress.  Skin: Warm, dry, good turgor, no rashes in visible areas.  Eye: Equal, round and reactive, conjunctiva clear, lids normal.  ENMT: Lips without lesions, good dentition, oropharynx clear.  Neck: Trachea midline, no masses, no thyromegaly. No cervical or supraclavicular lymphadenopathy  Respiratory: Unlabored respiratory effort, lungs clear to auscultation, no wheezes, no ronchi.  Cardiovascular: Normal S1, S2, no murmur, no edema.  Abdomen: Soft, non-tender, no masses, no hepatosplenomegaly.  Psych: Alert and oriented x3, normal affect and " mood.        Assessment and Plan:   The following treatment plan was discussed    1. Right hip pain  - DX-HIP-COMPLETE - UNILATERAL 2+ RIGHT; Future  - Referral to Physical Therapy    2. Acute cough    3. Screening for colorectal cancer    4. Acute recurrent maxillary sinusitis  - azelastine (ASTELIN) 137 MCG/SPRAY nasal spray; Administer 1 Spray into affected nostril(S) 2 times a day.  Dispense: 30 mL; Refill: 11    5. Screening for colon cancer  - Referral to GI for Colonoscopy    Other orders  - benzonatate (TESSALON) 100 MG Cap; Take 1 Capsule by mouth 3 times a day as needed for Cough.  Dispense: 60 Capsule; Refill: 0  - albuterol 108 (90 Base) MCG/ACT Aero Soln inhalation aerosol; Inhale 2 Puffs every four hours as needed for Shortness of Breath.  Dispense: 1 Each; Refill: 3      Followup: No follow-ups on file.

## 2023-12-17 ENCOUNTER — OCCUPATIONAL MEDICINE (OUTPATIENT)
Dept: URGENT CARE | Facility: PHYSICIAN GROUP | Age: 73
End: 2023-12-17
Payer: OTHER MISCELLANEOUS

## 2023-12-17 VITALS
TEMPERATURE: 96.7 F | WEIGHT: 120 LBS | HEIGHT: 63 IN | HEART RATE: 73 BPM | DIASTOLIC BLOOD PRESSURE: 80 MMHG | BODY MASS INDEX: 21.26 KG/M2 | SYSTOLIC BLOOD PRESSURE: 110 MMHG | RESPIRATION RATE: 16 BRPM | OXYGEN SATURATION: 99 %

## 2023-12-17 DIAGNOSIS — S80.01XD CONTUSION OF RIGHT KNEE, SUBSEQUENT ENCOUNTER: ICD-10-CM

## 2023-12-17 DIAGNOSIS — S80.02XD CONTUSION OF LEFT KNEE, SUBSEQUENT ENCOUNTER: ICD-10-CM

## 2023-12-17 PROCEDURE — 3074F SYST BP LT 130 MM HG: CPT

## 2023-12-17 PROCEDURE — 3079F DIAST BP 80-89 MM HG: CPT

## 2023-12-17 PROCEDURE — 99213 OFFICE O/P EST LOW 20 MIN: CPT

## 2023-12-17 ASSESSMENT — FIBROSIS 4 INDEX: FIB4 SCORE: 1.887931034482758621

## 2023-12-17 NOTE — PROGRESS NOTES
"Subjective     Tanisha Haile is a 73 y.o. female who presents with Follow-Up (W/c fv knee injury. Needs paperwork completed. Leg swelling. Heat and cold compression do help  )            Knee Injury  This is a new problem. The current episode started 1 to 4 weeks ago. The symptoms are aggravated by standing. She has tried heat (Biofreeze) for the symptoms.     DOI: 11/25/2023; follow-up visit #3  MANISHA: Bilateral knee injury    She reports the right knee is 80% better and the left knee is 30% better. She continues to endorse bilateral knee pain that is currently at 7/10 but can get worse to 8/10 with certain physical activities.  Pain is worse with prolonged standing. Pain is better with sitting. Heat and cold helps with pain relief.  She uses Biofreeze which helps with the pain. She was prescribed prednisone but did not take this.  She is not working right now, states that there is no work available for her. She reports walking to the grocery stores with no problems, did not have any exacerbation of pain.       ROS           Objective     /80   Pulse 73   Temp 35.9 °C (96.7 °F) (Temporal)   Resp 16   Ht 1.6 m (5' 3\")   Wt 54.4 kg (120 lb)   LMP  (LMP Unknown)   SpO2 99%   BMI 21.26 kg/m²      Physical Exam  Constitutional:       Appearance: Normal appearance.   HENT:      Head: Normocephalic.      Nose: Nose normal.   Eyes:      Extraocular Movements: Extraocular movements intact.   Cardiovascular:      Rate and Rhythm: Normal rate.      Pulses: Normal pulses.   Pulmonary:      Effort: Pulmonary effort is normal.   Musculoskeletal:         General: Normal range of motion.      Cervical back: Normal range of motion.      Right knee: No swelling. Normal range of motion.      Left knee: Swelling present. Normal range of motion. Tenderness present over the medial joint line and lateral joint line.   Skin:     General: Skin is warm and dry.   Neurological:      General: No focal deficit present.      Mental " Status: She is alert.   Psychiatric:         Mood and Affect: Mood normal.         Behavior: Behavior normal.         Judgment: Judgment normal.            Assessment & Plan        1. Contusion of left knee, subsequent encounter    - Referral to Occupational Medicine    2. Contusion of right knee, subsequent encounter    - Referral to Occupational Medicine          Patient continues to deny any improvement in pain and function of both knees; reports overall improvement in right knee of 80% and on the left knee of 30%.  She reports trying walking more, went to the grocery stores and tolerated this well.  Her restrictions were revised to allow her to do more walking and standing at work.  She has not started on the prednisone due to fear of side effects.  Patient was advised to start taking the prednisone, advised on possible side effects.  Continue Biofreeze topical.  Patient is referred to occupational medicine due to persistence of pain and no improvement in the past 2 to 3 weeks.  Return to clinic in 2 weeks if still not scheduled with occupational medicine.  Advised to return sooner if needed.     D39 paperwork completed

## 2023-12-17 NOTE — LETTER
[address] [place] [address]  Phone:  [phone] - Fax:  [phone]   [facility] Progress Report and Disability Certification  Date of Service: 12/17/2023   No Show:  No  Date / Time of Next Visit:     Claim Information   Patient Name: Tanisha Haile  Claim Number:     Employer:   *** Date of Injury: 11/25/2023     Insurer / TPA: [employer] *** ID / SSN:     Occupation:  *** Diagnosis: Diagnoses of Contusion of left knee, subsequent encounter and Contusion of right knee, subsequent encounter were pertinent to this visit.    Medical Information   Related to Industrial Injury?   ***   Subjective Complaints:      Objective Findings:     Pre-Existing Condition(s):     Assessment:        Status:    Permanent Disability:     Plan:      Diagnostics:      Comments:       Disability Information   Status:      From:     Through:   Restrictions are:     Physical Restrictions   Sitting:    Standing:    Stooping:    Bending:      Squatting:    Walking:    Climbing:    Pushing:      Pulling:    Other:    Reaching Above Shoulder (L):   Reaching Above Shoulder (R):       Reaching Below Shoulder (L):    Reaching Below Shoulder (R):      Not to exceed Weight Limits   Carrying(hrs):   Weight Limit(lb):   Lifting(hrs):   Weight  Limit(lb):     Comments:      Repetitive Actions   Hands: i.e. Fine Manipulations from Grasping:     Feet: i.e. Operating Foot Controls:     Driving / Operate Machinery:     Health Care Provider’s Original or Electronic Signature  ALAN Albright Health Care Provider’s Original or Electronic Signature    Fabian Gamez DO MPH     Clinic Name / Location: [address] [place] [address] Clinic Phone Number: Dept: [phone]   Appointment Time: 11:00 Am Visit Start Time: 12:08 PM   Check-In Time:  10:35 Am Visit Discharge Time:  ***   Original-Treating  Physician or Chiropractor    Page 2-Insurer/TPA    Page 3-Employer    Page 4-Employee       Attending Attestation (For Attendings USE Only)...

## 2023-12-17 NOTE — LETTER
"   [facility] [place] [place] [address]  Phone:  [phone] - Fax:  [phone]   [facility] Progress Report and Disability Certification  Date of Service: 12/17/2023   No Show:  No  Date / Time of Next Visit: 12/30/2023   Claim Information   Patient Name: Tanisha Haile  Claim Number:     Employer:   [employer]  Date of Injury: 11/25/2023     Insurer / TPA: [employer] ID / SSN:     Occupation:   Diagnosis: Diagnoses of Contusion of left knee, subsequent encounter and Contusion of right knee, subsequent encounter were pertinent to this visit.    Medical Information   Related to Industrial Injury?      Subjective Complaints:  Knee Injury  This is a new problem. The current episode started 1 to 4 weeks ago. The symptoms are aggravated by standing. She has tried heat (Biofreeze) for the symptoms.     DOI: 11/25/2023; follow-up visit #3  MANISHA: Bilateral knee injury    She reports the right knee is 80% better and the left knee is 30% better. She continues to endorse bilateral knee pain that is currently at 7/10 but can get worse to 8/10 with certain physical activities.  Pain is worse with prolonged standing. Pain is better with sitting. Heat and cold helps with pain relief.  She uses Biofreeze which helps with the pain. She was prescribed prednisone but did not take this.  She is not working right now, states that there is no work available for her. She reports walking to the grocery stores with no problems, did not have any exacerbation of pain.    Objective Findings: /80   Pulse 73   Temp 35.9 °C (96.7 °F) (Temporal)   Resp 16   Ht 1.6 m (5' 3\")   Wt 54.4 kg (120 lb)   LMP  (LMP Unknown)   SpO2 99%   BMI 21.26 kg/m²          Pre-Existing Condition(s): None   Assessment:   Condition Same    Status: Additional Care Required  Permanent Disability:No    Plan: Medication    Diagnostics:      Comments:       Disability Information "   Status: Released to Restricted Duty    From:  12/17/2023  Through: 12/30/2023 Restrictions are: Temporary   Physical Restrictions   Sitting:    Standing:  < or = to 2 hrs/day Stooping:  < or = to 1 hr/day Bending:  < or = to 1 hr/day   Squatting:  < or = to 1 hr/day Walking:  < or = to 4 hrs/day Climbing:  < or = to 1 hr/day Pushing:  < or = to 1 hr/day   Pulling:  < or = to 1 hr/day Other:    Reaching Above Shoulder (L):   Reaching Above Shoulder (R):       Reaching Below Shoulder (L):    Reaching Below Shoulder (R):      Not to exceed Weight Limits   Carrying(hrs):   Weight Limit(lb): < or = to 25 pounds Lifting(hrs):   Weight  Limit(lb):     Comments: Patient continues to deny any improvement in pain and function of both knees; reports overall improvement in right knee of 80% and on the left knee of 30%.  She reports trying walking more, went to the grocery stores and tolerated this well.  Her restrictions were revised to allow her to do more walking and standing at work.  She has not started on the prednisone due to fear of side effects.  Patient was advised to start taking the prednisone, advised on possible side effects.  Continue Biofreeze topical.  Patient is referred to occupational medicine due to persistence of pain and no improvement in the past 2 to 3 weeks.  Return to clinic in 2 weeks if still not scheduled with occupational medicine.  Advised to return sooner if needed.     Repetitive Actions   Hands: i.e. Fine Manipulations from Grasping:     Feet: i.e. Operating Foot Controls:     Driving / Operate Machinery:     Health Care Provider’s Original or Electronic Signature  MARTI Albright. Health Care Provider’s Original or Electronic Signature    Fabian Gamez DO MPH     Clinic Name / Location: [address] [address] Clinic Phone Number: Dept: [phone]   Appointment Time: 11:00 Am Visit Start Time: 12:08 PM   Check-In Time:  10:35 Am  Visit Discharge Time:  12:55 PM   Original-Treating Physician or Chiropractor    Page 2-Insurer/TPA    Page 3-Employer    Page 4-Employee

## 2023-12-17 NOTE — LETTER
"   [facility] [place] [address]  Phone:  [phone] - Fax:  [phone]   [facility] Progress Report and Disability Certification  Date of Service: 12/17/2023   No Show:  No  Date / Time of Next Visit: 12/30/2023   Claim Information   Patient Name: Tanisha Haile  Claim Number:     Employer:   [employer]  Date of Injury: 11/25/2023     Insurer / TPA: [employer] ID / SSN:     Occupation:   Diagnosis: Diagnoses of Contusion of left knee, subsequent encounter and Contusion of right knee, subsequent encounter were pertinent to this visit.    Medical Information   Related to Industrial Injury?      Subjective Complaints:  Knee Injury  This is a new problem. The current episode started 1 to 4 weeks ago. The symptoms are aggravated by standing. She has tried heat (Biofreeze) for the symptoms.     DOI: 11/25/2023; follow-up visit #3  MANISHA: Bilateral knee injury    She reports the right knee is 80% better and the left knee is 30% better. She continues to endorse bilateral knee pain that is currently at 7/10 but can get worse to 8/10 with certain physical activities.  Pain is worse with prolonged standing. Pain is better with sitting. Heat and cold helps with pain relief.  She uses Biofreeze which helps with the pain. She was prescribed prednisone but did not take this.  She is not working right now, states that there is no work available for her. She reports walking to the grocery stores with no problems, did not have any exacerbation of pain.    Objective Findings: /80   Pulse 73   Temp 35.9 °C (96.7 °F) (Temporal)   Resp 16   Ht 1.6 m (5' 3\")   Wt 54.4 kg (120 lb)   LMP  (LMP Unknown)   SpO2 99%   BMI 21.26 kg/m²          Pre-Existing Condition(s): None   Assessment:   Condition Same    Status: Additional Care Required  Permanent Disability:No    Plan: Medication    Diagnostics:      Comments:       Disability Information "   Status: Released to Restricted Duty    From:  12/17/2023  Through: 12/30/2023 Restrictions are: Temporary   Physical Restrictions   Sitting:    Standing:  < or = to 2 hrs/day Stooping:  < or = to 1 hr/day Bending:  < or = to 1 hr/day   Squatting:  < or = to 1 hr/day Walking:  < or = to 4 hrs/day Climbing:  < or = to 1 hr/day Pushing:  < or = to 1 hr/day   Pulling:  < or = to 1 hr/day Other:    Reaching Above Shoulder (L):   Reaching Above Shoulder (R):       Reaching Below Shoulder (L):    Reaching Below Shoulder (R):      Not to exceed Weight Limits   Carrying(hrs):   Weight Limit(lb): < or = to 25 pounds Lifting(hrs):   Weight  Limit(lb):     Comments: Patient continues to deny any improvement in pain and function of both knees; reports overall improvement in right knee of 80% and on the left knee of 30%.  She reports trying walking more, went to the grocery stores and tolerated this well.  Her restrictions were revised to allow her to do more walking and standing at work.  She has not started on the prednisone due to fear of side effects.  Patient was advised to start taking the prednisone, advised on possible side effects.  Continue Biofreeze topical.  Patient is referred to occupational medicine due to persistence of pain and no improvement in the past 2 to 3 weeks.  Return to clinic in 2 weeks if still not scheduled with occupational medicine.  Advised to return sooner if needed.     Repetitive Actions   Hands: i.e. Fine Manipulations from Grasping:     Feet: i.e. Operating Foot Controls:     Driving / Operate Machinery:     Health Care Provider’s Original or Electronic Signature  MARTI Albright. Health Care Provider’s Original or Electronic Signature    Fabian Gamez DO MPH     Clinic Name / Location: [address] [address] Clinic Phone Number: Dept: [phone]   Appointment Time: 11:00 Am Visit Start Time: 12:08 PM   Check-In Time:  10:35 Am  Visit Discharge Time:  12:56 PM   Original-Treating Physician or Chiropractor    Page 2-Insurer/TPA    Page 3-Employer    Page 4-Employee

## 2023-12-30 ENCOUNTER — OCCUPATIONAL MEDICINE (OUTPATIENT)
Dept: URGENT CARE | Facility: PHYSICIAN GROUP | Age: 73
End: 2023-12-30
Payer: OTHER MISCELLANEOUS

## 2023-12-30 VITALS
WEIGHT: 120 LBS | TEMPERATURE: 97.8 F | SYSTOLIC BLOOD PRESSURE: 128 MMHG | DIASTOLIC BLOOD PRESSURE: 64 MMHG | HEIGHT: 63 IN | RESPIRATION RATE: 16 BRPM | OXYGEN SATURATION: 94 % | BODY MASS INDEX: 21.26 KG/M2 | HEART RATE: 81 BPM

## 2023-12-30 DIAGNOSIS — S80.01XD CONTUSION OF RIGHT KNEE, SUBSEQUENT ENCOUNTER: ICD-10-CM

## 2023-12-30 DIAGNOSIS — S80.02XD CONTUSION OF LEFT KNEE, SUBSEQUENT ENCOUNTER: ICD-10-CM

## 2023-12-30 PROCEDURE — 3078F DIAST BP <80 MM HG: CPT | Performed by: NURSE PRACTITIONER

## 2023-12-30 PROCEDURE — 99213 OFFICE O/P EST LOW 20 MIN: CPT | Performed by: NURSE PRACTITIONER

## 2023-12-30 PROCEDURE — 3074F SYST BP LT 130 MM HG: CPT | Performed by: NURSE PRACTITIONER

## 2023-12-30 ASSESSMENT — ENCOUNTER SYMPTOMS
FALLS: 1
MYALGIAS: 1
FOCAL WEAKNESS: 0

## 2023-12-30 ASSESSMENT — FIBROSIS 4 INDEX: FIB4 SCORE: 1.887931034482758621
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
## 2023-12-30 NOTE — LETTER
[facility] [place] [place] [address]  Phone:  [phone] - Fax:  [phone]   [facility] Progress Report and Disability Certification  Date of Service: 12/30/2023   No Show:  No  Date / Time of Next Visit: 1/12/2024   Claim Information   Patient Name: Tanisha Haile  Claim Number:     Employer:   [employer]  Date of Injury: 11/25/2023     Insurer / TPA: Misc Workers Comp  ID / SSN:     Occupation:   Diagnosis: Diagnoses of Contusion of left knee, subsequent encounter and Contusion of right knee, subsequent encounter were pertinent to this visit.    Medical Information   Related to Industrial Injury? Yes    Subjective Complaints:  Pt presents for evaluation of an existing work comp injury.  Copied from previous visits  DOI: 11/25/23: Patient is a 73-year-old female presents urgent care for evaluation after she fell leaving work.  Patient states that she was walking around the corner it was dark and did not see a pallet catching her left foot causing her to fall down on her bilateral knees.  She has pain in both knees left knee hurts more than the right.  She is able to ambulate with pain.  Denies previous injuries to the bilateral knees.  Denies head injury and/or any other acute injuries.     Follow-up visit #1: DOI 11/25/23   FV #1 12/3/23   Presents to clinic with her first follow-up visit.  Patient reports 80% improvement to her right knee.  She has no pain with ambulation or range of motion.  Patient's left knee she reports approximately 20% improvement.  She does have pain with ambulation or if she attempts to bend her knee.  Patient has been using ice which has been helpful.  She is unable to take Tylenol or NSAIDs.  She denies any distal numbness or tingling.  She did note bruising to her left leg.  Although she denies any other new symptoms.  She has been following her work restrictions.    Follow-up visit #2. DOI:  11/25/23. Left knee still bothering her - has not improved from last visit on 12/3/23. Still swollen anterior inferolateral aspect of left knee. Uses ice and Biofreeze. Right knee a little sore in anterior-inferior aspect - has not improved from last visit on 12/3/23. Hasn't been back to work due to work restrictions.     Follow-up visit #3:DOI: 11/25/2023; follow-up visit #3  MANISHA: Bilateral knee injury     She reports the right knee is 80% better and the left knee is 30% better. She continues to endorse bilateral knee pain that is currently at 7/10 but can get worse to 8/10 with certain physical activities.  Pain is worse with prolonged standing. Pain is better with sitting. Heat and cold helps with pain relief.  She uses Biofreeze which helps with the pain. She was prescribed prednisone but did not take this.  She is not working right now, states that there is no work available for her. She reports walking to the grocery stores with no problems, did not have any exacerbation of pain.     Follow-up visit #4.  At last visit she was referred to follow-up with occupational medicine.  At this time her right knee is 90% improved and her left knee is 80% improved.  She is using her brace as needed.  She is ready to decrease her work restrictions.  She does endorse mild clicking and popping of the right knee however, pain has much improved.   Objective Findings: Right knee: No swelling, effusion, erythema or bony tenderness. Normal range of motion.      Left knee: Swelling present. No effusion, erythema, ecchymosis or bony tenderness. Normal range of motion.      Pre-Existing Condition(s):     Assessment:   Condition Improved    Status: Additional Care Required  Permanent Disability:No    Plan: Transfer Care    Diagnostics:      Comments:       Disability Information   Status: Released to Restricted Duty    From:  12/30/2023  Through: 1/12/2024 Restrictions are:     Physical Restrictions   Sitting:    Standing:     Stooping:    Bending:      Squatting:    Walking:    Climbing:    Pushing:      Pulling:    Other:    Reaching Above Shoulder (L):   Reaching Above Shoulder (R):       Reaching Below Shoulder (L):    Reaching Below Shoulder (R):      Not to exceed Weight Limits   Carrying(hrs):   Weight Limit(lb): < or = to 50 pounds Lifting(hrs):   Weight  Limit(lb): < or = to 50 pounds   Comments: Please allow Tanisha to wear knee brace as needed.  Please see scanned paperwork for additional work restrictions.    Repetitive Actions   Hands: i.e. Fine Manipulations from Grasping:     Feet: i.e. Operating Foot Controls:     Driving / Operate Machinery:     Health Care Provider’s Original or Electronic Signature  ALAN Desai Health Care Provider’s Original or Electronic Signature    Fabian Gamez DO MPH     Clinic Name / Location: [address] [address] Clinic Phone Number: Dept: [phone]   Appointment Time: 1:00 Pm Visit Start Time: 1:22 PM   Check-In Time:  12:26 Pm Visit Discharge Time:  2:18 PM   Original-Treating Physician or Chiropractor    Page 2-Insurer/TPA    Page 3-Employer    Page 4-Employee

## 2024-01-08 RX ORDER — AMLODIPINE BESYLATE 10 MG/1
10 TABLET ORAL DAILY
Qty: 90 TABLET | Refills: 3 | Status: SHIPPED | OUTPATIENT
Start: 2024-01-08

## 2024-01-14 ENCOUNTER — OFFICE VISIT (OUTPATIENT)
Dept: URGENT CARE | Facility: PHYSICIAN GROUP | Age: 74
End: 2024-01-14
Payer: MEDICARE

## 2024-01-14 VITALS
RESPIRATION RATE: 16 BRPM | SYSTOLIC BLOOD PRESSURE: 108 MMHG | TEMPERATURE: 97.6 F | DIASTOLIC BLOOD PRESSURE: 74 MMHG | OXYGEN SATURATION: 96 % | HEIGHT: 63 IN | HEART RATE: 89 BPM | WEIGHT: 120 LBS | BODY MASS INDEX: 21.26 KG/M2

## 2024-01-14 DIAGNOSIS — R09.82 PND (POST-NASAL DRIP): ICD-10-CM

## 2024-01-14 PROCEDURE — 3074F SYST BP LT 130 MM HG: CPT | Performed by: FAMILY MEDICINE

## 2024-01-14 PROCEDURE — 3078F DIAST BP <80 MM HG: CPT | Performed by: FAMILY MEDICINE

## 2024-01-14 PROCEDURE — 0241U POCT CEPHEID COV-2, FLU A/B, RSV - PCR: CPT | Performed by: FAMILY MEDICINE

## 2024-01-14 PROCEDURE — 99213 OFFICE O/P EST LOW 20 MIN: CPT | Performed by: FAMILY MEDICINE

## 2024-01-14 RX ORDER — FLUTICASONE PROPIONATE 50 MCG
1 SPRAY, SUSPENSION (ML) NASAL 2 TIMES DAILY
Qty: 16 G | Refills: 0 | Status: SHIPPED | OUTPATIENT
Start: 2024-01-14 | End: 2024-01-21

## 2024-01-14 ASSESSMENT — FIBROSIS 4 INDEX: FIB4 SCORE: 1.887931034482758621

## 2024-01-14 NOTE — PROGRESS NOTES
Cc: cough, runny nose x 1 d        congestion  This is a new problem. The current episode started 1 d ago. The problem has been unchanged. The problem occurs constantly , but worse at night.  Associated symptoms include : runny nose, headaches.  Pertinent negatives include no  Fevers, cough,   , nausea, vomiting, diarrhea, sweats, weight loss or wheezing. Nothing aggravates the symptoms.  Patient has tried nothing for the symptoms. There is no history of asthma.        Social History     Tobacco Use    Smoking status: Every Day     Current packs/day: 0.25     Average packs/day: 0.3 packs/day for 20.0 years (5.0 ttl pk-yrs)     Types: Cigarettes    Smokeless tobacco: Never   Vaping Use    Vaping Use: Never used   Substance Use Topics    Alcohol use: Yes     Alcohol/week: 4.2 oz     Types: 7 Glasses of wine per week     Comment: 1 glass of wine daily    Drug use: No     Comment: denies h/o heroin, cocaine, meth, IVDU           Past Medical History:   Diagnosis Date    Primary hypertension 6/22/2023    Fall 12/22/2022    Dysuria 10/2/2014    Seen 9/27 for uti, given macrobid Feels the same as 9/27 Feels more like vaginal pain      Insomnia 2/27/2014    Not sleeping, can't shut brain off Making her really tired during the day X 1 month plus but worse for a month    Cervical cancer screening 5/22/2013    Due to repeat around 2015.    Seasonal allergies 5/22/2013    Dyslipidemia 10/24/2012    Anemia     Arrhythmia     Arthritis     GERD (gastroesophageal reflux disease)     Heart murmur     Hyperlipidemia     Migraine     Shingles     Thyroid disease          Current Outpatient Medications on File Prior to Visit   Medication Sig Dispense Refill    amLODIPine (NORVASC) 10 MG Tab Take 1 Tablet by mouth every day. For high blood pressure 90 Tablet 3    benzonatate (TESSALON) 100 MG Cap Take 1 Capsule by mouth 3 times a day as needed for Cough. 60 Capsule 0    albuterol 108 (90 Base) MCG/ACT Aero Soln inhalation aerosol  "Inhale 2 Puffs every four hours as needed for Shortness of Breath. 1 Each 3    azelastine (ASTELIN) 137 MCG/SPRAY nasal spray Administer 1 Spray into affected nostril(S) 2 times a day. 30 mL 11    simvastatin (ZOCOR) 20 MG Tab TAKE 1 TABLET BY MOUTH EVERY EVENING 90 Tablet 3    alendronate (FOSAMAX) 70 MG Tab Take 1 Tablet by mouth every 7 days. For osteoporosis 12 Tablet 3    omeprazole (PRILOSEC) 20 MG delayed-release capsule Take 1 Capsule by mouth 2 times a day for 360 days. 180 Capsule 3    triamcinolone acetonide (KENALOG) 0.5 % Cream Apply 1g three times a day for 7 days to affected area 60 g 0    multivitamin (THERAGRAN) Tab Take 1 Tablet by mouth every day.      VITAMIN D PO Take 2,000 mcg by mouth.      CRANBERRY PO Take  by mouth.      ascorbic acid (ASCORBIC ACID) 500 MG Tab Take 1 Tablet by mouth every day.      Calcium Carbonate-Vitamin D 600-400 MG-UNIT Tab Take  by mouth 2 times a day.        predniSONE (DELTASONE) 20 MG Tab 1 TAB BY MOUTH ONCE A DAY ONLY IF NEEDED FOR PAIN AND/OR SWELLING IN KNEES FOR ANTI-INFLAMMATORY EFFECT. TAKE WITH FOOD. (Patient not taking: Reported on 1/14/2024) 7 Tablet 0     No current facility-administered medications on file prior to visit.         Review of Systems   Constitutional: Negative for fever and weight loss.   HENT: negative for otalgia  Cardiovascular - denies chest pain or dyspnea  Respiratory: Positive for cough.  .  Negative for wheezing.    Neurological: Negative for headaches.   GI - denies nausea, vomiting or diarrhea  Neuro - denies numbness or tingling.            Objective:     /74   Pulse 89   Temp 36.4 °C (97.6 °F) (Temporal)   Resp 16   Ht 1.6 m (5' 3\")   Wt 54.4 kg (120 lb)   SpO2 96%       Physical Exam   Constitutional: patient is oriented to person, place, and time. Patient appears well-developed and well-nourished. No distress.   HENT:   Head: Normocephalic and atraumatic.   Right Ear: External ear normal.   Left Ear: External ear " normal.   Nose: Mucosal edema and clear postnasal drip present. Right sinus exhibits no maxillary sinus tenderness. Left sinus exhibits no maxillary sinus tenderness.   Mouth/Throat: Mucous membranes are normal. No oral lesions.  No posterior pharyngeal erythema.  No oropharyngeal exudate or posterior oropharyngeal edema.   Eyes: Conjunctivae and EOM are normal. Pupils are equal, round, and reactive to light. Right eye exhibits no discharge. Left eye exhibits no discharge. No scleral icterus.   Neck: Normal range of motion. Neck supple. No tracheal deviation present.   Cardiovascular: Normal rate, regular rhythm and normal heart sounds.  Exam reveals no friction rub.    Pulmonary/Chest: Effort normal. No respiratory distress. Patient has no wheezes or rhonchi. Patient has no rales.    Musculoskeletal:  exhibits no edema.   Lymphadenopathy:     Patient has no cervical adenopathy.      Neurological: patient is alert and oriented to person, place, and time.   Skin: Skin is warm and dry. No rash noted. No erythema.   Psychiatric: patient  has a normal mood and affect.  behavior is normal.   Nursing note and vitals reviewed.              Assessment/Plan:       1. Postnasal drip        - fluticasone (FLONASE) 50 MCG/ACT nasal spray; Spray 1 Spray in nose 2 times a day.  Dispense: 1 Bottle; Refill: 0         Differential diagnosis, natural history, supportive care, and indications for immediate follow-up discussed. All questions answered. Patient agrees with the plan of care.     Follow-up as needed if symptoms worsen or fail to improve to PCP, Urgent care or Emergency Room.     I have personally reviewed prior external notes and test results pertinent to today's visit.  I have independently reviewed and interpreted all diagnostics ordered during this urgent care acute visit.

## 2024-01-15 ENCOUNTER — OCCUPATIONAL MEDICINE (OUTPATIENT)
Dept: OCCUPATIONAL MEDICINE | Facility: CLINIC | Age: 74
End: 2024-01-15
Payer: OTHER MISCELLANEOUS

## 2024-01-15 VITALS
WEIGHT: 119.6 LBS | HEIGHT: 63 IN | OXYGEN SATURATION: 97 % | TEMPERATURE: 96.8 F | RESPIRATION RATE: 16 BRPM | HEART RATE: 73 BPM | SYSTOLIC BLOOD PRESSURE: 118 MMHG | DIASTOLIC BLOOD PRESSURE: 76 MMHG | BODY MASS INDEX: 21.19 KG/M2

## 2024-01-15 DIAGNOSIS — S80.01XD CONTUSION OF RIGHT KNEE, SUBSEQUENT ENCOUNTER: ICD-10-CM

## 2024-01-15 DIAGNOSIS — S80.02XD CONTUSION OF LEFT KNEE, SUBSEQUENT ENCOUNTER: ICD-10-CM

## 2024-01-15 PROCEDURE — 3074F SYST BP LT 130 MM HG: CPT | Performed by: PREVENTIVE MEDICINE

## 2024-01-15 PROCEDURE — 99203 OFFICE O/P NEW LOW 30 MIN: CPT | Performed by: PREVENTIVE MEDICINE

## 2024-01-15 PROCEDURE — 3078F DIAST BP <80 MM HG: CPT | Performed by: PREVENTIVE MEDICINE

## 2024-01-15 ASSESSMENT — FIBROSIS 4 INDEX: FIB4 SCORE: 1.887931034482758621

## 2024-01-15 NOTE — PROGRESS NOTES
"Subjective:     Tanisha Haile is a 73 y.o. female who presents for Other (New  doi: 11/25/2023 both knees, right knee feeling better/left knee feeling worse rm 16)      DOI: 11/25/23: Patient is a 73-year-old female presents urgent care for evaluation after she fell leaving work.  Patient states that she was walking around the corner it was dark and did not see a pallet catching her left foot causing her to fall down on her bilateral knees.  She was seen urgent care x 5.  X-rays of the right and left knee were negative for acute findings.  She was advised OTC medications and work restrictions.  Patient states that symptoms had improved but continues to have persistent pain especially in the left knee.  She states she gets swelling daily in the left knee.  Pain is most on the lateral aspect of the knee.  Does get pain with prolonged standing or walking.  She states symptoms in the right knee are less but do continue more so on the medial aspect of the knee.  She states that she has not had any prior knee injuries or surgeries.    ROS: All systems were reviewed on intake form, form was reviewed and signed. See scanned documents in media. Pertinent positives and negatives included in HPI.    PMH: No pertinent past medical history to this problem  MEDS: Medications were reviewed in Epic  ALLERGIES:   Allergies   Allergen Reactions    Ibuprofen Nausea    Tylenol Anaphylaxis    Asa [Aspirin]     Fosamax [Alendronate Sodium] Nausea    Influenza Virus Vacc     Lisinopril Cough     cough    Meperidine     Nsaids Rash and Itching    Pcn [Penicillins] Itching    Propoxyphene     Sulfa Drugs     Voltaren [Diclofenac]      Nausea and vomiting      SOCHX: Works as anita at [employer]  FH: No pertinent family history to this problem       Objective:     /76 (BP Location: Right arm, Patient Position: Sitting, BP Cuff Size: Adult)   Pulse 73   Temp 36 °C (96.8 °F) (Temporal)   Resp 16   Ht 1.6 m (5' 3\")   Wt " 54.3 kg (119 lb 9.6 oz)   LMP  (LMP Unknown)   SpO2 97%   BMI 21.19 kg/m²     Constitutional: Patient is in no acute distress. Appears well-developed and well-nourished.   HENT: Normocephalic and atraumatic. EOM are normal. No scleral icterus.   Cardiovascular: Normal rate.    Pulmonary/Chest: Effort normal. No respiratory distress.   Neurological: Patient is alert and oriented to person, place, and time.   Skin: Skin is warm and dry.   Psychiatric: Normal mood and affect. Behavior is normal.     Left knee: Mild effusion.  Tenderness palpation over the lateral joint line.  Full range of motion with discomfort.  Anterior/posterior drawer test negative.  No laxity with varus or valgus stress but does elicit pain.  Byron's positive.  Right knee: No gross deformity.  Tenderness palpation over the proximal tibia and medial joint line.  Full range of motion.  Anterior/posterior drawer test negative.  Byron's positive.    Assessment/Plan:       1. Contusion of left knee, subsequent encounter  - Referral to Radiology  - MR-KNEE-W/O LEFT; Future  - Referral to Physical Therapy    2. Contusion of right knee, subsequent encounter  - Referral to Radiology  - MR-KNEE-W/O RIGHT; Future  - Referral to Physical Therapy    Released to Restricted Duty FROM 1/15/2024 TO 2/14/2024     Given duration of symptoms will refer for MRI bilateral knees  Referral to physical therapy  OTC muscle creams/ointments as needed  Restricted duty  Follow-up 4 weeks    Differential diagnosis, natural history, supportive care, and indications for immediate follow-up discussed.    Approximately 35 minutes were spent in reviewing notes, preparing for visit, obtaining history, exam and evaluation, patient counseling/education and post visit documentation/orders.

## 2024-01-15 NOTE — LETTER
[address],   [address] [place] [address]  Phone:  [phone] - Fax:  [phone]   Occupational Health [facility] Progress Report and Disability Certification  Date of Service: 1/15/2024   No Show:  No  Date / Time of Next Visit: 2/14/2024 11:45 AM    Claim Information   Patient Name: Tanisha Haile  Claim Number:     Employer:   [employer] Date of Injury: 11/25/2023     Insurer / TPA: Misc Workers Comp  ID / SSN:     Occupation:   Diagnosis: Diagnoses of Contusion of left knee, subsequent encounter and Contusion of right knee, subsequent encounter were pertinent to this visit.    Medical Information   Related to Industrial Injury? Yes    Subjective Complaints:  DOI: 11/25/23: Patient is a 73-year-old female presents urgent care for evaluation after she fell leaving work.  Patient states that she was walking around the corner it was dark and did not see a pallet catching her left foot causing her to fall down on her bilateral knees.  She was seen urgent care x 5.  X-rays of the right and left knee were negative for acute findings.  She was advised OTC medications and work restrictions.  Patient states that symptoms had improved but continues to have persistent pain especially in the left knee.  She states she gets swelling daily in the left knee.  Pain is most on the lateral aspect of the knee.  Does get pain with prolonged standing or walking.  She states symptoms in the right knee are less but do continue more so on the medial aspect of the knee.  She states that she has not had any prior knee injuries or surgeries.   Objective Findings: Left knee: Mild effusion.  Tenderness palpation over the lateral joint line.  Full range of motion with discomfort.  Anterior/posterior drawer test negative.  No laxity with varus or valgus stress but does elicit pain.  Byron's positive.  Right knee: No gross deformity.  Tenderness palpation over the  proximal tibia and medial joint line.  Full range of motion.  Anterior/posterior drawer test negative.  Byron's positive.   Pre-Existing Condition(s):     Assessment:   Condition Same    Status: Additional Care Required  Permanent Disability:No    Plan:      Diagnostics:      Comments:  Given duration of symptoms will refer for MRI bilateral knees  Referral to physical therapy  OTC muscle creams/ointments as needed  Restricted duty  Follow-up 4 weeks    Disability Information   Status: Released to Restricted Duty    From:  1/15/2024  Through: 2/14/2024 Restrictions are: Temporary   Physical Restrictions   Sitting:    Standing:    Stooping:    Bending:  < or = to 2 hrs/day   Squatting:  < or = to 2 hrs/day Walking:    Climbing:    Pushing:      Pulling:    Other:    Reaching Above Shoulder (L):   Reaching Above Shoulder (R):       Reaching Below Shoulder (L):    Reaching Below Shoulder (R):      Not to exceed Weight Limits   Carrying(hrs):   Weight Limit(lb): < or = to 50 pounds Lifting(hrs):   Weight  Limit(lb): < or = to 50 pounds   Comments:      Repetitive Actions   Hands: i.e. Fine Manipulations from Grasping:     Feet: i.e. Operating Foot Controls:     Driving / Operate Machinery:     Health Care Provider’s Original or Electronic Signature  Fabian Gamez D.O. Health Care Provider’s Original or Electronic Signature    Fabian Gamez DO MPH     Clinic Name / Location: [address],   [address] [address] Clinic Phone Number: Dept: [phone]   Appointment Time: 1:00 Pm Visit Start Time: 11:54 AM   Check-In Time:  11:30 Am Visit Discharge Time:  12:28 PM    Original-Treating Physician or Chiropractor    Page 2-Insurer/TPA    Page 3-Employer    Page 4-Employee

## 2024-02-07 DIAGNOSIS — S80.02XD CONTUSION OF LEFT KNEE, SUBSEQUENT ENCOUNTER: ICD-10-CM

## 2024-02-07 DIAGNOSIS — S80.01XD CONTUSION OF RIGHT KNEE, SUBSEQUENT ENCOUNTER: ICD-10-CM

## 2024-02-07 RX ORDER — LORAZEPAM 1 MG/1
1 TABLET ORAL
Qty: 1 TABLET | Refills: 0 | Status: SHIPPED | OUTPATIENT
Start: 2024-02-07 | End: 2024-02-07

## 2024-02-14 ENCOUNTER — OCCUPATIONAL MEDICINE (OUTPATIENT)
Dept: OCCUPATIONAL MEDICINE | Facility: CLINIC | Age: 74
End: 2024-02-14
Payer: OTHER MISCELLANEOUS

## 2024-02-14 ENCOUNTER — HOSPITAL ENCOUNTER (OUTPATIENT)
Dept: RADIOLOGY | Facility: MEDICAL CENTER | Age: 74
End: 2024-02-14
Attending: FAMILY MEDICINE
Payer: MEDICARE

## 2024-02-14 VITALS
HEIGHT: 63 IN | RESPIRATION RATE: 16 BRPM | WEIGHT: 120 LBS | OXYGEN SATURATION: 96 % | SYSTOLIC BLOOD PRESSURE: 128 MMHG | HEART RATE: 70 BPM | BODY MASS INDEX: 21.26 KG/M2 | DIASTOLIC BLOOD PRESSURE: 76 MMHG

## 2024-02-14 DIAGNOSIS — S80.02XD CONTUSION OF LEFT KNEE, SUBSEQUENT ENCOUNTER: ICD-10-CM

## 2024-02-14 DIAGNOSIS — Z12.31 VISIT FOR SCREENING MAMMOGRAM: ICD-10-CM

## 2024-02-14 DIAGNOSIS — S80.01XD CONTUSION OF RIGHT KNEE, SUBSEQUENT ENCOUNTER: ICD-10-CM

## 2024-02-14 PROCEDURE — 3078F DIAST BP <80 MM HG: CPT | Performed by: PREVENTIVE MEDICINE

## 2024-02-14 PROCEDURE — 3074F SYST BP LT 130 MM HG: CPT | Performed by: PREVENTIVE MEDICINE

## 2024-02-14 PROCEDURE — 99213 OFFICE O/P EST LOW 20 MIN: CPT | Performed by: PREVENTIVE MEDICINE

## 2024-02-14 PROCEDURE — 77067 SCR MAMMO BI INCL CAD: CPT

## 2024-02-14 ASSESSMENT — FIBROSIS 4 INDEX: FIB4 SCORE: 1.887931034482758621

## 2024-02-14 NOTE — LETTER
[address],   [address] [place] [address]  Phone:  [phone] - Fax:  [phone]   Occupational Health [facility] Progress Report and Disability Certification  Date of Service: 2/14/2024   No Show:  No  Date / Time of Next Visit: 3/6/2024 @ 11:15 am   Claim Information   Patient Name: Tanisha Haile  Claim Number:     Employer:   [employer] Date of Injury: 11/25/2023     Insurer / TPA: Misc Workers Comp  ID / SSN:     Occupation:   Diagnosis: Diagnoses of Contusion of left knee, subsequent encounter and Contusion of right knee, subsequent encounter were pertinent to this visit.    Medical Information   Related to Industrial Injury? Yes    Subjective Complaints:  DOI: 11/25/23: Patient is a 73-year-old female presents urgent care for evaluation after she fell leaving work.  Patient states that she was walking around the corner it was dark and did not see a pallet catching her left foot causing her to fall down on her bilateral knees.  She was seen urgent care x 5.  X-rays of the right and left knee were negative for acute findings.  She was advised OTC medications and work restrictions.  Patient states that symptoms had improved but continues to have persistent pain especially in the left knee.  She states she gets swelling daily in the left knee.  Pain is most on the lateral aspect of the knee.  Does get pain with prolonged standing or walking.  She states symptoms in the right knee are less but do continue more so on the medial aspect of the knee.  She states that she has not had any prior knee injuries or surgeries.    2/14/2024: Patient states overall symptoms are about the same.  She does note that she gets cold sensation below the knees when she goes outside otherwise continues to have pain with squatting and other activities.  She does note that she has the MRI scheduled for February 29 and the physical therapy scheduled to begin next  week.   Objective Findings: eft knee: Mild effusion.  Tenderness palpation over the lateral joint line.  Full range of motion with discomfort.  Anterior/posterior drawer test negative.  No laxity with varus or valgus stress but does elicit pain.  Byron's positive.  Right knee: No gross deformity.  Tenderness palpation over the proximal tibia and medial joint line.  Full range of motion.  Anterior/posterior drawer test negative.  Byron's positive.     Pre-Existing Condition(s):     Assessment:   Condition Same    Status: Additional Care Required  Permanent Disability:No    Plan:      Diagnostics:      Comments:  Keep appointment for MRI bilateral knees  Begin physical therapy as scheduled  OTC muscle creams/ointments as needed  Restricted duty  Follow-up 3 weeks      Disability Information   Status: Released to Restricted Duty    From:  2/14/2024  Through: 3/6/2024 Restrictions are: Temporary   Physical Restrictions   Sitting:    Standing:    Stooping:    Bending:      Squatting:    Walking:    Climbing:    Pushing:  < or = to 2 hrs/day   Pulling:  < or = to 2 hrs/day Other:    Reaching Above Shoulder (L):   Reaching Above Shoulder (R):       Reaching Below Shoulder (L):    Reaching Below Shoulder (R):      Not to exceed Weight Limits   Carrying(hrs):   Weight Limit(lb): < or = to 50 pounds Lifting(hrs):   Weight  Limit(lb): < or = to 50 pounds   Comments:      Repetitive Actions   Hands: i.e. Fine Manipulations from Grasping:     Feet: i.e. Operating Foot Controls:     Driving / Operate Machinery:     Health Care Provider’s Original or Electronic Signature  Fabian Gamez D.O. Health Care Provider’s Original or Electronic Signature    Fabian Gamez DO MPH     Clinic Name / Location: [address],   [address] [address] Clinic Phone Number: Dept: [phone]   Appointment Time: 11:45 Am Visit Start Time: 10:58 AM   Check-In Time:  10:46 Am Visit Discharge Time:   11:19AM   Original-Treating Physician or Chiropractor    Page 2-Insurer/TPA    Page 3-Employer    Page 4-Employee

## 2024-02-14 NOTE — PROGRESS NOTES
"Subjective:     Tanisha Haile is a 73 y.o. female who presents for Follow-Up (WC DOI 11/25/23 knee, Rm 17)      DOI: 11/25/23: Patient is a 73-year-old female presents urgent care for evaluation after she fell leaving work.  Patient states that she was walking around the corner it was dark and did not see a pallet catching her left foot causing her to fall down on her bilateral knees.  She was seen urgent care x 5.  X-rays of the right and left knee were negative for acute findings.  She was advised OTC medications and work restrictions.  Patient states that symptoms had improved but continues to have persistent pain especially in the left knee.  She states she gets swelling daily in the left knee.  Pain is most on the lateral aspect of the knee.  Does get pain with prolonged standing or walking.  She states symptoms in the right knee are less but do continue more so on the medial aspect of the knee.  She states that she has not had any prior knee injuries or surgeries.    2/14/2024: Patient states overall symptoms are about the same.  She does note that she gets cold sensation below the knees when she goes outside otherwise continues to have pain with squatting and other activities.  She does note that she has the MRI scheduled for February 29 and the physical therapy scheduled to begin next week.    ROS         Objective:     /76   Pulse 70   Resp 16   Ht 1.6 m (5' 3\")   Wt 54.4 kg (120 lb)   LMP  (LMP Unknown)   SpO2 96%   BMI 21.26 kg/m²     Constitutional: Patient is in no acute distress. Appears well-developed and well-nourished.   Cardiovascular: Normal rate.    Pulmonary/Chest: Effort normal. No respiratory distress.   Neurological: Patient is alert and oriented to person, place, and time.   Skin: Skin is warm and dry.   Psychiatric: Normal mood and affect. Behavior is normal.     eft knee: Mild effusion.  Tenderness palpation over the lateral joint line.  Full range of motion with discomfort.  " Anterior/posterior drawer test negative.  No laxity with varus or valgus stress but does elicit pain.  Byron's positive.  Right knee: No gross deformity.  Tenderness palpation over the proximal tibia and medial joint line.  Full range of motion.  Anterior/posterior drawer test negative.  Byron's positive.      Assessment/Plan:       1. Contusion of left knee, subsequent encounter    2. Contusion of right knee, subsequent encounter    Released to Restricted Duty FROM 2/14/2024 TO 3/6/2024       Keep appointment for MRI bilateral knees  Begin physical therapy as scheduled  OTC muscle creams/ointments as needed  Restricted duty  Follow-up 3 weeks      Differential diagnosis, natural history, supportive care, and indications for immediate follow-up discussed.    Approximately 25 minutes was spent in preparing for visit, obtaining history, exam and evaluation, patient counseling/education and post visit documentation/orders.

## 2024-02-29 ENCOUNTER — APPOINTMENT (OUTPATIENT)
Dept: RADIOLOGY | Facility: MEDICAL CENTER | Age: 74
End: 2024-02-29
Attending: PREVENTIVE MEDICINE
Payer: MEDICARE

## 2024-03-06 ENCOUNTER — OCCUPATIONAL MEDICINE (OUTPATIENT)
Dept: OCCUPATIONAL MEDICINE | Facility: CLINIC | Age: 74
End: 2024-03-06
Payer: OTHER MISCELLANEOUS

## 2024-03-06 VITALS
DIASTOLIC BLOOD PRESSURE: 82 MMHG | TEMPERATURE: 97 F | SYSTOLIC BLOOD PRESSURE: 120 MMHG | HEIGHT: 63 IN | OXYGEN SATURATION: 97 % | WEIGHT: 123.4 LBS | BODY MASS INDEX: 21.86 KG/M2 | HEART RATE: 72 BPM | RESPIRATION RATE: 16 BRPM

## 2024-03-06 DIAGNOSIS — S80.02XD CONTUSION OF LEFT KNEE, SUBSEQUENT ENCOUNTER: ICD-10-CM

## 2024-03-06 DIAGNOSIS — S80.01XD CONTUSION OF RIGHT KNEE, SUBSEQUENT ENCOUNTER: ICD-10-CM

## 2024-03-06 PROCEDURE — 99213 OFFICE O/P EST LOW 20 MIN: CPT | Performed by: PREVENTIVE MEDICINE

## 2024-03-06 PROCEDURE — 3079F DIAST BP 80-89 MM HG: CPT | Performed by: PREVENTIVE MEDICINE

## 2024-03-06 PROCEDURE — 3074F SYST BP LT 130 MM HG: CPT | Performed by: PREVENTIVE MEDICINE

## 2024-03-06 ASSESSMENT — FIBROSIS 4 INDEX: FIB4 SCORE: 1.887931034482758621
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
## 2024-03-27 ENCOUNTER — OFFICE VISIT (OUTPATIENT)
Dept: URGENT CARE | Facility: PHYSICIAN GROUP | Age: 74
End: 2024-03-27
Payer: MEDICARE

## 2024-03-27 VITALS
BODY MASS INDEX: 21.97 KG/M2 | RESPIRATION RATE: 16 BRPM | WEIGHT: 124 LBS | HEIGHT: 63 IN | SYSTOLIC BLOOD PRESSURE: 124 MMHG | TEMPERATURE: 97 F | HEART RATE: 63 BPM | DIASTOLIC BLOOD PRESSURE: 80 MMHG | OXYGEN SATURATION: 98 %

## 2024-03-27 DIAGNOSIS — J06.9 ACUTE URI: ICD-10-CM

## 2024-03-27 LAB
FLUAV RNA SPEC QL NAA+PROBE: NEGATIVE
FLUBV RNA SPEC QL NAA+PROBE: NEGATIVE
RSV RNA SPEC QL NAA+PROBE: NEGATIVE
S PYO DNA SPEC NAA+PROBE: NOT DETECTED
SARS-COV-2 RNA RESP QL NAA+PROBE: NEGATIVE

## 2024-03-27 RX ORDER — LORAZEPAM 1 MG/1
TABLET ORAL
COMMUNITY
Start: 2024-02-07

## 2024-03-27 ASSESSMENT — FIBROSIS 4 INDEX: FIB4 SCORE: 1.887931034482758621

## 2024-03-27 NOTE — PROGRESS NOTES
Chief Complaint   Patient presents with    Pharyngitis     Sx 5 days     Headache    Body Aches       HISTORY OF PRESENT ILLNESS: Patient is a pleasant 73 y.o. female who presents today due to symptoms which started four days ago. Reports cough, sore throat, nasal congestion, malaise, chills, headache, and fatigue. Denies blood in sputum, chest pain, shortness of breath, wheezing, nausea, vomiting, or diarrhea. Denies h/o asthma/copd/CAP. Has tried OTC cold/flu medications without significant relief of symptoms. No recent ABX use. No other aggravating or alleviating factors.     Patient Active Problem List    Diagnosis Date Noted    Acute cough 12/13/2023    Right hip pain 12/13/2023    Chronic frontal sinusitis 09/01/2023    Tendonitis of both wrists 09/01/2023    Primary hypertension 06/22/2023    Elevated fasting glucose 03/15/2023    Fall 12/22/2022    History of Helicobacter pylori infection 08/15/2022    Abdominal bloating 08/11/2022    Right flank pain 04/18/2022    Vaccine counseling 05/06/2021    Family history of thyroid cancer 09/21/2020    Annual physical exam 09/21/2020    Hemorrhoids 03/15/2019    Nicotine dependence with current use 09/12/2018    Gastroesophageal reflux disease without esophagitis 12/27/2017    Adjustment disorder with mixed anxiety and depressed mood 04/12/2017    Varicose vein of leg 03/21/2017    Grief reaction 02/21/2017    Interstitial cystitis 07/07/2016    Age-related osteoporosis without current pathological fracture 08/14/2015    Headache 10/03/2014    Insomnia 02/27/2014    Seasonal allergies 05/22/2013    Vitamin D deficiency 02/28/2013    Dyslipidemia 10/24/2012       Allergies:Ibuprofen, Tylenol, Asa [aspirin], Fosamax [alendronate sodium], Influenza virus vacc, Lisinopril, Meperidine, Nsaids, Pcn [penicillins], Propoxyphene, Sulfa drugs, and Voltaren [diclofenac]    Current Outpatient Medications Ordered in Epic   Medication Sig Dispense Refill    LORazepam (ATIVAN) 1 MG  Tab TAKE 1 TABLET BY MOUTH ONCE TIME DURING MRI      amLODIPine (NORVASC) 10 MG Tab Take 1 Tablet by mouth every day. For high blood pressure 90 Tablet 3    albuterol 108 (90 Base) MCG/ACT Aero Soln inhalation aerosol Inhale 2 Puffs every four hours as needed for Shortness of Breath. 1 Each 3    azelastine (ASTELIN) 137 MCG/SPRAY nasal spray Administer 1 Spray into affected nostril(S) 2 times a day. 30 mL 11    simvastatin (ZOCOR) 20 MG Tab TAKE 1 TABLET BY MOUTH EVERY EVENING 90 Tablet 3    alendronate (FOSAMAX) 70 MG Tab Take 1 Tablet by mouth every 7 days. For osteoporosis 12 Tablet 3    omeprazole (PRILOSEC) 20 MG delayed-release capsule Take 1 Capsule by mouth 2 times a day for 360 days. 180 Capsule 3    triamcinolone acetonide (KENALOG) 0.5 % Cream Apply 1g three times a day for 7 days to affected area 60 g 0    multivitamin (THERAGRAN) Tab Take 1 Tablet by mouth every day.      VITAMIN D PO Take 2,000 mcg by mouth.      CRANBERRY PO Take  by mouth.      ascorbic acid (ASCORBIC ACID) 500 MG Tab Take 1 Tablet by mouth every day.      Calcium Carbonate-Vitamin D 600-400 MG-UNIT Tab Take  by mouth 2 times a day.         No current Epic-ordered facility-administered medications on file.       Past Medical History:   Diagnosis Date    Anemia     Arrhythmia     Arthritis     Cervical cancer screening 5/22/2013    Due to repeat around 2015.    Dyslipidemia 10/24/2012    Dysuria 10/2/2014    Seen 9/27 for uti, given macrobid Feels the same as 9/27 Feels more like vaginal pain      Fall 12/22/2022    GERD (gastroesophageal reflux disease)     Heart murmur     Hyperlipidemia     Insomnia 2/27/2014    Not sleeping, can't shut brain off Making her really tired during the day X 1 month plus but worse for a month    Migraine     Primary hypertension 6/22/2023    Seasonal allergies 5/22/2013    Shingles     Thyroid disease        Social History     Tobacco Use    Smoking status: Every Day     Current packs/day: 0.25      "Average packs/day: 0.3 packs/day for 20.0 years (5.0 ttl pk-yrs)     Types: Cigarettes    Smokeless tobacco: Never   Vaping Use    Vaping Use: Never used   Substance Use Topics    Alcohol use: Yes     Alcohol/week: 4.2 oz     Types: 7 Glasses of wine per week     Comment: 1 glass of wine daily    Drug use: No     Comment: denies h/o heroin, cocaine, meth, IVDU       Family Status   Relation Name Status    Mo   at age 83        heart failure    Fa   at age 69        quad bpass, coma    Bro  Alive        handicap at a facility    Bro  Alive    Bro  Alive    MGMo      MGFa      PGMo      PGFa      Jerman  Alive     Family History   Problem Relation Age of Onset    Hypertension Mother     Hyperlipidemia Mother     Thyroid Mother     Lung Disease Mother     Heart Disease Father     Hypertension Father     Hyperlipidemia Father     Hypertension Brother     Hypertension Brother     Atrial fibrillation Brother     Stroke Maternal Grandmother     Arthritis Maternal Grandmother     Hypertension Maternal Grandfather     No Known Problems Paternal Grandmother     No Known Problems Paternal Grandfather     Thyroid cancer Daughter        ROS:  Review of Systems   Constitutional: Positive for chills, fatigue, malaise. Negative for weight loss.  HENT: Positive for congestion and sore throat. Negative for ear pain, nosebleeds, and neck pain.    Eyes: Negative for vision changes.   Cardiovascular: Negative for chest pain, palpitations, orthopnea and leg swelling.   Respiratory: Positive for cough. Negative for shortness of breath and wheezing.   Gastrointestinal: Negative for abdominal pain, nausea, vomiting or diarrhea.   Skin: Negative for rash, diaphoresis.     Exam:  /80   Pulse 63   Temp 36.1 °C (97 °F) (Temporal)   Resp 16   Ht 1.6 m (5' 3\")   Wt 56.2 kg (124 lb)   SpO2 98%   General: well-nourished, well-developed female, appears uncomfortable, non-toxic in appearance. "   Head: normocephalic, atraumatic.  Eyes: PERRLA, EOM within normal limits, no conjunctival injection, no scleral icterus, visual fields and acuity grossly intact.  Ears: normal shape and symmetry, no tenderness, no discharge. External canals are without any significant edema or erythema. Tympanic membranes are without any inflammation, no effusion. Gross auditory acuity is intact.  Nose: symmetrical without tenderness, mild discharge, erythema present bilateral nares.   Mouth/Throat: reasonable hygiene, no exudates or tonsillar enlargement. Erythema present.   Neck: no masses, range of motion within normal limits, no tracheal deviation.  Lymph: mild cervical adenopathy. No supraclavicular adenopathy.   Neuro: alert and oriented. Cranial nerves 1-12 grossly intact.   Cardiovascular: regular rate and regular rhythm without murmurs, rubs, or gallops. No edema.   Pulmonary: no distress. Chest is symmetrical with respiration, no wheezes, crackles, or rhonchi.   Musculoskeletal: appropriate muscle tone, gait is stable.  Skin: warm, dry, intact, no clubbing, no cyanosis.   Psych: appropriate mood, affect, judgement.       POC FLU/COVID/RSV negative      Assessment/Plan:  1. Acute URI  POCT GROUP A STREP, PCR    POCT CoV-2, Flu A/B, RSV by PCR              Discussed symptoms most likely viral, self limiting illness. Did not see any evidence of a bacterial process. Discussed natural progression and sx care. Rest, increase fluid intake, hand and respiratory hygiene. OTC cough/cold medications as directed.   Supportive care, differential diagnoses, and indications for immediate follow-up discussed with patient.   Pathogenesis of diagnosis discussed including typical length and natural progression.   Instructed to return to clinic or nearest emergency department for any change in condition, further concerns, or worsening of symptoms.  Patient states understanding of the plan of care and discharge instructions.  Instructed to  make an appointment, for follow up, with her primary care provider.            Please note that this dictation was created using voice recognition software. I have made every reasonable attempt to correct obvious errors, but I expect that there are errors of grammar and possibly content that I did not discover before finalizing the note.       MARTI Hood.

## 2024-03-27 NOTE — LETTER
March 27, 2024         Patient: Tanisha Haile   YOB: 1950   Date of Visit: 3/27/2024           To Whom it May Concern:    Tanisha Haile was seen in my clinic on 3/27/2024. She may be excused from any work absences this week due to illness.    If you have any questions or concerns, please don't hesitate to call.        Sincerely,           MARTI Hood.  Electronically Signed

## 2024-03-29 ENCOUNTER — APPOINTMENT (OUTPATIENT)
Dept: RADIOLOGY | Facility: MEDICAL CENTER | Age: 74
End: 2024-03-29
Attending: PREVENTIVE MEDICINE
Payer: OTHER MISCELLANEOUS

## 2024-04-03 ENCOUNTER — OCCUPATIONAL MEDICINE (OUTPATIENT)
Dept: OCCUPATIONAL MEDICINE | Facility: CLINIC | Age: 74
End: 2024-04-03
Payer: OTHER MISCELLANEOUS

## 2024-04-03 VITALS
SYSTOLIC BLOOD PRESSURE: 120 MMHG | HEIGHT: 63 IN | DIASTOLIC BLOOD PRESSURE: 80 MMHG | TEMPERATURE: 97 F | HEART RATE: 99 BPM | RESPIRATION RATE: 14 BRPM | BODY MASS INDEX: 21.79 KG/M2 | OXYGEN SATURATION: 99 % | WEIGHT: 123 LBS

## 2024-04-03 DIAGNOSIS — S80.02XD CONTUSION OF LEFT KNEE, SUBSEQUENT ENCOUNTER: ICD-10-CM

## 2024-04-03 DIAGNOSIS — S80.01XD CONTUSION OF RIGHT KNEE, SUBSEQUENT ENCOUNTER: ICD-10-CM

## 2024-04-03 PROCEDURE — 99213 OFFICE O/P EST LOW 20 MIN: CPT | Performed by: PREVENTIVE MEDICINE

## 2024-04-03 PROCEDURE — 3074F SYST BP LT 130 MM HG: CPT | Performed by: PREVENTIVE MEDICINE

## 2024-04-03 PROCEDURE — 3079F DIAST BP 80-89 MM HG: CPT | Performed by: PREVENTIVE MEDICINE

## 2024-04-03 PROCEDURE — 1125F AMNT PAIN NOTED PAIN PRSNT: CPT | Performed by: PREVENTIVE MEDICINE

## 2024-04-03 ASSESSMENT — PAIN SCALES - GENERAL: PAINLEVEL: 7=MODERATE-SEVERE PAIN

## 2024-04-03 ASSESSMENT — FIBROSIS 4 INDEX: FIB4 SCORE: 1.887931034482758621
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
## 2024-05-01 ENCOUNTER — OCCUPATIONAL MEDICINE (OUTPATIENT)
Dept: OCCUPATIONAL MEDICINE | Facility: CLINIC | Age: 74
End: 2024-05-01
Payer: OTHER MISCELLANEOUS

## 2024-05-01 VITALS
WEIGHT: 124.2 LBS | HEIGHT: 63 IN | RESPIRATION RATE: 12 BRPM | SYSTOLIC BLOOD PRESSURE: 124 MMHG | TEMPERATURE: 96.8 F | DIASTOLIC BLOOD PRESSURE: 78 MMHG | OXYGEN SATURATION: 98 % | HEART RATE: 61 BPM | BODY MASS INDEX: 22.01 KG/M2

## 2024-05-01 DIAGNOSIS — S80.01XD CONTUSION OF RIGHT KNEE, SUBSEQUENT ENCOUNTER: ICD-10-CM

## 2024-05-01 DIAGNOSIS — S80.02XD CONTUSION OF LEFT KNEE, SUBSEQUENT ENCOUNTER: ICD-10-CM

## 2024-05-01 PROCEDURE — 3074F SYST BP LT 130 MM HG: CPT | Performed by: PREVENTIVE MEDICINE

## 2024-05-01 PROCEDURE — 3078F DIAST BP <80 MM HG: CPT | Performed by: PREVENTIVE MEDICINE

## 2024-05-01 PROCEDURE — 99214 OFFICE O/P EST MOD 30 MIN: CPT | Performed by: PREVENTIVE MEDICINE

## 2024-05-01 ASSESSMENT — FIBROSIS 4 INDEX: FIB4 SCORE: 1.887931034482758621
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
## 2024-05-29 ENCOUNTER — OFFICE VISIT (OUTPATIENT)
Dept: URGENT CARE | Facility: PHYSICIAN GROUP | Age: 74
End: 2024-05-29
Payer: MEDICARE

## 2024-05-29 ENCOUNTER — OCCUPATIONAL MEDICINE (OUTPATIENT)
Dept: OCCUPATIONAL MEDICINE | Facility: CLINIC | Age: 74
End: 2024-05-29
Payer: OTHER MISCELLANEOUS

## 2024-05-29 VITALS
OXYGEN SATURATION: 98 % | HEIGHT: 63 IN | HEART RATE: 70 BPM | DIASTOLIC BLOOD PRESSURE: 78 MMHG | WEIGHT: 122.8 LBS | RESPIRATION RATE: 16 BRPM | SYSTOLIC BLOOD PRESSURE: 110 MMHG | BODY MASS INDEX: 21.76 KG/M2 | TEMPERATURE: 96.7 F

## 2024-05-29 VITALS
TEMPERATURE: 97.3 F | OXYGEN SATURATION: 100 % | BODY MASS INDEX: 20.55 KG/M2 | SYSTOLIC BLOOD PRESSURE: 110 MMHG | HEART RATE: 56 BPM | DIASTOLIC BLOOD PRESSURE: 70 MMHG | RESPIRATION RATE: 16 BRPM | WEIGHT: 116 LBS | HEIGHT: 63 IN

## 2024-05-29 DIAGNOSIS — S80.01XD CONTUSION OF RIGHT KNEE, SUBSEQUENT ENCOUNTER: ICD-10-CM

## 2024-05-29 DIAGNOSIS — S80.02XD CONTUSION OF LEFT KNEE, SUBSEQUENT ENCOUNTER: ICD-10-CM

## 2024-05-29 DIAGNOSIS — M43.6 TORTICOLLIS: ICD-10-CM

## 2024-05-29 DIAGNOSIS — S46.912A STRAIN OF LEFT SHOULDER, INITIAL ENCOUNTER: ICD-10-CM

## 2024-05-29 ASSESSMENT — FIBROSIS 4 INDEX
FIB4 SCORE: 1.91
FIB4 SCORE: 1.91
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
## 2024-05-29 NOTE — PROGRESS NOTES
Chief Complaint   Patient presents with    Shoulder Pain     Left shoulder pain. Woke up in pain on Monday      Subjective:      Chief Complaint   Patient presents with    Shoulder Pain     Left shoulder pain. Woke up in pain on Monday         Subjective:      Chief Complaint   Patient presents with    Shoulder Pain     Left shoulder pain. Woke up in pain on Monday                 This is a new problem. She awoke 3 days ago with neck, left  shoulder pain       . The problem has been unchanged. The pain is associated with - sleep position. The pain is present in the right side.  Pain is constant.    The quality of the pain is described as achy and stiff.  The pain is moderate. The symptoms are aggravated by position. Stiffness is present in the morning. Associated symptoms include: none. Pertinent negatives include no fever, headaches, numbness, pain with swallowing or tingling. Pt has tried nothing for the symptoms.         Past Medical History:   Diagnosis Date    Anemia     Arrhythmia     Arthritis     Cervical cancer screening 5/22/2013    Due to repeat around 2015.    Dyslipidemia 10/24/2012    Dysuria 10/2/2014    Seen 9/27 for uti, given macrobid Feels the same as 9/27 Feels more like vaginal pain      Fall 12/22/2022    GERD (gastroesophageal reflux disease)     Heart murmur     Hyperlipidemia     Insomnia 2/27/2014    Not sleeping, can't shut brain off Making her really tired during the day X 1 month plus but worse for a month    Migraine     Primary hypertension 6/22/2023    Seasonal allergies 5/22/2013    Shingles     Thyroid disease          Social History     Tobacco Use    Smoking status: Every Day     Current packs/day: 0.25     Average packs/day: 0.3 packs/day for 20.0 years (5.0 ttl pk-yrs)     Types: Cigarettes    Smokeless tobacco: Never   Vaping Use    Vaping status: Never Used   Substance Use Topics    Alcohol use: Yes     Alcohol/week: 4.2 oz     Types: 7 Glasses of wine per week     Comment: 1  "glass of wine daily    Drug use: No     Comment: denies h/o heroin, cocaine, meth, IVDU         Family hx was reviewed - no pertinent past family hx        Review of Systems   Constitutional: Negative for fever, chills and weight loss.   HENT - denies cough, ear pain, congestion, sore throat  Eyes: denies vision changes, discharge  Respiratory: Negative for cough and wheezing.    Cardiovascular: Negative for chest pain or PND.   Gastrointestinal:  No abdominal pain,  nausea, vomiting, diarrhea.  Negative for  blood in stool.     Neurological:  . Negative for tingling, weakness, numbnes.   musculoskeletal - denies myalgias, calf pain  Psych - denies anxiety/depression/mood changes.  Skin: no itching or rash  All other systems reviewed and are negative.           Objective:   /70   Pulse (!) 56   Temp 36.3 °C (97.3 °F) (Temporal)   Resp 16   Ht 1.6 m (5' 3\")   Wt 52.6 kg (116 lb)   SpO2 100%         Physical Exam   Constitutional: pt is oriented to person, place, and time. Pt appears well-developed and well-nourished. No distress.   HENT:   Head: Normocephalic and atraumatic.   Eyes: Conjunctivae are normal.   Cardiovascular: Normal rate and regular rhythm.    Pulmonary/Chest: Effort normal and breath sounds normal. No respiratory distress. Pt has no wheezes.   Musculoskeletal:   Left shoulder:   + anterior TTP.   Full AROM.           Cervical spine : pt exhibits decreased range of motion, tenderness and spasm (right). Pt exhibits no swelling.   Neurological: pt is alert and oriented to person, place, and time.   Strength:    Deltoid - 5/5 on right  5/5 on left     - 5/5 on right, 5/5 on left   Skin: Skin is warm. Pt is not diaphoretic. No erythema.   Psychiatric:  Pt's behavior is normal.   Nursing note and vitals reviewed.          A/P:          1. Torticollis     - Referral to Physical Therapy    2. Strain of left shoulder, initial encounter     - Referral to Physical Therapy        "

## 2024-05-30 ENCOUNTER — APPOINTMENT (OUTPATIENT)
Dept: MEDICAL GROUP | Facility: PHYSICIAN GROUP | Age: 74
End: 2024-05-30
Payer: MEDICARE

## 2024-06-13 ENCOUNTER — APPOINTMENT (OUTPATIENT)
Dept: RADIOLOGY | Facility: MEDICAL CENTER | Age: 74
End: 2024-06-13
Attending: PREVENTIVE MEDICINE
Payer: OTHER MISCELLANEOUS

## 2024-06-19 ENCOUNTER — OFFICE VISIT (OUTPATIENT)
Dept: URGENT CARE | Facility: PHYSICIAN GROUP | Age: 74
End: 2024-06-19
Payer: MEDICARE

## 2024-06-19 VITALS
RESPIRATION RATE: 16 BRPM | HEART RATE: 76 BPM | OXYGEN SATURATION: 98 % | WEIGHT: 122.8 LBS | SYSTOLIC BLOOD PRESSURE: 116 MMHG | DIASTOLIC BLOOD PRESSURE: 70 MMHG | TEMPERATURE: 97.4 F | BODY MASS INDEX: 21.76 KG/M2 | HEIGHT: 63 IN

## 2024-06-19 DIAGNOSIS — J01.11 ACUTE RECURRENT FRONTAL SINUSITIS: ICD-10-CM

## 2024-06-19 PROCEDURE — 3078F DIAST BP <80 MM HG: CPT

## 2024-06-19 PROCEDURE — 3074F SYST BP LT 130 MM HG: CPT

## 2024-06-19 PROCEDURE — 99213 OFFICE O/P EST LOW 20 MIN: CPT

## 2024-06-19 RX ORDER — DOXYCYCLINE 100 MG/1
100 CAPSULE ORAL 2 TIMES DAILY
Qty: 10 CAPSULE | Refills: 0 | Status: SHIPPED | OUTPATIENT
Start: 2024-06-19 | End: 2024-06-24

## 2024-06-19 ASSESSMENT — ENCOUNTER SYMPTOMS
VOMITING: 0
SPUTUM PRODUCTION: 0
WHEEZING: 0
MYALGIAS: 0
SORE THROAT: 0
SHORTNESS OF BREATH: 0
ABDOMINAL PAIN: 0
FEVER: 0
COUGH: 1
NAUSEA: 0
CHILLS: 1
DIARRHEA: 0
HEADACHES: 1
SINUS PAIN: 1

## 2024-06-19 ASSESSMENT — FIBROSIS 4 INDEX: FIB4 SCORE: 1.91

## 2024-06-19 NOTE — PROGRESS NOTES
"Subjective:   Tanisha Haile is a 74 y.o. female who presents for Cold Symptoms (X3 days. Nasal drainage, sneezing, Sinus headache )      Sinus Problem  This is a new problem. Episode onset: x3 days. The problem has been gradually worsening since onset. There has been no fever. Associated symptoms include chills, congestion, coughing, headaches and sneezing. Pertinent negatives include no ear pain, shortness of breath or sore throat. Past treatments include nothing.       Review of Systems   Constitutional:  Positive for chills. Negative for fever and malaise/fatigue.   HENT:  Positive for congestion, sinus pain and sneezing. Negative for ear pain, hearing loss and sore throat.    Respiratory:  Positive for cough. Negative for sputum production, shortness of breath and wheezing.    Cardiovascular:  Negative for chest pain.   Gastrointestinal:  Negative for abdominal pain, diarrhea, nausea and vomiting.   Genitourinary:  Negative for dysuria.   Musculoskeletal:  Negative for myalgias.   Skin:  Negative for rash.   Neurological:  Positive for headaches.       Medications, Allergies, and current problem list reviewed today in Epic.     Objective:     /70 (BP Location: Left arm, Patient Position: Sitting, BP Cuff Size: Small adult)   Pulse 76   Temp 36.3 °C (97.4 °F) (Temporal)   Resp 16   Ht 1.6 m (5' 3\")   Wt 55.7 kg (122 lb 12.8 oz)   SpO2 98%     Physical Exam  Vitals and nursing note reviewed.   Constitutional:       General: She is not in acute distress.     Appearance: Normal appearance. She is not ill-appearing.   HENT:      Head: Normocephalic and atraumatic.      Right Ear: Tympanic membrane normal.      Left Ear: Tympanic membrane normal.      Nose: Congestion and rhinorrhea present.      Right Sinus: Frontal sinus tenderness present. No maxillary sinus tenderness.      Left Sinus: Frontal sinus tenderness present. No maxillary sinus tenderness.      Mouth/Throat:      Mouth: Mucous " membranes are moist.      Pharynx: No oropharyngeal exudate or posterior oropharyngeal erythema.   Eyes:      Conjunctiva/sclera: Conjunctivae normal.   Cardiovascular:      Rate and Rhythm: Normal rate.      Heart sounds: Normal heart sounds.   Pulmonary:      Effort: Pulmonary effort is normal. No respiratory distress.      Breath sounds: Normal breath sounds. No wheezing.   Abdominal:      General: Abdomen is flat.      Palpations: Abdomen is soft.   Musculoskeletal:         General: Normal range of motion.      Cervical back: Normal range of motion.   Skin:     General: Skin is warm and dry.      Capillary Refill: Capillary refill takes less than 2 seconds.   Neurological:      Mental Status: She is alert and oriented to person, place, and time.   Psychiatric:         Mood and Affect: Mood normal.         Behavior: Behavior normal.       Assessment/Plan:       1. Acute non-recurrent frontal sinusitis  doxycycline (MONODOX) 100 MG capsule        After ROS and physical exam patient here with complaints of nasal congestion with sinus pressure for the past 2 days.  Patient reports that she is initially started sneezing and felt like it was allergies.  Patient then developed significant sinus pressure and headaches.  Patient does report that she has a history of sinus infections in the past and was due to see ENT but was unable to follow-up at that time.  Patient denies any fevers or nausea vomiting.  Patient used  Flonase with no relief of symptoms.  Patient concerned to take any other medications due to allergies to NSAIDs and Tylenol.  After assessment patient did have noted nasal congestion with bilateral swollen nasal turbinates.  Patient did have frontal sinus tenderness.  Patient did have noted cerumen to bilateral ear canals but not impacted at this time.  Patient was in no respiratory distress.  At this time I feel that patient's sudden onset of symptoms over the past 2 days that patient may have recurrent  frontal sinusitis.  Patient was provided doxycycline prescription due allergies to penicillin and amoxicillin in the past.  Patient was instructed to continue Flonase but also instructed to start daily nondrowsy antihistamine and sinus rinses.  Patient instructed to monitor for any worsening signs and symptoms and if any other concerns return to the urgent care or emergency department for further management.    Take full course of antibiotic  Tylenol and Motrin for fever and pain  OTC meds as discussed including oral decongestant if tolerated  Increase fluids and rest  Nasal spray, nasal rinse/wash, louis pot    Differential diagnosis, natural history, and supportive care discussed. We also reviewed side effects of medication including allergic response, GI upset, tendon injury, rash, sedation etc. Patient and/or guardian voices understanding.      Advised the patient to follow-up with the primary care physician for recheck, reevaluation, and consideration of further management.    I personally reviewed prior external notes and test results pertinent to today's visit as well as additional imaging and testing completed in clinic today.     Please note that this dictation was created using voice recognition software. I have made every reasonable attempt to correct obvious errors, but I expect that there are errors of grammar and possibly content that I did not discover before finalizing the note.    This note was electronically signed by ALAN Trinh

## 2024-06-19 NOTE — LETTER
[facility] [address] [address]     June 19, 2024    Patient: Tanisha Haile   YOB: 1950   Date of Visit: 6/19/2024       To Whom It May Concern:    Tanisha Haile was seen and treated in our department on 6/19/2024. Please excuse Patient for recent illness. Patient able to return to work on Saturday 6/22/24.     Sincerely,     MARTI Ross.

## 2024-07-02 ENCOUNTER — OCCUPATIONAL MEDICINE (OUTPATIENT)
Dept: OCCUPATIONAL MEDICINE | Facility: CLINIC | Age: 74
End: 2024-07-02
Payer: OTHER MISCELLANEOUS

## 2024-07-02 VITALS
OXYGEN SATURATION: 93 % | SYSTOLIC BLOOD PRESSURE: 140 MMHG | HEIGHT: 63 IN | DIASTOLIC BLOOD PRESSURE: 78 MMHG | HEART RATE: 70 BPM | BODY MASS INDEX: 21.62 KG/M2 | RESPIRATION RATE: 18 BRPM | WEIGHT: 122 LBS

## 2024-07-02 DIAGNOSIS — S80.02XD CONTUSION OF LEFT KNEE, SUBSEQUENT ENCOUNTER: ICD-10-CM

## 2024-07-02 DIAGNOSIS — S80.01XD CONTUSION OF RIGHT KNEE, SUBSEQUENT ENCOUNTER: ICD-10-CM

## 2024-07-02 PROCEDURE — 3078F DIAST BP <80 MM HG: CPT | Performed by: PREVENTIVE MEDICINE

## 2024-07-02 PROCEDURE — 3077F SYST BP >= 140 MM HG: CPT | Performed by: PREVENTIVE MEDICINE

## 2024-07-02 PROCEDURE — 99214 OFFICE O/P EST MOD 30 MIN: CPT | Performed by: PREVENTIVE MEDICINE

## 2024-07-02 ASSESSMENT — FIBROSIS 4 INDEX: FIB4 SCORE: 1.91

## 2024-07-22 ENCOUNTER — HOSPITAL ENCOUNTER (OUTPATIENT)
Dept: RADIOLOGY | Facility: MEDICAL CENTER | Age: 74
End: 2024-07-22
Attending: PREVENTIVE MEDICINE
Payer: OTHER MISCELLANEOUS

## 2024-07-22 DIAGNOSIS — S80.02XD CONTUSION OF LEFT KNEE, SUBSEQUENT ENCOUNTER: ICD-10-CM

## 2024-07-22 PROCEDURE — 73700 CT LOWER EXTREMITY W/O DYE: CPT | Mod: LT

## 2024-08-01 ENCOUNTER — APPOINTMENT (OUTPATIENT)
Dept: MEDICAL GROUP | Facility: PHYSICIAN GROUP | Age: 74
End: 2024-08-01
Payer: MEDICARE

## 2024-08-01 VITALS
DIASTOLIC BLOOD PRESSURE: 80 MMHG | RESPIRATION RATE: 21 BRPM | WEIGHT: 121 LBS | HEIGHT: 64 IN | OXYGEN SATURATION: 98 % | BODY MASS INDEX: 20.66 KG/M2 | TEMPERATURE: 97.6 F | SYSTOLIC BLOOD PRESSURE: 126 MMHG | HEART RATE: 77 BPM

## 2024-08-01 DIAGNOSIS — Z12.12 SCREENING FOR COLORECTAL CANCER: ICD-10-CM

## 2024-08-01 DIAGNOSIS — F17.200 NICOTINE DEPENDENCE WITH CURRENT USE: ICD-10-CM

## 2024-08-01 DIAGNOSIS — I10 PRIMARY HYPERTENSION: ICD-10-CM

## 2024-08-01 DIAGNOSIS — E55.9 VITAMIN D DEFICIENCY: ICD-10-CM

## 2024-08-01 DIAGNOSIS — E78.5 DYSLIPIDEMIA: ICD-10-CM

## 2024-08-01 DIAGNOSIS — H02.9 EYELID ABNORMALITY: ICD-10-CM

## 2024-08-01 DIAGNOSIS — R21 RASH: ICD-10-CM
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
## 2024-08-01 DIAGNOSIS — J32.9 RECURRENT SINUSITIS: ICD-10-CM

## 2024-08-01 DIAGNOSIS — Z12.11 SCREENING FOR COLORECTAL CANCER: ICD-10-CM

## 2024-08-01 DIAGNOSIS — F41.9 ANXIETY: ICD-10-CM

## 2024-08-01 DIAGNOSIS — K21.9 GASTROESOPHAGEAL REFLUX DISEASE WITHOUT ESOPHAGITIS: ICD-10-CM

## 2024-08-01 DIAGNOSIS — R73.01 ELEVATED FASTING GLUCOSE: ICD-10-CM

## 2024-08-01 PROCEDURE — 3074F SYST BP LT 130 MM HG: CPT | Performed by: FAMILY MEDICINE

## 2024-08-01 PROCEDURE — 3079F DIAST BP 80-89 MM HG: CPT | Performed by: FAMILY MEDICINE

## 2024-08-01 PROCEDURE — 99214 OFFICE O/P EST MOD 30 MIN: CPT | Performed by: FAMILY MEDICINE

## 2024-08-01 RX ORDER — TRIAMCINOLONE ACETONIDE 5 MG/G
CREAM TOPICAL
Qty: 60 G | Refills: 5 | Status: SHIPPED | OUTPATIENT
Start: 2024-08-01

## 2024-08-01 RX ORDER — ALENDRONATE SODIUM 70 MG/1
70 TABLET ORAL
Qty: 12 TABLET | Refills: 3 | Status: SHIPPED | OUTPATIENT
Start: 2024-08-01

## 2024-08-01 RX ORDER — BUPROPION HYDROCHLORIDE 150 MG/1
150 TABLET, EXTENDED RELEASE ORAL 2 TIMES DAILY
Qty: 180 TABLET | Refills: 3 | Status: SHIPPED | OUTPATIENT
Start: 2024-08-01

## 2024-08-01 RX ORDER — FLUTICASONE PROPIONATE 50 MCG
1 SPRAY, SUSPENSION (ML) NASAL DAILY
Qty: 16 G | Refills: 11 | Status: SHIPPED | OUTPATIENT
Start: 2024-08-01

## 2024-08-01 RX ORDER — SIMVASTATIN 20 MG
TABLET ORAL
Qty: 90 TABLET | Refills: 3 | Status: SHIPPED | OUTPATIENT
Start: 2024-08-01

## 2024-08-01 ASSESSMENT — PATIENT HEALTH QUESTIONNAIRE - PHQ9: CLINICAL INTERPRETATION OF PHQ2 SCORE: 0

## 2024-08-01 ASSESSMENT — FIBROSIS 4 INDEX: FIB4 SCORE: 1.91

## 2024-08-01 NOTE — ASSESSMENT & PLAN NOTE
Takes omeprazole 20 mg occasionally for GERD symptoms.   Encouraged to quit smoking completely.   Has no dysphagia or hemoptysis

## 2024-08-01 NOTE — H&P
Subjective:   Tanisha Haile is a 74 y.o. female here today for evaluation and management of:     Primary hypertension  Controlled on amlodipine 10 mg  Pt denies chest pain, LE edema.     Nicotine dependence with current use  Encouraged to quit smoking completely. She has attempted to quit in the past. Was offered patches, Bupropion, and varenicline.    Gastroesophageal reflux disease without esophagitis  Takes omeprazole 20 mg occasionally for GERD symptoms. Pt says the symptoms don't occur following meals and usually only happens when bending over.      Eyelid abnormality  Has plastic surgery consultation on Monday 8/5 to treat eyelashes on R eye that are rubbing, prevention of corneal abrasion    Fall  No recent falls in last few months. Had a fall when dark and tripped over pallets at work.   Has no chest pain, syncope or dizziness.          Current medicines (including changes today)  Current Outpatient Medications   Medication Sig Dispense Refill    alendronate (FOSAMAX) 70 MG Tab Take 1 Tablet by mouth every 7 days. For osteoporosis 12 Tablet 3    simvastatin (ZOCOR) 20 MG Tab TAKE 1 TABLET BY MOUTH EVERY EVENING 90 Tablet 3    triamcinolone acetonide (KENALOG) 0.5 % Cream Apply 1g three times a day for 7 days to affected area 60 g 5    fluticasone (FLONASE) 50 MCG/ACT nasal spray Administer 1 Spray into affected nostril(S) every day. 16 g 11    buPROPion SR (WELLBUTRIN-SR) 150 MG TABLET SR 12 HR sustained-release tablet Take 1 Tablet by mouth 2 times a day. For tobacco cessation 180 Tablet 3    LORazepam (ATIVAN) 1 MG Tab TAKE 1 TABLET BY MOUTH ONCE TIME DURING MRI      amLODIPine (NORVASC) 10 MG Tab Take 1 Tablet by mouth every day. For high blood pressure 90 Tablet 3    albuterol 108 (90 Base) MCG/ACT Aero Soln inhalation aerosol Inhale 2 Puffs every four hours as needed for Shortness of Breath. 1 Each 3    azelastine (ASTELIN) 137 MCG/SPRAY nasal spray Administer 1 Spray into affected nostril(S) 2  "times a day. 30 mL 11    multivitamin (THERAGRAN) Tab Take 1 Tablet by mouth every day.      VITAMIN D PO Take 2,000 mcg by mouth.      CRANBERRY PO Take  by mouth.      ascorbic acid (ASCORBIC ACID) 500 MG Tab Take 1 Tablet by mouth every day.      Calcium Carbonate-Vitamin D 600-400 MG-UNIT Tab Take  by mouth 2 times a day.         No current facility-administered medications for this visit.     She  has a past medical history of Anemia, Arrhythmia, Arthritis, Cervical cancer screening (5/22/2013), Dyslipidemia (10/24/2012), Dysuria (10/2/2014), Fall (12/22/2022), GERD (gastroesophageal reflux disease), Heart murmur, Hyperlipidemia, Insomnia (2/27/2014), Migraine, Primary hypertension (6/22/2023), Seasonal allergies (5/22/2013), Shingles, and Thyroid disease.    She has no past medical history of Anxiety, Asthma, Breast cancer (HCC), Cancer (HCC), Cataract, CHF (congestive heart failure) (HCC), Clotting disorder (HCC), COPD (chronic obstructive pulmonary disease) (HCC), Depression, Diabetes (HCC), Heart attack (HCC), HIV (human immunodeficiency virus infection) (HCC), Kidney disease, Seizure (HCC), Stroke (HCC), or Substance abuse (HCC).    ROS  No chest pain, no shortness of breath, no abdominal pain       Objective:     /80   Pulse 77   Temp 36.4 °C (97.6 °F) (Temporal)   Resp (!) 21   Ht 1.613 m (5' 3.5\")   Wt 54.9 kg (121 lb)   SpO2 98%  Body mass index is 21.1 kg/m².   Physical Exam:  Constitutional: Alert, no distress.  Skin: Warm, dry, good turgor, no rashes in visible areas.  Eye: Equal, round and reactive, conjunctiva clear, lids normal.  ENMT: Lips without lesions, good dentition, oropharynx clear. Minimal decrease in R maxillary sinus illumination. Waxy plug noted in each auditory canal.   Neck: Trachea midline, no masses, no thyromegaly. No cervical or supraclavicular lymphadenopathy  Respiratory: Unlabored respiratory effort, lungs clear to auscultation, no wheezes, no " scarlet.  Cardiovascular: Normal S1, S2, no murmur, no edema.  Psych: Alert and oriented x3, normal affect and mood.    Assessment and Plan:   The following treatment plan was discussed    1. Primary hypertension    2. Nicotine dependence with current use  - Consider a referral to Psychology    3. Gastroesophageal reflux disease without esophagitis  - Consider ordering a CBC  -Discussed taking her omeprazole as needed.    4. Dyslipidemia  - Consider ordering a CMP and lipid panel to assess status   - Discussed refilling her simvastatin (ZOCOR) 20 MG Tab    5. Eyelid abnormality  -Examined and discussed her eyelashes rubbing on her R eye. She has a f/u with eye plastic surgery on Monday, wrote down the office phone number for her.     6. Anxiety  - Consider a referral to Psychology    7. Rash  - Discussed refilling her triamcinolone acetonide (KENALOG) 0.5 % Cream    8. Screening for colorectal cancer  - Discussed ordering COLOGUARD (FIT DNA) as opposed to a colonoscopy.    9. Recurrent sinusitis  - Discussed varying factors that can lead to sinusitis; bacterial, viral, environmental etc.  -Discussed the use of a nasal rinse bottle, pt agreed with plan to try that to help.   -Consider a referral to ENT    Other orders  - alendronate (FOSAMAX) 70 MG Tab; Take 1 Tablet by mouth every 7 days. For osteoporosis  Dispense: 12 Tablet; Refill: 3  - fluticasone (FLONASE) 50 MCG/ACT nasal spray; Administer 1 Spray into affected nostril(S) every day.  Dispense: 16 g; Refill: 11  - buPROPion SR (WELLBUTRIN-SR) 150 MG TABLET SR 12 HR sustained-release tablet; Take 1 Tablet by mouth 2 times a day. For tobacco cessation  Dispense: 180 Tablet; Refill: 3      Followup: Return in about 6 months (around 2/1/2025) for Lab Review.

## 2024-08-01 NOTE — LETTER
August 1, 2024    To Whom It May Concern:         This is confirmation that Tanisha Foster Mic attended her scheduled appointment with Cristy Gleason M.D. on 8/01/24.         If you have any questions please do not hesitate to call me at the phone number listed below.    Sincerely,          Cristy Gleason M.D  [phone]

## 2024-08-01 NOTE — PROGRESS NOTES
Subjective:   Tanisha Haile is a 74 y.o. female here today for evaluation and management of:     Primary hypertension  Controlled on amlodipine 10 mg  Pt has no chest pain, LE edema.   GFR 94    Nicotine dependence with current use  Encouraged to quit smoking completely.  She has been actively trying to quit, went to small thin cigarettes   Offered chantix, wellbutrin, patches    Gastroesophageal reflux disease without esophagitis  Takes omeprazole 20 mg occasionally for GERD symptoms.   Encouraged to quit smoking completely.   Has no dysphagia or hemoptysis    Eyelid abnormality  Has plastic surgery eval Mon to treat lashes right eye to prevent corneal abrasion.     Fall  Had a fall when dark and tripped over pallets at work.   Has no chest pain, syncope or dizziness.      recurrent sinusitis, some UC visits  Advised to start flonase, nasal saline irrigation    Current medicines (including changes today)  Current Outpatient Medications   Medication Sig Dispense Refill    alendronate (FOSAMAX) 70 MG Tab Take 1 Tablet by mouth every 7 days. For osteoporosis 12 Tablet 3    simvastatin (ZOCOR) 20 MG Tab TAKE 1 TABLET BY MOUTH EVERY EVENING 90 Tablet 3    triamcinolone acetonide (KENALOG) 0.5 % Cream Apply 1g three times a day for 7 days to affected area 60 g 5    fluticasone (FLONASE) 50 MCG/ACT nasal spray Administer 1 Spray into affected nostril(S) every day. 16 g 11    LORazepam (ATIVAN) 1 MG Tab TAKE 1 TABLET BY MOUTH ONCE TIME DURING MRI      amLODIPine (NORVASC) 10 MG Tab Take 1 Tablet by mouth every day. For high blood pressure 90 Tablet 3    albuterol 108 (90 Base) MCG/ACT Aero Soln inhalation aerosol Inhale 2 Puffs every four hours as needed for Shortness of Breath. 1 Each 3    azelastine (ASTELIN) 137 MCG/SPRAY nasal spray Administer 1 Spray into affected nostril(S) 2 times a day. 30 mL 11    multivitamin (THERAGRAN) Tab Take 1 Tablet by mouth every day.      VITAMIN D PO Take 2,000 mcg by mouth.       "CRANBERRY PO Take  by mouth.      ascorbic acid (ASCORBIC ACID) 500 MG Tab Take 1 Tablet by mouth every day.      Calcium Carbonate-Vitamin D 600-400 MG-UNIT Tab Take  by mouth 2 times a day.         No current facility-administered medications for this visit.     She  has a past medical history of Anemia, Arrhythmia, Arthritis, Cervical cancer screening (5/22/2013), Dyslipidemia (10/24/2012), Dysuria (10/2/2014), Fall (12/22/2022), GERD (gastroesophageal reflux disease), Heart murmur, Hyperlipidemia, Insomnia (2/27/2014), Migraine, Primary hypertension (6/22/2023), Seasonal allergies (5/22/2013), Shingles, and Thyroid disease.    She has no past medical history of Anxiety, Asthma, Breast cancer (HCC), Cancer (HCC), Cataract, CHF (congestive heart failure) (HCC), Clotting disorder (HCC), COPD (chronic obstructive pulmonary disease) (HCC), Depression, Diabetes (HCC), Heart attack (HCC), HIV (human immunodeficiency virus infection) (HCC), Kidney disease, Seizure (HCC), Stroke (HCC), or Substance abuse (HCC).    ROS  No chest pain, no shortness of breath, no abdominal pain       Objective:     /80   Pulse 77   Temp 36.4 °C (97.6 °F) (Temporal)   Resp (!) 21   Ht 1.613 m (5' 3.5\")   Wt 54.9 kg (121 lb)   SpO2 98%  Body mass index is 21.1 kg/m².   Physical Exam:  Constitutional: Alert, no distress.  Skin: Warm, dry, good turgor, no rashes in visible areas.  Eye: Equal, round and reactive, conjunctiva clear, lids normal.  ENMT: Lips without lesions, good dentition, oropharynx clear.  Neck: Trachea midline, no masses, no thyromegaly. No cervical or supraclavicular lymphadenopathy  Respiratory: Unlabored respiratory effort, lungs clear to auscultation, no wheezes, no ronchi.  Cardiovascular: Normal S1, S2, no murmur, no edema.  Abdomen: Soft, non-tender, no masses, no hepatosplenomegaly.  Psych: Alert and oriented x3, normal affect and mood.    Assessment and Plan:   The following treatment plan was " discussed    1. Primary hypertension    2. Nicotine dependence with current use  - Referral to Psychology    3. Gastroesophageal reflux disease without esophagitis  - CBC WITH DIFFERENTIAL; Future    4. Dyslipidemia  - Comp Metabolic Panel; Future  - Lipid Profile; Future  - simvastatin (ZOCOR) 20 MG Tab; TAKE 1 TABLET BY MOUTH EVERY EVENING  Dispense: 90 Tablet; Refill: 3    5. Elevated fasting glucose  - HEMOGLOBIN A1C; Future    6. Vitamin D deficiency  - VITAMIN D,25 HYDROXY (DEFICIENCY); Future    7. Eyelid abnormality    8. Fall, subsequent encounter  - Patient identified as fall risk.  Appropriate orders and counseling given.    9. Anxiety  - Referral to Psychology    10. Rash  - triamcinolone acetonide (KENALOG) 0.5 % Cream; Apply 1g three times a day for 7 days to affected area  Dispense: 60 g; Refill: 5    11. Screening for colorectal cancer  - SALINAS (FIT DNA)    12. Recurrent sinusitis  - Referral to ENT    Other orders  - alendronate (FOSAMAX) 70 MG Tab; Take 1 Tablet by mouth every 7 days. For osteoporosis  Dispense: 12 Tablet; Refill: 3  - fluticasone (FLONASE) 50 MCG/ACT nasal spray; Administer 1 Spray into affected nostril(S) every day.  Dispense: 16 g; Refill: 11      Followup: Return in about 6 months (around 2/1/2025) for Lab Review.

## 2024-08-01 NOTE — ASSESSMENT & PLAN NOTE
Had a fall when dark and tripped over pallets at work.   Has no chest pain, syncope or dizziness.

## 2024-08-01 NOTE — ASSESSMENT & PLAN NOTE
Encouraged to quit smoking completely.  She has been actively trying to quit, went to small thin cigarettes   Offered chantix, wellbutrin, patches

## 2024-08-02 ENCOUNTER — DOCUMENTATION (OUTPATIENT)
Dept: MEDICAL GROUP | Facility: MEDICAL CENTER | Age: 74
End: 2024-08-02
Payer: MEDICARE

## 2024-08-02 ENCOUNTER — HOSPITAL ENCOUNTER (OUTPATIENT)
Dept: LAB | Facility: MEDICAL CENTER | Age: 74
End: 2024-08-02
Attending: FAMILY MEDICINE
Payer: MEDICARE

## 2024-08-02 DIAGNOSIS — E78.5 DYSLIPIDEMIA: ICD-10-CM

## 2024-08-02 DIAGNOSIS — K21.9 GASTROESOPHAGEAL REFLUX DISEASE WITHOUT ESOPHAGITIS: ICD-10-CM

## 2024-08-02 DIAGNOSIS — R73.01 ELEVATED FASTING GLUCOSE: ICD-10-CM

## 2024-08-02 DIAGNOSIS — E55.9 VITAMIN D DEFICIENCY: ICD-10-CM

## 2024-08-02 LAB
25(OH)D3 SERPL-MCNC: 178 NG/ML (ref 30–100)
ALBUMIN SERPL BCP-MCNC: 4.1 G/DL (ref 3.2–4.9)
ALBUMIN/GLOB SERPL: 1.6 G/DL
ALP SERPL-CCNC: 77 U/L (ref 30–99)
ALT SERPL-CCNC: 15 U/L (ref 2–50)
ANION GAP SERPL CALC-SCNC: 9 MMOL/L (ref 7–16)
AST SERPL-CCNC: 24 U/L (ref 12–45)
BASOPHILS # BLD AUTO: 0.6 % (ref 0–1.8)
BASOPHILS # BLD: 0.04 K/UL (ref 0–0.12)
BILIRUB SERPL-MCNC: 0.5 MG/DL (ref 0.1–1.5)
BUN SERPL-MCNC: 17 MG/DL (ref 8–22)
CALCIUM ALBUM COR SERPL-MCNC: 9.4 MG/DL (ref 8.5–10.5)
CALCIUM SERPL-MCNC: 9.5 MG/DL (ref 8.5–10.5)
CHLORIDE SERPL-SCNC: 104 MMOL/L (ref 96–112)
CHOLEST SERPL-MCNC: 168 MG/DL (ref 100–199)
CO2 SERPL-SCNC: 28 MMOL/L (ref 20–33)
CREAT SERPL-MCNC: 0.59 MG/DL (ref 0.5–1.4)
EOSINOPHIL # BLD AUTO: 0.52 K/UL (ref 0–0.51)
EOSINOPHIL NFR BLD: 7.8 % (ref 0–6.9)
ERYTHROCYTE [DISTWIDTH] IN BLOOD BY AUTOMATED COUNT: 45.6 FL (ref 35.9–50)
EST. AVERAGE GLUCOSE BLD GHB EST-MCNC: 108 MG/DL
FASTING STATUS PATIENT QL REPORTED: NORMAL
GFR SERPLBLD CREATININE-BSD FMLA CKD-EPI: 94 ML/MIN/1.73 M 2
GLOBULIN SER CALC-MCNC: 2.5 G/DL (ref 1.9–3.5)
GLUCOSE SERPL-MCNC: 89 MG/DL (ref 65–99)
HBA1C MFR BLD: 5.4 % (ref 4–5.6)
HCT VFR BLD AUTO: 41 % (ref 37–47)
HDLC SERPL-MCNC: 94 MG/DL
HGB BLD-MCNC: 13.5 G/DL (ref 12–16)
IMM GRANULOCYTES # BLD AUTO: 0.01 K/UL (ref 0–0.11)
IMM GRANULOCYTES NFR BLD AUTO: 0.1 % (ref 0–0.9)
LDLC SERPL CALC-MCNC: 66 MG/DL
LYMPHOCYTES # BLD AUTO: 1.51 K/UL (ref 1–4.8)
LYMPHOCYTES NFR BLD: 22.6 % (ref 22–41)
MCH RBC QN AUTO: 31.1 PG (ref 27–33)
MCHC RBC AUTO-ENTMCNC: 32.9 G/DL (ref 32.2–35.5)
MCV RBC AUTO: 94.5 FL (ref 81.4–97.8)
MONOCYTES # BLD AUTO: 0.53 K/UL (ref 0–0.85)
MONOCYTES NFR BLD AUTO: 7.9 % (ref 0–13.4)
NEUTROPHILS # BLD AUTO: 4.06 K/UL (ref 1.82–7.42)
NEUTROPHILS NFR BLD: 61 % (ref 44–72)
NRBC # BLD AUTO: 0 K/UL
NRBC BLD-RTO: 0 /100 WBC (ref 0–0.2)
PLATELET # BLD AUTO: 261 K/UL (ref 164–446)
PMV BLD AUTO: 9.6 FL (ref 9–12.9)
POTASSIUM SERPL-SCNC: 4.1 MMOL/L (ref 3.6–5.5)
PROT SERPL-MCNC: 6.6 G/DL (ref 6–8.2)
RBC # BLD AUTO: 4.34 M/UL (ref 4.2–5.4)
SODIUM SERPL-SCNC: 141 MMOL/L (ref 135–145)
TRIGL SERPL-MCNC: 42 MG/DL (ref 0–149)
WBC # BLD AUTO: 6.7 K/UL (ref 4.8–10.8)

## 2024-08-02 PROCEDURE — 85025 COMPLETE CBC W/AUTO DIFF WBC: CPT

## 2024-08-02 PROCEDURE — 80053 COMPREHEN METABOLIC PANEL: CPT

## 2024-08-02 PROCEDURE — 36415 COLL VENOUS BLD VENIPUNCTURE: CPT | Mod: GA

## 2024-08-02 PROCEDURE — 82306 VITAMIN D 25 HYDROXY: CPT

## 2024-08-02 PROCEDURE — 83036 HEMOGLOBIN GLYCOSYLATED A1C: CPT | Mod: GA

## 2024-08-02 PROCEDURE — 80061 LIPID PANEL: CPT

## 2024-08-02 NOTE — Clinical Note
I was paged for critical value of vit D. I told her to stop for the week and would notify you. Seemed like she was taking multiple vitamins with some vitamin D in it.

## 2024-08-03 NOTE — PROGRESS NOTES
After hours phone call  Call on 8/2/2024 at 10:16 PM from Tanisha Haile [phone] (home) [phone] (work) who reports PCP: Cristy Gleason M.D..  Pt reports: paged for critical lab value of vitamin D 178  Pt called, she was notified of very elevated vitamin D levels.   She reported some polydipsia but no confusion or anorexia.   She was taking vit D 5000IU, calcium supplement with vit D, multivit with vit D    Plan:  Stop vitamin D for at least a week  Notify PCP and likely need to recheck vit D levels.   ER precautions given      Nadege Brooke M.D.

## 2024-08-13 ENCOUNTER — OCCUPATIONAL MEDICINE (OUTPATIENT)
Dept: OCCUPATIONAL MEDICINE | Facility: CLINIC | Age: 74
End: 2024-08-13
Payer: OTHER MISCELLANEOUS

## 2024-08-13 VITALS
DIASTOLIC BLOOD PRESSURE: 80 MMHG | HEART RATE: 73 BPM | HEIGHT: 63 IN | OXYGEN SATURATION: 97 % | BODY MASS INDEX: 21.26 KG/M2 | RESPIRATION RATE: 14 BRPM | SYSTOLIC BLOOD PRESSURE: 126 MMHG | WEIGHT: 120 LBS | TEMPERATURE: 98.6 F

## 2024-08-13 DIAGNOSIS — S80.01XD CONTUSION OF RIGHT KNEE, SUBSEQUENT ENCOUNTER: ICD-10-CM

## 2024-08-13 DIAGNOSIS — S80.02XD CONTUSION OF LEFT KNEE, SUBSEQUENT ENCOUNTER: ICD-10-CM

## 2024-08-13 PROCEDURE — 99213 OFFICE O/P EST LOW 20 MIN: CPT | Performed by: PREVENTIVE MEDICINE

## 2024-08-13 ASSESSMENT — PAIN SCALES - GENERAL: PAINLEVEL: 5=MODERATE PAIN

## 2024-08-13 ASSESSMENT — FIBROSIS 4 INDEX: FIB4 SCORE: 1.76
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
## 2024-08-13 NOTE — LETTER
PHYSICIAN’S AND CHIROPRACTIC PHYSICIAN'S   PROGRESS REPORT CERTIFICATION OF DISABILITY Claim Number: [id number]    Social Security Number:    Patient’s Name:Tanisha Haile Date of Injury:11/25/2023   Employer:  [employer] Name of MCO (if applicable)      Patient’s Job Description/Occupation:        Previous Injuries/Diseases/Surgeries Contributing to the Condition:         Diagnosis:  (S80.02XD) Contusion of left knee, subsequent encounter  (S80.01XD) Contusion of right knee, subsequent encounter      Related to the Industrial Injury? Yes     Explain:       Objective Medical Findings:CT Left Knee: Moderate DJD. No acute injury identified. Varicose veins.    Left knee: No gross deformity. Tenderness over the anterior and lateral knee diffusely.         None - Discharged                         Stable  No                 Ratable  No       Generally Improved                        Condition Worsened               X  Condition Same  May Have Suffered a Permanent Disability  No     Treatment Plan:    Referral for more PT, to do in [place]  Case will be at [facility] following the completion of 6 weeks of PT  OTC Tylenol, heat/ice and OTC creams as needed        No Change in Therapy               X  PT/OT Prescribed                     Medication May be Used While Working       Case Management                        PT/OT Discontinued    Consultation    Further Diagnostic Studies:                               Prescription(s)                             Released to FULL DUTY /No Restrictions on (Date):  From:      Certified TOTALLY TEMPORARILY DISABLED (Indicate Dates) From:   To:    X  Released to RESTRICTED/Modified Duty on (Date): From: 8/13/2024 To: 9/10/2024                                                               Restrictions Are:  Temporary     No Sitting                               No Standing                   No Pulling                  Other:      No Bending at Waist           No  Stooping                    No Lifting       No Carrying                           No Walking                Lifting Restricted to (lbs.):  < or = to 50 pounds    No Pushing                            No Climbing                   No Reaching Above Shoulders   Date of Next Visit:  9/10/2024  @ 11:15 AM Date of this Exam:8/13/2024 Physician/Chiropractic Physician Name:Fabian Gamez D.O. Physician/Chiropractic Physician Signature:  Fabian Gamez DO MPH   D-39 (Rev. 2/24)

## 2024-09-10 ENCOUNTER — OCCUPATIONAL MEDICINE (OUTPATIENT)
Dept: OCCUPATIONAL MEDICINE | Facility: CLINIC | Age: 74
End: 2024-09-10
Payer: OTHER MISCELLANEOUS

## 2024-09-10 VITALS
OXYGEN SATURATION: 97 % | BODY MASS INDEX: 21.62 KG/M2 | TEMPERATURE: 97 F | DIASTOLIC BLOOD PRESSURE: 80 MMHG | RESPIRATION RATE: 16 BRPM | SYSTOLIC BLOOD PRESSURE: 140 MMHG | HEART RATE: 61 BPM | HEIGHT: 63 IN | WEIGHT: 122 LBS

## 2024-09-10 DIAGNOSIS — S80.01XD CONTUSION OF RIGHT KNEE, SUBSEQUENT ENCOUNTER: ICD-10-CM

## 2024-09-10 DIAGNOSIS — S80.02XD CONTUSION OF LEFT KNEE, SUBSEQUENT ENCOUNTER: ICD-10-CM

## 2024-09-10 PROCEDURE — 1125F AMNT PAIN NOTED PAIN PRSNT: CPT | Performed by: PREVENTIVE MEDICINE

## 2024-09-10 PROCEDURE — 3077F SYST BP >= 140 MM HG: CPT | Performed by: PREVENTIVE MEDICINE

## 2024-09-10 PROCEDURE — 3079F DIAST BP 80-89 MM HG: CPT | Performed by: PREVENTIVE MEDICINE

## 2024-09-10 PROCEDURE — 99213 OFFICE O/P EST LOW 20 MIN: CPT | Performed by: PREVENTIVE MEDICINE

## 2024-09-10 ASSESSMENT — FIBROSIS 4 INDEX: FIB4 SCORE: 1.76

## 2024-09-10 ASSESSMENT — PAIN SCALES - GENERAL: PAINLEVEL: 5=MODERATE PAIN

## 2024-09-10 NOTE — LETTER
PHYSICIAN’S AND CHIROPRACTIC PHYSICIAN'S   PROGRESS REPORT CERTIFICATION OF DISABILITY Claim Number:     Social Security Number:    Patient’s Name: Tanisha Haile Date of Injury: 11/25/2023   Employer:  [employer]  Name of MCO (if applicable):      Patient’s Job Description/Occupation:        Previous Injuries/Diseases/Surgeries Contributing to the Condition:         Diagnosis: (S80.02XD) Contusion of left knee, subsequent encounter  (S80.01XD) Contusion of right knee, subsequent encounter      Related to the Industrial Injury? Yes     Explain:        Objective Medical Findings: CT Left Knee: Moderate DJD. No acute injury identified. Varicose veins.  Left knee: No gross deformity. Tenderness over the anterior and lateral knee diffusely.            None - Discharged                         Stable  No                 Ratable  No        Generally Improved                         Condition Worsened               X   Condition Same  May Have Suffered a Permanent Disability No     Treatment Plan:    Begin PT as scheduled  Referral to Ortho         No Change in Therapy                  PT/OT Prescribed                      Medication May be Used While Working        Case Management                          PT/OT Discontinued    Consultation    Further Diagnostic Studies:    Prescription(s)                 Released to FULL DUTY /No Restrictions on (Date):  From:      Certified TOTALLY TEMPORARILY DISABLED (Indicate Dates) From:   To:    X  Released to RESTRICTED/Modified Duty on (Date): From: 9/10/2024 To: 10/15/2024  Restrictions Are:         No Sitting    No Standing    No Pulling Other:         No Bending at Waist     No Stooping     No Lifting        No Carrying     No Walking Lifting Restricted to (lbs.):  < or = to 50 pounds       No Pushing        No Climbing     No Reaching Above Shoulders       Date of Next Visit:  10/15/2024  @11:00 am  Date of this Exam: 9/10/2024  Physician/Chiropractic Physician Name: Fabian Gamez D.O. Physician/Chiropractic Physician Signature:  Fabian Gamez DO MPH                                                                                                                                                                                                            D-39 (Rev. 2/24)
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
## 2024-09-23 ENCOUNTER — OFFICE VISIT (OUTPATIENT)
Dept: URGENT CARE | Facility: PHYSICIAN GROUP | Age: 74
End: 2024-09-23
Payer: MEDICARE

## 2024-09-23 VITALS
HEIGHT: 64 IN | OXYGEN SATURATION: 98 % | WEIGHT: 122 LBS | TEMPERATURE: 97.5 F | BODY MASS INDEX: 20.83 KG/M2 | RESPIRATION RATE: 14 BRPM | HEART RATE: 86 BPM | DIASTOLIC BLOOD PRESSURE: 76 MMHG | SYSTOLIC BLOOD PRESSURE: 118 MMHG

## 2024-09-23 DIAGNOSIS — H61.21 IMPACTED CERUMEN OF RIGHT EAR: ICD-10-CM

## 2024-09-23 PROCEDURE — 69210 REMOVE IMPACTED EAR WAX UNI: CPT | Performed by: FAMILY MEDICINE

## 2024-09-23 PROCEDURE — 3074F SYST BP LT 130 MM HG: CPT | Performed by: FAMILY MEDICINE

## 2024-09-23 PROCEDURE — 3078F DIAST BP <80 MM HG: CPT | Performed by: FAMILY MEDICINE

## 2024-09-23 ASSESSMENT — FIBROSIS 4 INDEX: FIB4 SCORE: 1.76

## 2024-09-23 NOTE — PROGRESS NOTES
"Subjective:      Chief Complaint   Patient presents with    Ear Fullness     R side after using some ear wax removal drops in her R ear.              HPI        Pt c/o rt  earwax impaction       Review of Systems   HENT: Positive for hearing loss.           Objective:     /76   Pulse 86   Temp 36.4 °C (97.5 °F) (Temporal)   Resp 14   Ht 1.613 m (5' 3.5\")   Wt 55.3 kg (122 lb)   SpO2 98%       Physical Exam   Constitutional: He is oriented to person, place, and time. He appears well-developed. No distress.   HENT:   Head: Normocephalic and atraumatic.   Rt  cerumen impaction     Eyes: Conjunctivae are normal.   Cardiovascular: Normal rate.    Pulmonary/Chest: Effort normal.   Neurological: He is alert and oriented to person, place, and time.   Skin: Skin is warm. He is not diaphoretic. No erythema.   Psychiatric: His behavior is normal.   Nursing note and vitals reviewed.              Assessment/Plan:       1. Rt  impacted cerumen    Ear canal  impacted with cerumen. Ear lavage was performed with normal saline, afterward impacted cerumen was removed with speculum. Subsequent to this, tympanic membrane visualized and was normal.     "

## 2024-09-25 ENCOUNTER — OFFICE VISIT (OUTPATIENT)
Dept: URGENT CARE | Facility: PHYSICIAN GROUP | Age: 74
End: 2024-09-25
Payer: MEDICARE

## 2024-09-25 ENCOUNTER — HOSPITAL ENCOUNTER (OUTPATIENT)
Facility: MEDICAL CENTER | Age: 74
End: 2024-09-25
Attending: FAMILY MEDICINE
Payer: MEDICARE

## 2024-09-25 VITALS
HEART RATE: 81 BPM | TEMPERATURE: 97.2 F | RESPIRATION RATE: 12 BRPM | OXYGEN SATURATION: 94 % | WEIGHT: 122 LBS | SYSTOLIC BLOOD PRESSURE: 146 MMHG | BODY MASS INDEX: 21.27 KG/M2 | DIASTOLIC BLOOD PRESSURE: 94 MMHG

## 2024-09-25 DIAGNOSIS — N30.00 ACUTE CYSTITIS WITHOUT HEMATURIA: ICD-10-CM

## 2024-09-25 DIAGNOSIS — I10 PRIMARY HYPERTENSION: ICD-10-CM

## 2024-09-25 LAB
APPEARANCE UR: CLEAR
BILIRUB UR STRIP-MCNC: NEGATIVE MG/DL
COLOR UR AUTO: YELLOW
GLUCOSE UR STRIP.AUTO-MCNC: NEGATIVE MG/DL
KETONES UR STRIP.AUTO-MCNC: NEGATIVE MG/DL
LEUKOCYTE ESTERASE UR QL STRIP.AUTO: NORMAL
NITRITE UR QL STRIP.AUTO: NEGATIVE
PH UR STRIP.AUTO: 7 [PH] (ref 5–8)
PROT UR QL STRIP: NEGATIVE MG/DL
RBC UR QL AUTO: NORMAL
SP GR UR STRIP.AUTO: 1.01
UROBILINOGEN UR STRIP-MCNC: NEGATIVE MG/DL

## 2024-09-25 PROCEDURE — 99213 OFFICE O/P EST LOW 20 MIN: CPT | Performed by: FAMILY MEDICINE

## 2024-09-25 PROCEDURE — 3077F SYST BP >= 140 MM HG: CPT | Performed by: FAMILY MEDICINE

## 2024-09-25 PROCEDURE — 87086 URINE CULTURE/COLONY COUNT: CPT

## 2024-09-25 PROCEDURE — 3080F DIAST BP >= 90 MM HG: CPT | Performed by: FAMILY MEDICINE

## 2024-09-25 PROCEDURE — 81002 URINALYSIS NONAUTO W/O SCOPE: CPT | Performed by: FAMILY MEDICINE

## 2024-09-25 RX ORDER — NITROFURANTOIN 25; 75 MG/1; MG/1
100 CAPSULE ORAL 2 TIMES DAILY
Qty: 10 CAPSULE | Refills: 0 | Status: SHIPPED | OUTPATIENT
Start: 2024-09-25 | End: 2024-09-30

## 2024-09-25 ASSESSMENT — FIBROSIS 4 INDEX: FIB4 SCORE: 1.76

## 2024-09-25 NOTE — PROGRESS NOTES
Subjective:      CC:  presents with Dysuria            Dysuria   This is a new problem. The current episode started in the past 3 days. The problem occurs every urination. The problem has been unchanged. The quality of the pain is described as burning. There has been no fever. Pt is not sexually active. There is no history of pyelonephritis. Associated symptoms include frequency and urgency. Pertinent negatives include no chills, discharge, flank pain, nausea or vomiting. Pt has tried nothing for the symptoms. There is no history of recurrent UTIs.     Social History     Socioeconomic History    Marital status:      Spouse name: Not on file    Number of children: Not on file    Years of education: Not on file    Highest education level: Associate degree: occupational, technical, or vocational program   Occupational History    Not on file   Tobacco Use    Smoking status: Every Day     Current packs/day: 0.25     Average packs/day: 0.3 packs/day for 20.0 years (5.0 ttl pk-yrs)     Types: Cigarettes    Smokeless tobacco: Never   Vaping Use    Vaping status: Never Used   Substance and Sexual Activity    Alcohol use: Yes     Alcohol/week: 4.2 oz     Types: 7 Glasses of wine per week     Comment: 1 glass of wine daily    Drug use: No     Comment: denies h/o heroin, cocaine, meth, IVDU    Sexual activity: Not Currently     Partners: Male     Birth control/protection: Post-Menopausal   Other Topics Concern    Not on file   Social History Narrative    Not on file     Social Determinants of Health     Financial Resource Strain: Low Risk  (2/14/2023)    Overall Financial Resource Strain (CARDIA)     Difficulty of Paying Living Expenses: Not hard at all   Food Insecurity: Food Insecurity Present (2/14/2023)    Hunger Vital Sign     Worried About Running Out of Food in the Last Year: Sometimes true     Ran Out of Food in the Last Year: Never true   Transportation Needs: No Transportation Needs (2/14/2023)    PRAPARE -  Transportation     Lack of Transportation (Medical): No     Lack of Transportation (Non-Medical): No   Physical Activity: Insufficiently Active (2/14/2023)    Exercise Vital Sign     Days of Exercise per Week: 1 day     Minutes of Exercise per Session: 60 min   Stress: No Stress Concern Present (2/14/2023)    Citizen of the [place] of Occupational Health - Occupational Stress Questionnaire     Feeling of Stress : Only a little   Social Connections: Moderately Isolated (2/14/2023)    Social Connection and Isolation Panel [NHANES]     Frequency of Communication with Friends and Family: Three times a week     Frequency of Social Gatherings with Friends and Family: Once a week     Attends Scientologist Services: 1 to 4 times per year     Active Member of Clubs or Organizations: No     Attends Club or Organization Meetings: Never     Marital Status:    Intimate Partner Violence: Not on file   Housing Stability: Unknown (2/14/2023)    Housing Stability Vital Sign     Unable to Pay for Housing in the Last Year: No     Number of Places Lived in the Last Year: Not on file     Unstable Housing in the Last Year: No         Family History   Problem Relation Age of Onset    Hypertension Mother     Hyperlipidemia Mother     Thyroid Mother     Lung Disease Mother     Heart Disease Father     Hypertension Father     Hyperlipidemia Father     Hypertension Brother     Hypertension Brother     Atrial fibrillation Brother     Stroke Maternal Grandmother     Arthritis Maternal Grandmother     Hypertension Maternal Grandfather     No Known Problems Paternal Grandmother     No Known Problems Paternal Grandfather     Thyroid cancer Daughter          Allergies   Allergen Reactions    Ibuprofen Nausea    Tylenol Anaphylaxis    Asa [Aspirin]     Fosamax [Alendronate Sodium] Nausea    Influenza Virus Vacc     Lisinopril Cough     cough    Meperidine     Nsaids Rash and Itching    Pcn [Penicillins] Itching    Propoxyphene     Sulfa Drugs      Voltaren [Diclofenac]      Nausea and vomiting            Current Outpatient Medications on File Prior to Visit   Medication Sig Dispense Refill    alendronate (FOSAMAX) 70 MG Tab Take 1 Tablet by mouth every 7 days. For osteoporosis 12 Tablet 3    simvastatin (ZOCOR) 20 MG Tab TAKE 1 TABLET BY MOUTH EVERY EVENING 90 Tablet 3    triamcinolone acetonide (KENALOG) 0.5 % Cream Apply 1g three times a day for 7 days to affected area 60 g 5    fluticasone (FLONASE) 50 MCG/ACT nasal spray Administer 1 Spray into affected nostril(S) every day. 16 g 11    buPROPion SR (WELLBUTRIN-SR) 150 MG TABLET SR 12 HR sustained-release tablet Take 1 Tablet by mouth 2 times a day. For tobacco cessation 180 Tablet 3    LORazepam (ATIVAN) 1 MG Tab TAKE 1 TABLET BY MOUTH ONCE TIME DURING MRI      amLODIPine (NORVASC) 10 MG Tab Take 1 Tablet by mouth every day. For high blood pressure 90 Tablet 3    albuterol 108 (90 Base) MCG/ACT Aero Soln inhalation aerosol Inhale 2 Puffs every four hours as needed for Shortness of Breath. 1 Each 3    azelastine (ASTELIN) 137 MCG/SPRAY nasal spray Administer 1 Spray into affected nostril(S) 2 times a day. 30 mL 11    multivitamin (THERAGRAN) Tab Take 1 Tablet by mouth every day.      VITAMIN D PO Take 2,000 mcg by mouth.      CRANBERRY PO Take  by mouth.      ascorbic acid (ASCORBIC ACID) 500 MG Tab Take 1 Tablet by mouth every day.      Calcium Carbonate-Vitamin D 600-400 MG-UNIT Tab Take  by mouth 2 times a day.         No current facility-administered medications on file prior to visit.       Review of Systems   Constitutional: Negative for chills.   Respiratory: Negative for shortness of breath.    Cardiovascular: Negative for chest pain.   Gastrointestinal: Negative for nausea, vomiting and abdominal pain.   Genitourinary: Positive for dysuria, urgency and frequency. Negative for flank pain.   Skin: Negative for rash.   Neurological: Negative for dizziness and headaches.   All other systems reviewed  and are negative.         Objective:      BP (!) 146/94   Pulse 81   Temp 36.2 °C (97.2 °F) (Temporal)   Resp 12   Wt 55.3 kg (122 lb)   SpO2 94%       Physical Exam   Constitutional: pt is oriented to person, place, and time. Pt appears well-developed and well-nourished. No distress.   HENT:   Head: Normocephalic and atraumatic.   Mouth/Throat: Mucous membranes are normal.   Eyes: Conjunctivae and EOM are normal. Pupils are equal, round, and reactive to light. Right eye exhibits no discharge. Left eye exhibits no discharge. No scleral icterus.   Neck: Normal range of motion. Neck supple.   Cardiovascular: Normal rate, regular rhythm, normal heart sounds and intact distal pulses.    No murmur heard.  Pulmonary/Chest: Effort normal and breath sounds normal. No respiratory distress. Pt has no wheezes,  rales.   Abdominal: Bowel sounds are normal. Pt exhibits no distension and no mass. There is no tenderness. There is no rebound, no guarding and no CVA tenderness.   Neurological: pt is alert and oriented to person, place, and time.   Skin: Skin is warm and dry.   Psychiatric: behavior is normal.   Nursing note and vitals reviewed.           Lab Results   Component Value Date/Time    POCCOLOR YELLOW 08/11/2022 09:15 AM    POCAPPEAR CLEAR 08/11/2022 09:15 AM    POCLEUKEST NEG 08/11/2022 09:15 AM    POCNITRITE NEG 08/11/2022 09:15 AM    POCUROBILIGE 0.2 08/11/2022 09:15 AM    POCPROTEIN NEG 08/11/2022 09:15 AM    POCURPH 7.0 08/11/2022 09:15 AM    POCBLOOD SMALL 08/11/2022 09:15 AM    POCSPGRV 1.015 08/11/2022 09:15 AM    POCKETONES NEG 08/11/2022 09:15 AM    POCBILIRUBIN NEG 08/11/2022 09:15 AM    POCGLUCUA NEG 08/11/2022 09:15 AM            Assessment/Plan:            1. Acute cystitis without hematuria  UA c/w cystitis    Pt has multiple antibiotic allergies      - POCT Urinalysis  - nitrofurantoin (MACROBID) 100 MG Cap; Take 1 Capsule by mouth 2 times a day for 5 days.  Dispense: 10 Capsule; Refill: 0    2.  Primary hypertension   Not at goal    Advised f/u PCP

## 2024-09-25 NOTE — LETTER
September 25, 2024         Patient: Tanisha Haile   YOB: 1950   Date of Visit: 9/25/2024           To Whom it May Concern:    Tanisha Haile was seen in my clinic on 9/25/2024.     If you have any questions or concerns, please don't hesitate to call.        Sincerely,           Carlos Manuel Earl M.D.  Electronically Signed

## 2024-09-27 ENCOUNTER — OFFICE VISIT (OUTPATIENT)
Dept: URGENT CARE | Facility: PHYSICIAN GROUP | Age: 74
End: 2024-09-27
Payer: MEDICARE

## 2024-09-27 ENCOUNTER — HOSPITAL ENCOUNTER (OUTPATIENT)
Facility: MEDICAL CENTER | Age: 74
End: 2024-09-27
Attending: NURSE PRACTITIONER
Payer: MEDICARE

## 2024-09-27 VITALS
RESPIRATION RATE: 20 BRPM | SYSTOLIC BLOOD PRESSURE: 124 MMHG | DIASTOLIC BLOOD PRESSURE: 70 MMHG | BODY MASS INDEX: 20.9 KG/M2 | OXYGEN SATURATION: 98 % | HEART RATE: 63 BPM | HEIGHT: 64 IN | WEIGHT: 122.4 LBS | TEMPERATURE: 96.7 F

## 2024-09-27 DIAGNOSIS — N94.9 VAGINAL BURNING: ICD-10-CM

## 2024-09-27 DIAGNOSIS — R39.9 UTI SYMPTOMS: ICD-10-CM

## 2024-09-27 LAB
BACTERIA UR CULT: NORMAL
CANDIDA DNA VAG QL PROBE+SIG AMP: NEGATIVE
G VAGINALIS DNA VAG QL PROBE+SIG AMP: NEGATIVE
SIGNIFICANT IND 70042: NORMAL
SITE SITE: NORMAL
SOURCE SOURCE: NORMAL
T VAGINALIS DNA VAG QL PROBE+SIG AMP: NEGATIVE

## 2024-09-27 PROCEDURE — 99213 OFFICE O/P EST LOW 20 MIN: CPT | Performed by: NURSE PRACTITIONER

## 2024-09-27 PROCEDURE — 87510 GARDNER VAG DNA DIR PROBE: CPT

## 2024-09-27 PROCEDURE — 87480 CANDIDA DNA DIR PROBE: CPT

## 2024-09-27 PROCEDURE — 3078F DIAST BP <80 MM HG: CPT | Performed by: NURSE PRACTITIONER

## 2024-09-27 PROCEDURE — 87660 TRICHOMONAS VAGIN DIR PROBE: CPT

## 2024-09-27 PROCEDURE — 3074F SYST BP LT 130 MM HG: CPT | Performed by: NURSE PRACTITIONER

## 2024-09-27 ASSESSMENT — FIBROSIS 4 INDEX: FIB4 SCORE: 1.76

## 2024-09-27 NOTE — LETTER
September 27, 2024       Patient: Tanisha Haile   YOB: 1950   Date of Visit: 9/27/2024         To Whom It May Concern:    In my medical opinion, I recommend that Tanisha Haile be excused from work 9/26/2024 and 9/27/2024 due to illness.     If you have any questions or concerns, please don't hesitate to call [phone]          Sincerely,          ROBBY Daily.MEME.R.N.  Electronically Signed

## 2024-09-27 NOTE — PROGRESS NOTES
"Verbal consent was acquired by the patient to use [employer] ambient listening note generation during this visit.    Subjective:     HPI:   History of Present Illness  The patient is a 74-year-old female who presents for evaluation of vaginal burning and bloating.  She was seen in clinic on 9/25/2024 with a negative UA and negative urine culture.  She was prescribed Macrobid and had an adverse reaction with this, and she has since discontinued this medication.     She reports experiencing a burning sensation in her vaginal area, specifically affecting the tissue below her urethra. There is no associated discharge. She has a history of yeast infections and urinary tract infections (UTIs) and is unsure if her current symptoms are related to these past conditions. Her urination frequency remains unchanged, with her waking up 2 to 3 times at night to urinate, which she considers normal.    She also notes that her water quality is poor and suspects it may be contributing to her discomfort, as she often experiences burning after showering. She is currently taking cranberry juice and D-mannose daily for UTIs, as recommended by her urologist, due to her susceptibility to these infections.    Additionally, she had an adverse reaction to an antibiotic she took on Wednesday night, which caused her to feel sick all day on Thursday, leading her to leave work early due to nausea.    SOCIAL HISTORY  She works at [employer] and has been there for almost 5 years. She is getting ready to retire on 12/01/2024.      Objective:     Exam:  /70   Pulse 63   Temp 35.9 °C (96.7 °F) (Temporal)   Resp 20   Ht 1.613 m (5' 3.5\")   Wt 55.5 kg (122 lb 6.4 oz)   LMP  (LMP Unknown)   SpO2 98%   BMI 21.34 kg/m²  Body mass index is 21.34 kg/m².    Physical Exam  Vitals and nursing note reviewed.   Constitutional:       General: She is not in acute distress.     Appearance: Normal appearance. She is not ill-appearing. "   HENT:      Head: Normocephalic and atraumatic.      Nose: Nose normal.      Mouth/Throat:      Mouth: Mucous membranes are moist.   Cardiovascular:      Rate and Rhythm: Normal rate.      Pulses: Normal pulses.   Pulmonary:      Effort: Pulmonary effort is normal.   Abdominal:      General: Abdomen is flat.      Palpations: Abdomen is soft.      Tenderness: There is no abdominal tenderness. There is no right CVA tenderness, left CVA tenderness, guarding or rebound.   Musculoskeletal:      Cervical back: Normal range of motion.   Neurological:      Mental Status: She is alert.           Assessment & Plan:     1. UTI symptoms  CANCELED: POCT Urinalysis      2. Vaginal burning  VAGINAL PATHOGENS DNA PANEL          Assessment & Plan  1. Vaginal burning.    The symptoms of burning and bloating could be due to interstitial cystitis vs vaginal infection vs vaginal atrophy vs other etiology. For interstitial cystitis, recommendations include reducing caffeine intake, drinking plenty of fluids, and bladder training to prolong the time between urinations. Vaginal pathogen swab obtained today.  She will be notified of results. If she has an infection, treatment will be provided. If not, vaginal cream will be provided as well as referral to urology.  Patient verbalizes understanding and is in agreement with the plan of care.                   ROBBY Daily.P.R.N.      Please note that this dictation was created using voice recognition software. I have made every reasonable attempt to correct obvious errors, but I expect that there are errors of grammar and possibly content that I did not discover before finalizing the note.

## 2024-09-28 DIAGNOSIS — N95.2 ATROPHIC VAGINITIS: ICD-10-CM

## 2024-09-28 RX ORDER — CONJUGATED ESTROGENS 0.62 MG/G
1 CREAM VAGINAL DAILY
Qty: 30 G | Refills: 0 | Status: SHIPPED | OUTPATIENT
Start: 2024-09-28 | End: 2024-10-28

## 2024-10-15 ENCOUNTER — OCCUPATIONAL MEDICINE (OUTPATIENT)
Dept: OCCUPATIONAL MEDICINE | Facility: CLINIC | Age: 74
End: 2024-10-15
Payer: OTHER MISCELLANEOUS

## 2024-10-15 VITALS
WEIGHT: 121 LBS | BODY MASS INDEX: 21.44 KG/M2 | RESPIRATION RATE: 14 BRPM | TEMPERATURE: 97.6 F | HEIGHT: 63 IN | OXYGEN SATURATION: 95 % | HEART RATE: 70 BPM | DIASTOLIC BLOOD PRESSURE: 86 MMHG | SYSTOLIC BLOOD PRESSURE: 124 MMHG

## 2024-10-15 DIAGNOSIS — S80.02XD CONTUSION OF LEFT KNEE, SUBSEQUENT ENCOUNTER: ICD-10-CM

## 2024-10-15 PROCEDURE — 3074F SYST BP LT 130 MM HG: CPT | Performed by: PREVENTIVE MEDICINE

## 2024-10-15 PROCEDURE — 99213 OFFICE O/P EST LOW 20 MIN: CPT | Performed by: PREVENTIVE MEDICINE

## 2024-10-15 PROCEDURE — 3079F DIAST BP 80-89 MM HG: CPT | Performed by: PREVENTIVE MEDICINE

## 2024-10-15 PROCEDURE — 1125F AMNT PAIN NOTED PAIN PRSNT: CPT | Performed by: PREVENTIVE MEDICINE

## 2024-10-15 ASSESSMENT — FIBROSIS 4 INDEX: FIB4 SCORE: 1.76

## 2024-10-15 ASSESSMENT — PAIN SCALES - GENERAL: PAINLEVEL: 5=MODERATE PAIN

## 2024-10-23 ENCOUNTER — NON-PROVIDER VISIT (OUTPATIENT)
Dept: MEDICAL GROUP | Facility: PHYSICIAN GROUP | Age: 74
End: 2024-10-23
Payer: MEDICARE

## 2024-10-23 ENCOUNTER — HOSPITAL ENCOUNTER (OUTPATIENT)
Dept: LAB | Facility: MEDICAL CENTER | Age: 74
End: 2024-10-23
Attending: OPHTHALMOLOGY
Payer: MEDICARE

## 2024-10-23 DIAGNOSIS — Z01.818 PRE-OPERATIVE CLEARANCE: ICD-10-CM

## 2024-10-23 LAB
ANION GAP SERPL CALC-SCNC: 11 MMOL/L (ref 7–16)
BUN SERPL-MCNC: 11 MG/DL (ref 8–22)
CALCIUM SERPL-MCNC: 9.9 MG/DL (ref 8.5–10.5)
CHLORIDE SERPL-SCNC: 101 MMOL/L (ref 96–112)
CO2 SERPL-SCNC: 27 MMOL/L (ref 20–33)
CREAT SERPL-MCNC: 0.54 MG/DL (ref 0.5–1.4)
GFR SERPLBLD CREATININE-BSD FMLA CKD-EPI: 96 ML/MIN/1.73 M 2
GLUCOSE SERPL-MCNC: 99 MG/DL (ref 65–99)
POTASSIUM SERPL-SCNC: 4 MMOL/L (ref 3.6–5.5)
SODIUM SERPL-SCNC: 139 MMOL/L (ref 135–145)

## 2024-10-23 PROCEDURE — 93000 ELECTROCARDIOGRAM COMPLETE: CPT | Performed by: FAMILY MEDICINE

## 2024-10-23 PROCEDURE — 80048 BASIC METABOLIC PNL TOTAL CA: CPT

## 2024-10-23 PROCEDURE — 36415 COLL VENOUS BLD VENIPUNCTURE: CPT

## 2024-10-31 DIAGNOSIS — Z00.6 CLINICAL TRIAL PARTICIPANT: ICD-10-CM

## 2024-11-01 ENCOUNTER — RESEARCH ENCOUNTER (OUTPATIENT)
Dept: RESEARCH | Facility: MEDICAL CENTER | Age: 74
End: 2024-11-01

## 2025-01-10 ENCOUNTER — APPOINTMENT (OUTPATIENT)
Dept: RADIOLOGY | Facility: IMAGING CENTER | Age: 75
End: 2025-01-10
Attending: FAMILY MEDICINE
Payer: MEDICARE

## 2025-01-10 ENCOUNTER — OFFICE VISIT (OUTPATIENT)
Dept: URGENT CARE | Facility: PHYSICIAN GROUP | Age: 75
End: 2025-01-10
Payer: MEDICARE

## 2025-01-10 ENCOUNTER — PATIENT MESSAGE (OUTPATIENT)
Dept: RESEARCH | Facility: MEDICAL CENTER | Age: 75
End: 2025-01-10

## 2025-01-10 VITALS
BODY MASS INDEX: 21.44 KG/M2 | HEART RATE: 110 BPM | OXYGEN SATURATION: 95 % | TEMPERATURE: 97.4 F | SYSTOLIC BLOOD PRESSURE: 110 MMHG | WEIGHT: 121 LBS | DIASTOLIC BLOOD PRESSURE: 74 MMHG | HEIGHT: 63 IN | RESPIRATION RATE: 12 BRPM

## 2025-01-10 DIAGNOSIS — S63.501A SPRAIN OF RIGHT WRIST, INITIAL ENCOUNTER: ICD-10-CM

## 2025-01-10 PROCEDURE — 3078F DIAST BP <80 MM HG: CPT | Performed by: FAMILY MEDICINE

## 2025-01-10 PROCEDURE — 73130 X-RAY EXAM OF HAND: CPT | Mod: TC,FY,RT | Performed by: RADIOLOGY

## 2025-01-10 PROCEDURE — 73110 X-RAY EXAM OF WRIST: CPT | Mod: TC,FY,RT | Performed by: RADIOLOGY

## 2025-01-10 PROCEDURE — 99214 OFFICE O/P EST MOD 30 MIN: CPT | Performed by: FAMILY MEDICINE

## 2025-01-10 PROCEDURE — 3074F SYST BP LT 130 MM HG: CPT | Performed by: FAMILY MEDICINE

## 2025-01-10 RX ORDER — DIAZEPAM 5 MG/1
TABLET ORAL
COMMUNITY
Start: 2024-11-02

## 2025-01-10 RX ORDER — ERYTHROMYCIN 5 MG/G
OINTMENT OPHTHALMIC
COMMUNITY
Start: 2024-11-06

## 2025-01-10 RX ORDER — OXYCODONE HYDROCHLORIDE 5 MG/1
5 TABLET ORAL EVERY 8 HOURS PRN
Qty: 5 TABLET | Refills: 0 | Status: SHIPPED | OUTPATIENT
Start: 2025-01-10 | End: 2025-01-17

## 2025-01-10 RX ORDER — CEPHALEXIN 500 MG/1
500 CAPSULE ORAL 2 TIMES DAILY
COMMUNITY
Start: 2024-11-06 | End: 2024-11-13

## 2025-01-10 ASSESSMENT — FIBROSIS 4 INDEX: FIB4 SCORE: 1.76

## 2025-01-10 NOTE — PROGRESS NOTES
Subjective:      Chief Complaint   Patient presents with    Wrist Injury    Rt               Fall  The accident occurred 5 d ago.       FOOSH - rt.   GLF      Pt landed on hard floor. There was no blood loss. Point of impact: rt hand  . Pain location:  rt wrist       . Pertinent negatives include no fever, hematuria, loss of consciousness, tingling or visual change. Pt has tried nothing for the symptoms.         Social History     Tobacco Use    Smoking status: Every Day     Current packs/day: 0.25     Average packs/day: 0.3 packs/day for 20.0 years (5.0 ttl pk-yrs)     Types: Cigarettes    Smokeless tobacco: Never   Vaping Use    Vaping status: Never Used   Substance Use Topics    Alcohol use: Yes     Alcohol/week: 4.2 oz     Types: 7 Glasses of wine per week     Comment: 1 glass of wine daily    Drug use: No     Comment: denies h/o heroin, cocaine, meth, IVDU         Past Medical History:   Diagnosis Date    Anemia     Arrhythmia     Arthritis     Cervical cancer screening 5/22/2013    Due to repeat around 2015.    Dyslipidemia 10/24/2012    Dysuria 10/2/2014    Seen 9/27 for uti, given macrobid Feels the same as 9/27 Feels more like vaginal pain      Fall 12/22/2022    GERD (gastroesophageal reflux disease)     Heart murmur     Hyperlipidemia     Insomnia 2/27/2014    Not sleeping, can't shut brain off Making her really tired during the day X 1 month plus but worse for a month    Migraine     Primary hypertension 6/22/2023    Seasonal allergies 5/22/2013    Shingles     Thyroid disease          Current Outpatient Medications on File Prior to Visit   Medication Sig Dispense Refill    alendronate (FOSAMAX) 70 MG Tab Take 1 Tablet by mouth every 7 days. For osteoporosis 12 Tablet 3    simvastatin (ZOCOR) 20 MG Tab TAKE 1 TABLET BY MOUTH EVERY EVENING 90 Tablet 3    triamcinolone acetonide (KENALOG) 0.5 % Cream Apply 1g three times a day for 7 days to affected area 60 g 5    fluticasone (FLONASE) 50 MCG/ACT nasal  "spray Administer 1 Spray into affected nostril(S) every day. 16 g 11    amLODIPine (NORVASC) 10 MG Tab Take 1 Tablet by mouth every day. For high blood pressure 90 Tablet 3    albuterol 108 (90 Base) MCG/ACT Aero Soln inhalation aerosol Inhale 2 Puffs every four hours as needed for Shortness of Breath. 1 Each 3    azelastine (ASTELIN) 137 MCG/SPRAY nasal spray Administer 1 Spray into affected nostril(S) 2 times a day. 30 mL 11    multivitamin (THERAGRAN) Tab Take 1 Tablet by mouth every day.      CRANBERRY PO Take  by mouth.      ascorbic acid (ASCORBIC ACID) 500 MG Tab Take 1 Tablet by mouth every day.      diazePAM (VALIUM) 5 MG Tab TAKE 1 TABLET BY MOUTH USE AS DIRECTED FOR MRI (Patient not taking: Reported on 1/10/2025)      erythromycin 5 MG/GM Ointment  (Patient not taking: Reported on 1/10/2025)      buPROPion SR (WELLBUTRIN-SR) 150 MG TABLET SR 12 HR sustained-release tablet Take 1 Tablet by mouth 2 times a day. For tobacco cessation (Patient not taking: Reported on 1/10/2025) 180 Tablet 3    LORazepam (ATIVAN) 1 MG Tab TAKE 1 TABLET BY MOUTH ONCE TIME DURING MRI (Patient not taking: Reported on 1/10/2025)      VITAMIN D PO Take 2,000 mcg by mouth. (Patient not taking: Reported on 1/10/2025)      Calcium Carbonate-Vitamin D 600-400 MG-UNIT Tab Take  by mouth 2 times a day.   (Patient not taking: Reported on 9/27/2024)       No current facility-administered medications on file prior to visit.              Review of Systems   Constitutional: Negative for fever.   Genitourinary: Negative for hematuria.   Neurological: Negative for tingling and loss of consciousness.          Objective:     /74   Pulse (!) 110   Temp 36.3 °C (97.4 °F) (Temporal)   Resp 12   Ht 1.6 m (5' 3\")   Wt 54.9 kg (121 lb)   SpO2 95%     Physical Exam   Constitutional: She is oriented to person, place, and time. Pt appears well-developed and well-nourished. No distress.   HENT:   Head: Normocephalic and atraumatic.   Eyes: " Conjunctivae are normal.   Cardiovascular: Normal rate, regular rhythm and normal heart sounds.    Pulmonary/Chest: Effort normal and breath sounds normal. No respiratory distress. Pt has no wheezes.   Musculoskeletal:   Rt wrist:   diffuse bruising/swelling.    + TTP distal radius.   Full AROM.    No scaphoid TTP.   + neurovasc intact  Neurological: pt is alert and oriented to person, place, and time. No cranial nerve deficit.   Skin: Skin is warm. Pt is not diaphoretic. No erythema.   Nursing note and vitals reviewed.         Details    Reading Physician Reading Date Result Priority   Tolu Tatum M.D.  [phone] 1/10/2025      Narrative & Impression     1/10/2025 12:51 PM     HISTORY/REASON FOR EXAM:  Pain/Deformity Following Trauma        TECHNIQUE/EXAM DESCRIPTION AND NUMBER OF VIEWS:  4 views of the RIGHT wrist.     COMPARISON: None     FINDINGS:     Acute nondisplaced fracture of the distal radius with intra-articular extension.     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     IMPRESSION:        Acute nondisplaced fracture of the distal radius with intra-articular extension.           0 Result Notes  Details    Reading Physician Reading Date Result Priority   Tolu Tatum M.D.  [phone] 1/10/2025      Narrative & Impression     1/10/2025 12:51 PM     HISTORY/REASON FOR EXAM:  pain; Pain/Deformity Following Trauma  Ground-level fall.     TECHNIQUE/EXAM DESCRIPTION AND NUMBER OF VIEWS:  3 views of the RIGHT hand.     COMPARISON: None     FINDINGS:     Diffuse osseous demineralization.     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 Severe osteoarthritis of the DIP joint.     IMPRESSION:        Linear lucency in distal radius could relate to nondisplaced fracture.        Exam Ended: 01/10/25  1:03 PM Last Resulted: 01/10/25  1:09 PM       A/P:      1. Sprain of right wrist, initial encounter       X-rays were personally reviewed by myself.   There is  a distal radius  fracture with intra-articular extension as noted above.       I also personally reviewed the hand x-ray - no obvious scaphoid fxr        Likely will require surgical stabilization'    Referred to ortho      Placed in premade volar splint      Very limited rx oxycodone given - she has allergy to NSAIDS, tylenol     - oxyCODONE immediate-release (ROXICODONE) 5 MG Tab; Take 1 Tablet by mouth every 8 hours as needed for Severe Pain for up to 7 days.  Dispense: 5 Tablet; Refill: 0  - Referral to Hand Surgery        Narcotic use report obtained with no indication of narcotic overuse or misuse and deemed necessary for treaatment. Sedation, dependence, and constipation precautions given. Avoid use while driving or operating heavy machinery.      Differential diagnosis, natural history, supportive care, and indications for immediate follow-up discussed. All questions answered. Patient agrees with the plan of care.     Follow-up as needed if symptoms worsen or fail to improve to PCP, Urgent care or Emergency Room.     I have personally reviewed prior external notes and test results pertinent to today's visit.  I have independently reviewed and interpreted all diagnostics ordered during this urgent care acute visit.

## 2025-01-17 ENCOUNTER — APPOINTMENT (OUTPATIENT)
Dept: URGENT CARE | Facility: PHYSICIAN GROUP | Age: 75
End: 2025-01-17
Payer: MEDICARE

## 2025-01-21 ENCOUNTER — HOSPITAL ENCOUNTER (OUTPATIENT)
Dept: LAB | Facility: MEDICAL CENTER | Age: 75
End: 2025-01-21
Attending: NURSE PRACTITIONER
Payer: MEDICARE

## 2025-01-21 ENCOUNTER — APPOINTMENT (OUTPATIENT)
Dept: RADIOLOGY | Facility: IMAGING CENTER | Age: 75
End: 2025-01-21
Attending: NURSE PRACTITIONER
Payer: MEDICARE

## 2025-01-21 ENCOUNTER — OFFICE VISIT (OUTPATIENT)
Dept: URGENT CARE | Facility: PHYSICIAN GROUP | Age: 75
End: 2025-01-21
Payer: MEDICARE

## 2025-01-21 VITALS
OXYGEN SATURATION: 99 % | WEIGHT: 122 LBS | TEMPERATURE: 96.9 F | HEART RATE: 89 BPM | RESPIRATION RATE: 16 BRPM | SYSTOLIC BLOOD PRESSURE: 118 MMHG | HEIGHT: 63 IN | BODY MASS INDEX: 21.62 KG/M2 | DIASTOLIC BLOOD PRESSURE: 70 MMHG

## 2025-01-21 DIAGNOSIS — M79.605 ACUTE PAIN OF LEFT LOWER EXTREMITY: ICD-10-CM

## 2025-01-21 DIAGNOSIS — H61.23 IMPACTED CERUMEN OF BOTH EARS: ICD-10-CM

## 2025-01-21 LAB — D DIMER PPP IA.FEU-MCNC: 0.55 UG/ML (FEU) (ref 0–0.5)

## 2025-01-21 PROCEDURE — 69210 REMOVE IMPACTED EAR WAX UNI: CPT | Performed by: NURSE PRACTITIONER

## 2025-01-21 PROCEDURE — 85379 FIBRIN DEGRADATION QUANT: CPT

## 2025-01-21 PROCEDURE — 36415 COLL VENOUS BLD VENIPUNCTURE: CPT

## 2025-01-21 PROCEDURE — 3078F DIAST BP <80 MM HG: CPT | Performed by: NURSE PRACTITIONER

## 2025-01-21 PROCEDURE — 3074F SYST BP LT 130 MM HG: CPT | Performed by: NURSE PRACTITIONER

## 2025-01-21 PROCEDURE — 99214 OFFICE O/P EST MOD 30 MIN: CPT | Mod: 25 | Performed by: NURSE PRACTITIONER

## 2025-01-21 PROCEDURE — 73501 X-RAY EXAM HIP UNI 1 VIEW: CPT | Mod: TC,FY,LT | Performed by: RADIOLOGY

## 2025-01-21 PROCEDURE — 73551 X-RAY EXAM OF FEMUR 1: CPT | Mod: TC,FY,LT | Performed by: RADIOLOGY

## 2025-01-21 ASSESSMENT — FIBROSIS 4 INDEX: FIB4 SCORE: 1.76

## 2025-01-21 ASSESSMENT — ENCOUNTER SYMPTOMS: LEG PAIN: 1
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
## 2025-01-22 DIAGNOSIS — R79.89 POSITIVE D DIMER: ICD-10-CM

## 2025-01-22 DIAGNOSIS — M79.605 ACUTE PAIN OF LEFT LOWER EXTREMITY: ICD-10-CM

## 2025-01-23 ENCOUNTER — OFFICE VISIT (OUTPATIENT)
Dept: URGENT CARE | Facility: PHYSICIAN GROUP | Age: 75
End: 2025-01-23
Payer: MEDICARE

## 2025-01-23 ENCOUNTER — HOSPITAL ENCOUNTER (OUTPATIENT)
Dept: RADIOLOGY | Facility: MEDICAL CENTER | Age: 75
End: 2025-01-23
Attending: NURSE PRACTITIONER
Payer: MEDICARE

## 2025-01-23 VITALS
SYSTOLIC BLOOD PRESSURE: 152 MMHG | WEIGHT: 122 LBS | HEIGHT: 63 IN | RESPIRATION RATE: 16 BRPM | HEART RATE: 70 BPM | BODY MASS INDEX: 21.62 KG/M2 | TEMPERATURE: 97.4 F | OXYGEN SATURATION: 95 % | DIASTOLIC BLOOD PRESSURE: 84 MMHG

## 2025-01-23 DIAGNOSIS — M79.605 ACUTE PAIN OF LEFT LOWER EXTREMITY: ICD-10-CM

## 2025-01-23 DIAGNOSIS — H91.92 DECREASED HEARING OF LEFT EAR: ICD-10-CM

## 2025-01-23 DIAGNOSIS — I10 PRIMARY HYPERTENSION: ICD-10-CM

## 2025-01-23 DIAGNOSIS — R79.89 POSITIVE D DIMER: ICD-10-CM

## 2025-01-23 DIAGNOSIS — S76.212A INGUINAL STRAIN, LEFT, INITIAL ENCOUNTER: ICD-10-CM

## 2025-01-23 PROCEDURE — 3077F SYST BP >= 140 MM HG: CPT | Performed by: FAMILY MEDICINE

## 2025-01-23 PROCEDURE — 99213 OFFICE O/P EST LOW 20 MIN: CPT | Performed by: FAMILY MEDICINE

## 2025-01-23 PROCEDURE — 3079F DIAST BP 80-89 MM HG: CPT | Performed by: FAMILY MEDICINE

## 2025-01-23 PROCEDURE — 93971 EXTREMITY STUDY: CPT | Mod: LT

## 2025-01-23 ASSESSMENT — FIBROSIS 4 INDEX: FIB4 SCORE: 1.76

## 2025-01-24 NOTE — PROGRESS NOTES
Subjective:      Chief Complaint   Patient presents with    Follow-Up     Was seen on Tuesday 01821/2025 and had ear lavage and left ear still feels plugged                      #1.   Left ear congestion, dec hearing x 3 d      #2.    Left groin strain:    x 1 wk.   Slowly improving, but still has pain with walking. .      Social History     Tobacco Use    Smoking status: Every Day     Current packs/day: 0.25     Average packs/day: 0.3 packs/day for 20.0 years (5.0 ttl pk-yrs)     Types: Cigarettes    Smokeless tobacco: Never   Vaping Use    Vaping status: Never Used   Substance Use Topics    Alcohol use: Yes     Alcohol/week: 4.2 oz     Types: 7 Glasses of wine per week     Comment: 1 glass of wine daily    Drug use: No     Comment: denies h/o heroin, cocaine, meth, IVDU         Past Medical History:   Diagnosis Date    Anemia     Arrhythmia     Arthritis     Cervical cancer screening 5/22/2013    Due to repeat around 2015.    Dyslipidemia 10/24/2012    Dysuria 10/2/2014    Seen 9/27 for uti, given macrobid Feels the same as 9/27 Feels more like vaginal pain      Fall 12/22/2022    GERD (gastroesophageal reflux disease)     Heart murmur     Hyperlipidemia     Insomnia 2/27/2014    Not sleeping, can't shut brain off Making her really tired during the day X 1 month plus but worse for a month    Migraine     Primary hypertension 6/22/2023    Seasonal allergies 5/22/2013    Shingles     Thyroid disease        Current Outpatient Medications on File Prior to Visit   Medication Sig Dispense Refill    diazePAM (VALIUM) 5 MG Tab       erythromycin 5 MG/GM Ointment       alendronate (FOSAMAX) 70 MG Tab Take 1 Tablet by mouth every 7 days. For osteoporosis 12 Tablet 3    simvastatin (ZOCOR) 20 MG Tab TAKE 1 TABLET BY MOUTH EVERY EVENING 90 Tablet 3    triamcinolone acetonide (KENALOG) 0.5 % Cream Apply 1g three times a day for 7 days to affected area 60 g 5    fluticasone (FLONASE) 50 MCG/ACT nasal spray Administer 1 Spray  "into affected nostril(S) every day. 16 g 11    buPROPion SR (WELLBUTRIN-SR) 150 MG TABLET SR 12 HR sustained-release tablet Take 1 Tablet by mouth 2 times a day. For tobacco cessation 180 Tablet 3    LORazepam (ATIVAN) 1 MG Tab       amLODIPine (NORVASC) 10 MG Tab Take 1 Tablet by mouth every day. For high blood pressure 90 Tablet 3    albuterol 108 (90 Base) MCG/ACT Aero Soln inhalation aerosol Inhale 2 Puffs every four hours as needed for Shortness of Breath. 1 Each 3    azelastine (ASTELIN) 137 MCG/SPRAY nasal spray Administer 1 Spray into affected nostril(S) 2 times a day. 30 mL 11    multivitamin (THERAGRAN) Tab Take 1 Tablet by mouth every day.      VITAMIN D PO Take 2,000 mcg by mouth.      CRANBERRY PO Take  by mouth.      ascorbic acid (ASCORBIC ACID) 500 MG Tab Take 1 Tablet by mouth every day.      Calcium Carbonate-Vitamin D 600-400 MG-UNIT Tab Take  by mouth 2 times a day.         No current facility-administered medications on file prior to visit.         Review of Systems   Constitutional: Negative for fever and chills.   HENT:  . +  for hearing loss   Respiratory: . Negative for hemoptysis, shortness of breath and wheezing.    Cardiovascular: Negative for chest pain, palpitations and leg swelling.   Gastrointestinal: Negative for nausea and abdominal pain.   Musculoskeletal: Negative for myalgias, joint swelling and neck pain.   Skin: Negative for rash.   Neurological: Negative for headaches.   All other systems reviewed and are negative.         Objective:     BP (!) 152/84 (BP Location: Left arm, Patient Position: Sitting, BP Cuff Size: Adult)   Pulse 70   Temp 36.3 °C (97.4 °F) (Temporal)   Resp 16   Ht 1.6 m (5' 3\")   Wt 55.3 kg (122 lb)   SpO2 95%     Physical Exam   Constitutional: Vital signs are normal. She is active. No distress.   HENT:   Head: There is normal jaw occlusion.   Right Ear: External ear normal. Tympanic membrane is normal. No middle ear effusion.   Left Ear: External ear " normal. Tympanic membrane is normal. No middle ear effusion.  Nose:    No nasal discharge.   Mouth/Throat: Mucous membranes are moist. No oral lesions.   No oropharyngeal exudate, pharynx swelling or pharynx petechiae.    Eyes: Conjunctivae and EOM are normal. Pupils are equal, round, and reactive to light. Right eye exhibits no discharge. Left eye exhibits no discharge.   Neck: Normal range of motion. Neck supple.  No LAD  Cardiovascular: Normal rate and regular rhythm.  Pulses are palpable.    No murmur heard.  Left hip/groin -  full AROM.    No TTP over greater trochanter.   + TTP over adductor muscles.       Pulmonary/Chest: Effort normal and breath sounds normal. There is normal air entry. No respiratory distress. no wheezes, rhonchi,  retraction.   Musculoskeletal:   no edema.   Neurological: A/O x 3.   CN 2-12 intact   Skin: Skin is warm. Capillary refill takes less than 3 seconds. No purpura and no rash noted. Patient is not diaphoretic. No jaundice or pallor.   Nursing note and vitals reviewed.              Assessment/Plan:         1. Pain of left lower extremity   Still has pain    - Referral to Physical Therapy    2. Decreased hearing of left ear   No obvious infection or cerumen impaction  - Referral to ENT           - fexofenadine (ALLEGRA) 180 MG tablet; Take 1 Tab by mouth every day.  Dispense: 30 Tab; Refill: 0    3.   HTN  Not at goal  Advised f/u PCP      Follow up in one week if no improvement, sooner if symptoms worsen.

## 2025-01-28 ENCOUNTER — OFFICE VISIT (OUTPATIENT)
Dept: URGENT CARE | Facility: PHYSICIAN GROUP | Age: 75
End: 2025-01-28
Payer: MEDICARE

## 2025-01-28 VITALS
BODY MASS INDEX: 21.62 KG/M2 | OXYGEN SATURATION: 99 % | HEART RATE: 75 BPM | RESPIRATION RATE: 16 BRPM | SYSTOLIC BLOOD PRESSURE: 120 MMHG | HEIGHT: 63 IN | TEMPERATURE: 97.7 F | WEIGHT: 122 LBS | DIASTOLIC BLOOD PRESSURE: 80 MMHG

## 2025-01-28 DIAGNOSIS — J06.9 UPPER RESPIRATORY TRACT INFECTION, UNSPECIFIED TYPE: ICD-10-CM

## 2025-01-28 DIAGNOSIS — H66.002 NON-RECURRENT ACUTE SUPPURATIVE OTITIS MEDIA OF LEFT EAR WITHOUT SPONTANEOUS RUPTURE OF TYMPANIC MEMBRANE: ICD-10-CM

## 2025-01-28 PROCEDURE — 99213 OFFICE O/P EST LOW 20 MIN: CPT

## 2025-01-28 PROCEDURE — 0241U POCT CEPHEID COV-2, FLU A/B, RSV - PCR: CPT

## 2025-01-28 RX ORDER — DOXYCYCLINE HYCLATE 100 MG
100 TABLET ORAL 2 TIMES DAILY
Qty: 10 TABLET | Refills: 0 | Status: SHIPPED | OUTPATIENT
Start: 2025-01-28 | End: 2025-02-02

## 2025-01-28 ASSESSMENT — ENCOUNTER SYMPTOMS
ABDOMINAL PAIN: 0
VOMITING: 0
SHORTNESS OF BREATH: 0
COUGH: 0
PALPITATIONS: 0
FEVER: 0
SINUS PRESSURE: 1
WHEEZING: 0
NAUSEA: 0
DIZZINESS: 0
WEAKNESS: 0
HEADACHES: 0
CHILLS: 0
DIARRHEA: 0
SORE THROAT: 0

## 2025-01-28 ASSESSMENT — FIBROSIS 4 INDEX: FIB4 SCORE: 1.76

## 2025-01-28 NOTE — PROGRESS NOTES
"Subjective     Tanisha Haile is a 74 y.o. female who presents with Congestion (Congestion, left ear feels clogged ongoing issue. )            Sinusitis  This is a new problem. The current episode started in the past 7 days. The problem has been gradually worsening since onset. There has been no fever. She is experiencing no pain. Associated symptoms include congestion, ear pain and sinus pressure. Pertinent negatives include no chills, coughing, headaches, shortness of breath or sore throat. Past treatments include sitting up (warm water). The treatment provided mild relief.   Otalgia   Pertinent negatives include no abdominal pain, coughing, diarrhea, headaches, sore throat or vomiting.       Review of Systems   Constitutional:  Negative for chills and fever.   HENT:  Positive for congestion, ear pain and sinus pressure. Negative for sore throat.    Respiratory:  Negative for cough, shortness of breath and wheezing.    Cardiovascular:  Negative for chest pain and palpitations.   Gastrointestinal:  Negative for abdominal pain, diarrhea, nausea and vomiting.   Neurological:  Negative for dizziness, weakness and headaches.              Objective     /80   Pulse 75   Temp 36.5 °C (97.7 °F) (Temporal)   Resp 16   Ht 1.6 m (5' 3\")   Wt 55.3 kg (122 lb)   LMP  (LMP Unknown)   SpO2 99%   BMI 21.61 kg/m²      Physical Exam  Constitutional:       General: She is not in acute distress.     Appearance: Normal appearance. She is not ill-appearing.   HENT:      Head: Normocephalic and atraumatic.      Right Ear: Tympanic membrane, ear canal and external ear normal. Tympanic membrane is not erythematous.      Left Ear: Ear canal and external ear normal. Decreased hearing noted. No tenderness. A middle ear effusion is present. Tympanic membrane is erythematous.      Nose: No congestion.      Mouth/Throat:      Mouth: Mucous membranes are moist.   Eyes:      Extraocular Movements: Extraocular movements intact. "      Conjunctiva/sclera: Conjunctivae normal.      Pupils: Pupils are equal, round, and reactive to light.   Cardiovascular:      Rate and Rhythm: Normal rate and regular rhythm.   Pulmonary:      Effort: Pulmonary effort is normal. No respiratory distress.      Breath sounds: No stridor. No wheezing.   Musculoskeletal:         General: Normal range of motion.   Skin:     General: Skin is dry.   Neurological:      General: No focal deficit present.      Mental Status: She is alert and oriented to person, place, and time. Mental status is at baseline.      Motor: No weakness.                           Assessment & Plan        Assessment & Plan  Non-recurrent acute suppurative otitis media of left ear without spontaneous rupture of tympanic membrane    Orders:    doxycycline (VIBRAMYCIN) 100 MG Tab; Take 1 Tablet by mouth 2 times a day for 5 days.    Upper respiratory tract infection, unspecified type    Orders:    POCT CEPHEID COV-2, FLU A/B, RSV - PCR          This is an acute condition.  Previous visits, pmhx, medication, and VS reviewed.  Patient not acutely ill appearing.  No acute distress. VSS.  Lung sounds clear, no wheezing or rhonchi.  Mild sinus pressure without tenderness to palpation.    Left ear TM erythema and mid ear effusion without bulging or perforation.     Provided patient with information on the etiology & pathogenesis of otitis media. Instructed to take antibiotics as prescribed.   May give Tylenol/Motrin prn discomfort.  May apply warm compress to the ear for prn discomfort.   Increase fluids.  Refrain from earbud or headphone use until symptoms resolve.   OTC earwax removal drops prn   Follow-up in clinic in 4 days if symptoms or not improving or sooner if symptoms acutely worsen.  Informed patient of more severe symptoms that warrant ED workup.  Patient understands and is agreeable to treatment plan.  Denies further questions.

## 2025-01-29 PROBLEM — S52.501A CLOSED FRACTURE OF RIGHT DISTAL RADIUS: Status: ACTIVE | Noted: 2025-01-29

## 2025-02-04 ENCOUNTER — OFFICE VISIT (OUTPATIENT)
Dept: MEDICAL GROUP | Facility: PHYSICIAN GROUP | Age: 75
End: 2025-02-04
Payer: MEDICARE

## 2025-02-04 VITALS
HEIGHT: 63 IN | SYSTOLIC BLOOD PRESSURE: 122 MMHG | DIASTOLIC BLOOD PRESSURE: 82 MMHG | BODY MASS INDEX: 21.44 KG/M2 | HEART RATE: 65 BPM | TEMPERATURE: 97.8 F | OXYGEN SATURATION: 98 % | WEIGHT: 121 LBS | RESPIRATION RATE: 16 BRPM

## 2025-02-04 DIAGNOSIS — R73.01 ELEVATED FASTING GLUCOSE: ICD-10-CM

## 2025-02-04 DIAGNOSIS — E78.5 DYSLIPIDEMIA: ICD-10-CM

## 2025-02-04 DIAGNOSIS — Z12.11 SCREENING FOR COLORECTAL CANCER: ICD-10-CM

## 2025-02-04 DIAGNOSIS — H93.8X9 EAR POPPING, UNSPECIFIED LATERALITY: ICD-10-CM

## 2025-02-04 DIAGNOSIS — Z12.12 SCREENING FOR COLORECTAL CANCER: ICD-10-CM

## 2025-02-04 DIAGNOSIS — S52.501D CLOSED FRACTURE OF DISTAL END OF RIGHT RADIUS WITH ROUTINE HEALING, UNSPECIFIED FRACTURE MORPHOLOGY, SUBSEQUENT ENCOUNTER: ICD-10-CM

## 2025-02-04 DIAGNOSIS — E55.9 VITAMIN D DEFICIENCY: ICD-10-CM

## 2025-02-04 DIAGNOSIS — Z12.31 ENCOUNTER FOR SCREENING MAMMOGRAM FOR BREAST CANCER: ICD-10-CM

## 2025-02-04 PROCEDURE — 3079F DIAST BP 80-89 MM HG: CPT | Performed by: FAMILY MEDICINE

## 2025-02-04 PROCEDURE — 3074F SYST BP LT 130 MM HG: CPT | Performed by: FAMILY MEDICINE

## 2025-02-04 PROCEDURE — 99214 OFFICE O/P EST MOD 30 MIN: CPT | Performed by: FAMILY MEDICINE

## 2025-02-04 RX ORDER — CEPHALEXIN 500 MG/1
1 CAPSULE ORAL 2 TIMES DAILY
COMMUNITY
End: 2025-02-04

## 2025-02-04 RX ORDER — SIMVASTATIN 20 MG
TABLET ORAL
Qty: 90 TABLET | Refills: 3 | Status: SHIPPED | OUTPATIENT
Start: 2025-02-04

## 2025-02-04 ASSESSMENT — FIBROSIS 4 INDEX: FIB4 SCORE: 1.76

## 2025-02-04 ASSESSMENT — PATIENT HEALTH QUESTIONNAIRE - PHQ9: CLINICAL INTERPRETATION OF PHQ2 SCORE: 0

## 2025-02-04 NOTE — ASSESSMENT & PLAN NOTE
Pt with multiple drug allergies including pcn and sulfa treated in urgent care with doxycycline for otitis media, has symptoms of ear popping.

## 2025-02-04 NOTE — ASSESSMENT & PLAN NOTE
Seen in UC and placed in a brace, seen by orthopedic surgeon and they plan to continue in brace, non operative management and follow up in March.     Continues on alendronate for osteoporosis,     For OA of thumb, continue with topical analgesic, antiinflammatory, ice, rest, brace.

## 2025-02-07 NOTE — PROGRESS NOTES
Subjective:   Tanisha Haile is a 74 y.o. female here today for evaluation and management of:     Closed fracture of right distal radius  Seen in  and placed in a brace, seen by orthopedic surgeon and they plan to continue in brace, non operative management and follow up in March.     Continues on alendronate for osteoporosis,     For OA of thumb, continue with topical analgesic, antiinflammatory, ice, rest, brace.       Ear popping  Pt with multiple drug allergies including pcn and sulfa treated in urgent care with doxycycline for otitis media, has symptoms of ear popping.          Current medicines (including changes today)  Current Outpatient Medications   Medication Sig Dispense Refill    simvastatin (ZOCOR) 20 MG Tab TAKE 1 TABLET BY MOUTH EVERY EVENING 90 Tablet 3    diazePAM (VALIUM) 5 MG Tab       erythromycin 5 MG/GM Ointment       alendronate (FOSAMAX) 70 MG Tab Take 1 Tablet by mouth every 7 days. For osteoporosis 12 Tablet 3    triamcinolone acetonide (KENALOG) 0.5 % Cream Apply 1g three times a day for 7 days to affected area 60 g 5    fluticasone (FLONASE) 50 MCG/ACT nasal spray Administer 1 Spray into affected nostril(S) every day. 16 g 11    buPROPion SR (WELLBUTRIN-SR) 150 MG TABLET SR 12 HR sustained-release tablet Take 1 Tablet by mouth 2 times a day. For tobacco cessation 180 Tablet 3    LORazepam (ATIVAN) 1 MG Tab       amLODIPine (NORVASC) 10 MG Tab Take 1 Tablet by mouth every day. For high blood pressure 90 Tablet 3    albuterol 108 (90 Base) MCG/ACT Aero Soln inhalation aerosol Inhale 2 Puffs every four hours as needed for Shortness of Breath. 1 Each 3    azelastine (ASTELIN) 137 MCG/SPRAY nasal spray Administer 1 Spray into affected nostril(S) 2 times a day. 30 mL 11    multivitamin (THERAGRAN) Tab Take 1 Tablet by mouth every day.      VITAMIN D PO Take 2,000 mcg by mouth.      CRANBERRY PO Take  by mouth.      ascorbic acid (ASCORBIC ACID) 500 MG Tab Take 1 Tablet by mouth every  "day.      Calcium Carbonate-Vitamin D 600-400 MG-UNIT Tab Take  by mouth 2 times a day.         No current facility-administered medications for this visit.     She  has a past medical history of Anemia, Arrhythmia, Arthritis, Cervical cancer screening (5/22/2013), Dyslipidemia (10/24/2012), Dysuria (10/2/2014), Fall (12/22/2022), GERD (gastroesophageal reflux disease), Heart murmur, Hyperlipidemia, Insomnia (2/27/2014), Migraine, Primary hypertension (6/22/2023), Seasonal allergies (5/22/2013), Shingles, and Thyroid disease.    She has no past medical history of Anxiety, Asthma, Breast cancer (HCC), Cancer (HCC), Cataract, CHF (congestive heart failure) (HCC), Clotting disorder (HCC), COPD (chronic obstructive pulmonary disease) (HCC), Depression, Diabetes (HCC), Heart attack (HCC), HIV (human immunodeficiency virus infection) (HCC), Kidney disease, Seizure (HCC), Stroke (HCC), or Substance abuse (HCC).    ROS  No chest pain, no shortness of breath, no abdominal pain       Objective:     /82 (BP Location: Right arm, Patient Position: Sitting, BP Cuff Size: Adult)   Pulse 65   Temp 36.6 °C (97.8 °F) (Temporal)   Resp 16   Ht 1.6 m (5' 3\")   Wt 54.9 kg (121 lb)   SpO2 98%  Body mass index is 21.43 kg/m².   Physical Exam:  Constitutional: Alert, no distress.  Skin: Warm, dry, good turgor, no rashes in visible areas.  Eye: Equal, round and reactive, conjunctiva clear, lids normal.  ENMT: Lips without lesions, good dentition, oropharynx clear.  Neck: Trachea midline, no masses, no thyromegaly. No cervical or supraclavicular lymphadenopathy  Respiratory: Unlabored respiratory effort, lungs clear to auscultation, no wheezes, no ronchi.  Cardiovascular: Normal S1, S2, no murmur, no edema.  Abdomen: Soft, non-tender, no masses, no hepatosplenomegaly.  Psych: Alert and oriented x3, normal affect and mood.    Assessment and Plan:   The following treatment plan was discussed    1. Closed fracture of distal end of " right radius with routine healing, unspecified fracture morphology, subsequent encounter    2. Ear popping, unspecified laterality    3. Encounter for screening mammogram for breast cancer  - MA-SCREENING MAMMO BILAT W/TOMOSYNTHESIS W/CAD; Future    4. Screening for colorectal cancer  - Referral to GI for Colonoscopy    5. Vitamin D deficiency  - VITAMIN D,25 HYDROXY (DEFICIENCY); Future    6. Dyslipidemia  - Lipid Profile; Future  - simvastatin (ZOCOR) 20 MG Tab; TAKE 1 TABLET BY MOUTH EVERY EVENING  Dispense: 90 Tablet; Refill: 3    7. Elevated fasting glucose  - Comp Metabolic Panel; Future    Other orders  - Zoster Vac Recomb Adjuvanted (SHINGRIX) 50 MCG/0.5ML Recon Susp; Inject 0.5 mL into the shoulder, thigh, or buttocks one time for 1 dose.  Dispense: 0.5 mL; Refill: 0      Followup: Return for AWV 3-6 months, Lab Review.

## 2025-02-19 ENCOUNTER — HOSPITAL ENCOUNTER (OUTPATIENT)
Dept: RADIOLOGY | Facility: MEDICAL CENTER | Age: 75
End: 2025-02-19
Attending: FAMILY MEDICINE
Payer: MEDICARE

## 2025-04-08 ENCOUNTER — HOSPITAL ENCOUNTER (OUTPATIENT)
Dept: RADIOLOGY | Facility: MEDICAL CENTER | Age: 75
End: 2025-04-08
Attending: FAMILY MEDICINE
Payer: MEDICARE

## 2025-04-08 DIAGNOSIS — Z12.31 ENCOUNTER FOR SCREENING MAMMOGRAM FOR BREAST CANCER: ICD-10-CM

## 2025-04-08 PROCEDURE — 77067 SCR MAMMO BI INCL CAD: CPT

## 2025-04-11 ENCOUNTER — RESULTS FOLLOW-UP (OUTPATIENT)
Dept: MEDICAL GROUP | Facility: PHYSICIAN GROUP | Age: 75
End: 2025-04-11

## 2025-04-30 ENCOUNTER — OFFICE VISIT (OUTPATIENT)
Dept: MEDICAL GROUP | Facility: PHYSICIAN GROUP | Age: 75
End: 2025-04-30
Payer: MEDICARE

## 2025-04-30 VITALS
DIASTOLIC BLOOD PRESSURE: 80 MMHG | SYSTOLIC BLOOD PRESSURE: 122 MMHG | HEIGHT: 63 IN | HEART RATE: 66 BPM | WEIGHT: 123 LBS | OXYGEN SATURATION: 99 % | BODY MASS INDEX: 21.79 KG/M2 | TEMPERATURE: 97.6 F | RESPIRATION RATE: 16 BRPM

## 2025-04-30 DIAGNOSIS — R07.81 RIB PAIN: ICD-10-CM

## 2025-04-30 PROCEDURE — 3079F DIAST BP 80-89 MM HG: CPT | Performed by: FAMILY MEDICINE

## 2025-04-30 PROCEDURE — 99214 OFFICE O/P EST MOD 30 MIN: CPT | Performed by: FAMILY MEDICINE

## 2025-04-30 PROCEDURE — 3074F SYST BP LT 130 MM HG: CPT | Performed by: FAMILY MEDICINE

## 2025-04-30 RX ORDER — LIDOCAINE 50 MG/G
1 PATCH TOPICAL EVERY 24 HOURS
Qty: 30 PATCH | Refills: 3 | Status: SHIPPED | OUTPATIENT
Start: 2025-04-30

## 2025-04-30 RX ORDER — DOXYCYCLINE HYCLATE 100 MG
TABLET ORAL
COMMUNITY
End: 2025-05-14

## 2025-04-30 RX ORDER — SIMVASTATIN 20 MG
1 TABLET ORAL EVERY EVENING
COMMUNITY
End: 2025-05-15 | Stop reason: SDUPTHER

## 2025-04-30 RX ORDER — OXYCODONE HYDROCHLORIDE 5 MG/1
TABLET ORAL
COMMUNITY
End: 2025-05-14

## 2025-04-30 ASSESSMENT — FIBROSIS 4 INDEX: FIB4 SCORE: 1.76

## 2025-05-01 PROBLEM — R07.81 RIB PAIN: Status: ACTIVE | Noted: 2025-05-01

## 2025-05-01 NOTE — PROGRESS NOTES
Subjective:   Tanisha Haile is a 74 y.o. female here today for evaluation and management of:     History of Present Illness  The patient is a 74-year-old female who presents for evaluation of a bump on her back and some back pain.    She reports experiencing left-sided back pain localized over the ribs since Friday. The onset of pain is attributed to an incident of improper lifting. The pain intensifies around 5:00 AM, necessitating her to rise from bed. She has been unable to manage the pain with Tylenol or aspirin due to allergies. She has considered the use of Aleve as an alternative.    Additionally, she expresses concern about a small bump located on her back, which has been identified as seborrheic keratosis.    A severe reaction was experienced following a cortisone injection for her knee, characterized by flu-like symptoms and nausea persisting for 4 days post-injection. She also developed a cough during this period.    An appointment is scheduled for 05/14/2025, with blood work to be completed on 05/09/2025.          Current medicines (including changes today)  Current Outpatient Medications   Medication Sig Dispense Refill    doxycycline (VIBRAMYCIN) 100 MG Tab TAKE 1 TABLET BY MOUTH TWICE DAILY FOR 5 DAYS      oxyCODONE immediate-release (ROXICODONE) 5 MG Tab TAKE 1 TABLET BY MOUTH EVERY 8 HOURS FOR UP TO 7 DAYS AS NEEDED FOR SEVERE PAIN      simvastatin (ZOCOR) 20 MG Tab Take 1 Tablet by mouth every evening.      lidocaine (LIDODERM) 5 % Patch Place 1 Patch on the skin every 24 hours. 30 Patch 3    simvastatin (ZOCOR) 20 MG Tab TAKE 1 TABLET BY MOUTH EVERY EVENING 90 Tablet 3    diazePAM (VALIUM) 5 MG Tab       erythromycin 5 MG/GM Ointment       alendronate (FOSAMAX) 70 MG Tab Take 1 Tablet by mouth every 7 days. For osteoporosis 12 Tablet 3    triamcinolone acetonide (KENALOG) 0.5 % Cream Apply 1g three times a day for 7 days to affected area 60 g 5    fluticasone (FLONASE) 50 MCG/ACT nasal  spray Administer 1 Spray into affected nostril(S) every day. 16 g 11    buPROPion SR (WELLBUTRIN-SR) 150 MG TABLET SR 12 HR sustained-release tablet Take 1 Tablet by mouth 2 times a day. For tobacco cessation 180 Tablet 3    LORazepam (ATIVAN) 1 MG Tab       amLODIPine (NORVASC) 10 MG Tab Take 1 Tablet by mouth every day. For high blood pressure 90 Tablet 3    albuterol 108 (90 Base) MCG/ACT Aero Soln inhalation aerosol Inhale 2 Puffs every four hours as needed for Shortness of Breath. 1 Each 3    azelastine (ASTELIN) 137 MCG/SPRAY nasal spray Administer 1 Spray into affected nostril(S) 2 times a day. 30 mL 11    multivitamin (THERAGRAN) Tab Take 1 Tablet by mouth every day.      VITAMIN D PO Take 2,000 mcg by mouth.      CRANBERRY PO Take  by mouth.      ascorbic acid (ASCORBIC ACID) 500 MG Tab Take 1 Tablet by mouth every day.      Calcium Carbonate-Vitamin D 600-400 MG-UNIT Tab Take  by mouth 2 times a day.         No current facility-administered medications for this visit.     She  has a past medical history of Anemia, Arrhythmia, Arthritis, Cervical cancer screening (5/22/2013), Dyslipidemia (10/24/2012), Dysuria (10/2/2014), Fall (12/22/2022), GERD (gastroesophageal reflux disease), Heart murmur, Hyperlipidemia, Insomnia (2/27/2014), Migraine, Primary hypertension (6/22/2023), Seasonal allergies (5/22/2013), Shingles, and Thyroid disease.    She has no past medical history of Anxiety, Asthma, Breast cancer (formerly [facility]), Cancer (formerly [facility]), Cataract, CHF (congestive heart failure) (formerly [facility]), Clotting disorder (formerly [facility]), COPD (chronic obstructive pulmonary disease) (formerly [facility]), Depression, Diabetes (formerly [facility]), Heart attack (formerly [facility]), HIV (human immunodeficiency virus infection) (formerly [facility]), Kidney disease, Seizure (formerly [facility]), Stroke (formerly [facility]), or Substance abuse (formerly [facility]).    ROS  No chest pain, no shortness of breath, no abdominal pain       Objective:     /80 (BP Location: Left arm, Patient Position: Sitting, BP Cuff Size: Adult)   Pulse 66   Temp 36.4 °C  "(97.6 °F) (Temporal)   Resp 16   Ht 1.6 m (5' 3\")   Wt 55.8 kg (123 lb)   SpO2 99%  Body mass index is 21.79 kg/m².   Physical Exam:  Constitutional: Alert, no distress.  Skin: Warm, dry, good turgor, no rashes in visible areas.  Eye: Equal, round and reactive, conjunctiva clear, lids normal.  ENMT: Lips without lesions, good dentition, oropharynx clear.  Neck: Trachea midline, no masses, no thyromegaly. No cervical or supraclavicular lymphadenopathy  Respiratory: Unlabored respiratory effort, lungs clear to auscultation, no wheezes, no ronchi.  Cardiovascular: Normal S1, S2, no murmur, no edema.  Abdomen: Soft, non-tender, no masses, no hepatosplenomegaly.  Psych: Alert and oriented x3, normal affect and mood.       The following treatment plan was discussed  Assessment & Plan  1. Left-sided back pain.  - The pain is likely due to a muscle strain or bruise, possibly from lifting something incorrectly.  - No signs of rashes, blood pooling, or swelling are present upon examination.  - Advised to apply ice packs and perform gentle stretches; lidocaine patches will be prescribed and sent to [facility].  - If the pain persists, further evaluation may be needed.    2. Seborrheic keratosis.  - The bump on her back is identified as seborrheic keratosis, which is harmless and non-cancerous.  - No concerning physical exam findings; reassurance provided regarding the benign nature of the bump.  - Discussed that removal can be considered if it becomes bothersome due to clothing or skin irritation.  - No immediate treatment required unless symptoms change.    1. Rib pain    Other orders  - doxycycline (VIBRAMYCIN) 100 MG Tab; TAKE 1 TABLET BY MOUTH TWICE DAILY FOR 5 DAYS  - oxyCODONE immediate-release (ROXICODONE) 5 MG Tab; TAKE 1 TABLET BY MOUTH EVERY 8 HOURS FOR UP TO 7 DAYS AS NEEDED FOR SEVERE PAIN  - simvastatin (ZOCOR) 20 MG Tab; Take 1 Tablet by mouth every evening.  - lidocaine (LIDODERM) 5 % Patch; Place 1 Patch on " the skin every 24 hours.  Dispense: 30 Patch; Refill: 3      Followup: No follow-ups on file.  Verbal consent was acquired by the patient to use [employer] ambient listening note generation during this visit Yes

## 2025-05-09 ENCOUNTER — HOSPITAL ENCOUNTER (OUTPATIENT)
Dept: LAB | Facility: MEDICAL CENTER | Age: 75
End: 2025-05-09
Attending: FAMILY MEDICINE

## 2025-05-09 ENCOUNTER — HOSPITAL ENCOUNTER (OUTPATIENT)
Dept: LAB | Facility: MEDICAL CENTER | Age: 75
End: 2025-05-09
Attending: FAMILY MEDICINE
Payer: MEDICARE

## 2025-05-09 DIAGNOSIS — Z00.6 CLINICAL TRIAL PARTICIPANT: ICD-10-CM

## 2025-05-09 DIAGNOSIS — E55.9 VITAMIN D DEFICIENCY: ICD-10-CM

## 2025-05-09 DIAGNOSIS — R73.01 ELEVATED FASTING GLUCOSE: ICD-10-CM

## 2025-05-09 DIAGNOSIS — E78.5 DYSLIPIDEMIA: ICD-10-CM

## 2025-05-09 PROCEDURE — 80053 COMPREHEN METABOLIC PANEL: CPT

## 2025-05-09 PROCEDURE — 80061 LIPID PANEL: CPT

## 2025-05-09 PROCEDURE — 82306 VITAMIN D 25 HYDROXY: CPT

## 2025-05-10 ENCOUNTER — OFFICE VISIT (OUTPATIENT)
Dept: URGENT CARE | Facility: PHYSICIAN GROUP | Age: 75
End: 2025-05-10
Payer: MEDICARE

## 2025-05-10 ENCOUNTER — HOSPITAL ENCOUNTER (OUTPATIENT)
Facility: MEDICAL CENTER | Age: 75
End: 2025-05-10
Payer: MEDICARE

## 2025-05-10 VITALS
OXYGEN SATURATION: 98 % | RESPIRATION RATE: 20 BRPM | DIASTOLIC BLOOD PRESSURE: 74 MMHG | SYSTOLIC BLOOD PRESSURE: 128 MMHG | TEMPERATURE: 97 F | WEIGHT: 124.6 LBS | BODY MASS INDEX: 22.08 KG/M2 | HEART RATE: 71 BPM | HEIGHT: 63 IN

## 2025-05-10 DIAGNOSIS — R39.9 UTI SYMPTOMS: ICD-10-CM

## 2025-05-10 DIAGNOSIS — R30.0 DYSURIA: ICD-10-CM

## 2025-05-10 DIAGNOSIS — Z87.440 HISTORY OF UTI: ICD-10-CM

## 2025-05-10 LAB
25(OH)D3 SERPL-MCNC: 59 NG/ML (ref 30–100)
ALBUMIN SERPL BCP-MCNC: 4.1 G/DL (ref 3.2–4.9)
ALBUMIN/GLOB SERPL: 1.8 G/DL
ALP SERPL-CCNC: 72 U/L (ref 30–99)
ALT SERPL-CCNC: 20 U/L (ref 2–50)
ANION GAP SERPL CALC-SCNC: 6 MMOL/L (ref 7–16)
APPEARANCE UR: CLEAR
AST SERPL-CCNC: 21 U/L (ref 12–45)
BILIRUB SERPL-MCNC: 0.5 MG/DL (ref 0.1–1.5)
BILIRUB UR STRIP-MCNC: NEGATIVE MG/DL
BUN SERPL-MCNC: 15 MG/DL (ref 8–22)
CALCIUM ALBUM COR SERPL-MCNC: 9.4 MG/DL (ref 8.5–10.5)
CALCIUM SERPL-MCNC: 9.5 MG/DL (ref 8.5–10.5)
CHLORIDE SERPL-SCNC: 106 MMOL/L (ref 96–112)
CHOLEST SERPL-MCNC: 180 MG/DL (ref 100–199)
CO2 SERPL-SCNC: 31 MMOL/L (ref 20–33)
COLOR UR AUTO: YELLOW
CREAT SERPL-MCNC: 0.62 MG/DL (ref 0.5–1.4)
FASTING STATUS PATIENT QL REPORTED: NORMAL
GFR SERPLBLD CREATININE-BSD FMLA CKD-EPI: 93 ML/MIN/1.73 M 2
GLOBULIN SER CALC-MCNC: 2.3 G/DL (ref 1.9–3.5)
GLUCOSE SERPL-MCNC: 91 MG/DL (ref 65–99)
GLUCOSE UR STRIP.AUTO-MCNC: NEGATIVE MG/DL
HDLC SERPL-MCNC: 104 MG/DL
KETONES UR STRIP.AUTO-MCNC: NEGATIVE MG/DL
LDLC SERPL CALC-MCNC: 69 MG/DL
LEUKOCYTE ESTERASE UR QL STRIP.AUTO: NORMAL
NITRITE UR QL STRIP.AUTO: NEGATIVE
PH UR STRIP.AUTO: 6.5 [PH] (ref 5–8)
POTASSIUM SERPL-SCNC: 4.2 MMOL/L (ref 3.6–5.5)
PROT SERPL-MCNC: 6.4 G/DL (ref 6–8.2)
PROT UR QL STRIP: 30 MG/DL
RBC UR QL AUTO: NORMAL
SODIUM SERPL-SCNC: 143 MMOL/L (ref 135–145)
SP GR UR STRIP.AUTO: 1.01
TRIGL SERPL-MCNC: 35 MG/DL (ref 0–149)
UROBILINOGEN UR STRIP-MCNC: 0.2 MG/DL

## 2025-05-10 PROCEDURE — 81002 URINALYSIS NONAUTO W/O SCOPE: CPT

## 2025-05-10 PROCEDURE — 99213 OFFICE O/P EST LOW 20 MIN: CPT

## 2025-05-10 PROCEDURE — 3074F SYST BP LT 130 MM HG: CPT

## 2025-05-10 PROCEDURE — 87086 URINE CULTURE/COLONY COUNT: CPT

## 2025-05-10 PROCEDURE — 3078F DIAST BP <80 MM HG: CPT

## 2025-05-10 RX ORDER — CEFDINIR 300 MG/1
300 CAPSULE ORAL 2 TIMES DAILY
Qty: 10 CAPSULE | Refills: 0 | Status: SHIPPED | OUTPATIENT
Start: 2025-05-10 | End: 2025-05-14

## 2025-05-10 ASSESSMENT — FIBROSIS 4 INDEX: FIB4 SCORE: 1.33

## 2025-05-10 NOTE — PROGRESS NOTES
Subjective     Tanisha Haile is a 74 y.o. female who presents with UTI (Pt sts hard to sit, pain and burning when urinating x yesterday /Pt sts she has had back pain for a long time and she saw her pcp for the back pain already )    Onset x1 day.  She reports dysuria. Reports very mild urinary frequency and urgency. Denies flank pain. No fevers or chills. Denies new onset back pain, localized and non-radiating, reports similar to what she has had in the past, worse with movement. No falls or trauma. Denies discharge. No other vaginal symptoms. PMH Atrophic vaginitis. Last UTI 9/25/24, reports she was treated with Macrobid. Denies hx of kidney stones or kidney infections. No prior tx. No other aggravating or alleviating factors.          Review of Systems   All other systems reviewed and are negative.    Medications:    albuterol Aers  alendronate Tabs  amLODIPine Tabs  ascorbic acid Tabs  azelastine  buPROPion SR Tb12  Calcium Carbonate-Vitamin D Tabs  CRANBERRY PO  diazePAM Tabs  doxycycline Tabs  erythromycin Oint  fluticasone  lidocaine Ptch  LORazepam Tabs  multivitamin Tabs  oxyCODONE immediate-release Tabs  simvastatin Tabs  triamcinolone acetonide Crea  VITAMIN D PO    Allergies:  Ibuprofen, Tylenol, Asa [aspirin], Fosamax [alendronate sodium], Influenza virus vacc, Kenalog, Lisinopril, Meperidine, Nitrofurantoin, Nsaids, Pcn [penicillins], Propoxyphene, Sulfa drugs, and Voltaren [diclofenac]    Past Social Hx:  Tanisha Haile  reports that she has been smoking cigarettes. She has a 5 pack-year smoking history. She has never used smokeless tobacco. She reports current alcohol use of about 4.2 oz of alcohol per week. She reports that she does not use drugs.    Past Medical Hx:   Past Medical History:   Diagnosis Date    Anemia     Arrhythmia     Arthritis     Cervical cancer screening 5/22/2013    Due to repeat around 2015.    Dyslipidemia 10/24/2012    Dysuria 10/2/2014    Seen 9/27 for uti,  "given macrobid Feels the same as 9/27 Feels more like vaginal pain      Fall 12/22/2022    GERD (gastroesophageal reflux disease)     Heart murmur     Hyperlipidemia     Insomnia 2/27/2014    Not sleeping, can't shut brain off Making her really tired during the day X 1 month plus but worse for a month    Migraine     Primary hypertension 6/22/2023    Seasonal allergies 5/22/2013    Shingles     Thyroid disease              Objective     /74   Pulse 71   Temp 36.1 °C (97 °F) (Temporal)   Resp 20   Ht 1.6 m (5' 3\")   Wt 56.5 kg (124 lb 9.6 oz)   LMP  (LMP Unknown)   SpO2 98%   BMI 22.07 kg/m²      Physical Exam  Vitals reviewed.   Constitutional:       General: She is not in acute distress.     Appearance: Normal appearance. She is not ill-appearing or toxic-appearing.   HENT:      Head: Normocephalic and atraumatic.      Right Ear: External ear normal.      Left Ear: External ear normal.      Nose: Nose normal.      Mouth/Throat:      Mouth: Mucous membranes are moist.   Eyes:      Conjunctiva/sclera: Conjunctivae normal.   Cardiovascular:      Rate and Rhythm: Normal rate.      Heart sounds: Normal heart sounds.   Pulmonary:      Effort: Pulmonary effort is normal.      Breath sounds: Normal breath sounds.   Abdominal:      Palpations: Abdomen is soft.      Tenderness: There is no abdominal tenderness. There is no right CVA tenderness, left CVA tenderness, guarding or rebound.   Musculoskeletal:         General: Normal range of motion.      Cervical back: Normal range of motion and neck supple.   Skin:     General: Skin is warm and dry.      Capillary Refill: Capillary refill takes less than 2 seconds.   Neurological:      Mental Status: She is alert and oriented to person, place, and time.   Psychiatric:         Behavior: Behavior normal.                           Results for orders placed or performed in visit on 05/10/25   POCT Urinalysis    Collection Time: 05/10/25  1:02 PM   Result Value Ref " Range    POC Color Yellow Negative    POC Appearance Clear Negative    POC Glucose Negative Negative mg/dL    POC Bilirubin Negative Negative mg/dL    POC Ketones Negative Negative mg/dL    POC Specific Gravity 1.015 <1.005 - >1.030    POC Blood Moderate Negative    POC Urine PH 6.5 5.0 - 8.0    POC Protein 30 Negative mg/dL    POC Urobiligen 0.2 Negative (0.2) mg/dL    POC Nitrites Negative Negative    POC Leukocyte Esterase Small Negative          Assessment & Plan  Dysuria    Orders:    POCT Urinalysis    UTI symptoms    Orders:    POCT Urinalysis    cefdinir (OMNICEF) 300 MG Cap; Take 1 Capsule by mouth 2 times a day for 5 days.    URINE CULTURE(NEW); Future    History of UTI    Orders:    URINE CULTURE(NEW); Future    Afebrile, VSS. No CVA tenderness. Denies hx of anaphylaxis with PCNs. Culture pending. Has an appt 5/14 PCP. Recommend good hydration/fluids. ED precautions advised.    Differential diagnosis, natural history, supportive care, and indications for immediate follow-up discussed. Follow-up as needed with PCP or RTC for recheck, reevaluation, and consideration of further management. Recommend Emergency Department or call 911 if symptoms fail to improve, for any change in condition, further concerns, or new concerning symptoms.     Discussed management options (risks, benefits, and alternatives to treatment). Pt/parent/guardian demonstrates understanding and the treatment plan was agreed upon.     Electronically signed by   Miriam Hagan DNP, FRANK[place], BENY

## 2025-05-12 ENCOUNTER — RESULTS FOLLOW-UP (OUTPATIENT)
Dept: URGENT CARE | Facility: PHYSICIAN GROUP | Age: 75
End: 2025-05-12

## 2025-05-12 LAB
BACTERIA UR CULT: NORMAL
SIGNIFICANT IND 70042: NORMAL
SITE SITE: NORMAL
SOURCE SOURCE: NORMAL

## 2025-05-14 ENCOUNTER — OFFICE VISIT (OUTPATIENT)
Dept: MEDICAL GROUP | Facility: PHYSICIAN GROUP | Age: 75
End: 2025-05-14
Payer: MEDICARE

## 2025-05-14 VITALS
RESPIRATION RATE: 14 BRPM | SYSTOLIC BLOOD PRESSURE: 154 MMHG | TEMPERATURE: 98 F | HEIGHT: 62 IN | BODY MASS INDEX: 22.89 KG/M2 | WEIGHT: 124.4 LBS | OXYGEN SATURATION: 97 % | DIASTOLIC BLOOD PRESSURE: 86 MMHG | HEART RATE: 68 BPM

## 2025-05-14 DIAGNOSIS — I10 PRIMARY HYPERTENSION: ICD-10-CM

## 2025-05-14 DIAGNOSIS — Z78.0 MENOPAUSE: Primary | ICD-10-CM

## 2025-05-14 DIAGNOSIS — E78.5 DYSLIPIDEMIA: ICD-10-CM

## 2025-05-14 LAB
ELF SCORE: 9.61 -
HA (HYALURONIC ACID): 69 NG/ML
PIIINP (AMINO-TERMINAL PROPEPTIDE): 9.09 NG/ML
RELATIVE RISK: NORMAL
RISK GROUP: NORMAL
RISK: 3.3 %
TIMP-1 (TISSUE INHIBITOR OF MMP1): 193 NG/ML

## 2025-05-14 PROCEDURE — 99214 OFFICE O/P EST MOD 30 MIN: CPT | Performed by: FAMILY MEDICINE

## 2025-05-14 PROCEDURE — 3077F SYST BP >= 140 MM HG: CPT | Performed by: FAMILY MEDICINE

## 2025-05-14 PROCEDURE — 3079F DIAST BP 80-89 MM HG: CPT | Performed by: FAMILY MEDICINE

## 2025-05-14 RX ORDER — ALENDRONATE SODIUM 70 MG/1
70 TABLET ORAL
Qty: 12 TABLET | Refills: 3 | Status: SHIPPED | OUTPATIENT
Start: 2025-05-14

## 2025-05-14 ASSESSMENT — FIBROSIS 4 INDEX: FIB4 SCORE: 1.35

## 2025-05-14 NOTE — PATIENT INSTRUCTIONS
Call to schedule your colonoscopy  [facility]  [address] [place] [address] [id number] [phone]

## 2025-05-15 ENCOUNTER — RESULTS FOLLOW-UP (OUTPATIENT)
Dept: MEDICAL GROUP | Facility: PHYSICIAN GROUP | Age: 75
End: 2025-05-15
Payer: MEDICARE

## 2025-05-15 RX ORDER — AMLODIPINE BESYLATE 10 MG/1
10 TABLET ORAL DAILY
Qty: 100 TABLET | Refills: 3 | Status: SHIPPED | OUTPATIENT
Start: 2025-05-15

## 2025-05-15 RX ORDER — SIMVASTATIN 20 MG
20 TABLET ORAL EVERY EVENING
Qty: 100 TABLET | Refills: 3 | Status: SHIPPED | OUTPATIENT
Start: 2025-05-15

## 2025-05-15 NOTE — ASSESSMENT & PLAN NOTE
The ASCVD Risk score (Guillermina LAWS, et al., 2019) failed to calculate. About 18 when last calculated  Continue simvastatin 20 mg, new rx provided as she had for some reason stopped it  Lab Results   Component Value Date/Time    CHOLSTRLTOT 180 05/09/2025 07:22 AM    LDL 69 05/09/2025 07:22 AM     05/09/2025 07:22 AM    TRIGLYCERIDE 35 05/09/2025 07:22 AM       Lab Results   Component Value Date/Time    SODIUM 143 05/09/2025 07:22 AM    POTASSIUM 4.2 05/09/2025 07:22 AM    CHLORIDE 106 05/09/2025 07:22 AM    CO2 31 05/09/2025 07:22 AM    GLUCOSE 91 05/09/2025 07:22 AM    BUN 15 05/09/2025 07:22 AM    CREATININE 0.62 05/09/2025 07:22 AM    CREATININE 0.8 04/08/2007 01:19 PM     Lab Results   Component Value Date/Time    ALKPHOSPHAT 72 05/09/2025 07:22 AM    ASTSGOT 21 05/09/2025 07:22 AM    ALTSGPT 20 05/09/2025 07:22 AM    TBILIRUBIN 0.5 05/09/2025 07:22 AM

## 2025-05-15 NOTE — ASSESSMENT & PLAN NOTE
Uncontrolled as she stopped taking amlodipine 10 mg  Encouraged her to restart  Has no LE edema, chest pain or shortness of breath

## 2025-05-15 NOTE — PROGRESS NOTES
Subjective:   Tanisha Haile is a 75 y.o. female here today for evaluation and management of:     Dyslipidemia  The ASCVD Risk score (Guillermina DK, et al., 2019) failed to calculate. About 18 when last calculated  Continue simvastatin 20 mg, new rx provided as she had for some reason stopped it  Lab Results   Component Value Date/Time    CHOLSTRLTOT 180 05/09/2025 07:22 AM    LDL 69 05/09/2025 07:22 AM     05/09/2025 07:22 AM    TRIGLYCERIDE 35 05/09/2025 07:22 AM       Lab Results   Component Value Date/Time    SODIUM 143 05/09/2025 07:22 AM    POTASSIUM 4.2 05/09/2025 07:22 AM    CHLORIDE 106 05/09/2025 07:22 AM    CO2 31 05/09/2025 07:22 AM    GLUCOSE 91 05/09/2025 07:22 AM    BUN 15 05/09/2025 07:22 AM    CREATININE 0.62 05/09/2025 07:22 AM    CREATININE 0.8 04/08/2007 01:19 PM     Lab Results   Component Value Date/Time    ALKPHOSPHAT 72 05/09/2025 07:22 AM    ASTSGOT 21 05/09/2025 07:22 AM    ALTSGPT 20 05/09/2025 07:22 AM    TBILIRUBIN 0.5 05/09/2025 07:22 AM          Primary hypertension  Uncontrolled as she stopped taking amlodipine 10 mg  Encouraged her to restart  Has no LE edema, chest pain or shortness of breath           Current medicines (including changes today)  Current Medications[1]  She  has a past medical history of Anemia, Arrhythmia, Arthritis, Cervical cancer screening (5/22/2013), Dyslipidemia (10/24/2012), Dysuria (10/2/2014), Fall (12/22/2022), GERD (gastroesophageal reflux disease), Heart murmur, Hyperlipidemia, Insomnia (2/27/2014), Migraine, Primary hypertension (6/22/2023), Seasonal allergies (5/22/2013), Shingles, and Thyroid disease.    She has no past medical history of Anxiety, Asthma, Breast cancer (HCC), Cancer (HCC), Cataract, CHF (congestive heart failure) (HCC), Clotting disorder (HCC), COPD (chronic obstructive pulmonary disease) (HCC), Depression, Diabetes (HCC), Heart attack (HCC), HIV (human immunodeficiency virus infection) (HCC), Kidney disease, Seizure (HCC),  "Stroke (HCC), or Substance abuse (HCC).    ROS  No chest pain, no shortness of breath, no abdominal pain       Objective:     BP (!) 154/86   Pulse 68   Temp 36.7 °C (98 °F) (Temporal)   Resp 14   Ht 1.575 m (5' 2\")   Wt 56.4 kg (124 lb 6.4 oz)   SpO2 97%  Body mass index is 22.75 kg/m².   Physical Exam:  Constitutional: Alert, no distress.  Skin: Warm, dry, good turgor, no rashes in visible areas.  Eye: Equal, round and reactive, conjunctiva clear, lids normal.  ENMT: Lips without lesions, good dentition, oropharynx clear.  Neck: Trachea midline, no masses, no thyromegaly. No cervical or supraclavicular lymphadenopathy  Respiratory: Unlabored respiratory effort, lungs clear to auscultation, no wheezes, no ronchi.  Cardiovascular: Normal S1, S2, no murmur, no edema.  Abdomen: Soft, non-tender, no masses, no hepatosplenomegaly.  Psych: Alert and oriented x3, normal affect and mood.    Assessment and Plan:   The following treatment plan was discussed    1. Menopause  - DS-BONE DENSITY STUDY (DEXA); Future    2. Dyslipidemia    3. Primary hypertension    Other orders  - alendronate (FOSAMAX) 70 MG Tab; Take 1 Tablet by mouth every 7 days. For osteoporosis  Dispense: 12 Tablet; Refill: 3  - amLODIPine (NORVASC) 10 MG Tab; Take 1 Tablet by mouth every day. For high blood pressure  Dispense: 100 Tablet; Refill: 3  - simvastatin (ZOCOR) 20 MG Tab; Take 1 Tablet by mouth every evening.  Dispense: 100 Tablet; Refill: 3      Followup: Return in about 3 months (around 8/14/2025) for Hypertension, avs pls.                [1]   Current Outpatient Medications   Medication Sig Dispense Refill    amLODIPine (NORVASC) 10 MG Tab Take 1 Tablet by mouth every day. For high blood pressure 100 Tablet 3    simvastatin (ZOCOR) 20 MG Tab Take 1 Tablet by mouth every evening. 100 Tablet 3    alendronate (FOSAMAX) 70 MG Tab Take 1 Tablet by mouth every 7 days. For osteoporosis 12 Tablet 3    lidocaine (LIDODERM) 5 % Patch Place 1 Patch " on the skin every 24 hours. 30 Patch 3    triamcinolone acetonide (KENALOG) 0.5 % Cream Apply 1g three times a day for 7 days to affected area 60 g 5    fluticasone (FLONASE) 50 MCG/ACT nasal spray Administer 1 Spray into affected nostril(S) every day. 16 g 11    multivitamin (THERAGRAN) Tab Take 1 Tablet by mouth every day.      VITAMIN D PO Take 2,000 mcg by mouth.      buPROPion SR (WELLBUTRIN-SR) 150 MG TABLET SR 12 HR sustained-release tablet Take 1 Tablet by mouth 2 times a day. For tobacco cessation (Patient not taking: Reported on 5/14/2025) 180 Tablet 3    ascorbic acid (ASCORBIC ACID) 500 MG Tab Take 1 Tablet by mouth every day. (Patient not taking: Reported on 5/14/2025)       No current facility-administered medications for this visit.

## 2025-05-16 ENCOUNTER — PATIENT MESSAGE (OUTPATIENT)
Dept: MEDICAL GROUP | Facility: PHYSICIAN GROUP | Age: 75
End: 2025-05-16
Payer: MEDICARE

## 2025-05-19 LAB
APOB+LDLR+PCSK9 GENE MUT ANL BLD/T: NOT DETECTED
BRCA1+BRCA2 DEL+DUP + FULL MUT ANL BLD/T: NOT DETECTED
MLH1+MSH2+MSH6+PMS2 GN DEL+DUP+FUL M: NOT DETECTED

## 2025-05-26 ENCOUNTER — OFFICE VISIT (OUTPATIENT)
Dept: URGENT CARE | Facility: PHYSICIAN GROUP | Age: 75
End: 2025-05-26
Payer: MEDICARE

## 2025-05-26 VITALS
RESPIRATION RATE: 12 BRPM | HEIGHT: 63 IN | TEMPERATURE: 97.2 F | SYSTOLIC BLOOD PRESSURE: 102 MMHG | WEIGHT: 122 LBS | OXYGEN SATURATION: 100 % | BODY MASS INDEX: 21.62 KG/M2 | DIASTOLIC BLOOD PRESSURE: 86 MMHG | HEART RATE: 76 BPM

## 2025-05-26 DIAGNOSIS — I95.9 HYPOTENSION, UNSPECIFIED HYPOTENSION TYPE: ICD-10-CM

## 2025-05-26 DIAGNOSIS — R94.31 ST SEGMENT DEPRESSION: ICD-10-CM

## 2025-05-26 DIAGNOSIS — I95.1 ORTHOSTATIC HYPOTENSION: ICD-10-CM

## 2025-05-26 DIAGNOSIS — I10 PRIMARY HYPERTENSION: ICD-10-CM

## 2025-05-26 RX ORDER — AMLODIPINE BESYLATE 5 MG/1
5 TABLET ORAL DAILY
Qty: 30 TABLET | Refills: 0 | Status: SHIPPED | OUTPATIENT
Start: 2025-05-26 | End: 2025-06-25

## 2025-05-26 ASSESSMENT — FIBROSIS 4 INDEX: FIB4 SCORE: 1.35

## 2025-05-26 NOTE — PROGRESS NOTES
Subjective:      Chief Complaint   Patient presents with    Blood Pressure Problem                         Hypertension          She is concerned that BP is running too low.   She states she fequently feels dizzy after she takes her Amlodipine, 10mg    .    The current episode started more than 1 month ago. The problem is unchanged. The problem is uncontrolled. Associated symptoms include : none. Pertinent negatives include no anxiety, blurred vision, chest pain, malaise/fatigue, orthopnea, palpitations or shortness of breath. There are no associated agents to hypertension. There are no known risk factors for coronary artery disease. Past treatments include: . The current treatment provides mild improvement. There are no compliance problems.  There is no history of angina, kidney disease or CAD/MI.       Social History     Socioeconomic History    Marital status:      Spouse name: Not on file    Number of children: Not on file    Years of education: Not on file    Highest education level: Associate degree: occupational, technical, or vocational program   Occupational History    Not on file   Tobacco Use    Smoking status: Every Day     Current packs/day: 0.25     Average packs/day: 0.3 packs/day for 20.0 years (5.0 ttl pk-yrs)     Types: Cigarettes    Smokeless tobacco: Never   Vaping Use    Vaping status: Never Used   Substance and Sexual Activity    Alcohol use: Yes     Alcohol/week: 4.2 oz     Types: 7 Glasses of wine per week     Comment: 1 glass of wine daily    Drug use: No     Comment: denies h/o heroin, cocaine, meth, IVDU    Sexual activity: Not Currently     Partners: Male     Birth control/protection: Post-Menopausal   Other Topics Concern    Not on file   Social History Narrative    Not on file     Social Drivers of Health     Financial Resource Strain: Low Risk  (2/14/2023)    Overall Financial Resource Strain (CARDIA)     Difficulty of Paying Living Expenses: Not hard at all   Food  Insecurity: Food Insecurity Present (2/14/2023)    Hunger Vital Sign     Worried About Running Out of Food in the Last Year: Sometimes true     Ran Out of Food in the Last Year: Never true   Transportation Needs: No Transportation Needs (2/14/2023)    PRAPARE - Transportation     Lack of Transportation (Medical): No     Lack of Transportation (Non-Medical): No   Physical Activity: Insufficiently Active (2/14/2023)    Exercise Vital Sign     Days of Exercise per Week: 1 day     Minutes of Exercise per Session: 60 min   Stress: No Stress Concern Present (2/14/2023)    [place] [employer] [place] [employer] - Occupational Stress Questionnaire     Feeling of Stress : Only a little   Social Connections: Moderately Isolated (2/14/2023)    Social Connection and Isolation Panel [NHANES]     Frequency of Communication with Friends and Family: Three times a week     Frequency of Social Gatherings with Friends and Family: Once a week     Attends Religion Services: 1 to 4 times per year     Active Member of Clubs or Organizations: No     Attends Club or Organization Meetings: Never     Marital Status:    Intimate Partner Violence: Not on file   Housing Stability: Unknown (2/14/2023)    Housing Stability Vital Sign     Unable to Pay for Housing in the Last Year: No     Number of Places Lived in the Last Year: Not on file     Unstable Housing in the Last Year: No       Medications Ordered Prior to Encounter[1]      Past Medical History:   Diagnosis Date    Primary hypertension 6/22/2023    Fall 12/22/2022    Dysuria 10/2/2014    Seen 9/27 for uti, given macrobid Feels the same as 9/27 Feels more like vaginal pain      Insomnia 2/27/2014    Not sleeping, can't shut brain off Making her really tired during the day X 1 month plus but worse for a month    Cervical cancer screening 5/22/2013    Due to repeat around 2015.    Seasonal allergies 5/22/2013    Dyslipidemia 10/24/2012    Anemia     Arrhythmia     Arthritis      "GERD (gastroesophageal reflux disease)     Heart murmur     Hyperlipidemia     Migraine     Shingles     Thyroid disease          Review of Systems   Constitutional: Negative for fever and malaise/fatigue.   Eyes: Negative for blurred vision and double vision.   Respiratory: Negative for cough and shortness of breath.    Cardiovascular: Negative for chest pain, palpitations, orthopnea and claudication.   Gastrointestinal: Negative for nausea and vomiting.   Neurological: negative for headaches Negative for dizziness and tingling.   All other systems reviewed and are negative.         Objective:     /68   Pulse 84   Temp 36.2 °C (97.2 °F) (Temporal)   Resp 12   Ht 1.6 m (5' 3\")   Wt 55.3 kg (122 lb)   SpO2 99%       Physical Exam   Constitutional: pt is oriented to person, place, and time. Pt appears well-developed and well-nourished. No distress.   HENT:   Head: Normocephalic and atraumatic.   Mouth/Throat:   Eyes: Conjunctivae and EOM are normal. Pupils are equal, round, and reactive to light. Right eye exhibits no discharge. Left eye exhibits no discharge. No scleral icterus.   Cardiovascular: Normal rate, regular rhythm, normal heart sounds and intact distal pulses.  Exam reveals no friction rub.    No murmur heard.  Pulmonary/Chest: Effort normal and breath sounds normal. No respiratory distress. Pt has no wheezes. Pt has no rales.   Neurological: pt is alert and oriented to person, place, and time. No cranial nerve deficit.   Skin: Skin is warm. Pt is not diaphoretic. No erythema.   Psychiatric: pt behavior is normal.   Nursing note and vitals reviewed.            EKG interpretation - normal sinus rhythm.   There is minor ST depression V4, V5.   . QT interval is normal. Axis is positive. No signs of ischemia.    Assessment/Plan:     Essential hypertension  BP running low normal  She c/o dizziness    Will dec amlodipine to 5mg qd  Ambulatory BP log and advised f/u PCP      2. Hypotension, unspecified " hypotension type (Primary)   She is mildly orthostatic  Advised to drink 2-3L water daily     Check electrolytes         - Comp Metabolic Panel; Future  - URINALYSIS,CULTURE IF INDICATED; Future    3. ST segment depression  Denies angina  - REFERRAL TO CARDIOLOGY      -   Differential diagnosis, natural history, supportive care, and indications for immediate follow-up discussed. All questions answered. Patient agrees with the plan of care.     Follow-up as needed if symptoms worsen or fail to improve to PCP, Urgent care or Emergency Room.     I have personally reviewed prior external notes and test results pertinent to today's visit.  I have independently reviewed and interpreted all diagnostics ordered during this urgent care acute visit.              [1]   Current Outpatient Medications on File Prior to Visit   Medication Sig Dispense Refill    amLODIPine (NORVASC) 10 MG Tab Take 1 Tablet by mouth every day. For high blood pressure 100 Tablet 3    simvastatin (ZOCOR) 20 MG Tab Take 1 Tablet by mouth every evening. 100 Tablet 3    alendronate (FOSAMAX) 70 MG Tab Take 1 Tablet by mouth every 7 days. For osteoporosis 12 Tablet 3    lidocaine (LIDODERM) 5 % Patch Place 1 Patch on the skin every 24 hours. 30 Patch 3    triamcinolone acetonide (KENALOG) 0.5 % Cream Apply 1g three times a day for 7 days to affected area 60 g 5    fluticasone (FLONASE) 50 MCG/ACT nasal spray Administer 1 Spray into affected nostril(S) every day. 16 g 11    multivitamin (THERAGRAN) Tab Take 1 Tablet by mouth every day.      VITAMIN D PO Take 2,000 mcg by mouth.      buPROPion SR (WELLBUTRIN-SR) 150 MG TABLET SR 12 HR sustained-release tablet Take 1 Tablet by mouth 2 times a day. For tobacco cessation (Patient not taking: Reported on 5/26/2025) 180 Tablet 3    ascorbic acid (ASCORBIC ACID) 500 MG Tab Take 1 Tablet by mouth every day. (Patient not taking: Reported on 5/26/2025)       No current facility-administered medications on file prior  to visit.

## 2025-05-27 ENCOUNTER — HOSPITAL ENCOUNTER (OUTPATIENT)
Dept: LAB | Facility: MEDICAL CENTER | Age: 75
End: 2025-05-27
Attending: FAMILY MEDICINE
Payer: MEDICARE

## 2025-05-27 DIAGNOSIS — I95.1 ORTHOSTATIC HYPOTENSION: ICD-10-CM

## 2025-05-27 DIAGNOSIS — I95.9 HYPOTENSION, UNSPECIFIED HYPOTENSION TYPE: ICD-10-CM

## 2025-05-27 LAB
ALBUMIN SERPL BCP-MCNC: 4.1 G/DL (ref 3.2–4.9)
ALBUMIN/GLOB SERPL: 1.7 G/DL
ALP SERPL-CCNC: 80 U/L (ref 30–99)
ALT SERPL-CCNC: 23 U/L (ref 2–50)
ANION GAP SERPL CALC-SCNC: 11 MMOL/L (ref 7–16)
APPEARANCE UR: CLEAR
AST SERPL-CCNC: 24 U/L (ref 12–45)
BACTERIA #/AREA URNS HPF: ABNORMAL /HPF
BILIRUB SERPL-MCNC: 0.2 MG/DL (ref 0.1–1.5)
BILIRUB UR QL STRIP.AUTO: NEGATIVE
BUN SERPL-MCNC: 19 MG/DL (ref 8–22)
CALCIUM ALBUM COR SERPL-MCNC: 9.2 MG/DL (ref 8.5–10.5)
CALCIUM SERPL-MCNC: 9.3 MG/DL (ref 8.5–10.5)
CASTS URNS QL MICRO: ABNORMAL /LPF (ref 0–2)
CHLORIDE SERPL-SCNC: 102 MMOL/L (ref 96–112)
CO2 SERPL-SCNC: 24 MMOL/L (ref 20–33)
COLOR UR: YELLOW
CREAT SERPL-MCNC: 0.67 MG/DL (ref 0.5–1.4)
EPITHELIAL CELLS 1715: >20 /HPF (ref 0–5)
EPITHELIAL CELLS RENAL  1715R: PRESENT /HPF
GFR SERPLBLD CREATININE-BSD FMLA CKD-EPI: 91 ML/MIN/1.73 M 2
GLOBULIN SER CALC-MCNC: 2.4 G/DL (ref 1.9–3.5)
GLUCOSE SERPL-MCNC: 83 MG/DL (ref 65–99)
GLUCOSE UR STRIP.AUTO-MCNC: NEGATIVE MG/DL
KETONES UR STRIP.AUTO-MCNC: NEGATIVE MG/DL
LEUKOCYTE ESTERASE UR QL STRIP.AUTO: ABNORMAL
MICRO URNS: ABNORMAL
NITRITE UR QL STRIP.AUTO: NEGATIVE
PH UR STRIP.AUTO: 6 [PH] (ref 5–8)
POTASSIUM SERPL-SCNC: 3.9 MMOL/L (ref 3.6–5.5)
PROT SERPL-MCNC: 6.5 G/DL (ref 6–8.2)
PROT UR QL STRIP: NEGATIVE MG/DL
RBC # URNS HPF: ABNORMAL /HPF (ref 0–2)
RBC UR QL AUTO: ABNORMAL
SODIUM SERPL-SCNC: 137 MMOL/L (ref 135–145)
SP GR UR STRIP.AUTO: 1.02
TRANS CELLS URNS QL MICRO: PRESENT /HPF
UROBILINOGEN UR STRIP.AUTO-MCNC: 0.2 EU/DL
WBC #/AREA URNS HPF: ABNORMAL /HPF

## 2025-05-27 PROCEDURE — 36415 COLL VENOUS BLD VENIPUNCTURE: CPT

## 2025-05-27 PROCEDURE — 81001 URINALYSIS AUTO W/SCOPE: CPT

## 2025-05-27 PROCEDURE — 80053 COMPREHEN METABOLIC PANEL: CPT

## 2025-05-29 ENCOUNTER — APPOINTMENT (OUTPATIENT)
Dept: RADIOLOGY | Facility: MEDICAL CENTER | Age: 75
End: 2025-05-29
Attending: FAMILY MEDICINE
Payer: MEDICARE

## 2025-05-30 ENCOUNTER — OFFICE VISIT (OUTPATIENT)
Dept: URGENT CARE | Facility: PHYSICIAN GROUP | Age: 75
End: 2025-05-30
Payer: MEDICARE

## 2025-05-30 ENCOUNTER — RESULTS FOLLOW-UP (OUTPATIENT)
Dept: URGENT CARE | Facility: PHYSICIAN GROUP | Age: 75
End: 2025-05-30

## 2025-05-30 VITALS
HEIGHT: 63 IN | HEART RATE: 78 BPM | RESPIRATION RATE: 18 BRPM | SYSTOLIC BLOOD PRESSURE: 110 MMHG | DIASTOLIC BLOOD PRESSURE: 74 MMHG | BODY MASS INDEX: 22.15 KG/M2 | OXYGEN SATURATION: 98 % | TEMPERATURE: 97 F | WEIGHT: 125 LBS

## 2025-05-30 DIAGNOSIS — I10 PRIMARY HYPERTENSION: ICD-10-CM

## 2025-05-30 DIAGNOSIS — N30.00 ACUTE CYSTITIS WITHOUT HEMATURIA: Primary | ICD-10-CM

## 2025-05-30 DIAGNOSIS — R42 DIZZINESS: ICD-10-CM

## 2025-05-30 RX ORDER — GRANULES FOR ORAL 3 G/1
3 POWDER ORAL ONCE
Qty: 1 EACH | Refills: 0 | Status: SHIPPED | OUTPATIENT
Start: 2025-05-30 | End: 2025-05-30

## 2025-05-30 ASSESSMENT — FIBROSIS 4 INDEX: FIB4 SCORE: 1.44

## 2025-05-30 NOTE — PROGRESS NOTES
"She recently stopped her amlodipine      Has been checking BP at home - readings run 120's-30s/80s.    She did not bring her log in today - that was per her best recollection    She c/o feeling intermittently lightheaded and dizzy.    She denies naomy vertigo, however.                    No double vision, hearing loss, numbness, nausea, vomiting, headache, slurred speech or shortness of breath.   Denies depression, anxiety.    Past Medical History:   Diagnosis Date    Primary hypertension 6/22/2023    Fall 12/22/2022    Dysuria 10/2/2014    Seen 9/27 for uti, given macrobid Feels the same as 9/27 Feels more like vaginal pain      Insomnia 2/27/2014    Not sleeping, can't shut brain off Making her really tired during the day X 1 month plus but worse for a month    Cervical cancer screening 5/22/2013    Due to repeat around 2015.    Seasonal allergies 5/22/2013    Dyslipidemia 10/24/2012    Anemia     Arrhythmia     Arthritis     GERD (gastroesophageal reflux disease)     Heart murmur     Hyperlipidemia     Migraine     Shingles     Thyroid disease        Social History[1]         Review of Systems   Constitutional: Negative for fever.   Respiratory: Negative for shortness of breath.    Cardiovascular: Negative for chest pain.   GI - no diarrhea  Neurological: Positive for dizziness. Negative for headaches.   All other systems reviewed and are negative.         Objective:     /74 (BP Location: Left arm, Patient Position: Sitting, BP Cuff Size: Adult)   Pulse 78   Temp 36.1 °C (97 °F) (Temporal)   Resp 18   Ht 1.6 m (5' 3\")   Wt 56.7 kg (125 lb)   SpO2 98%       Physical Exam   Constitutional: She is oriented to person, place, and time. She appears well-developed and well-nourished. No distress.   HENT:   Head: Normocephalic and atraumatic. EOMI.  PERRLA.  No nystagmus  Mouth/Throat: No oropharyngeal exudate.      Eyes: Conjunctivae are normal.   Cardiovascular: Normal rate, regular rhythm and normal heart " sounds.    Pulmonary/Chest: Effort normal and breath sounds normal. No respiratory distress. Pt has no wheezes, rales.   Neurological: pt is alert and oriented to person, place, and time. No cranial nerve deficit. Coordination and gait normal.   Skin: Skin is warm. She is not diaphoretic. No erythema.   Psychiatric:  behavior is normal.   Nursing note and vitals reviewed.              Assessment/Plan:            1. Primary hypertension  BP is at goal, apparently, without the Amlodipine (she self-discontinued) several days ago, but continues to feel dizzy.     Advised to continue to monitor BP at home and record in log and she will bring to PCP    Will continue to hold amlodipine for now - she may ultimately not need it, but will depend on ambulatory BP readings while withholding the Amlodipine.       2. Dizziness   She refused STAT CT - she will schedule at her own convenience.     - CT-HEAD W/O; Future      Differential diagnosis, natural history, supportive care, and indications for immediate follow-up discussed. All questions answered. Patient agrees with the plan of care.     Follow-up as needed if symptoms worsen or fail to improve to PCP, Urgent care or Emergency Room.     I have personally reviewed prior external notes and test results pertinent to today's visit.  I have independently reviewed and interpreted all diagnostics ordered during this urgent care acute visit.          [1]   Social History  Tobacco Use    Smoking status: Every Day     Current packs/day: 0.25     Average packs/day: 0.3 packs/day for 20.0 years (5.0 ttl pk-yrs)     Types: Cigarettes    Smokeless tobacco: Never   Vaping Use    Vaping status: Never Used   Substance Use Topics    Alcohol use: Yes     Alcohol/week: 4.2 oz     Types: 7 Glasses of wine per week     Comment: 1 glass of wine daily    Drug use: No     Comment: denies h/o heroin, cocaine, meth, IVDU

## 2025-05-31 ENCOUNTER — HOSPITAL ENCOUNTER (OUTPATIENT)
Dept: RADIOLOGY | Facility: MEDICAL CENTER | Age: 75
End: 2025-05-31
Attending: FAMILY MEDICINE
Payer: MEDICARE

## 2025-05-31 ENCOUNTER — RESULTS FOLLOW-UP (OUTPATIENT)
Dept: URGENT CARE | Facility: PHYSICIAN GROUP | Age: 75
End: 2025-05-31

## 2025-05-31 DIAGNOSIS — R42 DIZZINESS: ICD-10-CM

## 2025-05-31 PROCEDURE — 70450 CT HEAD/BRAIN W/O DYE: CPT

## 2025-06-01 ENCOUNTER — OFFICE VISIT (OUTPATIENT)
Dept: URGENT CARE | Facility: PHYSICIAN GROUP | Age: 75
End: 2025-06-01
Payer: MEDICARE

## 2025-06-01 VITALS
BODY MASS INDEX: 21.97 KG/M2 | WEIGHT: 124 LBS | OXYGEN SATURATION: 100 % | DIASTOLIC BLOOD PRESSURE: 68 MMHG | SYSTOLIC BLOOD PRESSURE: 148 MMHG | HEART RATE: 76 BPM | RESPIRATION RATE: 14 BRPM | HEIGHT: 63 IN | TEMPERATURE: 97.9 F

## 2025-06-01 DIAGNOSIS — G44.89 OTHER HEADACHE SYNDROME: ICD-10-CM

## 2025-06-01 DIAGNOSIS — T78.40XA ALLERGIC REACTION, INITIAL ENCOUNTER: Primary | ICD-10-CM

## 2025-06-01 DIAGNOSIS — Z88.9 H/O MULTIPLE ALLERGIES: ICD-10-CM

## 2025-06-01 PROCEDURE — 99214 OFFICE O/P EST MOD 30 MIN: CPT | Performed by: NURSE PRACTITIONER

## 2025-06-01 RX ORDER — DIPHENHYDRAMINE HCL 12.5 MG/5ML
12.5 SOLUTION ORAL ONCE
Status: COMPLETED | OUTPATIENT
Start: 2025-06-01 | End: 2025-06-01

## 2025-06-01 RX ADMIN — DIPHENHYDRAMINE HCL 12.5 MG: 12.5 SOLUTION ORAL at 11:12

## 2025-06-01 ASSESSMENT — FIBROSIS 4 INDEX: FIB4 SCORE: 1.44

## 2025-06-01 NOTE — PROGRESS NOTES
"Subjective:   Tanisha Haile is a 75 y.o. female who presents for Headache (X 3 hours this AM), Dysphagia, and Chest Pain       HPI  Patient is a pleasant 75 y.o. who presents for evaluation of 3-hour history of headache, difficulty swallowing, and palpable chest pain.  Patient recently had fosfomycin for UTI.  Is concerned about possible allergy.  Her one-time dose was taken at 830 yesterday morning.  Noted that patient has several allergies.  Denies cough, wheezing, shortness of breath, chest pain, palpitations.  She has not take anything for her symptoms as she was unsure if it would react with Benadryl.    ROS  All other systems are negative except as documented above within HPI.    MEDS: Current Medications[1]  ALLERGIES: Allergies[2]    Patient's PMH, SocHx, SurgHx, FamHx, Drug allergies and medications were reviewed.     Objective:   BP (!) 148/68   Pulse 76   Temp 36.6 °C (97.9 °F) (Temporal)   Resp 14   Ht 1.6 m (5' 3\")   Wt 56.2 kg (124 lb)   LMP  (LMP Unknown)   SpO2 100%   BMI 21.97 kg/m²     Physical Exam  Vitals and nursing note reviewed.   Constitutional:       General: She is awake.      Appearance: Normal appearance. She is well-developed.   HENT:      Head: Normocephalic and atraumatic.      Right Ear: External ear normal.      Left Ear: External ear normal.      Nose: Nose normal.      Mouth/Throat:      Mouth: Mucous membranes are moist.      Pharynx: Oropharynx is clear.   Eyes:      Extraocular Movements: Extraocular movements intact.      Conjunctiva/sclera: Conjunctivae normal.   Cardiovascular:      Rate and Rhythm: Normal rate and regular rhythm.      Heart sounds: Normal heart sounds.   Pulmonary:      Effort: Pulmonary effort is normal.      Breath sounds: Normal breath sounds and air entry.   Musculoskeletal:         General: Normal range of motion.      Cervical back: Normal range of motion and neck supple.   Skin:     General: Skin is warm and dry.   Neurological:      " Mental Status: She is alert and oriented to person, place, and time.   Psychiatric:         Mood and Affect: Mood normal.         Behavior: Behavior normal.         Thought Content: Thought content normal.         Assessment/Plan:   Assessment    1. Allergic reaction, initial encounter  - diphenhydrAMINE (Benadryl) 12.5 MG/5ML liquid 12.5 mg    2. Other headache syndrome    3. H/O multiple allergies      Vital signs stable at today's acute urgent care visit.  Patient given Benadryl in clinic and monitored for 30 minutes, patient notes improved symptoms.  Is unclear as to whether she is truly allergic to her fosfomycin, recommend she follow-up with PCP regarding this.    Advised the patient to follow-up with the primary care provider if symptoms persist.  Strict red flags discussed and indications to immediately call 911 or present to the ED. All questions were encouraged and answered to the patient's satisfaction and understanding, and they agree to the plan of care.     This is an acute problem with uncertain prognosis, medication management and instructions as well as management options were provided.  I personally reviewed prior external notes and test results pertinent to today and independently reviewed and interpreted all diagnostics, to include POCT testing. Time spent evaluating this patient includes preparing for visit, counseling/education, exam, evaluation, obtaining history, and ordering lab/test/procedures.      Please note that this dictation was created using voice recognition software. I have made a reasonable attempt to correct obvious errors, but I expect that there are errors of grammar and possibly content that I did not discover before finalizing the note.           [1]   Current Outpatient Medications:     amLODIPine (NORVASC) 5 MG Tab, Take 1 Tablet by mouth every day for 30 days., Disp: 30 Tablet, Rfl: 0    amLODIPine (NORVASC) 10 MG Tab, Take 1 Tablet by mouth every day. For high blood  pressure, Disp: 100 Tablet, Rfl: 3    simvastatin (ZOCOR) 20 MG Tab, Take 1 Tablet by mouth every evening., Disp: 100 Tablet, Rfl: 3    alendronate (FOSAMAX) 70 MG Tab, Take 1 Tablet by mouth every 7 days. For osteoporosis, Disp: 12 Tablet, Rfl: 3    lidocaine (LIDODERM) 5 % Patch, Place 1 Patch on the skin every 24 hours., Disp: 30 Patch, Rfl: 3    triamcinolone acetonide (KENALOG) 0.5 % Cream, Apply 1g three times a day for 7 days to affected area, Disp: 60 g, Rfl: 5    fluticasone (FLONASE) 50 MCG/ACT nasal spray, Administer 1 Spray into affected nostril(S) every day., Disp: 16 g, Rfl: 11    buPROPion SR (WELLBUTRIN-SR) 150 MG TABLET SR 12 HR sustained-release tablet, Take 1 Tablet by mouth 2 times a day. For tobacco cessation, Disp: 180 Tablet, Rfl: 3    multivitamin (THERAGRAN) Tab, Take 1 Tablet by mouth every day., Disp: , Rfl:     VITAMIN D PO, Take 2,000 mcg by mouth., Disp: , Rfl:     ascorbic acid (ASCORBIC ACID) 500 MG Tab, Take 500 mg by mouth every day., Disp: , Rfl:   [2]   Allergies  Allergen Reactions    Ibuprofen Nausea    Tylenol Anaphylaxis    Asa [Aspirin]     Influenza Virus Vacc     Kenalog Nausea     Felt sick after knee injection    Lisinopril Cough     cough    Meperidine     Nitrofurantoin Vomiting    Nsaids Rash and Itching    Other Drug Nausea and Cough     STEROID INJECTIONS. Cough the next morning then sick to stomach for three days.    Pcn [Penicillins] Itching    Propoxyphene     Sulfa Drugs     Voltaren [Diclofenac]      Nausea and vomiting

## 2025-06-02 ENCOUNTER — TELEPHONE (OUTPATIENT)
Dept: HEALTH INFORMATION MANAGEMENT | Facility: OTHER | Age: 75
End: 2025-06-02
Payer: MEDICARE

## 2025-06-03 ENCOUNTER — OFFICE VISIT (OUTPATIENT)
Dept: MEDICAL GROUP | Facility: PHYSICIAN GROUP | Age: 75
End: 2025-06-03
Payer: MEDICARE

## 2025-06-03 VITALS
HEART RATE: 68 BPM | DIASTOLIC BLOOD PRESSURE: 80 MMHG | SYSTOLIC BLOOD PRESSURE: 122 MMHG | OXYGEN SATURATION: 99 % | TEMPERATURE: 97.4 F | BODY MASS INDEX: 22.15 KG/M2 | HEIGHT: 63 IN | RESPIRATION RATE: 16 BRPM | WEIGHT: 125 LBS

## 2025-06-03 DIAGNOSIS — I10 PRIMARY HYPERTENSION: Primary | ICD-10-CM

## 2025-06-03 DIAGNOSIS — T50.905S ADVERSE EFFECT OF DRUG, SEQUELA: ICD-10-CM

## 2025-06-03 PROBLEM — R05.1 ACUTE COUGH: Status: RESOLVED | Noted: 2023-12-13 | Resolved: 2025-06-03

## 2025-06-03 PROBLEM — T50.905A DRUG REACTION: Status: ACTIVE | Noted: 2025-06-03

## 2025-06-03 PROCEDURE — 3079F DIAST BP 80-89 MM HG: CPT | Performed by: FAMILY MEDICINE

## 2025-06-03 PROCEDURE — 3074F SYST BP LT 130 MM HG: CPT | Performed by: FAMILY MEDICINE

## 2025-06-03 PROCEDURE — 99214 OFFICE O/P EST MOD 30 MIN: CPT | Performed by: FAMILY MEDICINE

## 2025-06-03 ASSESSMENT — FIBROSIS 4 INDEX: FIB4 SCORE: 1.44

## 2025-06-03 NOTE — ASSESSMENT & PLAN NOTE
Off amlodipine, bp well controlled without medication  She had a uti that was causing her bp to be high.   Was treated in the urgent care with

## 2025-06-03 NOTE — PATIENT INSTRUCTIONS
Please check [employer] for your referral information or call the referral department at .   You can also send me a Handle message regarding your referral information.   Please note you will probably not get a call regarding the referral.      Please call GI to schedule your screening colonoscopy

## 2025-06-04 NOTE — PROGRESS NOTES
"Subjective:   Tanisha Haile is a 75 y.o. female here today for evaluation and management of:     Primary hypertension  Off amlodipine, bp well controlled without medication  She had a uti that was causing her bp to be high.   Was treated in the urgent care with     Drug reaction  Pt had adverse reactions to the amlodipine she was placed on had lightheadedness, not sure if this was an allergic reaction or if her bp was just too low.   BP today is completely normal with no BP/HTN medication 122/80    She also had UTI symptoms and trace blood in urine and LE negative for nitrates and negative urine culture, also had an adverse reaction to fosfomycin with her throat closing and trouble breathing, resolved well with benadryl in urgent care.     Thankfully she has recovered fully from both these adverse events.             Current medicines (including changes today)  Current Medications[1]  She  has a past medical history of Anemia, Arrhythmia, Arthritis, Cervical cancer screening (5/22/2013), Dyslipidemia (10/24/2012), Dysuria (10/2/2014), Fall (12/22/2022), GERD (gastroesophageal reflux disease), Heart murmur, Hyperlipidemia, Insomnia (2/27/2014), Migraine, Primary hypertension (6/22/2023), Seasonal allergies (5/22/2013), Shingles, and Thyroid disease.    She has no past medical history of Anxiety, Asthma, Breast cancer ([facility]), Cancer (HCC), Cataract, CHF (congestive heart failure) ([facility]), Clotting disorder (HCC), COPD (chronic obstructive pulmonary disease) ([facility]), Depression, Diabetes ([facility]), Heart attack (HCC), HIV (human immunodeficiency virus infection) ([facility]), Kidney disease, Seizure ([facility]), Stroke (HCC), or Substance abuse ([facility]ly [facility]).    ROS  No chest pain, no shortness of breath, no abdominal pain       Objective:     /80 (BP Location: Left arm, Patient Position: Sitting, BP Cuff Size: Adult)   Pulse 68   Temp 36.3 °C (97.4 °F) (Temporal)   Resp 16   Ht 1.6 m (5' 3\")   Wt 56.7 kg (125 lb)   SpO2 99%  " Body mass index is 22.14 kg/m².   Physical Exam:  Constitutional: Alert, no distress.  Skin: Warm, dry, good turgor, no rashes in visible areas.  Eye: Equal, round and reactive, conjunctiva clear, lids normal.  ENMT: Lips without lesions, good dentition, oropharynx clear.  Neck: Trachea midline, no masses, no thyromegaly. No cervical or supraclavicular lymphadenopathy  Respiratory: Unlabored respiratory effort, lungs clear to auscultation, no wheezes, no ronchi.  Cardiovascular: Normal S1, S2, no murmur, no edema.  Abdomen: Soft, non-tender, no masses, no hepatosplenomegaly.  Psych: Alert and oriented x3, normal affect and mood.    Assessment and Plan:   The following treatment plan was discussed    1. Primary hypertension    2. Adverse effect of drug, sequela      Followup: Return for As Scheduled.                [1]   Current Outpatient Medications   Medication Sig Dispense Refill    simvastatin (ZOCOR) 20 MG Tab Take 1 Tablet by mouth every evening. 100 Tablet 3    alendronate (FOSAMAX) 70 MG Tab Take 1 Tablet by mouth every 7 days. For osteoporosis 12 Tablet 3    lidocaine (LIDODERM) 5 % Patch Place 1 Patch on the skin every 24 hours. 30 Patch 3    triamcinolone acetonide (KENALOG) 0.5 % Cream Apply 1g three times a day for 7 days to affected area 60 g 5    fluticasone (FLONASE) 50 MCG/ACT nasal spray Administer 1 Spray into affected nostril(S) every day. 16 g 11    buPROPion SR (WELLBUTRIN-SR) 150 MG TABLET SR 12 HR sustained-release tablet Take 1 Tablet by mouth 2 times a day. For tobacco cessation 180 Tablet 3    multivitamin (THERAGRAN) Tab Take 1 Tablet by mouth every day.      VITAMIN D PO Take 2,000 mcg by mouth.      ascorbic acid (ASCORBIC ACID) 500 MG Tab Take 500 mg by mouth every day.       No current facility-administered medications for this visit.

## 2025-06-04 NOTE — ASSESSMENT & PLAN NOTE
Pt had adverse reactions to the amlodipine she was placed on had lightheadedness, not sure if this was an allergic reaction or if her bp was just too low.   BP today is completely normal with no BP/HTN medication 122/80    She also had UTI symptoms and trace blood in urine and LE negative for nitrates and negative urine culture, also had an adverse reaction to fosfomycin with her throat closing and trouble breathing, resolved well with benadryl in urgent care.     Thankfully she has recovered fully from both these adverse events.

## 2025-06-07 ENCOUNTER — APPOINTMENT (OUTPATIENT)
Dept: RADIOLOGY | Facility: MEDICAL CENTER | Age: 75
End: 2025-06-07
Attending: FAMILY MEDICINE
Payer: MEDICARE

## 2025-06-07 DIAGNOSIS — Z78.0 MENOPAUSE: ICD-10-CM

## 2025-06-07 PROCEDURE — 77080 DXA BONE DENSITY AXIAL: CPT

## 2025-06-09 ENCOUNTER — OFFICE VISIT (OUTPATIENT)
Dept: URGENT CARE | Facility: PHYSICIAN GROUP | Age: 75
End: 2025-06-09
Payer: MEDICARE

## 2025-06-09 VITALS
BODY MASS INDEX: 22.15 KG/M2 | OXYGEN SATURATION: 96 % | DIASTOLIC BLOOD PRESSURE: 82 MMHG | WEIGHT: 125 LBS | HEART RATE: 80 BPM | TEMPERATURE: 97.9 F | SYSTOLIC BLOOD PRESSURE: 118 MMHG | RESPIRATION RATE: 12 BRPM | HEIGHT: 63 IN

## 2025-06-09 DIAGNOSIS — K29.70 GASTRITIS WITHOUT BLEEDING, UNSPECIFIED CHRONICITY, UNSPECIFIED GASTRITIS TYPE: Primary | ICD-10-CM

## 2025-06-09 PROCEDURE — 3074F SYST BP LT 130 MM HG: CPT | Performed by: PHYSICIAN ASSISTANT

## 2025-06-09 PROCEDURE — 3079F DIAST BP 80-89 MM HG: CPT | Performed by: PHYSICIAN ASSISTANT

## 2025-06-09 PROCEDURE — 99213 OFFICE O/P EST LOW 20 MIN: CPT | Performed by: PHYSICIAN ASSISTANT

## 2025-06-09 RX ORDER — AMLODIPINE BESYLATE 10 MG/1
10 TABLET ORAL
COMMUNITY

## 2025-06-09 RX ORDER — GRANULES FOR ORAL 3 G/1
POWDER ORAL
COMMUNITY

## 2025-06-09 RX ORDER — AMLODIPINE BESYLATE 5 MG/1
5 TABLET ORAL
COMMUNITY

## 2025-06-09 ASSESSMENT — FIBROSIS 4 INDEX: FIB4 SCORE: 1.44

## 2025-06-09 NOTE — PROGRESS NOTES
"Subjective:   Tanisha Haile is a 75 y.o. female who presents for Pharyngitis (X1-2weeks )      Took fosfomycin 5/31/25 in afternoon, didn't notice anything that evening  Woke up feeling like throat was swollen, hard to swallow, felt subjectively hard to breathe    Seen 6/1/25 for this, treated with diphenhydramine which helped significantly    Saw primary 6/3, still felt better then  Since seeing primary has continued to have mild sore throat/ feels swollen/ irritated.  Feels like hard to breathe when she lays flat.     Currently only taking simvastatin    Has a history of acid reflux, does not take anything for it, has noted some worsening acid reflux, nausea after eating, and throat more inflamed in the morning, decreased appetite, early satiety  ROS    Medications, Allergies, and current problem list reviewed today in Epic.     Objective:     /82   Pulse 80   Temp 36.6 °C (97.9 °F) (Temporal)   Resp 12   Ht 1.6 m (5' 3\")   Wt 56.7 kg (125 lb)   SpO2 96%     Physical Exam  Vitals reviewed.   Constitutional:       Appearance: Normal appearance.   HENT:      Head: Normocephalic and atraumatic.      Right Ear: External ear normal.      Left Ear: External ear normal.      Nose: Nose normal.      Mouth/Throat:      Mouth: Mucous membranes are moist.      Pharynx: Oropharynx is clear. No oropharyngeal exudate or posterior oropharyngeal erythema.   Eyes:      Conjunctiva/sclera: Conjunctivae normal.   Cardiovascular:      Rate and Rhythm: Normal rate.   Pulmonary:      Effort: Pulmonary effort is normal.      Breath sounds: Normal breath sounds.   Abdominal:      Tenderness: There is no abdominal tenderness. There is no guarding or rebound.   Skin:     General: Skin is warm and dry.      Capillary Refill: Capillary refill takes less than 2 seconds.   Neurological:      Mental Status: She is alert and oriented to person, place, and time.         Assessment/Plan:     Diagnosis and associated orders: "     1. Gastritis without bleeding, unspecified chronicity, unspecified gastritis type           Comments/MDM:     Given the side effect profile of fosfomycin I will be the patient has been suffering from an allergy or an immune mediated response but rather this is caused dyspepsia and set off some gastritis and potential GERD flareup that resulted in some ongoing stomach and esophageal irritation which she is interpreted as throat swelling, difficulty breathing, difficulty swallowing.  Her exam is normal.  Recommend close follow-up if worsening or failing to improve but started PPI, H2 blocker and Maalox         Differential diagnosis, natural history, supportive care, and indications for immediate follow-up discussed.    Advised the patient to follow-up with the primary care physician for recheck, reevaluation, and consideration of further management.    Please note that this dictation was created using voice recognition software. I have made a reasonable attempt to correct obvious errors, but I expect that there are errors of grammar and possibly content that I did not discover before finalizing the note.    This note was electronically signed by Evaristo Mobley PA-C

## 2025-06-12 ENCOUNTER — APPOINTMENT (OUTPATIENT)
Dept: CARDIOLOGY | Facility: MEDICAL CENTER | Age: 75
End: 2025-06-12
Attending: FAMILY MEDICINE
Payer: MEDICARE

## 2025-06-25 ENCOUNTER — RESULTS FOLLOW-UP (OUTPATIENT)
Dept: MEDICAL GROUP | Facility: PHYSICIAN GROUP | Age: 75
End: 2025-06-25

## 2025-06-25 RX ORDER — ALENDRONATE SODIUM 70 MG/1
70 TABLET ORAL
Qty: 12 TABLET | Refills: 3 | Status: SHIPPED | OUTPATIENT
Start: 2025-06-25

## 2025-08-14 ENCOUNTER — APPOINTMENT (OUTPATIENT)
Dept: MEDICAL GROUP | Facility: PHYSICIAN GROUP | Age: 75
End: 2025-08-14
Payer: MEDICARE

## 2025-08-20 ENCOUNTER — OFFICE VISIT (OUTPATIENT)
Dept: MEDICAL GROUP | Facility: PHYSICIAN GROUP | Age: 75
End: 2025-08-20
Payer: MEDICARE

## 2025-08-20 VITALS
HEART RATE: 65 BPM | DIASTOLIC BLOOD PRESSURE: 90 MMHG | BODY MASS INDEX: 22.15 KG/M2 | TEMPERATURE: 97.5 F | WEIGHT: 125 LBS | OXYGEN SATURATION: 97 % | HEIGHT: 63 IN | RESPIRATION RATE: 16 BRPM | SYSTOLIC BLOOD PRESSURE: 138 MMHG

## 2025-08-20 DIAGNOSIS — Z12.11 COLON CANCER SCREENING: Primary | ICD-10-CM

## 2025-08-20 PROCEDURE — 3080F DIAST BP >= 90 MM HG: CPT | Performed by: FAMILY MEDICINE

## 2025-08-20 PROCEDURE — 3075F SYST BP GE 130 - 139MM HG: CPT | Performed by: FAMILY MEDICINE

## 2025-08-20 PROCEDURE — 99213 OFFICE O/P EST LOW 20 MIN: CPT | Performed by: FAMILY MEDICINE

## 2025-08-20 RX ORDER — ALENDRONATE SODIUM 70 MG/1
70 TABLET ORAL
Qty: 12 TABLET | Refills: 3 | Status: SHIPPED | OUTPATIENT
Start: 2025-08-20 | End: 2025-08-25

## 2025-08-20 ASSESSMENT — FIBROSIS 4 INDEX: FIB4 SCORE: 1.44

## 2025-08-22 RX ORDER — AMLODIPINE BESYLATE 5 MG/1
5 TABLET ORAL
Qty: 100 TABLET | Refills: 3 | Status: SHIPPED | OUTPATIENT
Start: 2025-08-22 | End: 2025-08-25

## 2025-08-25 ENCOUNTER — OFFICE VISIT (OUTPATIENT)
Dept: URGENT CARE | Facility: PHYSICIAN GROUP | Age: 75
End: 2025-08-25
Payer: MEDICARE

## 2025-08-25 VITALS
HEIGHT: 63 IN | SYSTOLIC BLOOD PRESSURE: 154 MMHG | OXYGEN SATURATION: 96 % | WEIGHT: 127 LBS | HEART RATE: 68 BPM | RESPIRATION RATE: 14 BRPM | DIASTOLIC BLOOD PRESSURE: 86 MMHG | BODY MASS INDEX: 22.5 KG/M2 | TEMPERATURE: 98.1 F

## 2025-08-25 DIAGNOSIS — Z72.0 TOBACCO USER: ICD-10-CM

## 2025-08-25 DIAGNOSIS — I10 PRIMARY HYPERTENSION: Primary | ICD-10-CM

## 2025-08-25 RX ORDER — DIAZEPAM 5 MG/1
TABLET ORAL
COMMUNITY
End: 2025-08-25

## 2025-08-25 RX ORDER — CEPHALEXIN 500 MG/1
500 CAPSULE ORAL 2 TIMES DAILY
COMMUNITY
End: 2025-08-25

## 2025-08-25 RX ORDER — OXYCODONE HYDROCHLORIDE 5 MG/1
TABLET ORAL
COMMUNITY
End: 2025-08-25

## 2025-08-25 RX ORDER — HYDROCHLOROTHIAZIDE 12.5 MG/1
12.5 CAPSULE ORAL DAILY
Qty: 30 CAPSULE | Refills: 0 | Status: SHIPPED | OUTPATIENT
Start: 2025-08-25

## 2025-08-25 RX ORDER — ERYTHROMYCIN 5 MG/G
OINTMENT OPHTHALMIC
COMMUNITY
End: 2025-08-25

## 2025-08-25 RX ORDER — DOXYCYCLINE HYCLATE 100 MG
TABLET ORAL
COMMUNITY
End: 2025-08-25

## 2025-08-25 ASSESSMENT — FIBROSIS 4 INDEX: FIB4 SCORE: 1.44
